# Patient Record
Sex: FEMALE | Race: WHITE | Employment: OTHER | URBAN - METROPOLITAN AREA
[De-identification: names, ages, dates, MRNs, and addresses within clinical notes are randomized per-mention and may not be internally consistent; named-entity substitution may affect disease eponyms.]

---

## 2017-02-09 ENCOUNTER — TELEPHONE (OUTPATIENT)
Dept: FAMILY MEDICINE | Facility: CLINIC | Age: 77
End: 2017-02-09

## 2017-02-09 ENCOUNTER — TRANSFERRED RECORDS (OUTPATIENT)
Dept: HEALTH INFORMATION MANAGEMENT | Facility: CLINIC | Age: 77
End: 2017-02-09

## 2017-02-09 NOTE — TELEPHONE ENCOUNTER
Reason for Call: Request for an order or referral:    Order or referral being requested: Alzheimer's     Date needed: as soon as possible    Has the patient been seen by the PCP for this problem? NO    Additional comments: Family is looking for a referral to see someone in regards to Alzheimer's. Stated that the last time Kathie's daughter was in, they discussed it, but the patient has since lost the names of the provider's that were given. Please call [facility] and advise. (We do have a consent to communicate on file)    Phone number Patient can be reached at:  Other phone number:  [phone]    Best Time:  anytime    Can we leave a detailed message on this number?  YES    Sasha Oglesby,   [facility]

## 2017-02-09 NOTE — TELEPHONE ENCOUNTER
Huddled with pcp.     Spoke with granddaughter and advised patient should be seen in clinic to determine type of referral needed.    She will inform patient and mother to call for appt.    Nicole Piña RN

## 2017-02-28 ENCOUNTER — OFFICE VISIT (OUTPATIENT)
Dept: FAMILY MEDICINE | Facility: CLINIC | Age: 77
End: 2017-02-28
Payer: MEDICARE

## 2017-02-28 VITALS
WEIGHT: 213 LBS | HEIGHT: 70 IN | DIASTOLIC BLOOD PRESSURE: 62 MMHG | BODY MASS INDEX: 30.49 KG/M2 | TEMPERATURE: 98.1 F | HEART RATE: 72 BPM | SYSTOLIC BLOOD PRESSURE: 108 MMHG | RESPIRATION RATE: 16 BRPM | OXYGEN SATURATION: 95 %

## 2017-02-28 DIAGNOSIS — F41.9 ANXIETY: ICD-10-CM

## 2017-02-28 DIAGNOSIS — I10 BENIGN ESSENTIAL HYPERTENSION: ICD-10-CM

## 2017-02-28 DIAGNOSIS — R41.3 MEMORY CHANGE: Primary | ICD-10-CM

## 2017-02-28 DIAGNOSIS — E21.0 PRIMARY HYPERPARATHYROIDISM (H): ICD-10-CM

## 2017-02-28 DIAGNOSIS — N95.0 POSTMENOPAUSAL BLEEDING: ICD-10-CM

## 2017-02-28 LAB
ALBUMIN SERPL-MCNC: 3.8 G/DL (ref 3.4–5)
ALP SERPL-CCNC: 79 U/L (ref 40–150)
ALT SERPL W P-5'-P-CCNC: 23 U/L (ref 0–50)
ANION GAP SERPL CALCULATED.3IONS-SCNC: 6 MMOL/L (ref 3–14)
AST SERPL W P-5'-P-CCNC: 15 U/L (ref 0–45)
BILIRUB SERPL-MCNC: 0.6 MG/DL (ref 0.2–1.3)
BUN SERPL-MCNC: 17 MG/DL (ref 7–30)
CALCIUM SERPL-MCNC: 9.2 MG/DL (ref 8.5–10.1)
CHLORIDE SERPL-SCNC: 105 MMOL/L (ref 94–109)
CO2 SERPL-SCNC: 29 MMOL/L (ref 20–32)
CREAT SERPL-MCNC: 0.92 MG/DL (ref 0.52–1.04)
ERYTHROCYTE [DISTWIDTH] IN BLOOD BY AUTOMATED COUNT: 13.2 % (ref 10–15)
ERYTHROCYTE [SEDIMENTATION RATE] IN BLOOD BY WESTERGREN METHOD: 14 MM/H (ref 0–30)
GFR SERPL CREATININE-BSD FRML MDRD: 59 ML/MIN/1.7M2
GLUCOSE SERPL-MCNC: 92 MG/DL (ref 70–99)
HCT VFR BLD AUTO: 42.6 % (ref 35–47)
HGB BLD-MCNC: 13.4 G/DL (ref 11.7–15.7)
MCH RBC QN AUTO: 28.5 PG (ref 26.5–33)
MCHC RBC AUTO-ENTMCNC: 31.5 G/DL (ref 31.5–36.5)
MCV RBC AUTO: 90 FL (ref 78–100)
PLATELET # BLD AUTO: 268 10E9/L (ref 150–450)
POTASSIUM SERPL-SCNC: 4.7 MMOL/L (ref 3.4–5.3)
PROT SERPL-MCNC: 7.7 G/DL (ref 6.8–8.8)
PTH-INTACT SERPL-MCNC: 31 PG/ML (ref 12–72)
RBC # BLD AUTO: 4.71 10E12/L (ref 3.8–5.2)
SODIUM SERPL-SCNC: 140 MMOL/L (ref 133–144)
TSH SERPL DL<=0.005 MIU/L-ACNC: 2.05 MU/L (ref 0.4–4)
VIT B12 SERPL-MCNC: 251 PG/ML (ref 193–986)
WBC # BLD AUTO: 7.5 10E9/L (ref 4–11)

## 2017-02-28 PROCEDURE — 99214 OFFICE O/P EST MOD 30 MIN: CPT | Performed by: INTERNAL MEDICINE

## 2017-02-28 PROCEDURE — 85652 RBC SED RATE AUTOMATED: CPT | Performed by: INTERNAL MEDICINE

## 2017-02-28 PROCEDURE — 83970 ASSAY OF PARATHORMONE: CPT | Performed by: INTERNAL MEDICINE

## 2017-02-28 PROCEDURE — 85027 COMPLETE CBC AUTOMATED: CPT | Performed by: INTERNAL MEDICINE

## 2017-02-28 PROCEDURE — 82607 VITAMIN B-12: CPT | Performed by: INTERNAL MEDICINE

## 2017-02-28 PROCEDURE — 84443 ASSAY THYROID STIM HORMONE: CPT | Performed by: INTERNAL MEDICINE

## 2017-02-28 PROCEDURE — 80053 COMPREHEN METABOLIC PANEL: CPT | Performed by: INTERNAL MEDICINE

## 2017-02-28 PROCEDURE — 36415 COLL VENOUS BLD VENIPUNCTURE: CPT | Performed by: INTERNAL MEDICINE

## 2017-02-28 RX ORDER — VIT A/VIT C/VIT E/ZINC/COPPER 4296-226
1 CAPSULE ORAL EVERY MORNING
COMMUNITY

## 2017-02-28 NOTE — NURSING NOTE
"Chief Complaint   Patient presents with     Memory Loss     short term memory loss, repeats herself a lot,   family is a little concerned about knowing where she is going.  Is writing down a lot of notes about schedules.        Initial /62 (BP Location: Right arm, Patient Position: Chair, Cuff Size: Adult Large)  Pulse 72  Temp 98.1  F (36.7  C) (Oral)  Resp 16  Ht 5' 9.5\" (1.765 m)  Wt 213 lb (96.6 kg)  SpO2 95%  BMI 31 kg/m2 Estimated body mass index is 31 kg/(m^2) as calculated from the following:    Height as of this encounter: 5' 9.5\" (1.765 m).    Weight as of this encounter: 213 lb (96.6 kg).  Medication Reconciliation: complete    "

## 2017-02-28 NOTE — LETTER
March 9, 2017      Kathie Wetzel  [address]   [facility] [facility] [place] [place] [address]        Dear Ms. Kathie Wetzel,    Overall, labs Look quite good; no change in medication or therapy recommended    Sincerely,     Cammie Daugherty MD

## 2017-02-28 NOTE — MR AVS SNAPSHOT
After Visit Summary   2/28/2017    Kathie Wetzel    MRN: [id number]           Patient Information     Date Of Birth          1940        Visit Information        Provider Department      2/28/2017 9:00 AM Cammie Daugherty MD [facility]        Today's Diagnoses     Memory change    -  1    Postmenopausal bleeding        Primary hyperparathyroidism (H)           Follow-ups after your visit        Additional Services     NEUROLOGY ADULT REFERRAL       Your provider has referred you to:     Rodri March, PhD  [facility]  [address]  [place], [place] [id number] [phone]      Reason for Referral: Consult    Please be aware that coverage of these services is subject to the terms and limitations of your health insurance plan.  Call member services at your health plan with any benefit or coverage questions.      Please bring the following with you to your appointment:    (1) Any X-Rays, CTs or MRIs which have been performed.  Contact the facility where they were done to arrange for  prior to your scheduled appointment.    (2) List of current medications  (3) This referral request   (4) Any documents/labs given to you for this referral                  Who to contact     If you have questions or need follow up information about today's clinic visit or your schedule please contact [facility] directly at [phone].  Normal or non-critical lab and imaging results will be communicated to you by MyChart, letter or phone within 4 business days after the clinic has received the results. If you do not hear from us within 7 days, please contact the clinic through MyChart or phone. If you have a critical or abnormal lab result, we will notify you by phone as soon as possible.  Submit refill requests through [employer] or call your pharmacy and they will forward the refill request to us. Please allow 3 business  "days for your refill to be completed.          Additional Information About Your Visit        [employer] Information     [employer] lets you send messages to your doctor, view your test results, renew your prescriptions, schedule appointments and more. To sign up, go to [email or web address] . Click on \"Log in\" on the left side of the screen, which will take you to the Welcome page. Then click on \"Sign up Now\" on the right side of the page.     You will be asked to enter the access code listed below, as well as some personal information. Please follow the directions to create your username and password.     Your access code is: [email or web address]  Expires: 2017  9:48 AM     Your access code will  in 90 days. If you need help or a new code, please call your [place] clinic or [phone].        Care EveryWhere ID     This is your [facility] EveryWhere ID. This could be used by other organizations to access your [facility] medical records  [id number]        Your Vitals Were     Pulse Temperature Respirations Height Pulse Oximetry BMI (Body Mass Index)    72 98.1  F (36.7  C) (Oral) 16 5' 9.5\" (1.765 m) 95% 31 kg/m2       Blood Pressure from Last 3 Encounters:   17 108/62   10/10/16 116/76   14 104/69    Weight from Last 3 Encounters:   17 213 lb (96.6 kg)   10/10/16 209 lb 9.6 oz (95.1 kg)   14 207 lb (93.9 kg)              We Performed the Following     CBC with platelets     Comprehensive metabolic panel     ESR: Erythrocyte sedimentation rate     NEUROLOGY ADULT REFERRAL     Parathyroid Hormone Intact     TSH with free T4 reflex     Vitamin B12        Primary Care Provider Office Phone # Fax #    Cammie Daugherty [phone]       [facility] [address]  [place] [address]        Thank you!     Thank you for choosing [facility]  for your care. Our goal is always to provide you with excellent care. Hearing back from our patients is one " way we can continue to improve our services. Please take a few minutes to complete the written survey that you may receive in the mail after your visit with us. Thank you!             Your Updated Medication List - Protect others around you: Learn how to safely use, store and throw away your medicines at www.disposemymeds.org.          This list is accurate as of: 2/28/17  9:48 AM.  Always use your most recent med list.                   Brand Name Dispense Instructions for use    ALEVE PO      PRN       citalopram 10 MG tablet    celeXA    90 tablet    Take 1 tablet (10 mg) by mouth daily       FISH OIL + D3 PO      Take 1 tablet by mouth daily       LIPITOR PO      Take 10 mg by mouth At Bedtime       losartan 50 MG tablet    COZAAR     Take 50 mg by mouth daily       PRESERVISION AREDS PO      Take 1 tablet by mouth daily       VITAMIN D PO      Take 1,000 Units by mouth daily

## 2017-02-28 NOTE — PROGRESS NOTES
SUBJECTIVE:                                                    Ktahie Wetzel is a 77 year old female who presents to clinic today for the following health issues:    Patient presents with:  Memory Loss: short term memory loss, repeats herself a lot, family is a little concerned about knowing where she is going. Is writing down a lot of notes about schedules.       Amount of exercise or physical activity: walks around her complex for about 5-10 minutes and shops and walks the stores. 2-3 times a week    Problems taking medications regularly: No    Medication side effects: none    Diet: regular (no restrictions)    Problem list and histories reviewed & adjusted, as indicated.  Additional history: as documented    BP Readings from Last 3 Encounters:   02/28/17 108/62   10/10/16 116/76   03/14/14 104/69    Wt Readings from Last 3 Encounters:   02/28/17 213 lb (96.6 kg)   10/10/16 209 lb 9.6 oz (95.1 kg)   03/14/14 207 lb (93.9 kg)        Labs reviewed in EPIC  Reviewed and updated as needed this visit by clinical staff  Tobacco  Allergies  Meds  Med Hx  Surg Hx  Fam Hx  Soc Hx      Reviewed and updated as needed this visit by Provider  Allergies  Meds       ROS:  CONSTITUTIONAL: NEGATIVE for fever, chills, change in weight  EYE: POSITIVE for macular degeneration - following with Ophthalmology  ENT/MOUTH: NEGATIVE for ear, mouth and throat problems  RESP: NEGATIVE for significant cough or SOB  CV: NEGATIVE for chest pain, palpitations or peripheral edema  GI: NEGATIVE for nausea, abdominal pain, heartburn, or change in bowel habits  : POSITIVE for postmenopausal spotting - scheduled US today   MUSCULOSKELETAL: NEGATIVE for significant arthralgias or myalgia  NEURO: POSITIVE for short term memory loss noted by family - repeats self, keeping notes about schedules, family concerned when patient leaves home, no reported concerns with remembering appointments, finances, or driving, NEGATIVE for weakness,  "dizziness or paresthesias  ENDOCRINE: Hx of right parathyroidectomy 3/14/14, NEGATIVE for temperature intolerance, skin/hair changes  HEME/ALLERGY/IMMUNE: NEGATIVE for bleeding problems  PSYCHIATRIC: NEGATIVE for changes in mood or affect    This document serves as a record of the services and decisions personally performed and made by Cammie Daugherty MD. It was created on her behalf by Drea Ardon, a trained medical scribe. The creation of this document is based on the provider's statements to the medical scribe.   Drea Ardon, 9:37 AM, February 28, 2017    OBJECTIVE:                                                    /62 (BP Location: Right arm, Patient Position: Chair, Cuff Size: Adult Large)  Pulse 72  Temp 98.1  F (36.7  C) (Oral)  Resp 16  Ht 5' 9.5\" (1.765 m)  Wt 213 lb (96.6 kg)  SpO2 95%  BMI 31 kg/m2  Body mass index is 31 kg/(m^2).  GENERAL APPEARANCE: healthy, alert and no distress  NECK: no adenopathy and thyroid normal to palpation, no bruits  RESP: lungs clear to auscultation - no rales, rhonchi or wheezes  CV: regular rates and rhythm and normal S1 S2, no peripheral edema  LYMPHATICS: normal ant/post cervical and supraclavicular nodes  ABDOMEN: soft, nontender, without hepatosplenomegaly or masses and bowel sounds normal  MS: extremities normal- no gross deformities noted  NEURO: mentation intact and speech normal  PSYCH: mentation appears normal and affect normal/bright    Diagnostic Test Results:  none      ASSESSMENT/PLAN:                                                    Patient seen in the clinic today with daughter, Regi.     (R41.3) Memory change (primary encounter diagnosis)  Comment: FH Alzheimer's; several family members concerned about noted change/behaviors; recommended Neurology evaluation or Neuropsych evaluation with Dr. March  Plan: NEUROLOGY ADULT REFERRAL, Vitamin B12, ESR:         Erythrocyte sedimentation rate, TSH with free         T4 reflex, Comprehensive metabolic " panel, CBC         with platelets, Parathyroid Hormone Intact          (N95.0) Postmenopausal bleeding  Comment: spotting; seeing Dr. Pau Tellez for US today and further definitive evaluation   Plan: Defer to GYN          (E21.0) Primary hyperparathyroidism (H)  Comment: large adenoma removed several years ago; Dr. Fischer,  Plan: Parathyroid Hormone Intact        CMP    (I10) Benign essential hypertension  Comment: Losartan 50 mg has been effective at controlling bp  Plan: well controlled    (F41.9) Anxiety  Comment: Citalopram 10 mg for quite sometime;   Plan: well tolerated     MEDICATIONS:   Orders Placed This Encounter   Medications     Multiple Vitamins-Minerals (PRESERVISION AREDS PO)     Sig: Take 1 tablet by mouth daily     Fish Oil-Cholecalciferol (FISH OIL + D3 PO)     Sig: Take 1 tablet by mouth daily     There are no discontinued medications.    FUTURE APPOINTMENTS:       - Make appointment with neuropsych specialist    Cammie Daugherty MD  Internal Medicine  [facility] [place]    The information in this document, created by a medical scribe for me, accurately reflects the services I personally performed and the decisions made by me. I have reviewed and approved this document for accuracy.  Dr. Cammie Daugherty, 9:49 AM, February 28, 2017

## 2017-03-15 NOTE — PROGRESS NOTES
[facility]  [address] [address] [phone]  Dept: [phone]    PRE-OP EVALUATION:  Today's date: 3/22/2017    Kathie Wetzel (: 1940) presents for pre-operative evaluation assessment as requested by Dr. Kai Tellez.  She requires evaluation and anesthesia risk assessment prior to undergoing surgery/procedure for treatment of vaginal bleeding. Proposed procedure: D and C with Biopsy    Date of Surgery/ Procedure: 3/30/17  Time of Surgery/ Procedure: 8:30am  Hospital/Surgical Facility: [facility]  Fax number for surgical facility: [phone]  Primary Physician: Cammie Daugherty  Type of Anesthesia Anticipated: to be determined    Patient has a Health Care Directive or Living Will:  YES     1. NO - Do you have a history of heart attack, stroke, stent, bypass or surgery on an artery in the head, neck, heart or legs?  2. NO - Do you ever have any pain or discomfort in your chest?  3. NO - Do you have a history of  Heart Failure?  4. YES - ARE YOUR TROUBLED BY SHORTNESS OF BREATH WHEN WALKING ON THE LEVEL, UP A SLIGHT HILL OR AT NIGHT? Gets winded when she is walking too fast.  5. NO - Do you currently have a cold, bronchitis or other respiratory infection?  6. NO - Do you have a cough, shortness of breath or wheezing?  7. NO - Do you sometimes get pains in the calves of your legs when you walk?  8. NO - Do you or anyone in your family have previous history of blood clots?  9. NO - Do you or does anyone in your family have a serious bleeding problem such as prolonged bleeding following surgeries or cuts?  10. NO - Have you ever had problems with anemia or been told to take iron pills?  11. NO - Have you had any abnormal blood loss such as black, tarry or bloody stools, or abnormal vaginal bleeding?  12. NO - Have you ever had a blood transfusion?  13. NO - Have you or any of your relatives ever had problems with anesthesia?  14. NO - Do you  have sleep apnea, excessive snoring or daytime drowsiness?  15. NO - Do you have any prosthetic heart valves?  16. NO - Do you have prosthetic joints?  17. NO - Is there any chance that you may be pregnant?      HPI:                                                      Brief HPI related to upcoming procedure:     The patient has been following with Dr. Kai Tellez and requested the patient follow-up for a dilation and curettage in addition to a biopsy to determine if there are any abnormal cells on the patient's cervix.    Sleep Disturbances:  The patient reports she has been having a tough time staying asleep at night, which she has been trying to treat with Benadryl and Tylenol-PM, but neither has proven to be consistently effective.       HYPERTENSION - Patient has longstanding history of mod-severe HTN , currently denies any symptoms referable to elevated blood pressure. Specifically denies chest pain, palpitations, dyspnea, orthopnea, PND or peripheral edema. Blood pressure readings have been in normal range. Current medication regimen is as listed below. Patient denies any side effects of medication.                                                                                                            BP Readings from Last 3 Encounters:   03/22/17 116/62   02/28/17 108/62   10/10/16 116/76        HYPERLIPIDEMIA - Patient has a long history of significant Hyperlipidemia requiring medication for treatment with recent good control. Patient reports no problems or side effects with the medication.                                                         MEDICAL HISTORY:                                                      Patient Active Problem List    Diagnosis Date Noted     Retinal tear, left 10/17/2016     Priority: Medium     Hyperlipidemia LDL goal <100 10/10/2016     Priority: Medium     Atorvastatin helpful       Benign essential hypertension 10/10/2016     Priority: Medium     Anxiety 10/10/2016      Priority: Medium     Citalopram for years       Primary hyperparathyroidism (H) 02/26/2014     Priority: Medium     Parathyroidectomy        Past Medical History:   Diagnosis Date     High cholesterol      HTN (hypertension)      Parathyroid adenoma      Thyroid disorder      Past Surgical History:   Procedure Laterality Date     APPENDECTOMY OPEN       CHOLECYSTECTOMY  2007     PARATHYROIDECTOMY  3/14/2014    Procedure: PARATHYROIDECTOMY;  Neck Exploration, Excision Right  Parathyroid Adenoma, Pre Operative Sestimibi Injection, Rapid PTH Assay ;  Surgeon: Natividad Fischer MD;  Location: [facility] OR     Current Outpatient Prescriptions   Medication Sig Dispense Refill     Multiple Vitamins-Minerals (PRESERVISION AREDS PO) Take 1 tablet by mouth daily       Fish Oil-Cholecalciferol (FISH OIL + D3 PO) Take 1 tablet by mouth daily       citalopram (CELEXA) 10 MG tablet Take 1 tablet (10 mg) by mouth daily 90 tablet 2     Atorvastatin Calcium (LIPITOR PO) Take 10 mg by mouth At Bedtime       losartan (COZAAR) 50 MG tablet Take 50 mg by mouth daily       Cholecalciferol (VITAMIN D PO) Take 1,000 Units by mouth daily        Naproxen Sodium (ALEVE PO) PRN       OTC products: None, except as noted above    Allergies   Allergen Reactions     Simvastatin      Buttock pain     Penicillins Rash      Latex Allergy: NO    Social History   Substance Use Topics     Smoking status: Former Smoker     Quit date: 3/10/1972     Smokeless tobacco: Never Used     Alcohol use Yes      Comment: 2 week      History   Drug Use No       REVIEW OF SYSTEMS:                                                    C: NEGATIVE for fever, chills, or change in weight  E: Hx of Retinal Tear, Left - surgically repaired on 2/9/2017; NEGATIVE for vision changes or irritation  E/M: NEGATIVE for ear, mouth and throat problems  R: POSITIVE for intermittent shortness of breath - with exertion and fast walking; NEGATIVE for significant cough  CV: Hx of Hypertension -  use of Losartan; NEGATIVE for chest pain, palpitations or peripheral edema  GI: NEGATIVE for nausea, abdominal pain, heartburn, or change in bowel habits  : NEGATIVE for unusual urinary or vaginal symptoms  M: NEGATIVE for significant arthralgias or myalgia  N: NEGATIVE for weakness, dizziness or paresthesias  E: Hx of Hyperparathyroidism - parathyroidectomy on 3/14/2014; Hx of Hyperlipidemia - use of Atorvastatin; NEGATIVE for temperature intolerance, or skin/hair changes  H: NEGATIVE for bleeding problems  P: Hx of Anxiety - use of Citalopram; POSITIVE for sleep disturbances - use of Benadryl; NEGATIVE for changes in mood or affect    This document serves as a record of the services and decisions personally performed and made by Cammie Daugherty MD. It was created on her behalf by Marisa Ponce, a trained medical scribe. The creation of this document is based the provider's statements to the medical scribe.  Marisa Ponce, March 22, 2017 2:19 PM     EXAM:                                                    /62 (BP Location: Right arm, Patient Position: Chair, Cuff Size: Adult Large)  Pulse 79  Temp 97.8  F (36.6  C) (Oral)  Resp 15  Wt 98.1 kg (216 lb 4.8 oz)  SpO2 97%  BMI 31.48 kg/m2  GENERAL: Patient appears healthy, alert and not distressed.  EYES: Eyes appear grossly normal to inspection, with normal conjunctivae and sclerae  HENT: Ear canals and TM's appear normal, mouth is without ulcers or lesions, oropharynx is clear and oral mucous membranes are moist  NECK: No adenopathy present, no asymmetry, masses, or scars noted, thyroid is normal to palpation  RESP: Lungs are clear to auscultation - no rales, rhonchi or wheezes present  CV: Regular rate and rhythm, normal S1 S2 heart sounds, no ectopy, no peripheral edema present, peripheral pulses normal, no carotid bruit.  ABDOMEN: Soft, nontender, no hepatosplenomegaly, no masses, normal bowel sounds  MS: No gross musculoskeletal defects noted, no  edema, gait is age appropriate without ataxia  NEURO: Patient exhibits normal strength and tone, normal speech and mentation  PSYCH: Mentation appears normal, affect is normal/bright    DIAGNOSTICS:                                                    EKG: appears normal, NSR, rate 77, normal axis, normal intervals, no acute ST/T changes c/w ischemia, no LVH by voltage criteria    Recent Labs   Lab Test  02/28/17   0949   HGB  13.4   PLT  268   NA  140   POTASSIUM  4.7   CR  0.92      IMPRESSION:                                                    Reason for surgery/procedure: postmenopausal bleeding  Diagnosis/reason for consult: postmenopausal bleeding.    The proposed surgical procedure is considered LOW risk.    REVISED CARDIAC RISK INDEX  The patient has the following serious cardiovascular risks for perioperative complications such as (MI, PE, VFib and 3  AV Block):  No serious cardiac risks  INTERPRETATION: 0 risks: Class I (very low risk - 0.4% complication rate)    The patient has the following additional risks for perioperative complications:  No identified additional risks      ICD-10-CM    1. Preop general physical exam Z01.818 EKG 12-lead complete w/read - Clinics   2. Symptomatic menopausal or female climacteric states N95.1    3. Benign essential hypertension I10        RECOMMENDATIONS:                                                        --Patient is to take all scheduled medications on the day of surgery EXCEPT for modifications listed below.     -HOLD Fish Oil and Naproxen one week prior to surgery  OK to take Citalopram and Losartan with a small sip of water on early AM of surgery.    APPROVAL GIVEN to proceed with proposed procedure, without further diagnostic evaluation     The information in this document, created by a medical scribe for me, accurately reflects the services I personally performed and the decisions made by me. I have reviewed and approved this document for accuracy.  Dr. Bonds  [place], March 22, 2017, 2:37 PM     Signed Electronically by: Cammie Daugherty MD    Copy of this evaluation report is provided to requesting physician.    [facility] Preop Guidelines

## 2017-03-15 NOTE — PATIENT INSTRUCTIONS
Before Your Surgery    Call your surgeon if there is any change in your health. This includes signs of a cold or flu (such as a sore throat, runny nose, cough, rash or fever).    Do not smoke, drink alcohol or take over the counter medicine (unless your surgeon or primary care doctor tells you to) for the 24 hours before and after surgery.    If you take prescribed drugs: Follow your doctor s orders about which medicines to take and which to stop until after surgery.    Eating and drinking prior to surgery: follow the instructions from your surgeon    Take a shower or bath the night before surgery. Use the soap your surgeon gave you to gently clean your skin. If you do not have soap from your surgeon, use your regular soap. Do not shave or scrub the surgery site.  Wear clean pajamas and have clean sheets on your bed.     Approval given to proceed with proposed procedure, without further diagnostic evaluation    --Patient advised to avoid NSAIDS (Motrin, Ibuprofen, Aleve or Naprosyn)    --If needed, Tylenol or Acetaminophen are fine to use.    --Medications reviewed:   OK to use eye drops as usual.   OK to take Citalopram and Losartan with a tiny sip of water.   WAIT to take Lipitor/Atorvastatin until you get home from surgery.   HOLD Fish Oil    --Pain medications, time off from work and FMLA following surgery deferred to surgeon.

## 2017-03-22 ENCOUNTER — OFFICE VISIT (OUTPATIENT)
Dept: FAMILY MEDICINE | Facility: CLINIC | Age: 77
End: 2017-03-22
Payer: MEDICARE

## 2017-03-22 VITALS
HEART RATE: 79 BPM | RESPIRATION RATE: 15 BRPM | OXYGEN SATURATION: 97 % | BODY MASS INDEX: 31.48 KG/M2 | TEMPERATURE: 97.8 F | SYSTOLIC BLOOD PRESSURE: 116 MMHG | DIASTOLIC BLOOD PRESSURE: 62 MMHG | WEIGHT: 216.3 LBS

## 2017-03-22 DIAGNOSIS — Z01.818 PREOP GENERAL PHYSICAL EXAM: Primary | ICD-10-CM

## 2017-03-22 DIAGNOSIS — N95.1 SYMPTOMATIC MENOPAUSAL OR FEMALE CLIMACTERIC STATES: ICD-10-CM

## 2017-03-22 DIAGNOSIS — I10 BENIGN ESSENTIAL HYPERTENSION: ICD-10-CM

## 2017-03-22 PROCEDURE — 93000 ELECTROCARDIOGRAM COMPLETE: CPT | Performed by: INTERNAL MEDICINE

## 2017-03-22 PROCEDURE — 99214 OFFICE O/P EST MOD 30 MIN: CPT | Performed by: INTERNAL MEDICINE

## 2017-03-22 NOTE — Clinical Note
Do we need to fax to [facility] surgery M[facility]? If so, please do and if not, all set.  EKG also current

## 2017-03-22 NOTE — NURSING NOTE
"Chief Complaint   Patient presents with     Pre-Op Exam       Initial /62 (BP Location: Right arm, Patient Position: Chair, Cuff Size: Adult Large)  Pulse 79  Temp 97.8  F (36.6  C) (Oral)  Resp 15  Wt 216 lb 4.8 oz (98.1 kg)  SpO2 97%  BMI 31.48 kg/m2 Estimated body mass index is 31.48 kg/(m^2) as calculated from the following:    Height as of 2/28/17: 5' 9.5\" (1.765 m).    Weight as of this encounter: 216 lb 4.8 oz (98.1 kg).  Medication Reconciliation: complete    "

## 2017-03-22 NOTE — MR AVS SNAPSHOT
After Visit Summary   3/22/2017    Kathie Wetzel    MRN: [id number]           Patient Information     Date Of Birth          1940        Visit Information        Provider Department      3/22/2017 1:30 PM Cammie Daugherty MD [facility]        Today's Diagnoses     Preop general physical exam    -  1    Symptomatic menopausal or female climacteric states        Benign essential hypertension          Care Instructions      Before Your Surgery    Call your surgeon if there is any change in your health. This includes signs of a cold or flu (such as a sore throat, runny nose, cough, rash or fever).    Do not smoke, drink alcohol or take over the counter medicine (unless your surgeon or primary care doctor tells you to) for the 24 hours before and after surgery.    If you take prescribed drugs: Follow your doctor s orders about which medicines to take and which to stop until after surgery.    Eating and drinking prior to surgery: follow the instructions from your surgeon    Take a shower or bath the night before surgery. Use the soap your surgeon gave you to gently clean your skin. If you do not have soap from your surgeon, use your regular soap. Do not shave or scrub the surgery site.  Wear clean pajamas and have clean sheets on your bed.     Approval given to proceed with proposed procedure, without further diagnostic evaluation    --Patient advised to avoid NSAIDS (Motrin, Ibuprofen, Aleve or Naprosyn)    --If needed, Tylenol or Acetaminophen are fine to use.    --Medications reviewed:   OK to use eye drops as usual.   OK to take Citalopram and Losartan with a tiny sip of water.   WAIT to take Lipitor/Atorvastatin until you get home from surgery.   HOLD Fish Oil    --Pain medications, time off from work and FMLA following surgery deferred to surgeon.        Follow-ups after your visit        Who to contact     If you have questions or need follow up information about today's  "clinic visit or your schedule please contact [facility] directly at [phone].  Normal or non-critical lab and imaging results will be communicated to you by MyChart, letter or phone within 4 business days after the clinic has received the results. If you do not hear from us within 7 days, please contact the clinic through [employer] or phone. If you have a critical or abnormal lab result, we will notify you by phone as soon as possible.  Submit refill requests through [employer] or call your pharmacy and they will forward the refill request to us. Please allow 3 business days for your refill to be completed.          Additional Information About Your Visit        MyChart Information     [employer] It lets you send messages to your doctor, view your test results, renew your prescriptions, schedule appointments and more. To sign up, go to [email or web address] . Click on \"Log in\" on the left side of the screen, which will take you to the Welcome page. Then click on \"Sign up Now\" on the right side of the page.     You will be asked to enter the access code listed below, as well as some personal information. Please follow the directions to create your username and password.     Your access code is: [email or web address]  Expires: 2017 10:48 AM     Your access code will  in 90 days. If you need help or a new code, please call your [place] clinic or [phone].        Care EveryWhere ID     This is your Care EveryWhere ID. This could be used by other organizations to access your [place] medical records  [id number]        Your Vitals Were     Pulse Temperature Respirations Pulse Oximetry BMI (Body Mass Index)       79 97.8  F (36.6  C) (Oral) 15 97% 31.48 kg/m2        Blood Pressure from Last 3 Encounters:   17 116/62   17 108/62   10/10/16 116/76    Weight from Last 3 Encounters:   17 216 lb 4.8 oz (98.1 kg)   17 213 lb (96.6 kg)   10/10/16 209 lb 9.6 oz (95.1 kg)              We " Performed the Following     EKG 12-lead complete w/read - Clinics        Primary Care Provider Office Phone # Fax #    Cammie Daugherty [phone]       [facility] [address]  [facility] [address]        Thank you!     Thank you for choosing [facility]  for your care. Our goal is always to provide you with excellent care. Hearing back from our patients is one way we can continue to improve our services. Please take a few minutes to complete the written survey that you may receive in the mail after your visit with us. Thank you!             Your Updated Medication List - Protect others around you: Learn how to safely use, store and throw away your medicines at www.disposemymeds.org.          This list is accurate as of: 3/22/17  2:33 PM.  Always use your most recent med list.                   Brand Name Dispense Instructions for use    ALEVE PO      PRN       citalopram 10 MG tablet    celeXA    90 tablet    Take 1 tablet (10 mg) by mouth daily       FISH OIL + D3 PO      Take 1 tablet by mouth daily       LIPITOR PO      Take 10 mg by mouth At Bedtime       losartan 50 MG tablet    COZAAR     Take 50 mg by mouth daily       PRESERVISION AREDS PO      Take 1 tablet by mouth daily       VITAMIN D PO      Take 1,000 Units by mouth daily

## 2017-03-30 ENCOUNTER — HOSPITAL PATHOLOGY (OUTPATIENT)
Dept: OTHER | Facility: CLINIC | Age: 77
End: 2017-03-30

## 2017-03-31 LAB — COPATH REPORT: NORMAL

## 2017-05-18 ENCOUNTER — TRANSFERRED RECORDS (OUTPATIENT)
Dept: HEALTH INFORMATION MANAGEMENT | Facility: CLINIC | Age: 77
End: 2017-05-18

## 2017-06-03 DIAGNOSIS — F41.9 ANXIETY: ICD-10-CM

## 2017-06-03 RX ORDER — CITALOPRAM HYDROBROMIDE 10 MG/1
TABLET ORAL
Qty: 90 TABLET | Refills: 0 | Status: CANCELLED | OUTPATIENT
Start: 2017-06-03

## 2017-06-05 NOTE — TELEPHONE ENCOUNTER
citalopram (CELEXA) 10 MG     Last Written Prescription Date: 10/10/16  Last Fill Quantity: 90, # refills: 2  [facility] Office Visit with [facility] care provider:  3/22/17   Next 5 appointments (look out 90 days)     Jun 06, 2017  2:00 PM CDT   [facility]    [address] [address] [address]   [phone]            Jun 20, 2017  9:00 AM CDT   Office Visit with Cammie D[facility] MD   [facility]    [address] [address] [address]   [phone]                   Last PHQ-9 score on record= No flowsheet data found.

## 2017-06-05 NOTE — TELEPHONE ENCOUNTER
Appointment tomorrow with Dr. Daugherty, called patient to ask if this can wait for tomorrow, or if she needs today.  Left message at home number to call and let us know if needed today.  Clarissa Malik, KEREN  Triage Nurse

## 2017-06-06 ENCOUNTER — OFFICE VISIT (OUTPATIENT)
Dept: FAMILY MEDICINE | Facility: CLINIC | Age: 77
End: 2017-06-06
Payer: MEDICARE

## 2017-06-06 VITALS
BODY MASS INDEX: 32.04 KG/M2 | RESPIRATION RATE: 15 BRPM | HEART RATE: 72 BPM | OXYGEN SATURATION: 96 % | DIASTOLIC BLOOD PRESSURE: 60 MMHG | SYSTOLIC BLOOD PRESSURE: 112 MMHG | TEMPERATURE: 98.3 F | WEIGHT: 220.1 LBS

## 2017-06-06 DIAGNOSIS — F41.9 ANXIETY: ICD-10-CM

## 2017-06-06 DIAGNOSIS — E78.5 HYPERLIPIDEMIA LDL GOAL <100: Primary | ICD-10-CM

## 2017-06-06 DIAGNOSIS — M54.50 RIGHT-SIDED LOW BACK PAIN WITHOUT SCIATICA, UNSPECIFIED CHRONICITY: ICD-10-CM

## 2017-06-06 DIAGNOSIS — I10 BENIGN ESSENTIAL HYPERTENSION: ICD-10-CM

## 2017-06-06 PROCEDURE — 99214 OFFICE O/P EST MOD 30 MIN: CPT | Performed by: INTERNAL MEDICINE

## 2017-06-06 RX ORDER — ATORVASTATIN CALCIUM 10 MG/1
10 TABLET, FILM COATED ORAL AT BEDTIME
Qty: 90 TABLET | Refills: 3 | Status: SHIPPED | OUTPATIENT
Start: 2017-06-06 | End: 2018-06-05

## 2017-06-06 RX ORDER — LOSARTAN POTASSIUM 50 MG/1
50 TABLET ORAL DAILY
Qty: 90 TABLET | Refills: 3 | Status: SHIPPED | OUTPATIENT
Start: 2017-06-06 | End: 2018-06-05

## 2017-06-06 RX ORDER — CITALOPRAM HYDROBROMIDE 10 MG/1
10 TABLET ORAL DAILY
Qty: 90 TABLET | Refills: 3 | Status: SHIPPED | OUTPATIENT
Start: 2017-06-06 | End: 2018-06-05

## 2017-06-06 NOTE — PROGRESS NOTES
SUBJECTIVE:                                                    Kathie Wetzel is a 77 year old female who presents to clinic today for the following health issues:    Hyperlipidemia Follow-Up      Rate your low fat/cholesterol diet?: fair    Taking statin? Yes, no muscle aches from statin    Other lipid medications/supplements?: none    Hypertension Follow-up      Outpatient blood pressures are not being checked.    Low Salt Diet: no added salt  BP Readings from Last 3 Encounters:   06/06/17 112/60   03/22/17 116/62   02/28/17 108/62        Anxiety Follow-Up      Status since last visit: No change    Other associated symptoms: None    Complicating factors:   Significant life event: No   Current substance abuse: None  Depression symptoms: No  No flowsheet data found.   GAD7       Amount of exercise or physical activity: None    Problems taking medications regularly: No    Medication side effects: none    Diet: regular (no restrictions)    Problem list and histories reviewed & adjusted, as indicated.  Additional history: as documented    Recent Labs   Lab Test  02/28/17   0949   ALT  23   CR  0.92   GFRESTIMATED  59*   GFRESTBLACK  72   POTASSIUM  4.7   TSH  2.05      BP Readings from Last 3 Encounters:   06/06/17 112/60   03/22/17 116/62   02/28/17 108/62    Wt Readings from Last 3 Encounters:   06/06/17 220 lb 1.6 oz (99.8 kg)   03/22/17 216 lb 4.8 oz (98.1 kg)   02/28/17 213 lb (96.6 kg)        Labs reviewed in EPIC    Reviewed and updated as needed this visit by clinical staff  Tobacco  Allergies  Meds  Problems  Med Hx  Surg Hx  Fam Hx  Soc Hx        Reviewed and updated as needed this visit by Provider  Allergies  Meds  Problems       ROS:  CONSTITUTIONAL: NEGATIVE for fever, chills, change in weight  RESP: NEGATIVE for significant cough or SOB  CV: HTN - on losartan (Cozaar); NEGATIVE for chest pain, palpitations or peripheral edema  GI: NEGATIVE for nausea, abdominal pain, heartburn, or change in  bowel habits  : Recent D & C with biopsy 3/30/17  MUSCULOSKELETAL: POSITIVE for intermittent right hip pain - doing PT exercises at home   ENDOCRINE: Hyperlipidemia - on atorvastatin (Lipitor); Hx of right parathyroidectomy; NEGATIVE for temperature intolerance, skin/hair changes  HEME/ALLERGY/IMMUNE: NEGATIVE for bleeding problems  PSYCHIATRIC: Anxiety - stable on citalopram (Celexa); NEGATIVE for changes in mood or affect    This document serves as a record of the services and decisions personally performed and made by Cammie Daugherty MD. It was created on her behalf by Drea Ardon, a trained medical scribe. The creation of this document is based on the provider's statements to the medical scribe.   Drea Ardon, 2:47 PM, June 6, 2017    OBJECTIVE:                                                    /60 (BP Location: Right arm, Patient Position: Chair, Cuff Size: Adult Large)  Pulse 72  Temp 98.3  F (36.8  C) (Oral)  Resp 15  Wt 220 lb 1.6 oz (99.8 kg)  SpO2 96%  BMI 32.04 kg/m2  Body mass index is 32.04 kg/(m^2).  GENERAL APPEARANCE: healthy, alert and no distress  NECK: no bruits  RESP: lungs clear to auscultation - no rales, rhonchi or wheezes  CV: regular rates and rhythm and normal S1 S2, no peripheral edema  ABDOMEN: soft, nontender, without hepatosplenomegaly or masses and bowel sounds normal  MS: extremities normal- no gross deformities noted  NEURO: Normal strength and tone, mentation intact and speech normal  PSYCH: mentation appears normal and affect normal/bright    Diagnostic Test Results:  none     ASSESSMENT/PLAN:                                                    Patient was seen in the clinic with her daughter, Regi.     (E78.5) Hyperlipidemia LDL goal <100 (primary encounter diagnosis)  Comment: med well-tolerated, future fasting lab orders placed   no LDL for comparison  Plan: atorvastatin (LIPITOR) 10 MG tablet, Lipid         panel reflex to direct LDL, Comprehensive         metabolic  panel          (I10) Benign essential hypertension  Comment: BP reviewed and well-controlled, will continue to monitor, no change in med/dosage   Plan: losartan (COZAAR) 50 MG tablet, Albumin Random         Urine Quantitative          (F41.9) Anxiety  Comment: doing well on Citalopram for past several years, stable and well-tolerated;   Plan: citalopram (CELEXA) 10 MG tablet          (M54.5) Right-sided low back pain without sciatica, unspecified chronicity  Comment: PT in the past felt to be helpful; tries to do many of those same exercises at home; she is interested in following with [facility] for re-evaluation and assess exercises;  also discussed not caring heavy purse;   Plan: [facility] PT, HAND, AND CHIROPRACTIC REFERRAL          MEDICATIONS:   Orders Placed This Encounter   Medications     citalopram (CELEXA) 10 MG tablet     Sig: Take 1 tablet (10 mg) by mouth daily     Dispense:  90 tablet     Refill:  3     losartan (COZAAR) 50 MG tablet     Sig: Take 1 tablet (50 mg) by mouth daily     Dispense:  90 tablet     Refill:  3     atorvastatin (LIPITOR) 10 MG tablet     Sig: Take 1 tablet (10 mg) by mouth At Bedtime     Dispense:  90 tablet     Refill:  3     Medications Discontinued During This Encounter   Medication Reason     citalopram (CELEXA) 10 MG tablet Reorder     losartan (COZAAR) 50 MG tablet Reorder     Atorvastatin Calcium (LIPITOR PO) Reorder     FUTURE LABS:       - Schedule a fasting blood draw in 2 weeks (lipids)  FUTURE APPOINTMENTS:       - Make follow-up visit after next lab draw      Cammie Daugherty MD  Internal Medicine  [facility]    The information in this document, created by a medical scribe for me, accurately reflects the services I personally performed and the decisions made by me. I have reviewed and approved this document for accuracy.  Dr. Cammie Daugherty, 3:08 PM, June 6, 2017

## 2017-06-06 NOTE — MR AVS SNAPSHOT
After Visit Summary   6/6/2017    Kathie Wetzel    MRN: [id number]           Patient Information     Date Of Birth          1940        Visit Information        Provider Department      6/6/2017 2:00 PM Cammie Daugherty MD [facility]        Today's Diagnoses     Hyperlipidemia LDL goal <100    -  1    Benign essential hypertension        Anxiety        Right-sided low back pain without sciatica, unspecified chronicity           Follow-ups after your visit        Additional Services     [facility] PT, HAND, AND CHIROPRACTIC REFERRAL       **This order will print in the [facility] Scheduling Office**    Physical Therapy, Hand Therapy and Chiropractic Care are available through:    *[facility]  *[facility]  *[facility]    Call one number to schedule at any of the above locations: [phone].    Your provider has referred you to: Physical Therapy at [facility] or [facility]    Indication/Reason for Referral: Low Back Pain  Onset of Illness: years  Therapy Orders: Evaluate and Treat  Special Programs: None  Special Request: None    Nubia Delacruz      Additional Comments for the Therapist or Chiropractor:       Please be aware that coverage of these services is subject to the terms and limitations of your health insurance plan.  Call member services at your health plan with any benefit or coverage questions.      Please bring the following to your appointment:    *Your personal calendar for scheduling future appointments  *Comfortable clothing                  Your next 10 appointments already scheduled     Jun 20, 2017  9:00 AM CDT   Office Visit with Cammie Daugherty MD   [facility] ([facility]    [address] [address] [phone]           Bring a current list of meds and any records pertaining to this visit.  For Physicals, please bring immunization records and any forms needing to  "be filled out.  Please arrive 10 minutes early to complete paperwork.              Future tests that were ordered for you today     Open Future Orders        Priority Expected Expires Ordered    Lipid panel reflex to direct LDL Routine 2017    Comprehensive metabolic panel Routine 2017    Albumin Random Urine Quantitative Routine 2017            Who to contact     If you have questions or need follow up information about today's clinic visit or your schedule please contact [facility] [place] directly at [phone].  Normal or non-critical lab and imaging results will be communicated to you by [employer], letter or phone within 4 business days after the clinic has received the results. If you do not hear from us within 7 days, please contact the clinic through [employer] or phone. If you have a critical or abnormal lab result, we will notify you by phone as soon as possible.  Submit refill requests through [employer] or call your pharmacy and they will forward the refill request to us. Please allow 3 business days for your refill to be completed.          Additional Information About Your Visit        [employer] Information     [employer] lets you send messages to your doctor, view your test results, renew your prescriptions, schedule appointments and more. To sign up, go to [email or web address] . Click on \"Log in\" on the left side of the screen, which will take you to the Welcome page. Then click on \"Sign up Now\" on the right side of the page.     You will be asked to enter the access code listed below, as well as some personal information. Please follow the directions to create your username and password.     Your access code is: [email or web address]  Expires: 2017  3:05 PM     Your access code will  in 90 days. If you need help or a new code, please call your [facility] or [phone].        Care EveryWhere ID     This is your Care " EveryWhere ID. This could be used by other organizations to access your [place] medical records  [id number]        Your Vitals Were     Pulse Temperature Respirations Pulse Oximetry BMI (Body Mass Index)       72 98.3  F (36.8  C) (Oral) 15 96% 32.04 kg/m2        Blood Pressure from Last 3 Encounters:   06/06/17 112/60   03/22/17 116/62   02/28/17 108/62    Weight from Last 3 Encounters:   06/06/17 220 lb 1.6 oz (99.8 kg)   03/22/17 216 lb 4.8 oz (98.1 kg)   02/28/17 213 lb (96.6 kg)              We Performed the Following     [facility] PT, HAND, AND CHIROPRACTIC REFERRAL          Where to get your medicines      These medications were sent to [facility] [address] [address] [address] [place] [address] & [facility]  [address] [address] [address]     Phone:  [phone]     atorvastatin 10 MG tablet    citalopram 10 MG tablet    losartan 50 MG tablet          Primary Care Provider Office Phone # Fax #    Cammie Daugherty [phone]       [facility] [address]  [facility] [address]        Thank you!     Thank you for choosing [facility]  for your care. Our goal is always to provide you with excellent care. Hearing back from our patients is one way we can continue to improve our services. Please take a few minutes to complete the written survey that you may receive in the mail after your visit with us. Thank you!             Your Updated Medication List - Protect others around you: Learn how to safely use, store and throw away your medicines at www.disposemymeds.org.          This list is accurate as of: 6/6/17  3:05 PM.  Always use your most recent med list.                   Brand Name Dispense Instructions for use    ALEVE PO      PRN       atorvastatin 10 MG tablet    LIPITOR    90 tablet    Take 1 tablet (10 mg) by mouth At Bedtime       citalopram 10 MG tablet    celeXA    90 tablet    Take 1 tablet (10 mg) by mouth  daily       FISH OIL + D3 PO      Take 1 tablet by mouth daily       losartan 50 MG tablet    COZAAR    90 tablet    Take 1 tablet (50 mg) by mouth daily       PRESERVISION AREDS PO      Take 1 tablet by mouth daily       VITAMIN D PO      Take 1,000 Units by mouth daily

## 2017-06-06 NOTE — NURSING NOTE
"Chief Complaint   Patient presents with     Hypertension     Anxiety     Lipids       Initial /60 (BP Location: Right arm, Patient Position: Chair, Cuff Size: Adult Large)  Pulse 72  Temp 98.3  F (36.8  C) (Oral)  Resp 15  Wt 220 lb 1.6 oz (99.8 kg)  SpO2 96%  BMI 32.04 kg/m2 Estimated body mass index is 32.04 kg/(m^2) as calculated from the following:    Height as of 2/28/17: 5' 9.5\" (1.765 m).    Weight as of this encounter: 220 lb 1.6 oz (99.8 kg).  Medication Reconciliation: complete    "

## 2017-06-20 ENCOUNTER — OFFICE VISIT (OUTPATIENT)
Dept: FAMILY MEDICINE | Facility: CLINIC | Age: 77
End: 2017-06-20
Payer: MEDICARE

## 2017-06-20 VITALS
OXYGEN SATURATION: 95 % | TEMPERATURE: 98.4 F | DIASTOLIC BLOOD PRESSURE: 62 MMHG | BODY MASS INDEX: 32.02 KG/M2 | RESPIRATION RATE: 17 BRPM | WEIGHT: 220 LBS | HEART RATE: 75 BPM | SYSTOLIC BLOOD PRESSURE: 108 MMHG

## 2017-06-20 DIAGNOSIS — F41.9 ANXIETY: ICD-10-CM

## 2017-06-20 DIAGNOSIS — R41.3 MEMORY IMPAIRMENT OF GRADUAL ONSET: ICD-10-CM

## 2017-06-20 DIAGNOSIS — R41.3 MEMORY IMPAIRMENT OF GRADUAL ONSET: Primary | ICD-10-CM

## 2017-06-20 DIAGNOSIS — I10 BENIGN ESSENTIAL HYPERTENSION: ICD-10-CM

## 2017-06-20 PROCEDURE — 99215 OFFICE O/P EST HI 40 MIN: CPT | Performed by: INTERNAL MEDICINE

## 2017-06-20 RX ORDER — DONEPEZIL HYDROCHLORIDE 5 MG/1
5 TABLET, FILM COATED ORAL AT BEDTIME
Qty: 30 TABLET | Refills: 1 | Status: SHIPPED | OUTPATIENT
Start: 2017-06-20 | End: 2017-07-19 | Stop reason: DRUGHIGH

## 2017-06-20 NOTE — PROGRESS NOTES
"  SUBJECTIVE:                                                    Kathie Wetzel is a 77 year old female who presents to clinic today for the following health issues:    Patient presents with:  Memory Loss: follow up   Patient was seen in clinic 2/28/17 with memory concerns and a family history of Alzheimer's. She reported short term memory loss with repeating herself often and writing down a lot of notes about schedules. Concerns also noted by family members. Labs were good and was recommended Neurology or Neuropsych evaluation with Dr. March. Evaluation was completed 5/18/17, patient did not follow up for results. Patient reports feeling \"stupid\" after the evaluation due to delayed recall when being timed.       Amount of exercise or physical activity: None    Problems taking medications regularly: No    Medication side effects: none    Diet: regular (no restrictions)    Problem list and histories reviewed & adjusted, as indicated.  Additional history: as documented    BP Readings from Last 3 Encounters:   06/20/17 108/62   06/06/17 112/60   03/22/17 116/62    Wt Readings from Last 3 Encounters:   06/20/17 220 lb (99.8 kg)   06/06/17 220 lb 1.6 oz (99.8 kg)   03/22/17 216 lb 4.8 oz (98.1 kg)        Reviewed and updated as needed this visit by clinical staff  Tobacco  Allergies  Meds  Med Hx  Surg Hx  Fam Hx  Soc Hx      Reviewed and updated as needed this visit by Provider       ROS:  CONSTITUTIONAL: NEGATIVE for fever, chills, change in weight  Resp: no cough  CV: bp has been well controlled; denies chest pain   ABD: no abdominal pain, no change in bowels  : no urinary symptoms   NEURO: Memory changes - seen by Dr. March for evaluation 5/18/17, report reviewed; NEGATIVE for weakness, dizziness or paresthesias  PSYCHIATRIC: Anxiety - use of citalopram (Celexa), patient reports some worrying, NEGATIVE for changes in mood or affect    This document serves as a record of the services and decisions " personally performed and made by Cammie Daugherty MD. It was created on her behalf by Drea Ardon, a trained medical scribe. The creation of this document is based on the provider's statements to the medical scribe.   Drea Ardon, 9:51 AM, June 20, 2017    OBJECTIVE:                                                    /62  Pulse 75  Temp 98.4  F (36.9  C) (Oral)  Resp 17  Wt 220 lb (99.8 kg)  SpO2 95%  BMI 32.02 kg/m2  Body mass index is 32.02 kg/(m^2).  GENERAL: healthy, alert and no distress  Lungs: clear  CV: regular rate and rhythm without murmur or ectopy  MS: extremities normal- no gross deformities noted  NEURO: mentation intact and speech normal  PSYCH: mentation appears normal and affect normal/bright    Diagnostic Test Results:  none      ASSESSMENT/PLAN:                                                    Patient was seen in the clinic today with her daughter, Regi.     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 Anderson report and recommendations 5/18/17 - labs were normal, mild to moderate impairment of memory and execution, difficulties retrieving complex information; declines MRI, Occupational therapy, driving evaluation, neuro evaluation; patient and daughter report no concern for safety, cooking, finances, or driving at this time (occasionally driving short distances); recommended regular brain exercises/activities - puzzles, reading, etc.; interested in adding Aricept - 5 mg for one month (can cut in half if having issues), follow up and can increase to 10 mg  Plan: donepezil (ARICEPT) 5 MG tablet          (F41.9) Anxiety  Comment: pt and daughter report worrying, feels current dose is effective and well tolerated.   Plan: recommend reassess in 2 months and could consider an increase in the future if desired, will continue to monitor     HTN: bp well tolerated;   meds reviewed.  Continue same med/dosage    MEDICATIONS:   Orders Placed  This Encounter   Medications     donepezil (ARICEPT) 5 MG tablet     Sig: Take 1 tablet (5 mg) by mouth At Bedtime     Dispense:  30 tablet     Refill:  1     There are no discontinued medications.    FUTURE APPOINTMENTS:       - Follow-up visit in 1 month     Cammie Daugherty MD  Internal Medicine  [facility] [place]  45 minutes is spent with patient, over 50% of that time spent providing counselling, discussing and reviewing meds and potential side effects.      The information in this document, created by a medical scribe for me, accurately reflects the services I personally performed and the decisions made by me. I have reviewed and approved this document for accuracy.  Dr. Cammie Daugherty, 10:21 AM, June 20, 2017

## 2017-06-20 NOTE — NURSING NOTE
"Chief Complaint   Patient presents with     Memory Loss     follow up        Initial /62  Pulse 75  Temp 98.4  F (36.9  C) (Oral)  Resp 17  Wt 220 lb (99.8 kg)  SpO2 95%  BMI 32.02 kg/m2 Estimated body mass index is 32.02 kg/(m^2) as calculated from the following:    Height as of 2/28/17: 5' 9.5\" (1.765 m).    Weight as of this encounter: 220 lb (99.8 kg).  Medication Reconciliation: complete    "

## 2017-06-20 NOTE — MR AVS SNAPSHOT
"              After Visit Summary   2017    Kathie Wetzel    MRN: [id number]           Patient Information     Date Of Birth          1940        Visit Information        Provider Department      2017 9:00 AM Cammie Daugherty MD [facility]        Today's Diagnoses     Memory impairment of gradual onset    -  1    Anxiety           Follow-ups after your visit        Follow-up notes from your care team     Return in about 4 weeks (around 2017), or med reassessment.      Who to contact     If you have questions or need follow up information about today's clinic visit or your schedule please contact [facility] directly at [phone].  Normal or non-critical lab and imaging results will be communicated to you by MyChart, letter or phone within 4 business days after the clinic has received the results. If you do not hear from us within 7 days, please contact the clinic through MyChart or phone. If you have a critical or abnormal lab result, we will notify you by phone as soon as possible.  Submit refill requests through [employer] or call your pharmacy and they will forward the refill request to us. Please allow 3 business days for your refill to be completed.          Additional Information About Your Visit        MyChart Information     [employer] lets you send messages to your doctor, view your test results, renew your prescriptions, schedule appointments and more. To sign up, go to [email or web address] . Click on \"Log in\" on the left side of the screen, which will take you to the Welcome page. Then click on \"Sign up Now\" on the right side of the page.     You will be asked to enter the access code listed below, as well as some personal information. Please follow the directions to create your username and password.     Your access code is: [email or web address]  Expires: 2017  3:05 PM     Your access code will  in 90 days. If you need help or a new code, please " call your [place] clinic or [phone].        Care EveryWhere ID     This is your Care EveryWhere ID. This could be used by other organizations to access your [place] medical records  [id number]        Your Vitals Were     Pulse Temperature Respirations Pulse Oximetry BMI (Body Mass Index)       75 98.4  F (36.9  C) (Oral) 17 95% 32.02 kg/m2        Blood Pressure from Last 3 Encounters:   06/20/17 108/62   06/06/17 112/60   03/22/17 116/62    Weight from Last 3 Encounters:   06/20/17 220 lb (99.8 kg)   06/06/17 220 lb 1.6 oz (99.8 kg)   03/22/17 216 lb 4.8 oz (98.1 kg)              Today, you had the following     No orders found for display         Today's Medication Changes          These changes are accurate as of: 6/20/17 10:20 AM.  If you have any questions, ask your nurse or doctor.               Start taking these medicines.        Dose/Directions    donepezil 5 MG tablet   Commonly known as:  ARICEPT   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   Started by:  Cammie Daugherty MD        Dose:  5 mg   Take 1 tablet (5 mg) by mouth At Bedtime   Quantity:  30 tablet   Refills:  1            Where to get your medicines      These medications were sent to [facility] [address] [facility] [address] [facility] AT [place] [address] & [facility]  [address] [facility] [address]     Phone:  [phone]     donepezil 5 MG tablet                Primary Care Provider Office Phone # Fax #    Cammie Daugherty [phone]       [facility] [address]  [facility] [address]        Thank you!     Thank you for choosing [facility]  for your care. Our goal is always to provide you with excellent care. Hearing back from our patients is one way we can continue to improve our services. Please take a few minutes to complete the written survey that you may receive in the mail after your visit with us. Thank you!             Your Updated  Medication List - Protect others around you: Learn how to safely use, store and throw away your medicines at www.disposemymeds.org.          This list is accurate as of: 6/20/17 10:20 AM.  Always use your most recent med list.                   Brand Name Dispense Instructions for use    ALEVE PO      PRN       atorvastatin 10 MG tablet    LIPITOR    90 tablet    Take 1 tablet (10 mg) by mouth At Bedtime       citalopram 10 MG tablet    celeXA    90 tablet    Take 1 tablet (10 mg) by mouth daily       donepezil 5 MG tablet    ARICEPT    30 tablet    Take 1 tablet (5 mg) by mouth At Bedtime       FISH OIL + D3 PO      Take 1 tablet by mouth daily       losartan 50 MG tablet    COZAAR    90 tablet    Take 1 tablet (50 mg) by mouth daily       PRESERVISION AREDS PO      Take 1 tablet by mouth daily       VITAMIN D PO      Take 1,000 Units by mouth daily

## 2017-06-21 DIAGNOSIS — I10 BENIGN ESSENTIAL HYPERTENSION: ICD-10-CM

## 2017-06-21 DIAGNOSIS — E78.5 HYPERLIPIDEMIA LDL GOAL <100: ICD-10-CM

## 2017-06-21 LAB
ALBUMIN SERPL-MCNC: 3.8 G/DL (ref 3.4–5)
ALP SERPL-CCNC: 72 U/L (ref 40–150)
ALT SERPL W P-5'-P-CCNC: 27 U/L (ref 0–50)
ANION GAP SERPL CALCULATED.3IONS-SCNC: 8 MMOL/L (ref 3–14)
AST SERPL W P-5'-P-CCNC: 15 U/L (ref 0–45)
BILIRUB SERPL-MCNC: 0.5 MG/DL (ref 0.2–1.3)
BUN SERPL-MCNC: 19 MG/DL (ref 7–30)
CALCIUM SERPL-MCNC: 9.2 MG/DL (ref 8.5–10.1)
CHLORIDE SERPL-SCNC: 107 MMOL/L (ref 94–109)
CHOLEST SERPL-MCNC: 172 MG/DL
CO2 SERPL-SCNC: 27 MMOL/L (ref 20–32)
CREAT SERPL-MCNC: 0.97 MG/DL (ref 0.52–1.04)
CREAT UR-MCNC: 125 MG/DL
GFR SERPL CREATININE-BSD FRML MDRD: 56 ML/MIN/1.7M2
GLUCOSE SERPL-MCNC: 95 MG/DL (ref 70–99)
HDLC SERPL-MCNC: 47 MG/DL
LDLC SERPL CALC-MCNC: 88 MG/DL
MICROALBUMIN UR-MCNC: 6 MG/L
MICROALBUMIN/CREAT UR: 4.66 MG/G CR (ref 0–25)
NONHDLC SERPL-MCNC: 125 MG/DL
POTASSIUM SERPL-SCNC: 4.2 MMOL/L (ref 3.4–5.3)
PROT SERPL-MCNC: 7.7 G/DL (ref 6.8–8.8)
SODIUM SERPL-SCNC: 142 MMOL/L (ref 133–144)
TRIGL SERPL-MCNC: 187 MG/DL

## 2017-06-21 PROCEDURE — 80053 COMPREHEN METABOLIC PANEL: CPT | Performed by: INTERNAL MEDICINE

## 2017-06-21 PROCEDURE — 80061 LIPID PANEL: CPT | Performed by: INTERNAL MEDICINE

## 2017-06-21 PROCEDURE — 82043 UR ALBUMIN QUANTITATIVE: CPT | Performed by: INTERNAL MEDICINE

## 2017-06-21 PROCEDURE — 36415 COLL VENOUS BLD VENIPUNCTURE: CPT | Performed by: INTERNAL MEDICINE

## 2017-06-21 NOTE — LETTER
[facility]  [address] [facility] [address]  [phone]          June 30, 2017    Kathie Wetzel                                                                                                                     [address]    [facility] [address]            Dear Kathie,    No worrisome findings on labs; encourage regular exercise and healthy eating.    Sincerely,         Cammie Daugherty MD

## 2017-06-21 NOTE — LETTER
[facility]  [address] [facility] [address]  [phone]          June 30, 2017    Kathie Wetzel                                                                                                                     [address]    [facility] [address]            Dear Kathie,    No worrisome findings on labs; encourage regular exercise and healthy eating.    Sincerely,         Cammie Daugherty MD

## 2017-06-22 RX ORDER — DONEPEZIL HYDROCHLORIDE 5 MG/1
TABLET, FILM COATED ORAL
Qty: 90 TABLET | Refills: 1 | OUTPATIENT
Start: 2017-06-22

## 2017-06-22 NOTE — TELEPHONE ENCOUNTER
Patient is due for a follow up in one month, so only 30 days for now.    Clarissa Malik, RN  Triage Nurse

## 2017-06-28 PROBLEM — R41.3 MEMORY IMPAIRMENT OF GRADUAL ONSET: Status: ACTIVE | Noted: 2017-06-28

## 2017-07-19 ENCOUNTER — OFFICE VISIT (OUTPATIENT)
Dept: FAMILY MEDICINE | Facility: CLINIC | Age: 77
End: 2017-07-19
Payer: MEDICARE

## 2017-07-19 VITALS
TEMPERATURE: 98 F | HEART RATE: 77 BPM | BODY MASS INDEX: 31.82 KG/M2 | DIASTOLIC BLOOD PRESSURE: 64 MMHG | RESPIRATION RATE: 17 BRPM | WEIGHT: 218.6 LBS | SYSTOLIC BLOOD PRESSURE: 124 MMHG | OXYGEN SATURATION: 98 %

## 2017-07-19 DIAGNOSIS — M51.369 DDD (DEGENERATIVE DISC DISEASE), LUMBAR: ICD-10-CM

## 2017-07-19 DIAGNOSIS — L70.0 OPEN COMEDONE: ICD-10-CM

## 2017-07-19 DIAGNOSIS — R41.3 MEMORY IMPAIRMENT OF GRADUAL ONSET: ICD-10-CM

## 2017-07-19 DIAGNOSIS — M54.50 LOW BACK PAIN WITHOUT SCIATICA, UNSPECIFIED BACK PAIN LATERALITY, UNSPECIFIED CHRONICITY: Primary | ICD-10-CM

## 2017-07-19 DIAGNOSIS — L72.3 SEBACEOUS CYST: ICD-10-CM

## 2017-07-19 PROCEDURE — 99214 OFFICE O/P EST MOD 30 MIN: CPT | Performed by: INTERNAL MEDICINE

## 2017-07-19 RX ORDER — DONEPEZIL HYDROCHLORIDE 5 MG/1
5 TABLET, FILM COATED ORAL AT BEDTIME
Qty: 30 TABLET | Refills: 6 | Status: CANCELLED | OUTPATIENT
Start: 2017-07-19

## 2017-07-19 RX ORDER — DONEPEZIL HYDROCHLORIDE 10 MG/1
10 TABLET, FILM COATED ORAL AT BEDTIME
Qty: 30 TABLET | Refills: 6 | Status: SHIPPED | OUTPATIENT
Start: 2017-07-19 | End: 2018-02-20

## 2017-07-19 NOTE — MR AVS SNAPSHOT
"              After Visit Summary   7/19/2017    Kathie Wetzel    MRN: [id number]           Patient Information     Date Of Birth          1940        Visit Information        Provider Department      7/19/2017 8:30 AM Cammie Daugherty MD [facility]        Today's Diagnoses     Low back pain without sciatica, unspecified back pain laterality, unspecified chronicity    -  1    Memory impairment of gradual onset           Follow-ups after your visit        Future tests that were ordered for you today     Open Future Orders        Priority Expected Expires Ordered    MR Lumbar Spine w/o Contrast Routine  7/19/2018 7/19/2017            Who to contact     If you have questions or need follow up information about today's clinic visit or your schedule please contact [facility] directly at [phone].  Normal or non-critical lab and imaging results will be communicated to you by MyChart, letter or phone within 4 business days after the clinic has received the results. If you do not hear from us within 7 days, please contact the clinic through MyChart or phone. If you have a critical or abnormal lab result, we will notify you by phone as soon as possible.  Submit refill requests through [employer] or call your pharmacy and they will forward the refill request to us. Please allow 3 business days for your refill to be completed.          Additional Information About Your Visit        MyChart Information     [employer] lets you send messages to your doctor, view your test results, renew your prescriptions, schedule appointments and more. To sign up, go to [email or web address] . Click on \"Log in\" on the left side of the screen, which will take you to the Welcome page. Then click on \"Sign up Now\" on the right side of the page.     You will be asked to enter the access code listed below, as well as some personal information. Please follow the directions to create your username and " password.     Your access code is: [email or web address]  Expires: 2017  3:05 PM     Your access code will  in 90 days. If you need help or a new code, please call your [facility] or [phone].        Care EveryWhere ID     This is your Care EveryWhere ID. This could be used by other organizations to access your [place] medical records  [id number]        Your Vitals Were     Pulse Temperature Respirations Pulse Oximetry BMI (Body Mass Index)       77 98  F (36.7  C) (Oral) 17 98% 31.82 kg/m2        Blood Pressure from Last 3 Encounters:   17 124/64   17 108/62   17 112/60    Weight from Last 3 Encounters:   17 218 lb 9.6 oz (99.2 kg)   17 220 lb (99.8 kg)   17 220 lb 1.6 oz (99.8 kg)                 Today's Medication Changes          These changes are accurate as of: 17  9:14 AM.  If you have any questions, ask your nurse or doctor.               These medicines have changed or have updated prescriptions.        Dose/Directions    donepezil 10 MG tablet   Commonly known as:  ARICEPT   This may have changed:    - medication strength  - how much to take   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   Changed by:  Cammie Daugherty MD        Dose:  10 mg   Take 1 tablet (10 mg) by mouth At Bedtime   Quantity:  30 tablet   Refills:  6            Where to get your medicines      These medications were sent to [facility] [address] [place] [address] [facility] AT [place] [address] & [facility]  [address] [facility] [address]     Phone:  [phone]     donepezil 10 MG tablet                Primary Care Provider Office Phone # Fax #    Cammie Daugherty [phone]       [facility] [address]  [facility] [address]        Equal Access to Services     RUPA DRAKE AH: Jv Peres, wakelseyda luqadaha, qaybta zeallam nelson, waxay david hahn. Yesi [facility]  [phone].    ATENCIÓN: Si rober gamino, tiene a perera disposición servicios gratuitos de asistencia lingüística. [place] [phone].    We comply with applicable federal civil rights laws and [place] laws. We do not discriminate on the basis of race, color, national origin, age, disability sex, sexual orientation or gender identity.            Thank you!     Thank you for choosing [facility] [place]  for your care. Our goal is always to provide you with excellent care. Hearing back from our patients is one way we can continue to improve our services. Please take a few minutes to complete the written survey that you may receive in the mail after your visit with us. Thank you!             Your Updated Medication List - Protect others around you: Learn how to safely use, store and throw away your medicines at www.disposemymeds.org.          This list is accurate as of: 7/19/17  9:14 AM.  Always use your most recent med list.                   Brand Name Dispense Instructions for use Diagnosis    ALEVE PO      PRN        atorvastatin 10 MG tablet    LIPITOR    90 tablet    Take 1 tablet (10 mg) by mouth At Bedtime    Hyperlipidemia LDL goal <100       citalopram 10 MG tablet    celeXA    90 tablet    Take 1 tablet (10 mg) by mouth daily    Anxiety       donepezil 10 MG tablet    ARICEPT    30 tablet    Take 1 tablet (10 mg) by mouth At Bedtime    Memory impairment of gradual onset       FISH OIL + D3 PO      Take 1 tablet by mouth daily        losartan 50 MG tablet    COZAAR    90 tablet    Take 1 tablet (50 mg) by mouth daily    Benign essential hypertension       PRESERVISION AREDS PO      Take 1 tablet by mouth daily        VITAMIN D PO      Take 1,000 Units by mouth daily

## 2017-07-19 NOTE — PROGRESS NOTES
SUBJECTIVE:                                                    Kathie Wetzel is a 77 year old female who presents to clinic today for the following health issues:    Patient presents with:  Hypertension  Memory Loss: seems to be tolerating her medications    Hypertension  Pt has a history of hypertension and is currently taking losartan.  She is happy with her medication and feels it is effective.  Her blood pressure is well maintained and within range, and she is not experiencing any side effects.  The patient is not requesting any changes at this time.    BP Readings from Last 3 Encounters:   07/19/17 124/64   06/20/17 108/62   06/06/17 112/60         Amount of exercise or physical activity: None    Problems taking medications regularly: No    Medication side effects: none    Diet: regular (no restrictions)      Memory Concerns  Pt presents to the clinic with memory concerns.  She completed a Neuropsych evaluation in May 2017, then followed up with her PCP in June, and was started on Aricept.  She feels the medication is very effective and states she tolerates it well.  She was put on 5 MG and is ready to increase her dosage.  Pt reports minimal diarrhea and is happy with her medication.     States legs are tired and so does not exercise much  Pt presents with fatigue in her legs.  She states that her whole leg feels tired and occasionally has to sit down because her legs feel so fatigued.  The patient does not report in her feet on toes, just in her legs.  Her daughter reports that this problem has gotten progressively worse.  The patient states that this problem has been occurring over the past year and agrees it has gotten worse over time.    Problem list and histories reviewed & adjusted, as indicated.  Additional history: as documented    Labs reviewed in EPIC    Reviewed and updated as needed this visit by clinical staff  Tobacco  Allergies  Meds  Med Hx  Surg Hx  Fam Hx  Soc Hx      Reviewed and  updated as needed this visit by Provider       REVIEW OF SYSTEMS:  C: NEGATIVE for fever, chills, or change in weight  E/M: NEGATIVE for ear, nose, mouth, or throat problems  R: NEGATIVE for cough or shortness of breath  CV: Hx of hypertension - use of losartan; NEGATIVE for chest pain, palpitations or peripheral edema  GI: NEGATIVE for nausea, abdominal pain, heartburn, or change in bowel habits  : NEGATIVE for unusual urinary or vaginal symptoms;  M: Positive for bilateral soreness in legs; NEGATIVE for significant arthralgias or myalgia  N: memory issues- has been evaluated per Dr. Rodri March- recently started Aricept, well tolerated; interested in increased dose.NEGATIVE for weakness, dizziness or paresthesias  E: Hyperlipidemia - use of atorvastatin; no myalgias NEGATIVE for temperature intolerance, or skin/hair changes  P: Anxiety - use of citalopram;      Pt seen with her daughter, Regi today.    This document serves as a record of the services and decisions personally performed and made by Cammie Daugherty MD. It was created on her behalf by Yannick Knowles, a trained medical scribe. The creation of this document is based the provider's statements to the medical scribe.  Yannick Knowles, July 19, 2017 8:56 AM    OBJECTIVE:   /64 (BP Location: Right arm, Patient Position: Chair, Cuff Size: Adult Large)  Pulse 77  Temp 98  F (36.7  C) (Oral)  Resp 17  Wt 218 lb 9.6 oz (99.2 kg)  SpO2 98%  BMI 31.82 kg/m2  Body mass index is 31.82 kg/(m^2).    EXAM:  GENERAL: Patient appears healthy, alert and not distressed.  NECK: No adenopathy present, no asymmetry, masses, or scars noted, thyroid is normal to palpation  RESP: Lungs are clear to auscultation - no rales, rhonchi or wheezes present  CV: Regular rate and rhythm, normal S1 S2 heart sounds, no ectopy, no peripheral edema present, peripheral pulses normal, no carotid bruit.  ABDOMEN: Soft, nontender, no hepatosplenomegaly, no masses, normal bowel  sounds  MS: No gross musculoskeletal defects noted, no edema, gait is age appropriate without ataxia, forward bending without difficulty; negative straight leg raise  SKIN: Sebaceous cysts- small and open comedones on middle of her back- easily expressed cellular debris  NEURO: Patient exhibits normal strength and tone, normal speech and mentation  PSYCH: Mentation appears normal, affect is normal/bright    Diagnostic Test Results:  No results found for this or any previous visit (from the past 24 hour(s)).     ASSESSMENT/PLAN:     (M54.5) Low back pain without sciatica, unspecified back pain laterality, unspecified chronicity  (primary encounter diagnosis)  Comment: symptoms suggesting lumbar stenosis, will have an MRI, complaining of pain in her hips as well  Plan: MR Lumbar Spine w/o Contrast        Consider FSOC or spine referral depending on results of MRI    (R41.3) Memory impairment of gradual onset  Comment: reviewed Dr. Monica courtney and recommendations.  declines MRI,. Occupational therapy, driving evaluation, neuro evaluation;  interested in increasing Aricept, feels it is effective  Plan: Increase dose of donepezil (ARICEPT) to 10 MG tablet            (L72.3) Sebaceous cyst  Comment: right mid to low back ; gently expressed, no infection.  Plan: cellular debris easily expressed from several areas    (L70.0) Open comedone  Comment: several ( 5 on back)  Plan: cellular debris easily expressed from several areas; no areas of irritation; periodic monitoring    The information in this document, created by a medical scribe for me, accurately reflects the services I personally performed and the decisions made by me. I have reviewed and approved this document for accuracy.  Dr. Cammie Daugherty, July 19, 2017, 9:24 AM    Cammie Daugherty MD  Internal Medicine  [facility]

## 2017-07-19 NOTE — NURSING NOTE
"Chief Complaint   Patient presents with     Hypertension     Memory Loss     seems to be tolerating her medications       Initial /64 (BP Location: Right arm, Patient Position: Chair, Cuff Size: Adult Large)  Pulse 77  Temp 98  F (36.7  C) (Oral)  Resp 17  Wt 218 lb 9.6 oz (99.2 kg)  SpO2 98%  BMI 31.82 kg/m2 Estimated body mass index is 31.82 kg/(m^2) as calculated from the following:    Height as of 2/28/17: 5' 9.5\" (1.765 m).    Weight as of this encounter: 218 lb 9.6 oz (99.2 kg).  Medication Reconciliation: complete    "

## 2017-07-21 RX ORDER — DONEPEZIL HYDROCHLORIDE 10 MG/1
TABLET, FILM COATED ORAL
Qty: 90 TABLET | Refills: 6 | OUTPATIENT
Start: 2017-07-21

## 2017-07-21 NOTE — TELEPHONE ENCOUNTER
Patient was here for office visit 7/19/17 and received refills.    Sent back as denied/duplicate.    Maren Weir, MSN, RN-BC  Care Coordinator  [facility] [place]

## 2017-07-26 ENCOUNTER — HOSPITAL ENCOUNTER (OUTPATIENT)
Dept: MRI IMAGING | Facility: CLINIC | Age: 77
Discharge: HOME | End: 2017-07-26
Attending: INTERNAL MEDICINE | Admitting: INTERNAL MEDICINE
Payer: MEDICARE

## 2017-07-26 DIAGNOSIS — M54.50 LOW BACK PAIN WITHOUT SCIATICA, UNSPECIFIED BACK PAIN LATERALITY, UNSPECIFIED CHRONICITY: ICD-10-CM

## 2017-07-26 PROCEDURE — 72148 MRI LUMBAR SPINE W/O DYE: CPT

## 2017-07-26 NOTE — LETTER
July 31, 2017      Kathie Wetzel  [address]   [facility] [address]        Dear Ms. 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    Sincerely,     Cammie Daugherty MD

## 2017-07-27 NOTE — PROGRESS NOTES
DDD with mild right L4 central nerve root ; referral to [facility]- [phone] to see about treatment options.

## 2017-07-31 ENCOUNTER — TELEPHONE (OUTPATIENT)
Dept: FAMILY MEDICINE | Facility: CLINIC | Age: 77
End: 2017-07-31

## 2017-07-31 NOTE — TELEPHONE ENCOUNTER
Pt. Daughter, Regi calling    Verbal permission from patient to discuss information given today.     Please review patient MR lumbar results.     FYI- will see FSOC this week. Back pain is more constant.    Wanting to know if additional pain medication can be prescribed as Naproxen is not helping. Pt. walking hunched over.     Vicki ALVAREZ RN, BSN, PHN  [place] [facility]

## 2017-07-31 NOTE — TELEPHONE ENCOUNTER
Reason for Call:  Request for results:    Name of test or procedure: MR of lumbar spine    Date of test of procedure: 7/26/17    Location of the test or procedure: [facility]    OK to leave the result message on voice mail or with a family member? YES    Phone number Patient can be reached at:  Home number on file [phone] (home)    Additional comments: Please call and discuss results and continued back pain concerns    Sasha Oglesby,   [facility]

## 2017-08-03 ENCOUNTER — TELEPHONE (OUTPATIENT)
Dept: PALLIATIVE MEDICINE | Facility: CLINIC | Age: 77
End: 2017-08-03

## 2017-08-03 ENCOUNTER — OFFICE VISIT (OUTPATIENT)
Dept: NEUROSURGERY | Facility: CLINIC | Age: 77
End: 2017-08-03
Attending: NURSE PRACTITIONER
Payer: MEDICARE

## 2017-08-03 DIAGNOSIS — M54.16 LUMBAR RADICULAR PAIN: Primary | ICD-10-CM

## 2017-08-03 PROCEDURE — 99211 OFF/OP EST MAY X REQ PHY/QHP: CPT | Performed by: NURSE PRACTITIONER

## 2017-08-03 PROCEDURE — 99203 OFFICE O/P NEW LOW 30 MIN: CPT | Performed by: NURSE PRACTITIONER

## 2017-08-03 RX ORDER — TRAMADOL HYDROCHLORIDE 50 MG/1
50 TABLET ORAL EVERY 6 HOURS PRN
Qty: 40 TABLET | Refills: 0 | Status: SHIPPED | OUTPATIENT
Start: 2017-08-03 | End: 2017-11-02

## 2017-08-03 RX ORDER — METHYLPREDNISOLONE 4 MG
TABLET, DOSE PACK ORAL
Qty: 21 TABLET | Refills: 0 | Status: SHIPPED | OUTPATIENT
Start: 2017-08-03 | End: 2017-11-02

## 2017-08-03 ASSESSMENT — PAIN SCALES - GENERAL: PAINLEVEL: MODERATE PAIN (5)

## 2017-08-03 NOTE — MR AVS SNAPSHOT
After Visit Summary   8/3/2017    Kathie Wetzel    MRN: [id number]           Patient Information     Date Of Birth          1940        Visit Information        Provider Department      8/3/2017 2:00 PM Natividad Cha APRN CNP [facility]        Today's Diagnoses     Lumbar radicular pain    -  1      Care Instructions    1. Ultram for pain.  Max 1 four times per day as needed only for severe pain. Monitor for confusion or sedation.     2.  Medrol dose pack for pain    3.  Please schedule your injection. Someone will contact you from the pain clinic within 24 hours to schedule.            Follow-ups after your visit        Additional Services     PAIN MANAGEMENT CENTER ([place]) REFERRAL       Your provider has referred you to the [facility]. Dr. Coleman WILSON  Reason for Referral: Procedure or injection only - patient will be contacted within 24 hrs to schedule: Epidural Steroid (transforaminal approach): Lumbar L3-4 right    Please complete the following questions:    What is your diagnosis for the patient's pain? Lumbar radicular pain     Do you have any specific questions for the pain specialist? No    Are there any red flags that may impact the assessment or management of the patient? None    **ANY DIAGNOSTIC TESTS THAT ARE NOT IN EPIC SHOULD BE SENT TO THE PAIN CENTER**    Please note the Pre-Op Pain Consult must be scheduled 2-3 weeks prior to the patient's surgery.  Patient's trying to schedule within 2 weeks of surgery may not be accommodated.     Pre-Op Pain Consults are only good for 30 days.    REGARDING OPIOID MEDICATIONS:  We will always address appropriateness of opioid pain medications, but we generally will not automatically take on a prescribing role. When we do take on prescribing of opioids for chronic pain, it is in collaboration with the referring physician for an intermediate period of time (months), with an expectation that the  primary physician or provider will assume the prescribing role if medications are effective at stable doses with demonstrated compliance.  Therefore, please do not assume that your prescribing responsibilities end on the day of pain clinic consultation.  Is this agreeable to you? YES    For any questions, contact the [facility] at [phone].    Please be aware that coverage of these services is subject to the terms and limitations of your health insurance plan.  Call member services at your health plan with any benefit or coverage questions.      Please bring the following with you to your appointment:    (1) Any X-Rays, CTs or MRIs which have been performed.  Contact the facility where they were done to arrange for  prior to your scheduled appointment.    (2) List of current medications   (3) This referral request   (4) Any documents/labs given to you for this referral                  Your next 10 appointments already scheduled     Aug 03, 2017  2:00 PM CDT   New Visit with WILD Snyder CNP   [facility] ([facility])    [address] [place] [address] [phone]              Who to contact     If you have questions or need follow up information about today's clinic visit or your schedule please contact [facility] directly at [phone].  Normal or non-critical lab and imaging results will be communicated to you by MyChart, letter or phone within 4 business days after the clinic has received the results. If you do not hear from us within 7 days, please contact the clinic through MyChart or phone. If you have a critical or abnormal lab result, we will notify you by phone as soon as possible.  Submit refill requests through [employer] or call your pharmacy and they will forward the refill request to us. Please allow 3 business days for your refill to be completed.           "Additional Information About Your Visit        MyChart Information     [employer] lets you send messages to your doctor, view your test results, renew your prescriptions, schedule appointments and more. To sign up, go to [email or web address] . Click on \"Log in\" on the left side of the screen, which will take you to the Welcome page. Then click on \"Sign up Now\" on the right side of the page.     You will be asked to enter the access code listed below, as well as some personal information. Please follow the directions to create your username and password.     Your access code is: [email or web address]  Expires: 2017  3:05 PM     Your access code will  in 90 days. If you need help or a new code, please call your [place] clinic or [phone].        Care EveryWhere ID     This is your Care EveryWhere ID. This could be used by other organizations to access your [facility] medical records  [id number]         Blood Pressure from Last 3 Encounters:   17 124/64   17 108/62   17 112/60    Weight from Last 3 Encounters:   17 218 lb 9.6 oz (99.2 kg)   17 220 lb (99.8 kg)   17 220 lb 1.6 oz (99.8 kg)              We Performed the Following     PAIN MANAGEMENT CENTER ([place]) REFERRAL          Today's Medication Changes          These changes are accurate as of: 8/3/17  1:25 PM.  If you have any questions, ask your nurse or doctor.               Start taking these medicines.        Dose/Directions    methylPREDNISolone 4 MG tablet   Commonly known as:  MEDROL DOSEPAK   Used for:  Lumbar radicular pain   Started by:  Natividad Cha APRN CNP        Follow package instructions   Quantity:  21 tablet   Refills:  0       traMADol 50 MG tablet   Commonly known as:  ULTRAM   Used for:  Lumbar radicular pain   Started by:  Natividad Cha APRN CNP        Dose:  50 mg   Take 1 tablet (50 mg) by mouth every 6 hours as needed for pain maximum 4 tablet(s) per day   Quantity:  40 tablet "   Refills:  0            Where to get your medicines      These medications were sent to [facility] [address] [place], [place] - [address] [address]  [address] [place] [address]     Phone:  [phone]     methylPREDNISolone 4 MG tablet         Some of these will need a paper prescription and others can be bought over the counter.  Ask your nurse if you have questions.     Bring a paper prescription for each of these medications     traMADol 50 MG tablet                Primary Care Provider Office Phone # Fax #    Cammie Daugherty [phone]       [facility] [address]  [facility] [address]        Equal Access to Services     RUPA DRAKE : Hadii aad ku hadasho Soomaali, waaxda luqadaha, qaybta kaalmada adeegyada, waxay chingin haymax alicia . So [facility] [phone].    ATENCIÓN: Si habla español, tiene a perera disposición servicios gratuitos de asistencia lingüística. [facility] [phone].    We comply with applicable federal civil rights laws and [place] laws. We do not discriminate on the basis of race, color, national origin, age, disability sex, sexual orientation or gender identity.            Thank you!     Thank you for choosing [facility]  for your care. Our goal is always to provide you with excellent care. Hearing back from our patients is one way we can continue to improve our services. Please take a few minutes to complete the written survey that you may receive in the mail after your visit with us. Thank you!             Your Updated Medication List - Protect others around you: Learn how to safely use, store and throw away your medicines at www.disposemymeds.org.          This list is accurate as of: 8/3/17  1:25 PM.  Always use your most recent med list.                   Brand Name Dispense Instructions for use Diagnosis    ALEVE PO      PRN        atorvastatin 10 MG  tablet    LIPITOR    90 tablet    Take 1 tablet (10 mg) by mouth At Bedtime    Hyperlipidemia LDL goal <100       citalopram 10 MG tablet    celeXA    90 tablet    Take 1 tablet (10 mg) by mouth daily    Anxiety       donepezil 10 MG tablet    ARICEPT    30 tablet    Take 1 tablet (10 mg) by mouth At Bedtime    Memory impairment of gradual onset       FISH OIL + D3 PO      Take 1 tablet by mouth daily        losartan 50 MG tablet    COZAAR    90 tablet    Take 1 tablet (50 mg) by mouth daily    Benign essential hypertension       methylPREDNISolone 4 MG tablet    MEDROL DOSEPAK    21 tablet    Follow package instructions    Lumbar radicular pain       PRESERVISION AREDS PO      Take 1 tablet by mouth daily        traMADol 50 MG tablet    ULTRAM    40 tablet    Take 1 tablet (50 mg) by mouth every 6 hours as needed for pain maximum 4 tablet(s) per day    Lumbar radicular pain       VITAMIN D PO      Take 1,000 Units by mouth daily

## 2017-08-03 NOTE — PROGRESS NOTES
Dr. Al Dailey  [facility]  Neurosurgery Clinic Visit          CC: low back pain     Primary care Provider: Cammie Daugherty      Reason For Visit:   I was asked by Dr. Daugherty to consult on the patient for lumbar pain.      HPI: Kathie Wetzel is a 77 year old female with lumbar pain on the right and right leg pain.  She notes she has had back pain for many years but the radicular pain started about 3 weeks ago.  She notes pain to the front of her right thigh to her knee. She has not had PT or injection therapy for her pain recently. She is not sure if the pain has gotten better.  She feels that any activity can make it worse.  She denies any falls or foot drop.      Pain at its worst 10  Pain right now:  5    Past Medical History:   Diagnosis Date     High cholesterol      HTN (hypertension)      Parathyroid adenoma      Thyroid disorder        Past Medical History reviewed with patient during visit.    Past Surgical History:   Procedure Laterality Date     APPENDECTOMY OPEN       CHOLECYSTECTOMY  2007     PARATHYROIDECTOMY  3/14/2014    Procedure: PARATHYROIDECTOMY;  Neck Exploration, Excision Right  Parathyroid Adenoma, Pre Operative Sestimibi Injection, Rapid PTH Assay ;  Surgeon: Nativdiad Fischer MD;  Location: [facility] OR     Past Surgical History reviewed with patient during visit.    Current Outpatient Prescriptions   Medication     donepezil (ARICEPT) 10 MG tablet     citalopram (CELEXA) 10 MG tablet     losartan (COZAAR) 50 MG tablet     atorvastatin (LIPITOR) 10 MG tablet     Multiple Vitamins-Minerals (PRESERVISION AREDS PO)     Fish Oil-Cholecalciferol (FISH OIL + D3 PO)     Cholecalciferol (VITAMIN D PO)     Naproxen Sodium (ALEVE PO)     No current facility-administered medications for this visit.        Allergies   Allergen Reactions     Simvastatin      Buttock pain     Penicillins Rash       Social History     Social History     Marital status: Single     Spouse name: N/A      Number of children: N/A     Years of education: N/A     Social History Main Topics     Smoking status: Former Smoker     Quit date: 3/10/1972     Smokeless tobacco: Never Used     Alcohol use Yes      Comment: 2 week      Drug use: No     Sexual activity: Not Asked     Other Topics Concern     Parent/Sibling W/ Cabg, Mi Or Angioplasty Before 65f 55m? No     Social History Narrative       Family History   Problem Relation Age of Onset     Thyroid Disease Maternal Aunt      Thyroid Disease Other      cousin          Review Of Systems  Skin: negative  Eyes: negative  Ears/Nose/Throat: negative  Respiratory: No shortness of breath, dyspnea on exertion, cough, or hemoptysis  Cardiovascular: HTN, HLD  Gastrointestinal: negative  Genitourinary: negative  Musculoskeletal: back pain  Neurologic: right leg pain   Psychiatric: negative  Hematologic/Lymphatic/Immunologic: negative  Endocrine: negative     ROS: 10 point ROS neg other than the symptoms noted above in the HPI.        Vital Signs: There were no vitals taken for this visit.    Examination:  Constitutional:  Alert, well nourished, NAD.  HEENT: Normocephalic, atraumatic.   Pulm:  Without shortness of breath   CV:  No pitting edema of BLE.    Neurological:  Awake  Alert  Oriented x 3  Speech clear  Cranial nerves II - XII intact  PERRL  EOMI  Face symmetric  Tongue midline  Motor exam   Shoulder Abduction:  Right:  5/5   Left:  5/5  Biceps:                      Right:  5/5   Left:  5/5  Triceps:                     Right:  5/5   Left:  5/5  Wrist Extensors:       Right:  5/5   Left:  5/5  Wrist Flexors:           Right:  5/5   Left:  5/5  Intrinsics:                   Right:  5/5   Left:  5/5   Hip Flexor:                Right: 5/5  Left:  5/5  Hip Adductor:             Right:  5/5  Left:  5/5  Hip Abductor:             Right:  5/5  Left:  5/5  Gastroc Soleus:        Right:  5/5  Left:  5/5  Tib/Ant:                      Right:  5/5  Left:  5/5  EHL:                           Right:  5/5  Left:  5/5   Sensation normal to bilateral upper and lower extremities    Gait: Able to stand from a seated position. Normal non-antalgic, non-myelopathic gait.  Able to heel/toe walk without loss of balance    Lumbar examination reveals tenderness of the spine and paraspinous muscles.  Painful ROM in all planes.  Hip height is symmetrical. Severe right SI joint pain, negative sciatic notch or greater trochanteric tenderness to palpation bilaterally.  Straight leg raise is negative bilaterally.      Imaging:     IMPRESSION:   1. At L3-L4 there are degenerative changes and a small right central  disc extrusion. This results in compression of the right L4 nerve root  and mild to moderate central canal and mild bilateral neural foraminal  stenosis.  2. Degenerative changes elsewhere with no other disc herniation seen.  There is mild to moderate central canal and moderate bilateral neural  foraminal stenosis at L4-L5. There is mild central canal stenosis at  both L1-L2 and L2-L3.        Assessment/Plan:   Kathie Wetzel is a 77 year old female with lumbar pain on the right and right leg pain.  She notes she has had back pain for many years but the radicular pain started about 3 weeks ago.  She notes pain to the front of her right thigh to her knee. She has not had PT or injection therapy for her pain recently. She is not sure if the pain has gotten better.  She feels that any activity can make it worse.  She denies any falls or foot drop.  The pts lumbar MRI was reviewed in detail. She does have a right L3-4 disc extrusion.  She is neurologically intact at this time.   It is recommended that she try a medrol dose pack and injection.  She is open to this. If pain persists after injection or worsens at any time she should come in to discuss surgery with Dr. Al Dailey.     Patient Instructions   1. Ultram for pain.  Max 1 four times per day as needed only for severe pain. Monitor for confusion  or sedation.     2.  Medrol dose pack for pain    3.  Please schedule your injection. Someone will contact you from the pain clinic within 24 hours to schedule.               Natividad Cha [facility]  [address] [address]    Tel [phone]  Pager [phone]

## 2017-08-03 NOTE — NURSING NOTE
"Kathie Wetzel is a 77 year old female who presents for:  Chief Complaint   Patient presents with     Neurologic Problem     Low back pain with out sciatica         Initial Vitals:  There were no vitals taken for this visit. Estimated body mass index is 31.82 kg/(m^2) as calculated from the following:    Height as of 2/28/17: 5' 9.5\" (1.765 m).    Weight as of 7/19/17: 218 lb 9.6 oz (99.2 kg).. There is no height or weight on file to calculate BSA. BP completed using cuff size: large  Data Unavailable    Do you feel safe in your environment?  Yes  Do you need any refills today? No    Nursing Comments: Low back pain with out sciatica.        5 min. nursing intake time  Lynda Piper MA       Discharge plan: 1. Ultram for pain.  Max 1 four times per day as needed only for severe pain. Monitor for confusion or sedation.     2.  Medrol dose pack for pain    3.  Please schedule your injection. Someone will contact you from the pain clinic within 24 hours to schedule.    2 min. nursing discharge time  Lynda Piper MA        "

## 2017-08-03 NOTE — PATIENT INSTRUCTIONS
1. Ultram for pain.  Max 1 four times per day as needed only for severe pain. Monitor for confusion or sedation.     2.  Medrol dose pack for pain    3.  Please schedule your injection. Someone will contact you from the pain clinic within 24 hours to schedule.

## 2017-08-04 NOTE — TELEPHONE ENCOUNTER
Pre-screening Questions for Radiology Injections:    Injection to be done at which interventional clinic site? [facility]    Procedure ordered by Semaj    Procedure ordered? Lumbar Epidural Steroid Injection    What insurance would patient like us to bill for this procedure? Medicare      Worker's comp-Any injection DO NOT SCHEDULE and route to Yeny Marino.      [employer] insurance - For SI joint injections, DO NOT SCHEDULE and route Paz Erazo.      [employer]- MBB's must be scheduled at LEAST two weeks apart      [employer] - Any injection besides hip/shoulder/knee joint DO NOT SCHEDULE and route to Paz Erazo. She will obtain PA and call pt back to schedule procedure or notify pt of denial.     HP [employer]-PA REQUIRED FOR NON-ALETA OR Joint injections    Any chance of pregnancy? NO   If YES, do NOT schedule and route to RN pool    Is an  needed? No     Patient has a drive home? (mandatory) YES:     Is patient taking any blood thinners (plavix, coumadin, jantoven, warfarin, heparin, pradaxa or dabigatran )? No   If hold needed, do NOT schedule, route to RN pool     Is patient taking any aspirin products? No     If more than 325mg/day do NOT schedule; route to RN pool     For CERVICAL procedures, hold all aspirin products for 6 days.      Does the patient have a bleeding or clotting disorder? No     If yes, okay to schedule AND route to RN nurse pool    **For any patients with platelet count <100, must be forwarded to provider**    Is patient diabetic?  No  If YES, have them bring their glucometer.    Does patient have an active infection or treated for one within the past week? No     Is patient currently taking any antibiotics?  No     For patients on chronic, preventative, or prophylactic antibiotics, procedures may be scheduled.     For patients on antibiotics for active or recent infection:    Michele Elizondo [place], [place]-antibiotic course must have been completed for  4 days    Sada Solo-antibiotic course must have been completed for 7 days    Is patient currently taking any steroid medications? (i.e. Prednisone, Medrol)  Yes - Prednisone 4mg Finish 8/10     For patients on steroid medications:    Michele Elizondo [place], [place]-steroid course must have been completed for 4 days    Sada Solo-steroid course must have been completed for 7 days    Reviewed with patient:  If you are started on any steroids or antibiotics between now and your appointment, you must contact us because it may affect our ability to perform your procedure.  Yes    Is patient actively being treated for cancer or immunocompromised, including the spleen having been removed? No    If YES, do NOT schedule and route to RN pool     **For Dr. Molina patients without spleens should have the chart sent to her**    Are you able to get on and off an exam table with minimal or no assistance? Yes  If NO, do NOT schedule and route to RN pool    Are you able to roll over and lay on your stomach with minimal or no assistance? Yes  If NO, do NOT schedule and route to RN pool     Any allergies to contrast dye, iodine, shellfish, or numbing and steroid medications? No  If YES, route to RN pool AND add allergy information to appointment notes    Allergies: Simvastatin and Penicillins        Has the patient had a flu shot or any other vaccinations within 7 days before or after the procedure.  No       Does patient have an MRI/CT?  YES: MRI  (SI joint, hip injections, lumbar sympathetic blocks, and stellate ganglion blocks do not require an MRI)    Was the MRI done w/in the last 3 years?  Yes    Was MRI done at [facility]? Yes      If not, where was it done? N/A       If MRI was not done at [facility], [facility] or [facility] do NOT schedule and route to nursing.  If pt has an imaging disc, the injection may be scheduled but pt has to bring disc to appt. If they show up w/out disc the injection cannot  be done    Reminders (please tell patient if applicable):       Instructed pt to arrive 30 minutes early for IV start if this is for a cervical procedure, ALL sympathetic (stellate ganglion, hypogastric, or lumbar sympathetic block) and all sedation procedures (RFA, spinal cord stimulation trials).  Not Applicable    -IVs are not routinely placed for Bautista and Egyhazi cervical case       If NPO for sedation, informed patient that it is okay to take medications with sips of water (except if they are to hold blood thinners).  Not Applicable   *DO take blood pressure medication if it is prescribed*      If this is for a cervical ALETA, informed patient that aspirin needs to be held for 6 days.   Not Applicable      For all patients not having spinal cord stimulator (SCS) trials or radiofrequency ablations (RFAs), informed patient:  IV sedation is not provided for this procedure.  If you feel that an oral anti-anxiety medication is needed, you can discuss this further with your referring provider or primary care provider.  The Pain Clinic provider will discuss specifics of what the procedure includes at your appointment.  Most procedures last 10-20 minutes.  We use numbing medications to help with any discomfort during the procedure.  Not Applicable      Do not schedule procedures requiring IV placement in the first appointment of the day or first appointment after lunch.       For patients 85 or older we recommend having an adult stay w/ them for the remainder of the day.       Does the patient have any questions?  NO  Yeny Marino  [facility]

## 2017-08-22 NOTE — PROGRESS NOTES
[facility] - Procedure Note    Date of Service: 8/23/2017    Procedure performed: Right L3-4 transforaminal epidural steroid injection with fluoroscopic guidance  Diagnosis: Lumbar spondylosis; Lumbar radiculitis/radiculopathy  : Claritza Cee MD & Andrew Escoto DO (pain fellow)   Anesthesia: none    Indications: Kathie Wetzel is a 77 year old female who is seen at the request of Natividad Cha CNP for lumbar transforaminal epidural steroid injection. The patient describes low back pain predominantly on the right that intermittently radiates to her lateral thigh and buttocks. The patient has been exhibiting symptoms consistent with lumbar intraspinal inflammation and radiculopathy. Symptoms have been persistent, disabling, and intermittently severe. The patient reports minimal improvement with conservative treatment, including physical therapy and medications.      Lumbar MRI was done on 7/26/2017 which showed   FINDINGS: Numbering of the levels is based on what appear to be five  lumbar type vertebral bodies. There is a mild left convexity curvature  of the mid lumbar spine. There is a decrease in the normal lumbar  lordosis. Vertebral body alignment is otherwise normal. No fracture is  seen. No pars interarticularis defect is demonstrated. No osseous  lesion is seen. There are mild Modic type II signal changes in the  endplates adjacent to the L4-L5 disc. No other abnormal marrow signal  intensity is identified. The conus medullaris terminates at the level  of the L1 vertebral body. No intrathecal abnormality is seen. The  adjacent soft tissues are unremarkable.     Findings by specific level:     T12-L1: The disc and facet joints are normal. No stenosis is seen.     L1-L2: There is disc dehydration. The disc height is well preserved.  There is a mild disc bulge with no focal herniation seen. There are  mild degenerative changes in the facet joints and prominence of the  ligamentum  flava. There is mild central canal stenosis. No neural  foraminal narrowing is seen.     L2-L3: There is disc dehydration with mild diffuse disc height loss.  There is a moderate disc bulge with no focal herniation seen. There  are mild degenerative changes in the facet joints. There is mild  central canal stenosis. No neural foraminal stenosis is present.     L3-L4: There is disc dehydration with marked disc height loss. There  is a diffuse disc bulge with a superimposed small focal right central  disc extrusion. This disc material measures 0.9 x 0.9 x 0.7 cm. This  results in posterior displacement and likely compression of the right  L4 nerve root. There is a mild to moderate degree of central canal  stenosis. Mild bilateral foraminal narrowing is seen. There are mild  degenerative changes in the facet joints and prominence of the  ligamentum flava.     L4-L5: There is disc dehydration with marked disc height loss. There  is a diffuse disc bulge with no focal herniation seen. There are mild  degenerative changes in the facet joints. There is mild to moderate  central canal stenosis with moderate bilateral neural foraminal  stenosis.     L5-S1: There is disc dehydration. The disc height is well preserved.  There is a mild disc bulge with no herniation or stenosis seen. There  are moderate degenerative changes in the facet joints.       IMPRESSION:   1. At L3-L4 there are degenerative changes and a small right central  disc extrusion. This results in compression of the right L4 nerve root  and mild to moderate central canal and mild bilateral neural foraminal  stenosis.  2. Degenerative changes elsewhere with no other disc herniation seen.  There is mild to moderate central canal and moderate bilateral neural  foraminal stenosis at L4-L5. There is mild central canal stenosis at  both L1-L2 and L2-L3.    Allergies:      Allergies   Allergen Reactions     Simvastatin      Buttock pain     Penicillins Rash         Vitals:  /75  Pulse 71  SpO2 95%    Review of Systems: The patient denies recent fever, chills, illness, use of antibiotics or anticoagulants. All other 10-point review of systems negative.     Procedure: The procedure and risks were explained, and informed written consent was obtained from the patient. Risks include but are not limited to: infection, bleeding, increased pain, and damage to soft tissue, nerve, muscle, and vasculature structures. After getting informed consent, patient was brought into the procedure suite and was placed in a prone position on the procedure table. A Pause for the Cause was performed. Patient was prepped and draped in sterile fashion.     After identifying the right L3 neuroforamen, the C-arm was rotated to a right lateral oblique angle.  A total of 4.5 mL of Lidocaine 1% was used to anesthetize the skin and the needle track at a skin entry site coaxial with the fluoroscopy beam, and overriding the superior aspect of the neuroforamen.  A 22 gauge 5 inch spinal needle was advanced under intermittent fluoroscopy until it entered the foramen superiorly.    The position was then inspected from anteroposterior and lateral views, and the needle adjusted appropriately.  After negative aspiration, a total of 2 mL of Omnipaque-300 was injected using static and continuous fluoroscopy confirming appropriate position, with spread along the nerve root sheath and into the epidural space, with no intravascular or intrathecal uptake. 1 mL of Omnipaque-300 was wasted.    2mL of 1% lidocaine with 20 mg of dexamethasone was injected.  The needle was removed. Hemostasis was achieved, the area was cleaned, and bandaids were placed when appropriate. Images were saved to PACS.    The patient tolerated the procedure well, and was taken to the recovery room, and there was no evidence of procedural complications. No new sensory or motor deficits were noted following the procedure. The patient was  stable and able to ambulate on discharge home. Post-procedure instructions were provided.     Pre-procedure pain score: 0/10 in the back, 4/10 in the leg  Post-procedure pain score: 0/10 in the back, 4/10 in the leg    Assessment/Plan: Kathie Wetzel is a 77 year old female s/p right L3 transforaminal epidural steroid injection today for lumbar spondylosis, radiculitis/radiculopathy.     1. Following today's procedure, the patient was advised to contact the [facility] for any of the following:   Fever, chills, or night sweats   New onset of pain, numbness, or weakness   Any questions/concerns regarding the procedure  If unable to contact the Pain Center, the patient was instructed to go to a local Emergency Room for any complications.   2. The patient will receive a follow-up call in 1 week.  3. The patient should follow-up with Natividad Cha CNP in 2 weeks for post-procedure evaluation.    [facility]

## 2017-08-23 ENCOUNTER — RADIANT APPOINTMENT (OUTPATIENT)
Dept: GENERAL RADIOLOGY | Facility: CLINIC | Age: 77
End: 2017-08-23
Attending: ANESTHESIOLOGY

## 2017-08-23 ENCOUNTER — RADIOLOGY INJECTION OFFICE VISIT (OUTPATIENT)
Dept: PALLIATIVE MEDICINE | Facility: CLINIC | Age: 77
End: 2017-08-23
Payer: MEDICARE

## 2017-08-23 VITALS — OXYGEN SATURATION: 96 % | SYSTOLIC BLOOD PRESSURE: 120 MMHG | HEART RATE: 70 BPM | DIASTOLIC BLOOD PRESSURE: 77 MMHG

## 2017-08-23 DIAGNOSIS — M54.16 LUMBAR RADICULOPATHY: Primary | ICD-10-CM

## 2017-08-23 DIAGNOSIS — M54.16 LUMBAR RADICULAR PAIN: ICD-10-CM

## 2017-08-23 PROCEDURE — 64483 NJX AA&/STRD TFRM EPI L/S 1: CPT | Mod: RT | Performed by: ANESTHESIOLOGY

## 2017-08-23 ASSESSMENT — PAIN SCALES - GENERAL: PAINLEVEL: MODERATE PAIN (4)

## 2017-08-23 NOTE — NURSING NOTE
Discharge Information    IV Discontiued Time:  NA    Amount of Fluid Infused:  NA    Discharge Criteria = When patient returns to baseline or as per MD order    Consciousness:  Pt is fully awake    Circulation:  BP +/- 20% of pre-procedure level    Respiration:  Patient is able to breathe deeply    O2 Sat:  Patient is able to maintain O2 Sat >92% on room air    Activity:  Moves 4 extremities on command    Ambulation:  Patient is able to stand and walk     Dressing:  Clean/dry or No Dressing    Notes:   Discharge instructions and AVS given to patient    Patient meets criteria for discharge?  YES    Admitted to PCU?  No    Responsible adult present to accompany patient home?  Yes    Signature/Title:    Keily Weir RN Care Coordinator  [facility] [place]

## 2017-08-23 NOTE — PATIENT INSTRUCTIONS
[facility] Procedure Discharge Instructions    Today you saw:   Dr. Claritza Cee    Your procedure:  Lumbar epidural steroid injection      Medications used:  Lidocaine (anesthetic)    Dexamethasone (steroid)       Omnipaque (contrast)              Be cautious when walking as numbness and/or weakness in the legs may occur up to 6-8 hours after the procedure due to effect of the local anesthetic    Do not drive for 6 hours. The effect of the local anesthetic could slow your reflexes.     Avoid strenuous activity for the first 24 hours. You may resume your regular activities after that.     You may shower, however avoid swimming, tub baths or hot tubs for 24 hours following your procedure    You may have a mild to moderate increase in pain for several days following the injection.      You may use ice packs for 10-15 minutes, 3 to 4 times a day at the injection site for comfort    Do not use heat to painful areas for 6 to 8 hours. This will give the local anesthetic time to wear off and prevent you from accidentally burning your skin.    You may use anti-inflammatory medications (such as Ibuprofen/Advil or Aleve) or Tylenol for pain control if necessary    It may take up to 14 days for the steroid medication to start working although you may feel the effect as early as a few days after the procedure.       If you experience any of the following, call the pain center line during work hours at [phone] or on-call physician after hours at [phone]:  -Fever over 100 degree F  -Swelling, bleeding, redness, drainage, warmth at the injection site  -Progressive weakness or numbness in your legs   -Loss of bowel or bladder function  -Unusual headache that is not relieved by Tylenol or your regular headache medication  -Unusual new onset of pain that is not improving    Phone #s:  Nurse triage line for general questions: [phone]

## 2017-08-23 NOTE — MR AVS SNAPSHOT
After Visit Summary   8/23/2017    Kathie Wetzel    MRN: [id number]           Patient Information     Date Of Birth          1940        Visit Information        Provider Department      8/23/2017 9:15 AM Claritza Cee MD [facility]        Care Instructions    [facility] Procedure Discharge Instructions    Today you saw:   Dr. Claritza Cee    Your procedure:  Lumbar epidural steroid injection      Medications used:  Lidocaine (anesthetic)    Dexamethasone (steroid)       Omnipaque (contrast)              Be cautious when walking as numbness and/or weakness in the legs may occur up to 6-8 hours after the procedure due to effect of the local anesthetic    Do not drive for 6 hours. The effect of the local anesthetic could slow your reflexes.     Avoid strenuous activity for the first 24 hours. You may resume your regular activities after that.     You may shower, however avoid swimming, tub baths or hot tubs for 24 hours following your procedure    You may have a mild to moderate increase in pain for several days following the injection.      You may use ice packs for 10-15 minutes, 3 to 4 times a day at the injection site for comfort    Do not use heat to painful areas for 6 to 8 hours. This will give the local anesthetic time to wear off and prevent you from accidentally burning your skin.    You may use anti-inflammatory medications (such as Ibuprofen/Advil or Aleve) or Tylenol for pain control if necessary    It may take up to 14 days for the steroid medication to start working although you may feel the effect as early as a few days after the procedure.       If you experience any of the following, call the pain center line during work hours at [phone] or on-call physician after hours at [phone]:  -Fever over 100 degree F  -Swelling, bleeding, redness, drainage, warmth at the injection site  -Progressive weakness or numbness in your legs   -Loss of bowel or  bladder function  -Unusual headache that is not relieved by Tylenol or your regular headache medication  -Unusual new onset of pain that is not improving    Phone #s:  Nurse triage line for general questions: [phone]              Follow-ups after your visit        Your next 10 appointments already scheduled     Aug 23, 2017  9:15 AM CDT   Radiology Injections with Claritza Cee MD   [facility] ([facility])    [address]  [address]  [facility] [address]   [phone]            Aug 23, 2017  9:15 AM CDT   XR LUMBAR TRANSFORAMINAL INJ SINGLE with [email or web address]   [facility] ([facility])    [address]  [address]  [facility] [address]   [phone]           For nerve root injection, please send or bring copies of any MRIs or other scans you have had.  Bring a list of your current medicines to your exam. (Include vitamins, minerals and over-the-counter medicines.) Leave your valuables at home.  Plan to have someone drive you home afterward.  Stop taking the following medicines (but talk to your doctor first):   If you take blood thinners, you may need to stop taking them a few days before treatment. Talk to your doctor before stopping these medicines.Stop taking Coumadin (warfarin) 3 days before treatment. Restart the day after treatment.   If you take Plavix, Ticlid, Pletal or Persantine, please ask your doctor if you should stop these medicines. You may need extra tests on the morning of your scan.   If you take Xarelto (Rivaroxaban), you may need to stop taking it 24 hours before treatment. Talk to your doctor before stopping this medicine. Restart the day after treatment.  You may take your other medicines as normal.  Stop all food and drink (including water) 3 hours before your test or treatment.  Please tell the doctor:   If you are allergic to X-ray dye (contrast fluid).   If you may be pregnant.   "Injections take about 30 to 45 minutes. Most people spend up to 2 hours in the clinic or hospital.  Please call the Imaging Department at your exam site with any questions.              Who to contact     If you have questions or need follow up information about today's clinic visit or your schedule please contact [facility] directly at [phone].  Normal or non-critical lab and imaging results will be communicated to you by MyChart, letter or phone within 4 business days after the clinic has received the results. If you do not hear from us within 7 days, please contact the clinic through [employer] or phone. If you have a critical or abnormal lab result, we will notify you by phone as soon as possible.  Submit refill requests through [employer] or call your pharmacy and they will forward the refill request to us. Please allow 3 business days for your refill to be completed.          Additional Information About Your Visit        [employer] Information     [employer] lets you send messages to your doctor, view your test results, renew your prescriptions, schedule appointments and more. To sign up, go to [email or web address] . Click on \"Log in\" on the left side of the screen, which will take you to the Welcome page. Then click on \"Sign up Now\" on the right side of the page.     You will be asked to enter the access code listed below, as well as some personal information. Please follow the directions to create your username and password.     Your access code is: [email or web address]  Expires: 2017  3:05 PM     Your access code will  in 90 days. If you need help or a new code, please call your [facility] clinic or [phone].        Care EveryWhere ID     This is your Care EveryWhere ID. This could be used by other organizations to access your [facility] medical records  [id number]         Blood Pressure from Last 3 Encounters:   17 124/64   17 108/62   17 112/60    Weight from Last 3 " Encounters:   07/19/17 99.2 kg (218 lb 9.6 oz)   06/20/17 99.8 kg (220 lb)   06/06/17 99.8 kg (220 lb 1.6 oz)              Today, you had the following     No orders found for display       Primary Care Provider Office Phone # Fax #    Cammie Sanjeev Daugherty [phone] [address] [address] [facility] [address]        Equal Access to Services     ADAM DRAKE AH: Hadii aad ku hadasho Soomaali, waaxda luqadaha, qaybta kaalmada adeegyada, waxay idiin hayaan adeeg kharash la'aan . So [facility] [phone].    ATENCIÓN: Si habla español, tiene a perera disposición servicios gratuitos de asistencia lingüística. [facility] [phone].    We comply with applicable federal civil rights laws and [place] laws. We do not discriminate on the basis of race, color, national origin, age, disability sex, sexual orientation or gender identity.            Thank you!     Thank you for choosing [facility]  for your care. Our goal is always to provide you with excellent care. Hearing back from our patients is one way we can continue to improve our services. Please take a few minutes to complete the written survey that you may receive in the mail after your visit with us. Thank you!             Your Updated Medication List - Protect others around you: Learn how to safely use, store and throw away your medicines at www.disposemymeds.org.          This list is accurate as of: 8/23/17  9:13 AM.  Always use your most recent med list.                   Brand Name Dispense Instructions for use Diagnosis    ALEVE PO      PRN        atorvastatin 10 MG tablet    LIPITOR    90 tablet    Take 1 tablet (10 mg) by mouth At Bedtime    Hyperlipidemia LDL goal <100       citalopram 10 MG tablet    celeXA    90 tablet    Take 1 tablet (10 mg) by mouth daily    Anxiety       donepezil 10 MG tablet    ARICEPT    30 tablet    Take 1 tablet (10 mg) by mouth At Bedtime    Memory impairment of gradual onset       FISH OIL + D3 PO       Take 1 tablet by mouth daily        losartan 50 MG tablet    COZAAR    90 tablet    Take 1 tablet (50 mg) by mouth daily    Benign essential hypertension       methylPREDNISolone 4 MG tablet    MEDROL DOSEPAK    21 tablet    Follow package instructions    Lumbar radicular pain       PRESERVISION AREDS PO      Take 1 tablet by mouth daily        traMADol 50 MG tablet    ULTRAM    40 tablet    Take 1 tablet (50 mg) by mouth every 6 hours as needed for pain maximum 4 tablet(s) per day    Lumbar radicular pain       VITAMIN D PO      Take 1,000 Units by mouth daily

## 2017-09-20 ENCOUNTER — OFFICE VISIT (OUTPATIENT)
Dept: NEUROSURGERY | Facility: CLINIC | Age: 77
End: 2017-09-20
Attending: NURSE PRACTITIONER
Payer: MEDICARE

## 2017-09-20 ENCOUNTER — TELEPHONE (OUTPATIENT)
Dept: PALLIATIVE MEDICINE | Facility: CLINIC | Age: 77
End: 2017-09-20

## 2017-09-20 VITALS
HEART RATE: 70 BPM | OXYGEN SATURATION: 98 % | HEIGHT: 70 IN | WEIGHT: 218 LBS | TEMPERATURE: 97.2 F | DIASTOLIC BLOOD PRESSURE: 74 MMHG | BODY MASS INDEX: 31.21 KG/M2 | SYSTOLIC BLOOD PRESSURE: 121 MMHG

## 2017-09-20 DIAGNOSIS — M54.16 LUMBAR RADICULAR PAIN: Primary | ICD-10-CM

## 2017-09-20 PROCEDURE — 99214 OFFICE O/P EST MOD 30 MIN: CPT | Performed by: NURSE PRACTITIONER

## 2017-09-20 PROCEDURE — 99211 OFF/OP EST MAY X REQ PHY/QHP: CPT | Performed by: NURSE PRACTITIONER

## 2017-09-20 ASSESSMENT — PAIN SCALES - GENERAL: PAINLEVEL: SEVERE PAIN (6)

## 2017-09-20 NOTE — NURSING NOTE
"Kathie Wetzel is a 77 year old female who presents for:  Chief Complaint   Patient presents with     Neurologic Problem     Failed lumbar ALETA        Initial Vitals:  /74 (BP Location: Right arm, Patient Position: Chair, Cuff Size: Adult Large)  Pulse 70  Temp 97.2  F (36.2  C)  Ht 5' 10\" (1.778 m)  Wt 218 lb (98.9 kg)  SpO2 98%  BMI 31.28 kg/m2 Estimated body mass index is 31.28 kg/(m^2) as calculated from the following:    Height as of this encounter: 5' 10\" (1.778 m).    Weight as of this encounter: 218 lb (98.9 kg).. Body surface area is 2.21 meters squared. BP completed using cuff size: large  Severe Pain (6)    Do you feel safe in your environment?  Yes  Do you need any refills today? No    Nursing Comments: Failed lumbar ALETA.  Patient rates low back pain today as 6.      5 min. nursing intake time  Joyce Kunz CMA      Discharge plan: See provider's dictation.  2 min. nursing discharge time  Joyce Kunz CMA    "

## 2017-09-20 NOTE — MR AVS SNAPSHOT
After Visit Summary   9/20/2017    Kathie Wetzel    MRN: [id number]           Patient Information     Date Of Birth          1940        Visit Information        Provider Department      9/20/2017 12:00 PM Natividad Cha APRN CNP [facility]        Today's Diagnoses     Lumbar radicular pain    -  1      Care Instructions    1.  Please schedule your injection. Someone will contact you from the pain clinic within 24 hours to schedule.      2.  Please contact the clinic if pain persists at [phone]. To schedule with Dr. Al Dailey to discuss surgery.            Follow-ups after your visit        Additional Services     PAIN MANAGEMENT CENTER ([place]) REFERRAL       Your provider has referred you to the [facility].  Dr. Cee    Reason for Referral: Procedure or injection only - patient will be contacted within 24 hrs to schedule: Epidural Steroid (transforaminal approach): Lumbar    Please complete the following questions:    What is your diagnosis for the patient's pain? Lumbar L3-4    Do you have any specific questions for the pain specialist? No    Are there any red flags that may impact the assessment or management of the patient? None    **ANY DIAGNOSTIC TESTS THAT ARE NOT IN EPIC SHOULD BE SENT TO THE PAIN CENTER**    Please note the Pre-Op Pain Consult must be scheduled 2-3 weeks prior to the patient's surgery.  Patient's trying to schedule within 2 weeks of surgery may not be accommodated.     Pre-Op Pain Consults are only good for 30 days.    REGARDING OPIOID MEDICATIONS:  We will always address appropriateness of opioid pain medications, but we generally will not automatically take on a prescribing role. When we do take on prescribing of opioids for chronic pain, it is in collaboration with the referring physician for an intermediate period of time (months), with an expectation that the primary physician or provider will assume the  "prescribing role if medications are effective at stable doses with demonstrated compliance.  Therefore, please do not assume that your prescribing responsibilities end on the day of pain clinic consultation.  Is this agreeable to you? YES    For any questions, contact the [facility] at [phone].    Please be aware that coverage of these services is subject to the terms and limitations of your health insurance plan.  Call member services at your health plan with any benefit or coverage questions.      Please bring the following with you to your appointment:    (1) Any X-Rays, CTs or MRIs which have been performed.  Contact the facility where they were done to arrange for  prior to your scheduled appointment.    (2) List of current medications   (3) This referral request   (4) Any documents/labs given to you for this referral                  Who to contact     If you have questions or need follow up information about today's clinic visit or your schedule please contact [facility] directly at [phone].  Normal or non-critical lab and imaging results will be communicated to you by MyChart, letter or phone within 4 business days after the clinic has received the results. If you do not hear from us within 7 days, please contact the clinic through [employer] or phone. If you have a critical or abnormal lab result, we will notify you by phone as soon as possible.  Submit refill requests through [employer] or call your pharmacy and they will forward the refill request to us. Please allow 3 business days for your refill to be completed.          Additional Information About Your Visit        [employer] Information     [employer] lets you send messages to your doctor, view your test results, renew your prescriptions, schedule appointments and more. To sign up, go to [email or web address] . Click on \"Log in\" on the left side of the screen, which will take you to the Welcome " "page. Then click on \"Sign up Now\" on the right side of the page.     You will be asked to enter the access code listed below, as well as some personal information. Please follow the directions to create your username and password.     Your access code is: [email or web address]  Expires: 2017 12:12 PM     Your access code will  in 90 days. If you need help or a new code, please call your [facility] clinic or [phone].        Care EveryWhere ID     This is your Care EveryWhere ID. This could be used by other organizations to access your [facility] medical records  [id number]        Your Vitals Were     Pulse Temperature Height Pulse Oximetry BMI (Body Mass Index)       70 97.2  F (36.2  C) 5' 10\" (1.778 m) 98% 31.28 kg/m2        Blood Pressure from Last 3 Encounters:   17 121/74   17 120/77   17 124/64    Weight from Last 3 Encounters:   17 218 lb (98.9 kg)   17 218 lb 9.6 oz (99.2 kg)   17 220 lb (99.8 kg)              We Performed the Following     PAIN MANAGEMENT CENTER ([place]) REFERRAL        Primary Care Provider Office Phone # Fax #    Cammie Daugherty [phone] [address] [facility] [address]        Equal Access to Services     [facility]: Hadii aad ku hadasho Soomaali, waaxda luqadaha, qaybta kaalmada adeegyada, ronnell hernandez haymax alicia . So [facility] [phone].    ATENCIÓN: Si habla español, tiene a perera disposición servicios gratuitos de asistencia lingüística. Llame al [phone].    We comply with applicable federal civil rights laws and [place] laws. We do not discriminate on the basis of race, color, national origin, age, disability sex, sexual orientation or gender identity.            Thank you!     Thank you for choosing [facility]  for your care. Our goal is always to provide you with excellent care. Hearing back from our patients is one way we can continue to improve our services. " Please take a few minutes to complete the written survey that you may receive in the mail after your visit with us. Thank you!             Your Updated Medication List - Protect others around you: Learn how to safely use, store and throw away your medicines at www.disposemymeds.org.          This list is accurate as of: 9/20/17 12:12 PM.  Always use your most recent med list.                   Brand Name Dispense Instructions for use Diagnosis    ALEVE PO      PRN        atorvastatin 10 MG tablet    LIPITOR    90 tablet    Take 1 tablet (10 mg) by mouth At Bedtime    Hyperlipidemia LDL goal <100       citalopram 10 MG tablet    celeXA    90 tablet    Take 1 tablet (10 mg) by mouth daily    Anxiety       donepezil 10 MG tablet    ARICEPT    30 tablet    Take 1 tablet (10 mg) by mouth At Bedtime    Memory impairment of gradual onset       FISH OIL + D3 PO      Take 1 tablet by mouth daily        losartan 50 MG tablet    COZAAR    90 tablet    Take 1 tablet (50 mg) by mouth daily    Benign essential hypertension       methylPREDNISolone 4 MG tablet    MEDROL DOSEPAK    21 tablet    Follow package instructions    Lumbar radicular pain       PRESERVISION AREDS PO      Take 1 tablet by mouth daily        traMADol 50 MG tablet    ULTRAM    40 tablet    Take 1 tablet (50 mg) by mouth every 6 hours as needed for pain maximum 4 tablet(s) per day    Lumbar radicular pain       VITAMIN D PO      Take 1,000 Units by mouth daily

## 2017-09-20 NOTE — PROGRESS NOTES
[facility]  Neurosurgery followup:    HPI: Ms. Wetzel is a 77 year old female that returns regarding her lumbar radicular pain on the right.  She notes continued right sided low back pain with pain that wraps into her right thigh to the knee. She has had one injection and she is not sure it was helpful. She denies any weakness and presents with her daughter today.        Exam:  Constitutional:  Alert, well nourished, NAD.  HEENT: Normocephalic, atraumatic.   Pulm:  Without shortness of breath   CV:  No pitting edema of BLE.      Neurological:  Awake  Alert  Oriented x 3  Motor exam:        IP Q DF PF EHL  R   5  5   5   5    5  L   5  5   5   5    5     Able to spontaneously move L/E bilaterally  Sensation intact throughout all L/E dermatomes       Imaging: IMPRESSION:   1. At L3-L4 there are degenerative changes and a small right central  disc extrusion. This results in compression of the right L4 nerve root  and mild to moderate central canal and mild bilateral neural foraminal  stenosis.  2. Degenerative changes elsewhere with no other disc herniation seen.  There is mild to moderate central canal and moderate bilateral neural  foraminal stenosis at L4-L5. There is mild central canal stenosis at  both L1-L2 and L2-L3.    A/P: Ms. Wetzel is a 77 year old female that returns regarding her lumbar radicular pain on the right.  She notes continued right sided low back pain with pain that wraps into her right thigh to the knee. She has had one injection and she is not sure it was helpful. She denies any weakness and presents with her daughter today.  The pts MRI was reviewed again.  She does have a right L3-4 disc extrusion. She would like to try another injection. It was explained if pain persists then she may want to come and see Dr. Al Dailey regarding surgery.  She agreed.       Patient Instructions   1.  Please schedule your injection. Someone will contact you from the pain clinic within 24 hours  to schedule.      2.  Please contact the clinic if pain persists at [phone]. To schedule with Dr. Al Dailey to discuss surgery.           Natividad Cha [facility]  [facility]  [address] [address]    Tel [phone]  Pager [phone]

## 2017-09-20 NOTE — PATIENT INSTRUCTIONS
1.  Please schedule your injection. Someone will contact you from the pain clinic within 24 hours to schedule.      2.  Please contact the clinic if pain persists at [phone]. To schedule with Dr. Al Dailey to discuss surgery.

## 2017-09-21 NOTE — TELEPHONE ENCOUNTER
Note      Pre-screening Questions for Radiology Injections:     Injection to be done at which interventional clinic site? [facility]     Procedure ordered by Semaj     Procedure ordered? Lumbar Epidural Steroid Injection     What insurance would patient like us to bill for this procedure? Medicare       Worker's comp-Any injection DO NOT SCHEDULE and route to Yeny Marino.       [employer] insurance - For SI joint injections, DO NOT SCHEDULE and route Paz Erazo.       [employer]- MBB's must be scheduled at LEAST two weeks apart       [employer] - Any injection besides hip/shoulder/knee joint DO NOT SCHEDULE and route to Paz Erazo. She will obtain PA and call pt back to schedule procedure or notify pt of denial.     [employer]-PA REQUIRED FOR NON-ALETA OR Joint injections     Any chance of pregnancy? NO   If YES, do NOT schedule and route to RN pool     Is an  needed? No     Patient has a drive home? (mandatory) YES:      Is patient taking any blood thinners (plavix, coumadin, jantoven, warfarin, heparin, pradaxa or dabigatran )? No   If hold needed, do NOT schedule, route to RN pool     Is patient taking any aspirin products? No     If more than 325mg/day do NOT schedule; route to RN pool     For CERVICAL procedures, hold all aspirin products for 6 days.      Does the patient have a bleeding or clotting disorder? No     If yes, okay to schedule AND route to RN nurse pool    **For any patients with platelet count <100, must be forwarded to provider**     Is patient diabetic?  No  If YES, have them bring their glucometer.     Does patient have an active infection or treated for one within the past week? No     Is patient currently taking any antibiotics?  No     For patients on chronic, preventative, or prophylactic antibiotics, procedures may be scheduled.     For patients on antibiotics for active or recent infection:    Michele Elizondo [place], [place]-antibiotic course must  have been completed for 4 days    Sada Solo-antibiotic course must have been completed for 7 days     Is patient currently taking any steroid medications? (i.e. Prednisone, Medrol)  Yes - Prednisone 4mg Finish 8/10     For patients on steroid medications:    iMchele Elizondo [place], [place]-steroid course must have been completed for 4 days    Sada Solo-steroid course must have been completed for 7 days     Reviewed with patient:  If you are started on any steroids or antibiotics between now and your appointment, you must contact us because it may affect our ability to perform your procedure.  Yes     Is patient actively being treated for cancer or immunocompromised, including the spleen having been removed? No    If YES, do NOT schedule and route to RN pool     **For Dr. Molina patients without spleens should have the chart sent to her**     Are you able to get on and off an exam table with minimal or no assistance? Yes  If NO, do NOT schedule and route to RN pool     Are you able to roll over and lay on your stomach with minimal or no assistance? Yes  If NO, do NOT schedule and route to RN pool     Any allergies to contrast dye, iodine, shellfish, or numbing and steroid medications? No  If YES, route to RN pool AND add allergy information to appointment notes     Allergies: Simvastatin and Penicillins         Has the patient had a flu shot or any other vaccinations within 7 days before or after the procedure.  No        Does patient have an MRI/CT?  YES: MRI  (SI joint, hip injections, lumbar sympathetic blocks, and stellate ganglion blocks do not require an MRI)    Was the MRI done w/in the last 3 years?  Yes    Was MRI done at [facility]? Yes      If not, where was it done? N/A        If MRI was not done at [facility], [facility] or [facility] do NOT schedule and route to nursing.  If pt has an imaging disc, the injection may be scheduled but pt has to bring disc to appt. If they show up  w/out disc the injection cannot be done     Reminders (please tell patient if applicable):       Instructed pt to arrive 30 minutes early for IV start if this is for a cervical procedure, ALL sympathetic (stellate ganglion, hypogastric, or lumbar sympathetic block) and all sedation procedures (RFA, spinal cord stimulation trials).  Not Applicable    -IVs are not routinely placed for Bautista and Egyhazi cervical case       If NPO for sedation, informed patient that it is okay to take medications with sips of water (except if they are to hold blood thinners).  Not Applicable                         *DO take blood pressure medication if it is prescribed*       If this is for a cervical ALETA, informed patient that aspirin needs to be held for 6 days.   Not Applicable       For all patients not having spinal cord stimulator (SCS) trials or radiofrequency ablations (RFAs), informed patient:  IV sedation is not provided for this procedure.  If you feel that an oral anti-anxiety medication is needed, you can discuss this further with your referring provider or primary care provider.  The Pain Clinic provider will discuss specifics of what the procedure includes at your appointment.  Most procedures last 10-20 minutes.  We use numbing medications to help with any discomfort during the procedure.  Not Applicable       Do not schedule procedures requiring IV placement in the first appointment of the day or first appointment after lunch.        For patients 85 or older we recommend having an adult stay w/ them for the remainder of the day.        Does the patient have any questions?  NO

## 2017-10-03 ENCOUNTER — RADIOLOGY INJECTION OFFICE VISIT (OUTPATIENT)
Dept: PALLIATIVE MEDICINE | Facility: CLINIC | Age: 77
End: 2017-10-03
Payer: MEDICARE

## 2017-10-03 ENCOUNTER — RADIANT APPOINTMENT (OUTPATIENT)
Dept: GENERAL RADIOLOGY | Facility: CLINIC | Age: 77
End: 2017-10-03
Attending: PAIN MEDICINE

## 2017-10-03 VITALS — DIASTOLIC BLOOD PRESSURE: 72 MMHG | HEART RATE: 71 BPM | OXYGEN SATURATION: 97 % | SYSTOLIC BLOOD PRESSURE: 123 MMHG

## 2017-10-03 DIAGNOSIS — M54.16 LUMBAR RADICULOPATHY: Primary | ICD-10-CM

## 2017-10-03 DIAGNOSIS — M53.3 SI (SACROILIAC) JOINT DYSFUNCTION: ICD-10-CM

## 2017-10-03 DIAGNOSIS — M54.16 LUMBAR RADICULAR PAIN: ICD-10-CM

## 2017-10-03 DIAGNOSIS — M53.3 SACROILIAC JOINT DYSFUNCTION OF RIGHT SIDE: ICD-10-CM

## 2017-10-03 PROCEDURE — 27096 INJECT SACROILIAC JOINT: CPT | Mod: RT | Performed by: PAIN MEDICINE

## 2017-10-03 NOTE — PROGRESS NOTES
Pre procedure Diagnosis: SI joint dysfunction    Post procedure Diagnosis: Same  Procedure performed: R SI joint injection  Anesthesia: none  Complications: none  Operators: Jun Nava MD     Indications:   Kathie Wetzel is a 77 year old female. The patient has a history of R LBP and tried conservative treatments prior to injection.    Options/alternatives, benefits and risks were discussed with the patient including bleeding, infection, tissue trauma, exposure to radiation, reaction to medications including seizure, nerve injury, weakness, and numbness.  Questions were answered to her satisfaction and she agrees to proceed. Voluntary informed consent was obtained and signed.     Vitals were reviewed: Yes  Allergies were reviewed:  Yes   Medications were reviewed:  Yes   Pre-procedure pain score: 4/10    Procedure:  After obtaining signed informed consent, the patient was brought into the procedure suite and was placed in a prone position on the procedure table.   A Pause for the Cause was performed.  The patient was prepped and draped in the usual sterile fashion.     After identifying the right SI joint, the C-arm was rotated obliquely to obtain the best view of the inferior angle of the joint.  Then 4 ml of Lidocaine 1%  was used to anesthetize the skin at a skin entry site coaxial with the fluoroscopy beam at this location.  A 22 gauge 3.5 inch needle was advanced under intermittent fluoroscopy until it was felt to enter the SI joint.  Aspiration was negative.    A total of 1ml of Omnipaque-300 was injected, confirming appropriate position, with spread into the intraarticular space, with no intravascular uptake noted.  9ml of Omnipaque was wasted. Location was verified in lateral view.    2 ml of 0.5% bupivacaine with 40mg of Kenalog was injected.  The needle was removed.     Hemostasis was achieved, the area was cleaned, and bandaids were placed when appropriate.  The patient tolerated the procedure  well, and was taken to the recovery room.  Images were saved to PACS.    Post-procedure pain score: 4/10  Follow-up includes:   -f/u phone call in one week  -f/u with referring Physician     Jun Nava MD  [facility]

## 2017-10-03 NOTE — NURSING NOTE
Discharge Information    IV Discontiued Time:  NA    Amount of Fluid Infused:  NA    Discharge Criteria = When patient returns to baseline or as per MD order    Consciousness:  Pt is fully awake    Circulation:  BP +/- 20% of pre-procedure level    Respiration:  Patient is able to breathe deeply    O2 Sat:  Patient is able to maintain O2 Sat >92% on room air    Activity:  Moves 4 extremities on command    Ambulation:  Patient is able to stand and walk or stand and pivot into wheelchair    Dressing:  Clean/dry or No Dressing    Notes:   Discharge instructions and AVS given to patient    Patient meets criteria for discharge?  YES    Admitted to PCU?  No    Responsible adult present to accompany patient home?  Yes    Signature/Title:    Gretchen Kinney  RN Care Coordinator  [facility]

## 2017-10-03 NOTE — MR AVS SNAPSHOT
After Visit Summary   10/3/2017    Kathie Wetzel    MRN: [id number]           Patient Information     Date Of Birth          1940        Visit Information        Provider Department      10/3/2017 10:45 AM Jun Nava MD [facility]        Today's Diagnoses     Lumbar radiculopathy    -  1    SI (sacroiliac) joint dysfunction          Care Instructions    [facility] Procedure Discharge Instructions    Today you saw:    Dr. Jun Nava    Your procedure: Sacro-iliac joint injection      Medications used:  Lidocaine (local anesthetic)  Bupivacaine (anesthetic), Kenalog (steroid)  Omnipaque (contrast)                 Be cautious when walking as numbness and/or weakness in the legs may occur up to 6-8 hours after the procedure due to effect of the local anesthetic    Do not drive for 6 hours. The effect of the local anesthetic could slow your reflexes.     Avoid strenuous activity for the first 24 hours. You may resume your regular activities after that.     You may shower, however avoid swimming, tub baths or hot tubs for 24 hours following your procedure    You may have a mild to moderate increase in pain for several days following the injection.      You may use ice packs for 10-15 minutes, 3 to 4 times a day at the injection site for comfort    Do not use heat to painful areas for 6 to 8 hours. This will give the local anesthetic time to wear off and prevent you from accidentally burning your skin.    You may use anti-inflammatory medications (such as Ibuprofen/Advil or Aleve) or Tylenol for pain control if necessary    With diabetes, check your blood sugar more frequently than usual as your blood sugar may be higher than normal for 10-14 days following a steroid injection. Contact your doctor who manages your diabetes if your blood sugar is higher than usual    It may take up to 14 days for the steroid medication to start working although you may feel  "the effect as early as a few days after the procedure.       If you experience any of the following, call the pain center line during work hours at [phone] or on-call physician after hours at [phone]:  -Fever over 100 degree F  -Swelling, bleeding, redness, drainage, warmth at the injection site  -Progressive weakness or numbness in your legs or arms  -Loss of bowel or bladder function  -Unusual headache that is not relieved by Tylenol or your regular headache medication  -Unusual new onset of pain that is not improving    Phone #s:  Nurse triage line for general questions: [phone]              Follow-ups after your visit        Who to contact     If you have questions or need follow up information about today's clinic visit or your schedule please contact [facility] directly at [phone].  Normal or non-critical lab and imaging results will be communicated to you by SkuServehart, letter or phone within 4 business days after the clinic has received the results. If you do not hear from us within 7 days, please contact the clinic through SkuServehart or phone. If you have a critical or abnormal lab result, we will notify you by phone as soon as possible.  Submit refill requests through FootballScout or call your pharmacy and they will forward the refill request to us. Please allow 3 business days for your refill to be completed.          Additional Information About Your Visit        FootballScout Information     FootballScout lets you send messages to your doctor, view your test results, renew your prescriptions, schedule appointments and more. To sign up, go to [email or web address] . Click on \"Log in\" on the left side of the screen, which will take you to the Welcome page. Then click on \"Sign up Now\" on the right side of the page.     You will be asked to enter the access code listed below, as well as some personal information. Please follow the directions to create your username and password.     Your " access code is: [email or web address]  Expires: 2017 12:12 PM     Your access code will  in 90 days. If you need help or a new code, please call your [facility] clinic or [phone].        Care EveryWhere ID     This is your Care EveryWhere ID. This could be used by other organizations to access your [facility] medical records  [id number]        Your Vitals Were     Pulse Pulse Oximetry Breastfeeding?             67 98% No          Blood Pressure from Last 3 Encounters:   10/03/17 118/79   17 121/74   17 120/77    Weight from Last 3 Encounters:   17 98.9 kg (218 lb)   17 99.2 kg (218 lb 9.6 oz)   17 99.8 kg (220 lb)              We Performed the Following     NO CHARGE LOS        Primary Care Provider Office Phone # Fax #    Cammie Sanjeev Daugherty [phone] [address] [place] [address]        Equal Access to Services     [facility]: Hadii aad ku hadasho Soomaali, waaxda luqadaha, qaybta kaalmada adeegyada, waxay chingin haymax alicia . So [facility] [phone].    ATENCIÓN: Si habla español, tiene a perera disposición servicios gratuitos de asistencia lingüística. Llame al [phone].    We comply with applicable federal civil rights laws and [place] laws. We do not discriminate on the basis of race, color, national origin, age, disability, sex, sexual orientation, or gender identity.            Thank you!     Thank you for choosing [facility]  for your care. Our goal is always to provide you with excellent care. Hearing back from our patients is one way we can continue to improve our services. Please take a few minutes to complete the written survey that you may receive in the mail after your visit with us. Thank you!             Your Updated Medication List - Protect others around you: Learn how to safely use, store and throw away your medicines at www.disposemymeds.org.          This list is accurate as of: 10/3/17 11:12  AM.  Always use your most recent med list.                   Brand Name Dispense Instructions for use Diagnosis    ALEVE PO      PRN        atorvastatin 10 MG tablet    LIPITOR    90 tablet    Take 1 tablet (10 mg) by mouth At Bedtime    Hyperlipidemia LDL goal <100       citalopram 10 MG tablet    celeXA    90 tablet    Take 1 tablet (10 mg) by mouth daily    Anxiety       donepezil 10 MG tablet    ARICEPT    30 tablet    Take 1 tablet (10 mg) by mouth At Bedtime    Memory impairment of gradual onset       FISH OIL + D3 PO      Take 1 tablet by mouth daily        losartan 50 MG tablet    COZAAR    90 tablet    Take 1 tablet (50 mg) by mouth daily    Benign essential hypertension       methylPREDNISolone 4 MG tablet    MEDROL DOSEPAK    21 tablet    Follow package instructions    Lumbar radicular pain       PRESERVISION AREDS PO      Take 1 tablet by mouth daily        traMADol 50 MG tablet    ULTRAM    40 tablet    Take 1 tablet (50 mg) by mouth every 6 hours as needed for pain maximum 4 tablet(s) per day    Lumbar radicular pain       VITAMIN D PO      Take 1,000 Units by mouth daily

## 2017-10-03 NOTE — PATIENT INSTRUCTIONS
[facility] Procedure Discharge Instructions    Today you saw:    Dr. Jun Nava    Your procedure: Sacro-iliac joint injection      Medications used:  Lidocaine (local anesthetic)  Bupivacaine (anesthetic), Kenalog (steroid)  Omnipaque (contrast)                 Be cautious when walking as numbness and/or weakness in the legs may occur up to 6-8 hours after the procedure due to effect of the local anesthetic    Do not drive for 6 hours. The effect of the local anesthetic could slow your reflexes.     Avoid strenuous activity for the first 24 hours. You may resume your regular activities after that.     You may shower, however avoid swimming, tub baths or hot tubs for 24 hours following your procedure    You may have a mild to moderate increase in pain for several days following the injection.      You may use ice packs for 10-15 minutes, 3 to 4 times a day at the injection site for comfort    Do not use heat to painful areas for 6 to 8 hours. This will give the local anesthetic time to wear off and prevent you from accidentally burning your skin.    You may use anti-inflammatory medications (such as Ibuprofen/Advil or Aleve) or Tylenol for pain control if necessary    With diabetes, check your blood sugar more frequently than usual as your blood sugar may be higher than normal for 10-14 days following a steroid injection. Contact your doctor who manages your diabetes if your blood sugar is higher than usual    It may take up to 14 days for the steroid medication to start working although you may feel the effect as early as a few days after the procedure.       If you experience any of the following, call the pain center line during work hours at [phone] or on-call physician after hours at [phone]:  -Fever over 100 degree F  -Swelling, bleeding, redness, drainage, warmth at the injection site  -Progressive weakness or numbness in your legs or arms  -Loss of bowel or bladder  function  -Unusual headache that is not relieved by Tylenol or your regular headache medication  -Unusual new onset of pain that is not improving    Phone #s:  Nurse triage line for general questions: [phone]

## 2017-10-03 NOTE — NURSING NOTE
Injection intake:    If this procedure is requiring IV sedation has the patient been NPO for 6  Hours? NA    Is patient on a prescribed blood thinner such as coumadin, Plavix, Xarelto?    No    Does patient take aspirin?  No    If this is for a cervical procedure and patient is on aspirin has it been held for 6 days?   NA    Any allergies to contrast dye, iodine, steroid and/or numbing medications?  NO    Is patient currently taking antibiotics or have an active infection?  NO    Has pt had a fever/elevated temperature within the past week? NO    Is patient currently taking oral steroids? NO    Does patient have a ? Yes       Is patient pregnant or breastfeeding?  NO    Are the vital signs normal?  Yes

## 2017-11-01 ENCOUNTER — TELEPHONE (OUTPATIENT)
Dept: NEUROSURGERY | Facility: CLINIC | Age: 77
End: 2017-11-01

## 2017-11-01 ENCOUNTER — OFFICE VISIT (OUTPATIENT)
Dept: NEUROSURGERY | Facility: CLINIC | Age: 77
End: 2017-11-01
Attending: NEUROLOGICAL SURGERY
Payer: MEDICARE

## 2017-11-01 VITALS
TEMPERATURE: 97 F | WEIGHT: 217 LBS | HEART RATE: 76 BPM | SYSTOLIC BLOOD PRESSURE: 117 MMHG | RESPIRATION RATE: 16 BRPM | BODY MASS INDEX: 31.14 KG/M2 | DIASTOLIC BLOOD PRESSURE: 76 MMHG | OXYGEN SATURATION: 96 %

## 2017-11-01 DIAGNOSIS — M54.16 LUMBAR RADICULAR PAIN: Primary | ICD-10-CM

## 2017-11-01 PROCEDURE — 99211 OFF/OP EST MAY X REQ PHY/QHP: CPT | Performed by: NEUROLOGICAL SURGERY

## 2017-11-01 PROCEDURE — 99213 OFFICE O/P EST LOW 20 MIN: CPT | Performed by: NEUROLOGICAL SURGERY

## 2017-11-01 NOTE — TELEPHONE ENCOUNTER
Left voicemail for patient to call back and schedule surgery with Dr. Dailey. Awaiting patients call back.

## 2017-11-01 NOTE — MR AVS SNAPSHOT
After Visit Summary   11/1/2017    Kathie Wetzel    MRN: [id number]           Patient Information     Date Of Birth          1940        Visit Information        Provider Department      11/1/2017 11:00 AM Al Dailey MD [facility]        Care Instructions    Patient Instructions  Pre-Operative:  -Surgery scheduled at [facility] for Right L3-4 Microdiscectomy   -Surgical risks: blood clots in the leg or lung, problems urinating, nerve damage, drainage from the incision, infection,stiffness  - Pre-operative physical with primary care physician within 30 days of surgical date.   - Likely same day procedure with discharge home day of surgery, may stay for 23 hour observation hospitalization for monitoring.     -Shower procedure: please shower with antimicrobial soap the night before surgery and morning of surgery. Please refer to showering instruction sheet in folder.  -Stop all solid foods 8 hours before surgery.  -Keep drinking clear liquids until 4 hours before surgery. Clear liquids include water, clear juice, black coffee, or clear tea without milk, Gatorade, clear soda.   - Discontinue Aspirin, NSAIDs (Advil/Ibuprofen, Naproxen,Nuprin,Relafen/Nabumetone, Diclofenac,Meloxicam, Aleve, Celebrex) x 7 days prior to surgical date.  - May try tylenol for pain 1000 mg three times per day for pain      Post-Operative:  -you may resume taking NSAIDs 7 days post op   - Post operative incisional pain x 1-2 weeks which will require pain medications and muscle relaxants. You will receive medication upon discharge.  -Do NOT drive while taking narcotic pain medication.  -Do not drink alcohol while using any pain medication  -Post operative incision care- Watch for signs of infection: redness, swelling, warmth, drainage, and fever of 101 degrees or higher. Notify clinic [phone].  -No submerging incision in water such as pools, hot tubs,baths for at least 8  "weeks or until incision is healed. Showers are fine.   - Post operative activity limitations: 6-8 weeks, no lifting > 10 pounds, no bending, twisting, or overhead reaching.  - Follow up appointments:6 week post op follow up visit with Nurse practitioner.Please call to schedule follow up appointment at [phone].  -If you are currently employed, you will need to be off work for 2-4 weeks for post op recovery and healing.               Follow-ups after your visit        Who to contact     If you have questions or need follow up information about today's clinic visit or your schedule please contact [facility] directly at [phone].  Normal or non-critical lab and imaging results will be communicated to you by BullionVaulthart, letter or phone within 4 business days after the clinic has received the results. If you do not hear from us within 7 days, please contact the clinic through BullionVaulthart or phone. If you have a critical or abnormal lab result, we will notify you by phone as soon as possible.  Submit refill requests through Oesia or call your pharmacy and they will forward the refill request to us. Please allow 3 business days for your refill to be completed.          Additional Information About Your Visit        BullionVaultharDigital Vault Information     Oesia lets you send messages to your doctor, view your test results, renew your prescriptions, schedule appointments and more. To sign up, go to [email or web address] . Click on \"Log in\" on the left side of the screen, which will take you to the Welcome page. Then click on \"Sign up Now\" on the right side of the page.     You will be asked to enter the access code listed below, as well as some personal information. Please follow the directions to create your username and password.     Your access code is: [email or web address]  Expires: 2017 12:12 PM     Your access code will  in 90 days. If you need help or a new code, please call your [place] clinic or " [phone].        Care EveryWhere ID     This is your Care EveryWhere ID. This could be used by other organizations to access your [facility] medical records  [id number]        Your Vitals Were     Pulse Temperature Respirations Pulse Oximetry BMI (Body Mass Index)       76 97  F (36.1  C) (Oral) 16 96% 31.14 kg/m2        Blood Pressure from Last 3 Encounters:   11/01/17 117/76   10/03/17 123/72   09/20/17 121/74    Weight from Last 3 Encounters:   11/01/17 217 lb (98.4 kg)   09/20/17 218 lb (98.9 kg)   07/19/17 218 lb 9.6 oz (99.2 kg)              Today, you had the following     No orders found for display       Primary Care Provider Office Phone # Fax #    Cammie Sanjeev Daugherty [phone] [address] [facility] [address]        Equal Access to Services     [facility]: Hadii aad ku hadasho Soomaali, waaxda luqadaha, qaybta kaalmada adeegyada, waxay chingin haysimonn richard alicia . So [facility] [phone].    ATENCIÓN: Si habla español, tiene a perera disposición servicios gratuitos de asistencia lingüística. Llame al [phone].    We comply with applicable federal civil rights laws and [place] laws. We do not discriminate on the basis of race, color, national origin, age, disability, sex, sexual orientation, or gender identity.            Thank you!     Thank you for choosing [facility]  for your care. Our goal is always to provide you with excellent care. Hearing back from our patients is one way we can continue to improve our services. Please take a few minutes to complete the written survey that you may receive in the mail after your visit with us. Thank you!             Your Updated Medication List - Protect others around you: Learn how to safely use, store and throw away your medicines at www.disposemymeds.org.          This list is accurate as of: 11/1/17 11:26 AM.  Always use your most recent med list.                   Brand Name Dispense  Instructions for use Diagnosis    ALEVE PO      PRN        atorvastatin 10 MG tablet    LIPITOR    90 tablet    Take 1 tablet (10 mg) by mouth At Bedtime    Hyperlipidemia LDL goal <100       citalopram 10 MG tablet    celeXA    90 tablet    Take 1 tablet (10 mg) by mouth daily    Anxiety       donepezil 10 MG tablet    ARICEPT    30 tablet    Take 1 tablet (10 mg) by mouth At Bedtime    Memory impairment of gradual onset       FISH OIL + D3 PO      Take 1 tablet by mouth daily        losartan 50 MG tablet    COZAAR    90 tablet    Take 1 tablet (50 mg) by mouth daily    Benign essential hypertension       methylPREDNISolone 4 MG tablet    MEDROL DOSEPAK    21 tablet    Follow package instructions    Lumbar radicular pain       PRESERVISION AREDS PO      Take 1 tablet by mouth daily        traMADol 50 MG tablet    ULTRAM    40 tablet    Take 1 tablet (50 mg) by mouth every 6 hours as needed for pain maximum 4 tablet(s) per day    Lumbar radicular pain       VITAMIN D PO      Take 1,000 Units by mouth daily

## 2017-11-01 NOTE — PATIENT INSTRUCTIONS
Patient Instructions  Pre-Operative:  -Surgery scheduled at [facility] for Right L3-4 Microdiscectomy   -Surgical risks: blood clots in the leg or lung, problems urinating, nerve damage, drainage from the incision, infection,stiffness  - Pre-operative physical with primary care physician within 30 days of surgical date.   - Likely same day procedure with discharge home day of surgery, may stay for 23 hour observation hospitalization for monitoring.     -Shower procedure: please shower with antimicrobial soap the night before surgery and morning of surgery. Please refer to showering instruction sheet in folder.  -Stop all solid foods 8 hours before surgery.  -Keep drinking clear liquids until 4 hours before surgery. Clear liquids include water, clear juice, black coffee, or clear tea without milk, Gatorade, clear soda.   - Discontinue Aspirin, NSAIDs (Advil/Ibuprofen, Naproxen,Nuprin,Relafen/Nabumetone, Diclofenac,Meloxicam, Aleve, Celebrex) x 7 days prior to surgical date.  - May try tylenol for pain 1000 mg three times per day for pain      Post-Operative:  -you may resume taking NSAIDs 7 days post op   - Post operative incisional pain x 1-2 weeks which will require pain medications and muscle relaxants. You will receive medication upon discharge.  -Do NOT drive while taking narcotic pain medication.  -Do not drink alcohol while using any pain medication  -Post operative incision care- Watch for signs of infection: redness, swelling, warmth, drainage, and fever of 101 degrees or higher. Notify clinic [phone].  -No submerging incision in water such as pools, hot tubs,baths for at least 8 weeks or until incision is healed. Showers are fine.   - Post operative activity limitations: 6-8 weeks, no lifting > 10 pounds, no bending, twisting, or overhead reaching.  - Follow up appointments:6 week post op follow up visit with Nurse practitioner.Please call to schedule follow up appointment at  [phone].  -If you are currently employed, you will need to be off work for 2-4 weeks for post op recovery and healing.

## 2017-11-01 NOTE — LETTER
11/1/2017         RE: Kathie Wetzel  [address]  [place] [address]        Dear Colleague,    Thank you for referring your patient, Kathie Wetzel, to the [facility]. Please see a copy of my visit note below.    Neurosurgery Follow Up Clinic Visit      Kathie Wetzel returns for follow up visit regarding her RLE pain    Currently the patient describes having persistent RLE radicular pain. She has failed PT and ALETA. Her pain is persistent and affecting her AODL    Exam is stable    MRI lumbar - right L3-4 herniated disk with compression of the right L4 root. She also has L4-5 DDD      Plan:   1. I have recommended a right L3-4 microdiscectomy. I discussed with the patient and daughter the risk of surgery to include, but, not be limited to; nerve injury, failure of improvement of symptoms, CSF leak, infection, post op hematoma, the need for recurrent surgery, paralysis, coma and death.             Again, thank you for allowing me to participate in the care of your patient.        Sincerely,        Al Dailey MD

## 2017-11-02 ENCOUNTER — OFFICE VISIT (OUTPATIENT)
Dept: FAMILY MEDICINE | Facility: CLINIC | Age: 77
End: 2017-11-02
Payer: MEDICARE

## 2017-11-02 VITALS
SYSTOLIC BLOOD PRESSURE: 116 MMHG | BODY MASS INDEX: 31.21 KG/M2 | WEIGHT: 210.7 LBS | RESPIRATION RATE: 16 BRPM | DIASTOLIC BLOOD PRESSURE: 64 MMHG | HEIGHT: 69 IN | HEART RATE: 67 BPM | TEMPERATURE: 98.1 F | OXYGEN SATURATION: 97 %

## 2017-11-02 DIAGNOSIS — I10 BENIGN ESSENTIAL HYPERTENSION: ICD-10-CM

## 2017-11-02 DIAGNOSIS — E78.5 HYPERLIPIDEMIA LDL GOAL <100: ICD-10-CM

## 2017-11-02 DIAGNOSIS — M54.16 LUMBAR RADICULAR PAIN: ICD-10-CM

## 2017-11-02 DIAGNOSIS — Z01.818 PREOP GENERAL PHYSICAL EXAM: Primary | ICD-10-CM

## 2017-11-02 DIAGNOSIS — F41.9 ANXIETY: ICD-10-CM

## 2017-11-02 DIAGNOSIS — R41.3 MEMORY IMPAIRMENT OF GRADUAL ONSET: ICD-10-CM

## 2017-11-02 PROCEDURE — 99214 OFFICE O/P EST MOD 30 MIN: CPT | Performed by: INTERNAL MEDICINE

## 2017-11-02 PROCEDURE — 93000 ELECTROCARDIOGRAM COMPLETE: CPT | Performed by: INTERNAL MEDICINE

## 2017-11-02 RX ORDER — TRAMADOL HYDROCHLORIDE 50 MG/1
50 TABLET ORAL EVERY 6 HOURS PRN
Qty: 40 TABLET | Refills: 0 | Status: SHIPPED | OUTPATIENT
Start: 2017-11-02 | End: 2017-12-18

## 2017-11-02 NOTE — PROGRESS NOTES
[facility]  [address] [facility] [address] [phone]  Dept: [phone]    PRE-OP EVALUATION:  Today's date: 2017    Kathie Wetzel (: 1940) presents for pre-operative evaluation assessment as requested by Dr. Al Dailey.  She requires evaluation and anesthesia risk assessment prior to undergoing surgery/procedure for treatment of back pain. Proposed procedure: DISCECTOMY LUMBAR POSTERIOR MICROSCOPIC TWO LEVELS  Right    Date of Surgery/ Procedure: 11/10/17  Time of Surgery/ Procedure: 12:10 pm  Hospital/Surgical Facility: [facility]  Fax number for surgical facility:   Primary Physician: Cammie Daugherty  Type of Anesthesia Anticipated: General    Patient has a Health Care Directive or Living Will: YES     1. NO - Do you have a history of heart attack, stroke, stent, bypass or surgery on an artery in the head, neck, heart or legs?  2. NO - Do you ever have any pain or discomfort in your chest?  3. NO - Do you have a history of  Heart Failure?  4. NO - Are you troubled by shortness of breath when: walking on the level, up a slight hill or at night?  5. NO - Do you currently have a cold, bronchitis or other respiratory infection?  6. NO - Do you have a cough, shortness of breath or wheezing?  7. NO - Do you sometimes get pains in the calves of your legs when you walk?  8. NO - Do you or anyone in your family have previous history of blood clots?  9. NO - Do you or does anyone in your family have a serious bleeding problem such as prolonged bleeding following surgeries or cuts?  10. NO - Have you ever had problems with anemia or been told to take iron pills?  11. NO - Have you had any abnormal blood loss such as black, tarry or bloody stools, or abnormal vaginal bleeding?  12. NO - Have you ever had a blood transfusion?  13. NO - Have you or any of your relatives ever had problems with anesthesia?  14. NO - Do you have sleep apnea,  excessive snoring or daytime drowsiness?  15. NO - Do you have any prosthetic heart valves?  16. NO - Do you have prosthetic joints?  17. NO - Is there any chance that you may be pregnant?    HPI:                                                      Brief HPI related to upcoming procedure: Patient has a history of lumbar radicular pain on the right. MRI 7/26/17 showed right L3-4 herniated disc with compression of the right L4 root. She has had two injections without relief of pain. Patient is to undergo a right L3-4 microdiscectomy.     See problem list for active medical problems. Problems all longstanding and stable, except as noted/documented. See ROS for pertinent symptoms related to these conditions.    HYPERTENSION - Patient has longstanding history of mod-severe HTN, currently denies any symptoms referable to elevated blood pressure. Specifically denies chest pain, palpitations, dyspnea, orthopnea, PND or peripheral edema. Blood pressure readings have been in normal range. Current medication regimen is as listed below. Patient denies any side effects of medication.  BP Readings from Last 3 Encounters:   11/02/17 116/64   11/01/17 117/76   10/03/17 123/72     HYPERLIPIDEMIA - Patient has a long history of significant Hyperlipidemia requiring medication for treatment with recent good control. Patient reports no problems or side effects with the medication.  Lab Results   Component Value Date    LDL 88 06/21/2017     MEDICAL HISTORY:                                                    Patient Active Problem List    Diagnosis Date Noted     Lumbar radicular pain- right side. 11/02/2017     Priority: Medium     Memory impairment of gradual onset 06/28/2017     Priority: Medium     reviewed Dr. Monica courtney and recommendations.  declines MRI,. Occupational therapy, driving evaluation, neuro evaluation;  interested in adding Aricept.       Retinal tear, left 10/17/2016     Priority: Medium     Hyperlipidemia LDL  goal <100 10/10/2016     Priority: Medium     Atorvastatin helpful       Benign essential hypertension 10/10/2016     Priority: Medium     Anxiety 10/10/2016     Priority: Medium     Citalopram for years       Primary hyperparathyroidism (H) 02/26/2014     Priority: Medium     Parathyroidectomy        Past Medical History:   Diagnosis Date     High cholesterol      HTN (hypertension)      Parathyroid adenoma      Thyroid disorder      Past Surgical History:   Procedure Laterality Date     APPENDECTOMY OPEN       CHOLECYSTECTOMY  2007     PARATHYROIDECTOMY  3/14/2014    Procedure: PARATHYROIDECTOMY;  Neck Exploration, Excision Right  Parathyroid Adenoma, Pre Operative Sestimibi Injection, Rapid PTH Assay ;  Surgeon: Natividad Fischer MD;  Location: [facility] OR     Current Outpatient Prescriptions   Medication Sig Dispense Refill     traMADol (ULTRAM) 50 MG tablet Take 1 tablet (50 mg) by mouth every 6 hours as needed for pain maximum 4 tablet(s) per day 40 tablet 0     donepezil (ARICEPT) 10 MG tablet Take 1 tablet (10 mg) by mouth At Bedtime 30 tablet 6     citalopram (CELEXA) 10 MG tablet Take 1 tablet (10 mg) by mouth daily 90 tablet 3     losartan (COZAAR) 50 MG tablet Take 1 tablet (50 mg) by mouth daily 90 tablet 3     atorvastatin (LIPITOR) 10 MG tablet Take 1 tablet (10 mg) by mouth At Bedtime 90 tablet 3     Multiple Vitamins-Minerals (PRESERVISION AREDS PO) Take 1 tablet by mouth daily       Fish Oil-Cholecalciferol (FISH OIL + D3 PO) Take 1 tablet by mouth daily       Cholecalciferol (VITAMIN D PO) Take 1,000 Units by mouth daily        Naproxen Sodium (ALEVE PO) PRN     OTC products: None, except as noted above    Allergies   Allergen Reactions     Simvastatin      Buttock pain     Penicillins Rash   Latex Allergy: NO    Social History   Substance Use Topics     Smoking status: Former Smoker     Quit date: 3/10/1972     Smokeless tobacco: Never Used     Alcohol use Yes      Comment: 2 week      History   Drug  "Use No     REVIEW OF SYSTEMS:                                                    CONSTITUTIONAL: NEGATIVE for fever, chills, change in weight  ENT/MOUTH: NEGATIVE for ear, mouth and throat problems  RESP: NEGATIVE for significant cough or SOB  CV: HTN - taking losartan (Cozaar); NEGATIVE for chest pain, palpitations or peripheral edema  GI: NEGATIVE for nausea, abdominal pain, heartburn, or change in bowel habits  MUSCULOSKELETAL: POSITIVE for lumbar radicular pain on the right - MRI 7/26/17 showed L3-4 herniated disc, two injections without relief, is to undergo surgery; NEGATIVE for significant arthralgias or myalgia  NEURO: NEGATIVE for weakness, dizziness or paresthesias  ENDOCRINE: Hyperlipidemia - taking atorvastatin (Lipitor); NEGATIVE for temperature intolerance, skin/hair changes  HEME/ALLERGY/IMMUNE: NEGATIVE for bleeding problems  PSYCHIATRIC: Anxiety - taking citalopram (Celexa); NEGATIVE for changes in mood or affect    This document serves as a record of the services and decisions personally performed and made by Cammie Daugherty MD. It was created on her behalf by Drea Ardon, a trained medical scribe. The creation of this document is based on the provider's statements to the medical scribe.   Drea Ardon, 3:54 PM, November 2, 2017    EXAM:                                                    /64  Pulse 67  Temp 98.1  F (36.7  C) (Oral)  Resp 16  Ht 5' 8.8\" (1.748 m)  Wt 210 lb 11.2 oz (95.6 kg)  SpO2 97%  BMI 31.3 kg/m2    GENERAL APPEARANCE: healthy, alert and no distress     HENT: ear canals and TM's normal, nose and mouth without ulcers or lesions, oral mucous membranes moist and oropharynx clear     NECK: no adenopathy and thyroid normal to palpation, no bruits      RESP: lungs clear to auscultation - no rales, rhonchi or wheezes     CV: regular rates and rhythm, normal S1 S2, no peripheral edema and peripheral pulses strong     ABDOMEN: soft, nontender, without hepatosplenomegaly or " masses and bowel sounds normal     MS: extremities normal- no gross deformities noted     NEURO: mentation intact and speech normal; reflexes- ankle jerk unable to illicit bilaterally     PSYCH: mentation appears normal and affect normal/bright     LYMPHATICS: normal ant/post cervical, supraclavicular nodes    DIAGNOSTICS:                                                    EKG: appears normal, NSR, rate 68 bpm, normal axis, normal intervals, no acute ST/T changes c/w ischemia, no LVH by voltage criteria    Recent Labs   Lab Test  06/21/17   0846  02/28/17   0949   HGB   --   13.4   PLT   --   268   NA  142  140   POTASSIUM  4.2  4.7   CR  0.97  0.92     IMPRESSION:                                                    Reason for surgery/procedure: Lumbar radicular pain on the right  Diagnosis/reason for consult: Pre-operative evaluation assessment for a right lumbar microscopic discectomy    The proposed surgical procedure is considered INTERMEDIATE risk.    REVISED CARDIAC RISK INDEX  The patient has the following serious cardiovascular risks for perioperative complications such as (MI, PE, VFib and 3  AV Block):  No serious cardiac risks  INTERPRETATION: 0 risks: Class I (very low risk - 0.4% complication rate)    The patient has the following additional risks for perioperative complications:  No identified additional risks      ICD-10-CM    1. Preop general physical exam Z01.818 EKG 12-lead complete w/read - Clinics   2. Lumbar radicular pain- right side. M54.16 traMADol (ULTRAM) 50 MG tablet   3. Hyperlipidemia LDL goal <100 E78.5    4. Benign essential hypertension I10    5. Anxiety F41.9    6. Memory impairment of gradual onset R41.3      RECOMMENDATIONS:                                                      Hypertension:  ACE Inhibitor or Angiotensin Receptor Blocker (ARB) Use  Ace inhibitor or Angiotensin Receptor Blocker (ARB) and will continue this medication due to the higher risk of uncontrolled perioerative  hypertension.    Mood disorder- Citalopram use reviewed; ok to continue to take medication on a daily basis; take with a small sip of water including the AM of surgeyr;     Memory- OK to continue to take Aricept every evening as scheduled     APPROVAL GIVEN to proceed with proposed procedure, without further diagnostic evaluation    --Pt advised to avoid NSAIDS (Motrin, Ibuprofen, Aleve or Naprosyn) one week prior to surgery; If needed, Tylenol or Acetaminophen are fine to use.  --Meds reviewed; Take losartan (Cozaar) and citalopram (Celexa) on the morning of surgery with a tiny sip of water. Take other medications up through the night prior to surgery.   --Pain medications, time off from work and FMLA following surgery deferred to surgeon.  --See patient instructions.     Cammie Daugherty MD  Internal Medicine  [facility] [place]    The information in this document, created by a medical scribe for me, accurately reflects the services I personally performed and the decisions made by me. I have reviewed and approved this document for accuracy.  Dr. Cammie Daugherty, 4:15 PM, November 2, 2017    Signed Electronically by: Cammie Daugherty MD    Copy of this evaluation report is provided to requesting physician.  [place] Preop Guidelines

## 2017-11-02 NOTE — PATIENT INSTRUCTIONS
--Approval given to proceed with proposed procedure, without further diagnostic evaluation.  --Avoid NSAIDS (Motrin, Ibuprofen, Aleve or Naprosyn) and fish oil one week prior to surgery; If needed, Tylenol or Acetaminophen are fine to use.  --Take losartan (Cozaar) and citalopram (Celexa) on the morning of surgery with a tiny sip of water. Take other medications up through the night prior to surgery.   --Pain medications, time off from work and FMLA following surgery deferred to surgeon.        Before Your Surgery      Call your surgeon if there is any change in your health. This includes signs of a cold or flu (such as a sore throat, runny nose, cough, rash or fever).    Do not smoke, drink alcohol or take over the counter medicine (unless your surgeon or primary care doctor tells you to) for the 24 hours before and after surgery.    If you take prescribed drugs: Follow your doctor s orders about which medicines to take and which to stop until after surgery.    Eating and drinking prior to surgery: follow the instructions from your surgeon    Take a shower or bath the night before surgery. Use the soap your surgeon gave you to gently clean your skin. If you do not have soap from your surgeon, use your regular soap. Do not shave or scrub the surgery site.  Wear clean pajamas and have clean sheets on your bed.

## 2017-11-02 NOTE — MR AVS SNAPSHOT
After Visit Summary   11/2/2017    Kathie Wetzel    MRN: [id number]           Patient Information     Date Of Birth          1940        Visit Information        Provider Department      11/2/2017 3:15 PM Cammie Daugherty MD [facility]        Today's Diagnoses     Preop general physical exam    -  1    Lumbar radicular pain- right side.        Hyperlipidemia LDL goal <100        Benign essential hypertension        Anxiety        Memory impairment of gradual onset          Care Instructions    --Approval given to proceed with proposed procedure, without further diagnostic evaluation.  --Avoid NSAIDS (Motrin, Ibuprofen, Aleve or Naprosyn) and fish oil one week prior to surgery; If needed, Tylenol or Acetaminophen are fine to use.  --Take losartan (Cozaar) and citalopram (Celexa) on the morning of surgery with a tiny sip of water. Take other medications up through the night prior to surgery.   --Pain medications, time off from work and FMLA following surgery deferred to surgeon.        Before Your Surgery      Call your surgeon if there is any change in your health. This includes signs of a cold or flu (such as a sore throat, runny nose, cough, rash or fever).    Do not smoke, drink alcohol or take over the counter medicine (unless your surgeon or primary care doctor tells you to) for the 24 hours before and after surgery.    If you take prescribed drugs: Follow your doctor s orders about which medicines to take and which to stop until after surgery.    Eating and drinking prior to surgery: follow the instructions from your surgeon    Take a shower or bath the night before surgery. Use the soap your surgeon gave you to gently clean your skin. If you do not have soap from your surgeon, use your regular soap. Do not shave or scrub the surgery site.  Wear clean pajamas and have clean sheets on your bed.           Follow-ups after your visit        Your next 10 appointments already  "scheduled     Nov 10, 2017   Procedure with Al Dailey MD   [facility] (--)    [address]  [facility] [address] [phone]              Who to contact     If you have questions or need follow up information about today's clinic visit or your schedule please contact [facility] directly at [phone].  Normal or non-critical lab and imaging results will be communicated to you by MyChart, letter or phone within 4 business days after the clinic has received the results. If you do not hear from us within 7 days, please contact the clinic through [facility] or phone. If you have a critical or abnormal lab result, we will notify you by phone as soon as possible.  Submit refill requests through [employer] or call your pharmacy and they will forward the refill request to us. Please allow 3 business days for your refill to be completed.          Additional Information About Your Visit        [facility] Information     [employer] lets you send messages to your doctor, view your test results, renew your prescriptions, schedule appointments and more. To sign up, go to [email or web address] . Click on \"Log in\" on the left side of the screen, which will take you to the Welcome page. Then click on \"Sign up Now\" on the right side of the page.     You will be asked to enter the access code listed below, as well as some personal information. Please follow the directions to create your username and password.     Your access code is: [email or web address]  Expires: 2017 12:12 PM     Your access code will  in 90 days. If you need help or a new code, please call your [place] clinic or [phone].        Care EveryWhere ID     This is your Care EveryWhere ID. This could be used by other organizations to access your [place] medical records  [id number]        Your Vitals Were     Pulse Temperature Respirations Height Pulse Oximetry BMI (Body Mass Index)    67 98.1  F (36.7 " " C) (Oral) 16 5' 8.8\" (1.748 m) 97% 31.3 kg/m2       Blood Pressure from Last 3 Encounters:   11/02/17 116/64   11/01/17 117/76   10/03/17 123/72    Weight from Last 3 Encounters:   11/02/17 210 lb 11.2 oz (95.6 kg)   11/01/17 217 lb (98.4 kg)   09/20/17 218 lb (98.9 kg)              We Performed the Following     EKG 12-lead complete w/read - Clinics          Where to get your medicines      Some of these will need a paper prescription and others can be bought over the counter.  Ask your nurse if you have questions.     Bring a paper prescription for each of these medications     traMADol 50 MG tablet          Primary Care Provider Office Phone # Fax #    Cammie Sanjeev Daugherty [phone] [address] [facility] [address]        Equal Access to Services     ADAM DRAKE : Hadii miguel angel ryano Somarissa, waaxda luqadaha, qaybta kaalmada adelucianayada, ronnell alicia . So [facility] [phone].    ATENCIÓN: Si habla español, tiene a perera disposición servicios gratuitos de asistencia lingüística. Llame al [phone].    We comply with applicable federal civil rights laws and [place] laws. We do not discriminate on the basis of race, color, national origin, age, disability, sex, sexual orientation, or gender identity.            Thank you!     Thank you for choosing [facility]  for your care. Our goal is always to provide you with excellent care. Hearing back from our patients is one way we can continue to improve our services. Please take a few minutes to complete the written survey that you may receive in the mail after your visit with us. Thank you!             Your Updated Medication List - Protect others around you: Learn how to safely use, store and throw away your medicines at www.disposemymeds.org.          This list is accurate as of: 11/2/17  4:06 PM.  Always use your most recent med list.                   Brand Name Dispense Instructions for use " Diagnosis    ALEVE PO      PRN        atorvastatin 10 MG tablet    LIPITOR    90 tablet    Take 1 tablet (10 mg) by mouth At Bedtime    Hyperlipidemia LDL goal <100       citalopram 10 MG tablet    celeXA    90 tablet    Take 1 tablet (10 mg) by mouth daily    Anxiety       donepezil 10 MG tablet    ARICEPT    30 tablet    Take 1 tablet (10 mg) by mouth At Bedtime    Memory impairment of gradual onset       FISH OIL + D3 PO      Take 1 tablet by mouth daily        losartan 50 MG tablet    COZAAR    90 tablet    Take 1 tablet (50 mg) by mouth daily    Benign essential hypertension       PRESERVISION AREDS PO      Take 1 tablet by mouth daily        traMADol 50 MG tablet    ULTRAM    40 tablet    Take 1 tablet (50 mg) by mouth every 6 hours as needed for pain maximum 4 tablet(s) per day    Lumbar radicular pain       VITAMIN D PO      Take 1,000 Units by mouth daily

## 2017-11-02 NOTE — NURSING NOTE
"Chief Complaint   Patient presents with     Pre-Op Exam       Initial /64  Pulse 67  Temp 98.1  F (36.7  C) (Oral)  Resp 16  Ht 5' 8.8\" (1.748 m)  Wt 210 lb 11.2 oz (95.6 kg)  SpO2 97%  BMI 31.3 kg/m2 Estimated body mass index is 31.3 kg/(m^2) as calculated from the following:    Height as of this encounter: 5' 8.8\" (1.748 m).    Weight as of this encounter: 210 lb 11.2 oz (95.6 kg).  Medication Reconciliation: complete    "

## 2017-11-02 NOTE — NURSING NOTE
Pre-operative Education    Education included but not limited to:  - Pre-operative physical with primary care physician within 30 days of surgical date. Pre op Juliet Melgar.   - Pre-operative clearance (cardiology, hematology, etc).   - Discontinue Aspirin, NSAIDs, Diclofenac x 7 days prior to surgical date.   -May try tylenol for pain 1000 mg three times per day for pain  -you may resume taking NSAIDs 7 days post op  -Patient is not a smoker  -Forms to be completed: none per patient   -Surgical risks: blood clots in the leg or lung, problems urinating, nerve damage, drainage from the incision, infection,stiffness  -Preparation timeline  - When to start NPO           -Special bathing procedure.Patient received Hibiclens and showering instruction sheet.   Hospital stay:  Checking in  The surgery itself   Recovery room  - Managing pain   - Likely same day procedure with discharge home day of surgery, may stay for 23 hour observation hospitalization for monitoring  - Post operative incisional pain x 1-2 weeks which will require pain medications and muscle relaxants.   -Do NOT drive while taking narcotic pain medication.  -Post operative incision care-Keep your incision clean and dry at all times. OK to remove dressing on postop day 2. OK to shower on postop day 3 and allow water to run over incision, pat dry after shower. No bathing, swimming or submerging in water. Call immediately or come to ED if any drainage occurs, or you develop new pain. Watch for signs of infection: redness, swelling, warmth, drainage, and fever of 101 degrees or higher. Notify clinic.   - Post operative activity limitations: 6-8 weeks, no lifting > 10 pounds, no bending, twisting, or overhead reaching.  - Follow up appointments in 6 weeks with Nurse Practitioner. Please call to schedule follow up appointment at [phone].   - Spine Education book was also given to the patient for further review.      Patient verbalized understanding  of above instructions. All questions were answered to the best of my ability and the patient's satisfaction. Patient advised to call with any additional questions or concerns.  Cierra Schafer RN

## 2017-11-09 NOTE — H&P (VIEW-ONLY)
[facility]  [address] [facility] [address] [phone]  Dept: [phone]    PRE-OP EVALUATION:  Today's date: 2017    Kathie Wetzel (: 1940) presents for pre-operative evaluation assessment as requested by Dr. Al Dailey.  She requires evaluation and anesthesia risk assessment prior to undergoing surgery/procedure for treatment of back pain. Proposed procedure: DISCECTOMY LUMBAR POSTERIOR MICROSCOPIC TWO LEVELS  Right    Date of Surgery/ Procedure: 11/10/17  Time of Surgery/ Procedure: 12:10 pm  Hospital/Surgical Facility: [facility]  Fax number for surgical facility:   Primary Physician: Cammie Daugherty  Type of Anesthesia Anticipated: General    Patient has a Health Care Directive or Living Will: YES     1. NO - Do you have a history of heart attack, stroke, stent, bypass or surgery on an artery in the head, neck, heart or legs?  2. NO - Do you ever have any pain or discomfort in your chest?  3. NO - Do you have a history of  Heart Failure?  4. NO - Are you troubled by shortness of breath when: walking on the level, up a slight hill or at night?  5. NO - Do you currently have a cold, bronchitis or other respiratory infection?  6. NO - Do you have a cough, shortness of breath or wheezing?  7. NO - Do you sometimes get pains in the calves of your legs when you walk?  8. NO - Do you or anyone in your family have previous history of blood clots?  9. NO - Do you or does anyone in your family have a serious bleeding problem such as prolonged bleeding following surgeries or cuts?  10. NO - Have you ever had problems with anemia or been told to take iron pills?  11. NO - Have you had any abnormal blood loss such as black, tarry or bloody stools, or abnormal vaginal bleeding?  12. NO - Have you ever had a blood transfusion?  13. NO - Have you or any of your relatives ever had problems with anesthesia?  14. NO - Do you have sleep apnea,  excessive snoring or daytime drowsiness?  15. NO - Do you have any prosthetic heart valves?  16. NO - Do you have prosthetic joints?  17. NO - Is there any chance that you may be pregnant?    HPI:                                                      Brief HPI related to upcoming procedure: Patient has a history of lumbar radicular pain on the right. MRI 7/26/17 showed right L3-4 herniated disc with compression of the right L4 root. She has had two injections without relief of pain. Patient is to undergo a right L3-4 microdiscectomy.     See problem list for active medical problems. Problems all longstanding and stable, except as noted/documented. See ROS for pertinent symptoms related to these conditions.    HYPERTENSION - Patient has longstanding history of mod-severe HTN, currently denies any symptoms referable to elevated blood pressure. Specifically denies chest pain, palpitations, dyspnea, orthopnea, PND or peripheral edema. Blood pressure readings have been in normal range. Current medication regimen is as listed below. Patient denies any side effects of medication.  BP Readings from Last 3 Encounters:   11/02/17 116/64   11/01/17 117/76   10/03/17 123/72     HYPERLIPIDEMIA - Patient has a long history of significant Hyperlipidemia requiring medication for treatment with recent good control. Patient reports no problems or side effects with the medication.  Lab Results   Component Value Date    LDL 88 06/21/2017     MEDICAL HISTORY:                                                    Patient Active Problem List    Diagnosis Date Noted     Lumbar radicular pain- right side. 11/02/2017     Priority: Medium     Memory impairment of gradual onset 06/28/2017     Priority: Medium     reviewed Dr. Monica courtney and recommendations.  declines MRI,. Occupational therapy, driving evaluation, neuro evaluation;  interested in adding Aricept.       Retinal tear, left 10/17/2016     Priority: Medium     Hyperlipidemia LDL  goal <100 10/10/2016     Priority: Medium     Atorvastatin helpful       Benign essential hypertension 10/10/2016     Priority: Medium     Anxiety 10/10/2016     Priority: Medium     Citalopram for years       Primary hyperparathyroidism (H) 02/26/2014     Priority: Medium     Parathyroidectomy        Past Medical History:   Diagnosis Date     High cholesterol      HTN (hypertension)      Parathyroid adenoma      Thyroid disorder      Past Surgical History:   Procedure Laterality Date     APPENDECTOMY OPEN       CHOLECYSTECTOMY  2007     PARATHYROIDECTOMY  3/14/2014    Procedure: PARATHYROIDECTOMY;  Neck Exploration, Excision Right  Parathyroid Adenoma, Pre Operative Sestimibi Injection, Rapid PTH Assay ;  Surgeon: Natividad Fischer MD;  Location: [facility] OR     Current Outpatient Prescriptions   Medication Sig Dispense Refill     traMADol (ULTRAM) 50 MG tablet Take 1 tablet (50 mg) by mouth every 6 hours as needed for pain maximum 4 tablet(s) per day 40 tablet 0     donepezil (ARICEPT) 10 MG tablet Take 1 tablet (10 mg) by mouth At Bedtime 30 tablet 6     citalopram (CELEXA) 10 MG tablet Take 1 tablet (10 mg) by mouth daily 90 tablet 3     losartan (COZAAR) 50 MG tablet Take 1 tablet (50 mg) by mouth daily 90 tablet 3     atorvastatin (LIPITOR) 10 MG tablet Take 1 tablet (10 mg) by mouth At Bedtime 90 tablet 3     Multiple Vitamins-Minerals (PRESERVISION AREDS PO) Take 1 tablet by mouth daily       Fish Oil-Cholecalciferol (FISH OIL + D3 PO) Take 1 tablet by mouth daily       Cholecalciferol (VITAMIN D PO) Take 1,000 Units by mouth daily        Naproxen Sodium (ALEVE PO) PRN     OTC products: None, except as noted above    Allergies   Allergen Reactions     Simvastatin      Buttock pain     Penicillins Rash   Latex Allergy: NO    Social History   Substance Use Topics     Smoking status: Former Smoker     Quit date: 3/10/1972     Smokeless tobacco: Never Used     Alcohol use Yes      Comment: 2 week      History   Drug  "Use No     REVIEW OF SYSTEMS:                                                    CONSTITUTIONAL: NEGATIVE for fever, chills, change in weight  ENT/MOUTH: NEGATIVE for ear, mouth and throat problems  RESP: NEGATIVE for significant cough or SOB  CV: HTN - taking losartan (Cozaar); NEGATIVE for chest pain, palpitations or peripheral edema  GI: NEGATIVE for nausea, abdominal pain, heartburn, or change in bowel habits  MUSCULOSKELETAL: POSITIVE for lumbar radicular pain on the right - MRI 7/26/17 showed L3-4 herniated disc, two injections without relief, is to undergo surgery; NEGATIVE for significant arthralgias or myalgia  NEURO: NEGATIVE for weakness, dizziness or paresthesias  ENDOCRINE: Hyperlipidemia - taking atorvastatin (Lipitor); NEGATIVE for temperature intolerance, skin/hair changes  HEME/ALLERGY/IMMUNE: NEGATIVE for bleeding problems  PSYCHIATRIC: Anxiety - taking citalopram (Celexa); NEGATIVE for changes in mood or affect    This document serves as a record of the services and decisions personally performed and made by Cammie Daugherty MD. It was created on her behalf by Drea Ardon, a trained medical scribe. The creation of this document is based on the provider's statements to the medical scribe.   Drea Ardon, 3:54 PM, November 2, 2017    EXAM:                                                    /64  Pulse 67  Temp 98.1  F (36.7  C) (Oral)  Resp 16  Ht 5' 8.8\" (1.748 m)  Wt 210 lb 11.2 oz (95.6 kg)  SpO2 97%  BMI 31.3 kg/m2    GENERAL APPEARANCE: healthy, alert and no distress     HENT: ear canals and TM's normal, nose and mouth without ulcers or lesions, oral mucous membranes moist and oropharynx clear     NECK: no adenopathy and thyroid normal to palpation, no bruits      RESP: lungs clear to auscultation - no rales, rhonchi or wheezes     CV: regular rates and rhythm, normal S1 S2, no peripheral edema and peripheral pulses strong     ABDOMEN: soft, nontender, without hepatosplenomegaly or " masses and bowel sounds normal     MS: extremities normal- no gross deformities noted     NEURO: mentation intact and speech normal; reflexes- ankle jerk unable to illicit bilaterally     PSYCH: mentation appears normal and affect normal/bright     LYMPHATICS: normal ant/post cervical, supraclavicular nodes    DIAGNOSTICS:                                                    EKG: appears normal, NSR, rate 68 bpm, normal axis, normal intervals, no acute ST/T changes c/w ischemia, no LVH by voltage criteria    Recent Labs   Lab Test  06/21/17   0846  02/28/17   0949   HGB   --   13.4   PLT   --   268   NA  142  140   POTASSIUM  4.2  4.7   CR  0.97  0.92     IMPRESSION:                                                    Reason for surgery/procedure: Lumbar radicular pain on the right  Diagnosis/reason for consult: Pre-operative evaluation assessment for a right lumbar microscopic discectomy    The proposed surgical procedure is considered INTERMEDIATE risk.    REVISED CARDIAC RISK INDEX  The patient has the following serious cardiovascular risks for perioperative complications such as (MI, PE, VFib and 3  AV Block):  No serious cardiac risks  INTERPRETATION: 0 risks: Class I (very low risk - 0.4% complication rate)    The patient has the following additional risks for perioperative complications:  No identified additional risks      ICD-10-CM    1. Preop general physical exam Z01.818 EKG 12-lead complete w/read - Clinics   2. Lumbar radicular pain- right side. M54.16 traMADol (ULTRAM) 50 MG tablet   3. Hyperlipidemia LDL goal <100 E78.5    4. Benign essential hypertension I10    5. Anxiety F41.9    6. Memory impairment of gradual onset R41.3      RECOMMENDATIONS:                                                      Hypertension:  ACE Inhibitor or Angiotensin Receptor Blocker (ARB) Use  Ace inhibitor or Angiotensin Receptor Blocker (ARB) and will continue this medication due to the higher risk of uncontrolled perioerative  hypertension.    Mood disorder- Citalopram use reviewed; ok to continue to take medication on a daily basis; take with a small sip of water including the AM of surgeyr;     Memory- OK to continue to take Aricept every evening as scheduled     APPROVAL GIVEN to proceed with proposed procedure, without further diagnostic evaluation    --Pt advised to avoid NSAIDS (Motrin, Ibuprofen, Aleve or Naprosyn) one week prior to surgery; If needed, Tylenol or Acetaminophen are fine to use.  --Meds reviewed; Take losartan (Cozaar) and citalopram (Celexa) on the morning of surgery with a tiny sip of water. Take other medications up through the night prior to surgery.   --Pain medications, time off from work and FMLA following surgery deferred to surgeon.  --See patient instructions.     Cammie Daugherty MD  Internal Medicine  [facility], [place]    The information in this document, created by a medical scribe for me, accurately reflects the services I personally performed and the decisions made by me. I have reviewed and approved this document for accuracy.  Dr. Cammie Daugherty, 4:15 PM, November 2, 2017    Signed Electronically by: Cammie Daugherty MD    Copy of this evaluation report is provided to requesting physician.  [place] Preop Guidelines

## 2017-11-10 ENCOUNTER — HOSPITAL ENCOUNTER (OUTPATIENT)
Facility: CLINIC | Age: 77
Discharge: HOME | End: 2017-11-11
Attending: NEUROLOGICAL SURGERY | Admitting: NEUROLOGICAL SURGERY
Payer: MEDICARE

## 2017-11-10 ENCOUNTER — ANESTHESIA EVENT (OUTPATIENT)
Dept: SURGERY | Facility: CLINIC | Age: 77
End: 2017-11-10
Payer: MEDICARE

## 2017-11-10 ENCOUNTER — ANESTHESIA (OUTPATIENT)
Dept: SURGERY | Facility: CLINIC | Age: 77
End: 2017-11-10
Payer: MEDICARE

## 2017-11-10 ENCOUNTER — APPOINTMENT (OUTPATIENT)
Dept: GENERAL RADIOLOGY | Facility: CLINIC | Age: 77
End: 2017-11-10
Attending: NEUROLOGICAL SURGERY
Payer: MEDICARE

## 2017-11-10 DIAGNOSIS — Z98.890 S/P LUMBAR MICRODISCECTOMY: Primary | ICD-10-CM

## 2017-11-10 LAB
CREAT SERPL-MCNC: 1.01 MG/DL (ref 0.52–1.04)
GFR SERPL CREATININE-BSD FRML MDRD: 53 ML/MIN/1.7M2
HGB BLD-MCNC: 12.8 G/DL (ref 11.7–15.7)
POTASSIUM SERPL-SCNC: 4 MMOL/L (ref 3.4–5.3)

## 2017-11-10 PROCEDURE — 40000934 ZZH STATISTIC OUTPATIENT (NON-OBS) DAY

## 2017-11-10 PROCEDURE — 25000128 H RX IP 250 OP 636: Performed by: NURSE ANESTHETIST, CERTIFIED REGISTERED

## 2017-11-10 PROCEDURE — 25000566 ZZH SEVOFLURANE, EA 15 MIN: Performed by: NEUROLOGICAL SURGERY

## 2017-11-10 PROCEDURE — 36000067 ZZH SURGERY LEVEL 5 1ST 30 MIN: Performed by: NEUROLOGICAL SURGERY

## 2017-11-10 PROCEDURE — 40000306 ZZH STATISTIC PRE PROC ASSESS II: Performed by: NEUROLOGICAL SURGERY

## 2017-11-10 PROCEDURE — 85018 HEMOGLOBIN: CPT | Performed by: ANESTHESIOLOGY

## 2017-11-10 PROCEDURE — 71000012 ZZH RECOVERY PHASE 1 LEVEL 1 FIRST HR: Performed by: NEUROLOGICAL SURGERY

## 2017-11-10 PROCEDURE — 25000125 ZZHC RX 250: Performed by: NURSE ANESTHETIST, CERTIFIED REGISTERED

## 2017-11-10 PROCEDURE — 25000125 ZZHC RX 250: Performed by: ANESTHESIOLOGY

## 2017-11-10 PROCEDURE — 36000069 ZZH SURGERY LEVEL 5 EA 15 ADDTL MIN: Performed by: NEUROLOGICAL SURGERY

## 2017-11-10 PROCEDURE — 25000128 H RX IP 250 OP 636: Performed by: NEUROLOGICAL SURGERY

## 2017-11-10 PROCEDURE — 25000125 ZZHC RX 250: Performed by: NEUROLOGICAL SURGERY

## 2017-11-10 PROCEDURE — 84132 ASSAY OF SERUM POTASSIUM: CPT | Performed by: ANESTHESIOLOGY

## 2017-11-10 PROCEDURE — 25000128 H RX IP 250 OP 636: Performed by: ANESTHESIOLOGY

## 2017-11-10 PROCEDURE — 37000009 ZZH ANESTHESIA TECHNICAL FEE, EACH ADDTL 15 MIN: Performed by: NEUROLOGICAL SURGERY

## 2017-11-10 PROCEDURE — 27210794 ZZH OR GENERAL SUPPLY STERILE: Performed by: NEUROLOGICAL SURGERY

## 2017-11-10 PROCEDURE — 25000300 ZZH OR RX SURGIFLO HEMOSTATIC MATRIX 10ML 199102S OPNP: Performed by: NEUROLOGICAL SURGERY

## 2017-11-10 PROCEDURE — 37000008 ZZH ANESTHESIA TECHNICAL FEE, 1ST 30 MIN: Performed by: NEUROLOGICAL SURGERY

## 2017-11-10 PROCEDURE — 63030 LAMOT DCMPRN NRV RT 1 LMBR: CPT | Performed by: NEUROLOGICAL SURGERY

## 2017-11-10 PROCEDURE — 40000936 ZZH STATISTIC OUTPATIENT (NON-OBS) NIGHT

## 2017-11-10 PROCEDURE — 82565 ASSAY OF CREATININE: CPT | Performed by: ANESTHESIOLOGY

## 2017-11-10 PROCEDURE — 36415 COLL VENOUS BLD VENIPUNCTURE: CPT | Performed by: ANESTHESIOLOGY

## 2017-11-10 PROCEDURE — 25800025 ZZH RX 258: Performed by: NEUROLOGICAL SURGERY

## 2017-11-10 PROCEDURE — 40000985 XR LUMBAR SPINE PORT 1 VW: Mod: TC

## 2017-11-10 RX ORDER — LIDOCAINE 40 MG/G
CREAM TOPICAL
Status: DISCONTINUED | OUTPATIENT
Start: 2017-11-10 | End: 2017-11-10 | Stop reason: HOSPADM

## 2017-11-10 RX ORDER — ONDANSETRON 2 MG/ML
4 INJECTION INTRAMUSCULAR; INTRAVENOUS EVERY 6 HOURS PRN
Status: DISCONTINUED | OUTPATIENT
Start: 2017-11-10 | End: 2017-11-11 | Stop reason: HOSPADM

## 2017-11-10 RX ORDER — CYCLOBENZAPRINE HCL 5 MG
5 TABLET ORAL 3 TIMES DAILY PRN
Qty: 42 TABLET | Refills: 0 | Status: SHIPPED | OUTPATIENT
Start: 2017-11-10 | End: 2017-12-18

## 2017-11-10 RX ORDER — ACETAMINOPHEN 325 MG/1
650 TABLET ORAL EVERY 6 HOURS PRN
Status: DISCONTINUED | OUTPATIENT
Start: 2017-11-10 | End: 2017-11-11 | Stop reason: HOSPADM

## 2017-11-10 RX ORDER — NALOXONE HYDROCHLORIDE 0.4 MG/ML
.1-.4 INJECTION, SOLUTION INTRAMUSCULAR; INTRAVENOUS; SUBCUTANEOUS
Status: DISCONTINUED | OUTPATIENT
Start: 2017-11-10 | End: 2017-11-11 | Stop reason: HOSPADM

## 2017-11-10 RX ORDER — NALOXONE HYDROCHLORIDE 0.4 MG/ML
.1-.4 INJECTION, SOLUTION INTRAMUSCULAR; INTRAVENOUS; SUBCUTANEOUS
Status: DISCONTINUED | OUTPATIENT
Start: 2017-11-10 | End: 2017-11-10 | Stop reason: HOSPADM

## 2017-11-10 RX ORDER — FENTANYL CITRATE 50 UG/ML
INJECTION, SOLUTION INTRAMUSCULAR; INTRAVENOUS PRN
Status: DISCONTINUED | OUTPATIENT
Start: 2017-11-10 | End: 2017-11-10

## 2017-11-10 RX ORDER — CEFAZOLIN SODIUM 2 G/100ML
2 INJECTION, SOLUTION INTRAVENOUS
Status: COMPLETED | OUTPATIENT
Start: 2017-11-10 | End: 2017-11-10

## 2017-11-10 RX ORDER — HYDROMORPHONE HYDROCHLORIDE 1 MG/ML
0.5 INJECTION, SOLUTION INTRAMUSCULAR; INTRAVENOUS; SUBCUTANEOUS ONCE
Status: COMPLETED | OUTPATIENT
Start: 2017-11-10 | End: 2017-11-10

## 2017-11-10 RX ORDER — BUPIVACAINE HYDROCHLORIDE AND EPINEPHRINE 5; 5 MG/ML; UG/ML
INJECTION, SOLUTION PERINEURAL PRN
Status: DISCONTINUED | OUTPATIENT
Start: 2017-11-10 | End: 2017-11-10 | Stop reason: HOSPADM

## 2017-11-10 RX ORDER — NEOSTIGMINE METHYLSULFATE 1 MG/ML
VIAL (ML) INJECTION PRN
Status: DISCONTINUED | OUTPATIENT
Start: 2017-11-10 | End: 2017-11-10

## 2017-11-10 RX ORDER — ONDANSETRON 2 MG/ML
INJECTION INTRAMUSCULAR; INTRAVENOUS PRN
Status: DISCONTINUED | OUTPATIENT
Start: 2017-11-10 | End: 2017-11-10

## 2017-11-10 RX ORDER — METOCLOPRAMIDE 5 MG/1
5 TABLET ORAL EVERY 6 HOURS PRN
Status: DISCONTINUED | OUTPATIENT
Start: 2017-11-10 | End: 2017-11-11 | Stop reason: HOSPADM

## 2017-11-10 RX ORDER — SODIUM CHLORIDE, SODIUM LACTATE, POTASSIUM CHLORIDE, CALCIUM CHLORIDE 600; 310; 30; 20 MG/100ML; MG/100ML; MG/100ML; MG/100ML
INJECTION, SOLUTION INTRAVENOUS CONTINUOUS
Status: DISCONTINUED | OUTPATIENT
Start: 2017-11-10 | End: 2017-11-10 | Stop reason: HOSPADM

## 2017-11-10 RX ORDER — CEFAZOLIN SODIUM 1 G/3ML
1 INJECTION, POWDER, FOR SOLUTION INTRAMUSCULAR; INTRAVENOUS SEE ADMIN INSTRUCTIONS
Status: DISCONTINUED | OUTPATIENT
Start: 2017-11-10 | End: 2017-11-10 | Stop reason: HOSPADM

## 2017-11-10 RX ORDER — GLYCOPYRROLATE 0.2 MG/ML
INJECTION, SOLUTION INTRAMUSCULAR; INTRAVENOUS PRN
Status: DISCONTINUED | OUTPATIENT
Start: 2017-11-10 | End: 2017-11-10

## 2017-11-10 RX ORDER — PROPOFOL 10 MG/ML
INJECTION, EMULSION INTRAVENOUS CONTINUOUS PRN
Status: DISCONTINUED | OUTPATIENT
Start: 2017-11-10 | End: 2017-11-10

## 2017-11-10 RX ORDER — ONDANSETRON 4 MG/1
4 TABLET, ORALLY DISINTEGRATING ORAL EVERY 6 HOURS PRN
Status: DISCONTINUED | OUTPATIENT
Start: 2017-11-10 | End: 2017-11-11 | Stop reason: HOSPADM

## 2017-11-10 RX ORDER — CYCLOBENZAPRINE HCL 5 MG
5 TABLET ORAL
Status: DISCONTINUED | OUTPATIENT
Start: 2017-11-10 | End: 2017-11-10 | Stop reason: HOSPADM

## 2017-11-10 RX ORDER — PROPOFOL 10 MG/ML
INJECTION, EMULSION INTRAVENOUS PRN
Status: DISCONTINUED | OUTPATIENT
Start: 2017-11-10 | End: 2017-11-10

## 2017-11-10 RX ORDER — PROCHLORPERAZINE MALEATE 5 MG
5 TABLET ORAL EVERY 6 HOURS PRN
Status: DISCONTINUED | OUTPATIENT
Start: 2017-11-10 | End: 2017-11-11 | Stop reason: HOSPADM

## 2017-11-10 RX ORDER — MEPERIDINE HYDROCHLORIDE 25 MG/ML
12.5 INJECTION INTRAMUSCULAR; INTRAVENOUS; SUBCUTANEOUS
Status: DISCONTINUED | OUTPATIENT
Start: 2017-11-10 | End: 2017-11-10 | Stop reason: HOSPADM

## 2017-11-10 RX ORDER — FENTANYL CITRATE 50 UG/ML
50 INJECTION, SOLUTION INTRAMUSCULAR; INTRAVENOUS EVERY 10 MIN PRN
Status: COMPLETED | OUTPATIENT
Start: 2017-11-10 | End: 2017-11-10

## 2017-11-10 RX ORDER — ONDANSETRON 2 MG/ML
4 INJECTION INTRAMUSCULAR; INTRAVENOUS EVERY 30 MIN PRN
Status: DISCONTINUED | OUTPATIENT
Start: 2017-11-10 | End: 2017-11-10 | Stop reason: HOSPADM

## 2017-11-10 RX ORDER — FENTANYL CITRATE 50 UG/ML
25-50 INJECTION, SOLUTION INTRAMUSCULAR; INTRAVENOUS
Status: DISCONTINUED | OUTPATIENT
Start: 2017-11-10 | End: 2017-11-10 | Stop reason: HOSPADM

## 2017-11-10 RX ORDER — ONDANSETRON 4 MG/1
4 TABLET, ORALLY DISINTEGRATING ORAL EVERY 30 MIN PRN
Status: DISCONTINUED | OUTPATIENT
Start: 2017-11-10 | End: 2017-11-10 | Stop reason: HOSPADM

## 2017-11-10 RX ORDER — HYDROMORPHONE HCL/0.9% NACL/PF 0.2MG/0.2
0.2 SYRINGE (ML) INTRAVENOUS
Status: DISCONTINUED | OUTPATIENT
Start: 2017-11-10 | End: 2017-11-11 | Stop reason: HOSPADM

## 2017-11-10 RX ORDER — METOCLOPRAMIDE HYDROCHLORIDE 5 MG/ML
5 INJECTION INTRAMUSCULAR; INTRAVENOUS EVERY 6 HOURS PRN
Status: DISCONTINUED | OUTPATIENT
Start: 2017-11-10 | End: 2017-11-11 | Stop reason: HOSPADM

## 2017-11-10 RX ORDER — DEXAMETHASONE SODIUM PHOSPHATE 4 MG/ML
INJECTION, SOLUTION INTRA-ARTICULAR; INTRALESIONAL; INTRAMUSCULAR; INTRAVENOUS; SOFT TISSUE PRN
Status: DISCONTINUED | OUTPATIENT
Start: 2017-11-10 | End: 2017-11-10

## 2017-11-10 RX ORDER — HYDROMORPHONE HYDROCHLORIDE 2 MG/1
2-4 TABLET ORAL EVERY 4 HOURS PRN
Qty: 50 TABLET | Refills: 0 | Status: SHIPPED | OUTPATIENT
Start: 2017-11-10 | End: 2017-12-18

## 2017-11-10 RX ORDER — LIDOCAINE HYDROCHLORIDE 10 MG/ML
INJECTION, SOLUTION INFILTRATION; PERINEURAL PRN
Status: DISCONTINUED | OUTPATIENT
Start: 2017-11-10 | End: 2017-11-10

## 2017-11-10 RX ADMIN — GLYCOPYRROLATE 0.4 MG: 0.2 INJECTION, SOLUTION INTRAMUSCULAR; INTRAVENOUS at 18:05

## 2017-11-10 RX ADMIN — HYDROMORPHONE HYDROCHLORIDE 0.5 MG: 1 INJECTION, SOLUTION INTRAMUSCULAR; INTRAVENOUS; SUBCUTANEOUS at 19:48

## 2017-11-10 RX ADMIN — SODIUM CHLORIDE, POTASSIUM CHLORIDE, SODIUM LACTATE AND CALCIUM CHLORIDE: 600; 310; 30; 20 INJECTION, SOLUTION INTRAVENOUS at 16:25

## 2017-11-10 RX ADMIN — FENTANYL CITRATE 50 MCG: 50 INJECTION INTRAMUSCULAR; INTRAVENOUS at 14:44

## 2017-11-10 RX ADMIN — PROPOFOL 75 MCG/KG/MIN: 10 INJECTION, EMULSION INTRAVENOUS at 17:07

## 2017-11-10 RX ADMIN — PROCHLORPERAZINE EDISYLATE 5 MG: 5 INJECTION INTRAMUSCULAR; INTRAVENOUS at 19:52

## 2017-11-10 RX ADMIN — ONDANSETRON 4 MG: 2 INJECTION INTRAMUSCULAR; INTRAVENOUS at 17:20

## 2017-11-10 RX ADMIN — ONDANSETRON 4 MG: 4 TABLET, ORALLY DISINTEGRATING ORAL at 19:42

## 2017-11-10 RX ADMIN — PHENYLEPHRINE HYDROCHLORIDE 100 MCG: 10 INJECTION, SOLUTION INTRAMUSCULAR; INTRAVENOUS; SUBCUTANEOUS at 17:15

## 2017-11-10 RX ADMIN — PROPOFOL 180 MG: 10 INJECTION, EMULSION INTRAVENOUS at 16:37

## 2017-11-10 RX ADMIN — Medication 0.2 MG: at 22:27

## 2017-11-10 RX ADMIN — FENTANYL CITRATE 100 MCG: 50 INJECTION, SOLUTION INTRAMUSCULAR; INTRAVENOUS at 16:37

## 2017-11-10 RX ADMIN — HYDROMORPHONE HYDROCHLORIDE 0.5 MG: 1 INJECTION, SOLUTION INTRAMUSCULAR; INTRAVENOUS; SUBCUTANEOUS at 15:41

## 2017-11-10 RX ADMIN — Medication 3 MG: at 18:05

## 2017-11-10 RX ADMIN — SODIUM CHLORIDE, POTASSIUM CHLORIDE, SODIUM LACTATE AND CALCIUM CHLORIDE: 600; 310; 30; 20 INJECTION, SOLUTION INTRAVENOUS at 16:24

## 2017-11-10 RX ADMIN — FENTANYL CITRATE 50 MCG: 50 INJECTION INTRAMUSCULAR; INTRAVENOUS at 14:09

## 2017-11-10 RX ADMIN — GLYCOPYRROLATE 0.2 MG: 0.2 INJECTION, SOLUTION INTRAMUSCULAR; INTRAVENOUS at 16:37

## 2017-11-10 RX ADMIN — HYDROMORPHONE HYDROCHLORIDE 1 MG: 1 INJECTION, SOLUTION INTRAMUSCULAR; INTRAVENOUS; SUBCUTANEOUS at 17:30

## 2017-11-10 RX ADMIN — CEFAZOLIN SODIUM 2 G: 2 INJECTION, SOLUTION INTRAVENOUS at 16:24

## 2017-11-10 RX ADMIN — LIDOCAINE HYDROCHLORIDE 50 MG: 10 INJECTION, SOLUTION INFILTRATION; PERINEURAL at 16:37

## 2017-11-10 RX ADMIN — ROCURONIUM BROMIDE 50 MG: 10 INJECTION INTRAVENOUS at 16:30

## 2017-11-10 RX ADMIN — METOCLOPRAMIDE 5 MG: 5 INJECTION, SOLUTION INTRAMUSCULAR; INTRAVENOUS at 21:33

## 2017-11-10 RX ADMIN — DEXAMETHASONE SODIUM PHOSPHATE 4 MG: 4 INJECTION, SOLUTION INTRA-ARTICULAR; INTRALESIONAL; INTRAMUSCULAR; INTRAVENOUS; SOFT TISSUE at 16:37

## 2017-11-10 ASSESSMENT — ENCOUNTER SYMPTOMS
DYSRHYTHMIAS: 0
SEIZURES: 0

## 2017-11-10 NOTE — OP NOTE
Operative Report    PREOPERATIVE DIAGNOSIS: Right L3-4 herniated disk with right lower extremity radiculopathy     POSTOPERATIVE DIAGNOSIS: Same    PROCEDURE: Right L3-4 hemilaminectomy, medial facetectomy, foraminotomy with microdiscectomy and use of X-ray and intraoperative microscope     ASSISTANT: Terry Daniels     INDICATION FOR PROCEDURE: The patient is a 77 year old female that presented with RLE radiculopathy. After reviewing the imaging studies and examination, the decision was made to proceed with the above procedure. The patient understood the risk of surgery to be infection, CSF leak, nerve root injury, failure of improvement of his symptoms. The patient voiced understanding and wanted to proceed.     DESCRIPTION OF PROCEDURE: The patient was seen in the pre op area and the procedure was discussed with the patient and family once again and all questions were answered. The consent was then signed and the she was marked with a marker. The patient was transported to the operating room on a stretcher and received general endotracheal anesthesia. The patient was placed on the operating table in the prone position on the Boaz frame with all pressure points padded. The planned operative area of the back was prepped and draped in normal sterile fashion. Portable X-ray was used to identify the appropriate level. Local anesthesia was then injected along the planned incision. A 10 blade scalpel was used to make a midline incision with dissection down through the subcutaneous tissue to the fascia. The fascia was opened on the right side with the Bovie cautery. Subperiosteal dissection exposed the lamina facet joint at L3-4. The Serrano retractor was then placed. The Midas Jericho drill and the Kerrison rongeur were used to perform the hemilaminectomy, medial facetectomy, foraminotomy, and to remove the ligamentum flavum. The thecal sac and L4 nerve root were identified. There was a bulging disk compressing the right  L4 root. The annulus was incised with the 11 blade knife and the disk was then removed with the Garland curette and also the pituitary rongeur. The nerve root was then noted to be decompressed and the Ulloa ball hook was used to verify that. Copious irrigation was performed with saline. The retractor was removed and the wound was irrigated once again. The fascia was then closed with 0-Vicryl, the subcutaneous tissue with 3-0 Vicryl, and the skin closed with dermabond. The patient was then transported back to the stretcher, extubated, and sent to recovery. At the end of the case, all counts were correct.     No complications.     ESTIMATED BLOOD LOSS: 20 ml     IV FLUID: See Anesthesia report     The patient received Ancef preoperatively.     Al Dailey MD, MS, FAANS

## 2017-11-10 NOTE — ANESTHESIA PREPROCEDURE EVALUATION
Anesthesia Evaluation     . Pt has had prior anesthetic. Type: General    History of anesthetic complications   - PONV        ROS/MED HX    ENT/Pulmonary:      (-) asthma, sleep apnea and Other pulmonary disease   Neurologic:     (+)dementia (mild),    (-) seizures, CVA, TIA and Other neuro hx   Cardiovascular:     (+) Dyslipidemia, hypertension----. : . . . :. .      (-) CAD, CHF, arrhythmias and pulmonary hypertension   METS/Exercise Tolerance:     Hematologic:        (-) anemia   Musculoskeletal:   (+) , , other musculoskeletal- Chronic back apin     (-) arthritis   GI/Hepatic:     (+) GERD      (-) hiatal hernia, inflamatory bowel disease and hepatitis   Renal/Genitourinary:      (-) renal disease   Endo:     (+) Obesity, Other Endocrine Disorder Hx parathyroid adenoma.   (-) Type I DM, Type II DM, thyroid disease and chronic steroid usage   Psychiatric:        (-) psychiatric history   Infectious Disease:  - neg infectious disease ROS       Malignancy:      - no malignancy   Other:    (+) H/O Chronic Pain,                   Physical Exam      Airway   Mallampati: II  TM distance: >3 FB  Neck ROM: full    Dental     Cardiovascular   Rhythm and rate: regular and normal  (-) no murmur    Pulmonary    breath sounds clear to auscultation    Other findings: Lab Test        02/28/17                       0949          WBC          7.5           HGB          13.4          MCV          90            PLT          268            Lab Test        06/21/17 02/28/17                       0846          0949          NA           142          140           POTASSIUM    4.2          4.7           CHLORIDE     107          105           CO2          27           29            BUN          19           17            CR           0.97         0.92          ANIONGAP     8            6             OMEGA          9.2          9.2           GLC          95           92                          Anesthesia Plan      History &  Physical Review  History and physical reviewed and following examination; no interval change.    ASA Status:  3 .    NPO Status:  > 8 hours    Plan for General and ETT with Propofol induction. Maintenance will be Balanced.    PONV prophylaxis:  Ondansetron (or other 5HT-3) and Dexamethasone or Solumedrol       Postoperative Care  Postoperative pain management:  IV analgesics and Oral pain medications.      Consents  Anesthetic plan, risks, benefits and alternatives discussed with:  Patient.  Use of blood products discussed: Yes.   Use of blood products discussed with Patient.  Consented to blood products.  .                          .

## 2017-11-10 NOTE — IP AVS SNAPSHOT
[facility] Observation Department    [address]    [facility] [address]    Phone:  [phone]                                       After Visit Summary   11/10/2017    Kathie Wetzel    MRN: [id number]           After Visit Summary Signature Page     I have received my discharge instructions, and my questions have been answered. I have discussed any challenges I see with this plan with the nurse or doctor.    ..........................................................................................................................................  Patient/Patient Representative Signature      ..........................................................................................................................................  Patient Representative Print Name and Relationship to Patient    ..................................................               ................................................  Date                                            Time    ..........................................................................................................................................  Reviewed by Signature/Title    ...................................................              ..............................................  Date                                                            Time

## 2017-11-10 NOTE — IP AVS SNAPSHOT
MRN:[id number]                      After Visit Summary   11/10/2017    Kathie Wetzel    MRN: [id number]           Thank you!     Thank you for choosing [facility] for your care. Our goal is always to provide you with excellent care. Hearing back from our patients is one way we can continue to improve our services. Please take a few minutes to complete the written survey that you may receive in the mail after you visit. If you would like to speak to someone directly about your visit please contact Patient Relations at [phone]. Thank you!          Patient Information     Date Of Birth          1940        About your hospital stay     You were admitted on:  November 10, 2017 You last received care in the:  [facility] Observation Department    You were discharged on:  November 11, 2017       Who to Call     For medical emergencies, please call 911.  For non-urgent questions about your medical care, please call your primary care provider or clinic, [phone]  For questions related to your surgery, please call your surgery clinic        Attending Provider     Provider Specialty    Al Dailey MD Neurosurgery       Primary Care Provider Office Phone # Fax #    Cammie Daugherty [phone]      After Care Instructions     Diet Instructions       Resume pre-procedure diet            Discharge Instructions       Patient to follow up with appointment in 6 weeks            No lifting        No lifting over 10 lbs and no strenuous physical activity for 6 weeks                             Further instructions from your care team       GENERAL ANESTHESIA OR SEDATION ADULT DISCHARGE INSTRUCTIONS   SPECIAL PRECAUTIONS FOR 24 HOURS AFTER SURGERY    IT IS NOT UNUSUAL TO FEEL LIGHT-HEADED OR FAINT, UP TO 24 HOURS AFTER SURGERY OR WHILE TAKING PAIN MEDICATION.  IF YOU HAVE THESE SYMPTOMS; SIT FOR A FEW MINUTES BEFORE STANDING AND HAVE SOMEONE  ASSIST YOU WHEN YOU GET UP TO WALK OR USE THE BATHROOM.    YOU SHOULD REST AND RELAX FOR THE NEXT 24 HOURS AND YOU MUST MAKE ARRANGEMENTS TO HAVE SOMEONE STAY WITH YOU FOR AT LEAST 24 HOURS AFTER YOUR DISCHARGE.  AVOID HAZARDOUS AND STRENUOUS ACTIVITIES.  DO NOT MAKE IMPORTANT DECISIONS FOR 24 HOURS.    DO NOT DRIVE ANY VEHICLE OR OPERATE MECHANICAL EQUIPMENT FOR 24 HOURS FOLLOWING THE END OF YOUR SURGERY.  EVEN THOUGH YOU MAY FEEL NORMAL, YOUR REACTIONS MAY BE AFFECTED BY THE MEDICATION YOU HAVE RECEIVED.    DO NOT DRINK ALCOHOLIC BEVERAGES FOR 24 HOURS FOLLOWING YOUR SURGERY.    DRINK CLEAR LIQUIDS (APPLE JUICE, GINGER ALE, 7-UP, BROTH, ETC.).  PROGRESS TO YOUR REGULAR DIET AS YOU FEEL ABLE.    YOU MAY HAVE A DRY MOUTH, A SORE THROAT, MUSCLES ACHES OR TROUBLE SLEEPING.  THESE SHOULD GO AWAY AFTER 24 HOURS.    CALL YOUR DOCTOR FOR ANY OF THE FOLLOWING:  SIGNS OF INFECTION (FEVER, GROWING TENDERNESS AT THE SURGERY SITE, A LARGE AMOUNT OF DRAINAGE OR BLEEDING, SEVERE PAIN, FOUL-SMELLING DRAINAGE, REDNESS OR SWELLING.    IT HAS BEEN OVER 8 TO 10 HOURS SINCE SURGERY AND YOU ARE STILL NOT ABLE TO URINATE (PASS WATER).           SPINE SURGERY DISCHARGE INSTRUCTIONS  DR. RAJWINDER BARRERA M.D.  [phone]    1.  NO LIFTING OF MORE THAT 10 POUNDS UNTIL FOLLOW-UP VISIT IN 6-8 WEEKS.    2.  OK TO REMOVE DRESSING IN 24 HOURS AND SHOWER OR BATHE, BUT DO NOT SCRUB OR SUBMERGE INCISION UNDER WATER FOR 6 WEEKS.  IF YOU HAVE DRAINAGE FROM THE INCISION, YOU MAY REPLACE THE DRESSING AS NEEDED.    3.  OK TO USE ICE PACKS TO THE BACK AREA FOR 20 MINUTES ON AND 20 MINUTES OFF FOR 24-48 HOURS AFTER SURGERY.  AVOID ICE CONTACT DIRECTLY TO THE SKIN OR INCISION.    4.  OK TO WALK AS MUCH AS TOLERATED.    5.  NO CONTACT SPORTS UNTIL FOLLOW-UP VISIT.    6.  NO HIGH IMPACT ACTIVITIES SUCH AS RUNNING/JOGGING, SNOWMOBILE OR FOUR PETERSON RIDING OR ANY OTHER RECREACTIONAL VEHICLES.    CALL MY OFFICE [phone] FOR INCREASING REDNESS,  "SWELLING OR PUS DRAINING FROM THE INCISION.  CALL IF YOU HAVE INCREASED PAIN OR ANY OTHER QUESTIONS OR CONCERNS.          Pending Results     No orders found for last 3 day(s).            Admission Information     Date & Time Provider Department Dept. Phone    11/10/2017 Al Dailey MD [facility] Observation Department [phone]      Your Vitals Were     Blood Pressure Temperature Respirations Height Weight Pulse Oximetry    121/65 (BP Location: Right arm) 97.1  F (36.2  C) (Oral) 16 1.727 m (5' 8\") 94.3 kg (208 lb) 95%    BMI (Body Mass Index)                   31.63 kg/m2           MyChart Information     [email or web address] lets you send messages to your doctor, view your test results, renew your prescriptions, schedule appointments and more. To sign up, go to [email or web address] . Click on \"Log in\" on the left side of the screen, which will take you to the Welcome page. Then click on \"Sign up Now\" on the right side of the page.     You will be asked to enter the access code listed below, as well as some personal information. Please follow the directions to create your username and password.     Your access code is: [email or web address]  Expires: 2017 11:12 AM     Your access code will  in 90 days. If you need help or a new code, please call your [facility] clinic or [phone].        Care EveryWhere ID     This is your Care EveryWhere ID. This could be used by other organizations to access your [facility] medical records  [id number]        Equal Access to Services     RUPA DRAKE : Hadii miguel angel patrick Somarissa, waaxda luqadaha, qaybta kaalmada leslie, ronnell hahn. So [facility] [phone].    ATENCIÓN: Si habla español, tiene a perera disposición servicios gratuitos de asistencia lingüística. Llame al [phone].    We comply with applicable federal civil rights laws and [place] laws. We do not discriminate on the basis of race, color, national origin, " age, disability, sex, sexual orientation, or gender identity.               Review of your medicines      START taking        Dose / Directions    cyclobenzaprine 5 MG tablet   Commonly known as:  FLEXERIL        Dose:  5 mg   Take 1 tablet (5 mg) by mouth 3 times daily as needed for muscle spasms   Quantity:  42 tablet   Refills:  0       HYDROmorphone 2 MG tablet   Commonly known as:  DILAUDID        Dose:  2-4 mg   Take 1-2 tablets (2-4 mg) by mouth every 4 hours as needed for moderate to severe pain, severe pain, breakthrough pain or pain   Quantity:  50 tablet   Refills:  0         CONTINUE these medicines which have NOT CHANGED        Dose / Directions    atorvastatin 10 MG tablet   Commonly known as:  LIPITOR   Used for:  Hyperlipidemia LDL goal <100        Dose:  10 mg   Take 1 tablet (10 mg) by mouth At Bedtime   Quantity:  90 tablet   Refills:  3       citalopram 10 MG tablet   Commonly known as:  celeXA   Used for:  Anxiety        Dose:  10 mg   Take 1 tablet (10 mg) by mouth daily   Quantity:  90 tablet   Refills:  3       donepezil 10 MG tablet   Commonly known as:  ARICEPT   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        Dose:  10 mg   Take 1 tablet (10 mg) by mouth At Bedtime   Quantity:  30 tablet   Refills:  6       losartan 50 MG tablet   Commonly known as:  COZAAR   Used for:  Benign essential hypertension        Dose:  50 mg   Take 1 tablet (50 mg) by mouth daily   Quantity:  90 tablet   Refills:  3       PRESERVISION AREDS PO        Dose:  1 tablet   Take 1 tablet by mouth daily   Refills:  0       traMADol 50 MG tablet   Commonly known as:  ULTRAM   Used for:  Lumbar radicular pain        Dose:  50 mg   Take 1 tablet (50 mg) by mouth every 6 hours as needed for pain maximum 4 tablet(s) per day   Quantity:  40 tablet   Refills:  0       VITAMIN D PO        Dose:  1000 Units   Take 1,000 Units by mouth daily   Refills:  0         STOP taking     ALEVE PO           FISH OIL + D3 PO                 Where to get your medicines      These medications were sent to [place], [place] - [address] [address] [facility]  [address] [facility] [address]     Phone:  [phone]     cyclobenzaprine 5 MG tablet         Some of these will need a paper prescription and others can be bought over the counter. Ask your nurse if you have questions.     Bring a paper prescription for each of these medications     HYDROmorphone 2 MG tablet                Protect others around you: Learn how to safely use, store and throw away your medicines at www.disposemymeds.org.             Medication List: This is a list of all your medications and when to take them. Check marks below indicate your daily home schedule. Keep this list as a reference.      Medications           Morning Afternoon Evening Bedtime As Needed    atorvastatin 10 MG tablet   Commonly known as:  LIPITOR   Take 1 tablet (10 mg) by mouth At Bedtime                                citalopram 10 MG tablet   Commonly known as:  celeXA   Take 1 tablet (10 mg) by mouth daily                                cyclobenzaprine 5 MG tablet   Commonly known as:  FLEXERIL   Take 1 tablet (5 mg) by mouth 3 times daily as needed for muscle spasms                                donepezil 10 MG tablet   Commonly known as:  ARICEPT   Take 1 tablet (10 mg) by mouth At Bedtime                                HYDROmorphone 2 MG tablet   Commonly known as:  DILAUDID   Take 1-2 tablets (2-4 mg) by mouth every 4 hours as needed for moderate to severe pain, severe pain, breakthrough pain or pain   Last time this was given:  2 mg on 11/11/2017  5:57 AM                                losartan 50 MG tablet   Commonly known as:  COZAAR   Take 1 tablet (50 mg) by mouth daily                                PRESERVISION AREDS PO   Take 1 tablet by mouth daily                                traMADol 50 MG tablet   Commonly known as:  ULTRAM   Take 1 tablet (50 mg) by  mouth every 6 hours as needed for pain maximum 4 tablet(s) per day                                VITAMIN D PO   Take 1,000 Units by mouth daily

## 2017-11-11 VITALS
OXYGEN SATURATION: 95 % | BODY MASS INDEX: 31.52 KG/M2 | RESPIRATION RATE: 16 BRPM | SYSTOLIC BLOOD PRESSURE: 121 MMHG | DIASTOLIC BLOOD PRESSURE: 65 MMHG | HEIGHT: 68 IN | WEIGHT: 208 LBS | TEMPERATURE: 97.1 F

## 2017-11-11 PROCEDURE — 25000132 ZZH RX MED GY IP 250 OP 250 PS 637: Mod: GY | Performed by: NEUROLOGICAL SURGERY

## 2017-11-11 PROCEDURE — A9270 NON-COVERED ITEM OR SERVICE: HCPCS | Mod: GY | Performed by: NEUROLOGICAL SURGERY

## 2017-11-11 PROCEDURE — 25000128 H RX IP 250 OP 636: Performed by: NEUROLOGICAL SURGERY

## 2017-11-11 RX ORDER — DEXAMETHASONE SODIUM PHOSPHATE 10 MG/ML
10 INJECTION, SOLUTION INTRAMUSCULAR; INTRAVENOUS ONCE
Status: DISCONTINUED | OUTPATIENT
Start: 2017-11-11 | End: 2017-11-11 | Stop reason: HOSPADM

## 2017-11-11 RX ADMIN — Medication 0.2 MG: at 01:01

## 2017-11-11 RX ADMIN — ACETAMINOPHEN 650 MG: 325 TABLET, FILM COATED ORAL at 05:58

## 2017-11-11 RX ADMIN — HYDROMORPHONE HYDROCHLORIDE 2 MG: 2 TABLET ORAL at 05:57

## 2017-11-11 RX ADMIN — HYDROMORPHONE HYDROCHLORIDE 2 MG: 2 TABLET ORAL at 09:03

## 2017-11-11 NOTE — PLAN OF CARE
Problem: Patient Care Overview  Goal: Plan of Care/Patient Progress Review  Outcome: Adequate for Discharge Date Met:  11/11/17  OBSERVATION patient END time: 1118

## 2017-11-11 NOTE — PROGRESS NOTES
Pt seen and she is doing very well. She feels that her pain is much better and well controlled.  Ok to DC home.      [facility]  [facility]  [address] [place] [address].  [id number]  Tel. [phone]  Fax [phone]

## 2017-11-11 NOTE — ANESTHESIA CARE TRANSFER NOTE
Patient: Kathie Wetzel    Procedure(s):  Right L3-4 Microdiscectomy - Wound Class: I-Clean    Diagnosis: Right L3-4 Herniated disc with right lower extremity radiculopathy  Diagnosis Additional Information: No value filed.    Anesthesia Type:   General, ETT     Note:  Airway :Face Mask  Patient transferred to:PACU  Comments: VVS, oxygen per face mask, report to RN.      Vitals: (Last set prior to Anesthesia Care Transfer)    CRNA VITALS  11/10/2017 1751 - 11/10/2017 1821      11/10/2017             Pulse: 116    SpO2: 100 %    Resp Rate (observed): (!)  1                Electronically Signed By: WILD Soriano CRNA  November 10, 2017  6:21 PM

## 2017-11-11 NOTE — PLAN OF CARE
Pt cleared for DC. Seen by neurosurgery prior to dc, states pain has much improved and denies need for change in medications. IV was removed unable to give iv decadron. ICe pack given for comfort measure. Pt escorted by daughter for discharge in private car

## 2017-11-11 NOTE — PHARMACY-ADMISSION MEDICATION HISTORY
Admission medication history interview status for this patient is complete. See [facility] admission navigator for allergy information, prior to admission medications and immunization status.     PTA meds completed by pre-admitting nurse Mamta Benjamin and reviewed by pharmacy       Prior to Admission medications    Medication Sig Last Dose Taking? Auth Provider   HYDROmorphone (DILAUDID) 2 MG tablet Take 1-2 tablets (2-4 mg) by mouth every 4 hours as needed for moderate to severe pain, severe pain, breakthrough pain or pain  Yes Al Dailey MD   cyclobenzaprine (FLEXERIL) 5 MG tablet Take 1 tablet (5 mg) by mouth 3 times daily as needed for muscle spasms  Yes Al Dailey MD   traMADol (ULTRAM) 50 MG tablet Take 1 tablet (50 mg) by mouth every 6 hours as needed for pain maximum 4 tablet(s) per day 11/9/2017 at Unknown time Yes Cammie Daugherty MD   donepezil (ARICEPT) 10 MG tablet Take 1 tablet (10 mg) by mouth At Bedtime 11/9/2017 at Unknown time Yes Cammie Daugherty MD   citalopram (CELEXA) 10 MG tablet Take 1 tablet (10 mg) by mouth daily 11/9/2017 at Unknown time Yes Cammie Daugherty MD   losartan (COZAAR) 50 MG tablet Take 1 tablet (50 mg) by mouth daily 11/9/2017 at Unknown time Yes Cammie Daugherty MD   atorvastatin (LIPITOR) 10 MG tablet Take 1 tablet (10 mg) by mouth At Bedtime 11/9/2017 at Unknown time Yes Cammie Daugherty MD   Multiple Vitamins-Minerals (PRESERVISION AREDS PO) Take 1 tablet by mouth daily More than a month at Unknown time  Reported, Patient   Cholecalciferol (VITAMIN D PO) Take 1,000 Units by mouth daily  More than a month at Unknown time  Reported, Patient

## 2017-11-11 NOTE — PLAN OF CARE
Problem: Patient Care Overview  Goal: Plan of Care/Patient Progress Review  Outcome: No Change  PRIMARY DIAGNOSIS: Microdiskectomy of the L3-L4 spine.  OUTPATIENT/OBSERVATION GOALS TO BE MET BEFORE DISCHARGE:  1. ADLs back to baseline: No      2. Activity and level of assistance: Up with standby assistance.      3. Pain status: Improved-controlled with oral pain medications.      4. Return to near baseline physical activity: Yes          Discharge Planner Nurse   Safe discharge environment identified: Yes  Barriers to discharge: Yes       Entered by: Irving Donis 11/10/2017 9:54 PM     Pt felt nauseous per shift. Pt received IV Reglan. Pt in bed sleeping. Pt has not requested pain meds. Ambulated in room per shift. Pt O@ removed. sats 95 on RA. VSS ex elevated BP. LS clear. Pt educated on deep breathing. A&Ox4. SBA.

## 2017-11-11 NOTE — OR NURSING
"[facility] PACU-to-NURSING HANDOFF    Procedure:  Procedure(s):  Right L3-4 Microdiscectomy - Wound Class: I-Clean   {NON-OPERATIVE PROCEDURES:369243  Last Vitals: Blood pressure (!) 150/95, temperature 97.5  F (36.4  C), temperature source Temporal, resp. rate 17, height 1.727 m (5' 8\"), weight 94.3 kg (208 lb), SpO2 99 %, not currently breastfeeding.   Reason patient is transferring to unit: unable to void  Can discharge home once goals are met: per MD.   Prescriptions: Sent to [facility].    Pain Management:: Last IV pain medication given at 1730  Pain Control: Good     Incision Site: Clean, dry, and intact  Antiemetic Management: zofran  IV Fluid total: 1000 mL  Elimination Status: Unable to void.  In bathroom now.              Will scan if no void.     Discharge Teaching: Not applicable  Patient has a Ride Home:  Yes.   Patient has someone to stay with them 24 hours after the procedure:  Yes.     PACU Nurse Name/Phone Number: Kaity Lynch,   7:36 PM    Above PACU Nurse Handoff Report was reviewed: Yes  Reviewed by: Irving Donis  "

## 2017-11-11 NOTE — PLAN OF CARE
Problem: Patient Care Overview  Goal: Plan of Care/Patient Progress Review  Outcome: Improving  PRIMARY DIAGNOSIS: ACUTE PAIN  OUTPATIENT/OBSERVATION GOALS TO BE MET BEFORE DISCHARGE:  1. Pain Status: Improved-controlled with oral pain medications.     2. Return to near baseline physical activity: No     3. Cleared for discharge by consultants (if involved): Yes     Discharge Planner Nurse   Safe discharge environment identified: Yes  Barriers to discharge: No       Entered by: Luisa Loya 11/11/2017 10:02 AM  Call out to Dr vieira re pain control      Please review provider order for any additional goals.   Nurse to notify provider when observation goals have been met and patient is ready for discharge.

## 2017-11-11 NOTE — ANESTHESIA POSTPROCEDURE EVALUATION
Patient: Kathie Wetzel    Procedure(s):  Right L3-4 Microdiscectomy - Wound Class: I-Clean    Diagnosis:Right L3-4 Herniated disc with right lower extremity radiculopathy  Diagnosis Additional Information: PREOPERATIVE DIAGNOSIS: Right L3-4 herniated disk with right lower extremity radiculopathy      POSTOPERATIVE DIAGNOSIS: Same     PROCEDURE: Right L3-4 hemilaminectomy, medial facetectomy, foraminotomy with microdiscectomy and use of X-ray and intrao, perative microscope        Anesthesia Type:  General, ETT    Note:  Anesthesia Post Evaluation    Patient location during evaluation: PACU  Patient participation: Able to fully participate in evaluation  Level of consciousness: awake and alert  Pain management: adequate  Airway patency: patent  Cardiovascular status: acceptable  Respiratory status: acceptable  Hydration status: acceptable  PONV: none     Anesthetic complications: None          Last vitals:  Vitals:    11/10/17 1845 11/10/17 1945 11/10/17 2004   BP: (!) 150/95  142/89   Resp: 17  16   Temp:   97.5  F (36.4  C)   SpO2: 99% 99% 98%         Electronically Signed By: Dennis Sharpe MD  November 10, 2017  8:21 PM

## 2017-11-11 NOTE — PLAN OF CARE
Problem: Patient Care Overview  Goal: Plan of Care/Patient Progress Review  ROOM # 206-1     Living Situation (if not independent, order SW consult): Home apt  Facility name:  : Daughter Regi     Activity level at baseline: Up ad rafael  Activity level on admit: SBA        Patient registered to observation; given Patient Bill of Rights; given the opportunity to ask questions about observation status and their plan of care.  Patient has been oriented to the observation room, bathroom and call light is in place.     Discussed discharge goals and expectations with patient/family.

## 2017-11-11 NOTE — PLAN OF CARE
Problem: Patient Care Overview  Goal: Discharge Needs Assessment  Outcome: Improving  PRIMARY DIAGNOSIS: Microdiskectomy of the L3-L4 spine.  OUTPATIENT/OBSERVATION GOALS TO BE MET BEFORE DISCHARGE:  1. ADLs back to baseline: No      2. Activity and level of assistance: Up with standby assistance.      3. Pain status: Improved-controlled with oral pain medications.      4. Return to near baseline physical activity: Yes      Discharge Planner Nurse   Safe discharge environment identified: Yes  Barriers to discharge: Yes       Entered by: Irving Donis 11/10/2017 9:54 PM     Pt denies nausea now. Pt in bed sleeping. Pt has not requested pain meds. Ambulated in room per shift. Pt O@ removed.  Sats 94 on RA. VS.   LS clear. Pt educated on deep breathing.  A&Ox4. SBA.  Plan is for discharge in am if all goals are met.

## 2017-11-11 NOTE — DISCHARGE INSTRUCTIONS
GENERAL ANESTHESIA OR SEDATION ADULT DISCHARGE INSTRUCTIONS   SPECIAL PRECAUTIONS FOR 24 HOURS AFTER SURGERY    IT IS NOT UNUSUAL TO FEEL LIGHT-HEADED OR FAINT, UP TO 24 HOURS AFTER SURGERY OR WHILE TAKING PAIN MEDICATION.  IF YOU HAVE THESE SYMPTOMS; SIT FOR A FEW MINUTES BEFORE STANDING AND HAVE SOMEONE ASSIST YOU WHEN YOU GET UP TO WALK OR USE THE BATHROOM.    YOU SHOULD REST AND RELAX FOR THE NEXT 24 HOURS AND YOU MUST MAKE ARRANGEMENTS TO HAVE SOMEONE STAY WITH YOU FOR AT LEAST 24 HOURS AFTER YOUR DISCHARGE.  AVOID HAZARDOUS AND STRENUOUS ACTIVITIES.  DO NOT MAKE IMPORTANT DECISIONS FOR 24 HOURS.    DO NOT DRIVE ANY VEHICLE OR OPERATE MECHANICAL EQUIPMENT FOR 24 HOURS FOLLOWING THE END OF YOUR SURGERY.  EVEN THOUGH YOU MAY FEEL NORMAL, YOUR REACTIONS MAY BE AFFECTED BY THE MEDICATION YOU HAVE RECEIVED.    DO NOT DRINK ALCOHOLIC BEVERAGES FOR 24 HOURS FOLLOWING YOUR SURGERY.    DRINK CLEAR LIQUIDS (APPLE JUICE, GINGER ALE, 7-UP, BROTH, ETC.).  PROGRESS TO YOUR REGULAR DIET AS YOU FEEL ABLE.    YOU MAY HAVE A DRY MOUTH, A SORE THROAT, MUSCLES ACHES OR TROUBLE SLEEPING.  THESE SHOULD GO AWAY AFTER 24 HOURS.    CALL YOUR DOCTOR FOR ANY OF THE FOLLOWING:  SIGNS OF INFECTION (FEVER, GROWING TENDERNESS AT THE SURGERY SITE, A LARGE AMOUNT OF DRAINAGE OR BLEEDING, SEVERE PAIN, FOUL-SMELLING DRAINAGE, REDNESS OR SWELLING.    IT HAS BEEN OVER 8 TO 10 HOURS SINCE SURGERY AND YOU ARE STILL NOT ABLE TO URINATE (PASS WATER).           SPINE SURGERY DISCHARGE INSTRUCTIONS  DR. RAJWINDER BARRERA M.D.  [phone]    1.  NO LIFTING OF MORE THAT 10 POUNDS UNTIL FOLLOW-UP VISIT IN 6-8 WEEKS.    2.  OK TO REMOVE DRESSING IN 24 HOURS AND SHOWER OR BATHE, BUT DO NOT SCRUB OR SUBMERGE INCISION UNDER WATER FOR 6 WEEKS.  IF YOU HAVE DRAINAGE FROM THE INCISION, YOU MAY REPLACE THE DRESSING AS NEEDED.    3.  OK TO USE ICE PACKS TO THE BACK AREA FOR 20 MINUTES ON AND 20 MINUTES OFF FOR 24-48 HOURS AFTER SURGERY.  AVOID ICE CONTACT  DIRECTLY TO THE SKIN OR INCISION.    4.  OK TO WALK AS MUCH AS TOLERATED.    5.  NO CONTACT SPORTS UNTIL FOLLOW-UP VISIT.    6.  NO HIGH IMPACT ACTIVITIES SUCH AS RUNNING/JOGGING, SNOWMOBILE OR FOUR PETERSON RIDING OR ANY OTHER RECREACTIONAL VEHICLES.    CALL MY OFFICE [phone] FOR INCREASING REDNESS, SWELLING OR PUS DRAINING FROM THE INCISION.  CALL IF YOU HAVE INCREASED PAIN OR ANY OTHER QUESTIONS OR CONCERNS.

## 2017-11-11 NOTE — PLAN OF CARE
Pt stating current pain regimen is not helping her. Pt has DC order placed and prescribed meds for dilaudid and flexeril filled. Dr Vieira paged re pt concerns going home. Awaiting call return    Spoke with dr vieira at 1020  Informed of pain Suggested x1 iv decadron. However IV was removed prior to receiving the order.  Dr vieira states Nurse practitioner is rounding this am.

## 2017-11-14 ENCOUNTER — TELEPHONE (OUTPATIENT)
Dept: NEUROSURGERY | Facility: CLINIC | Age: 77
End: 2017-11-14

## 2017-11-14 DIAGNOSIS — Z98.890 S/P LUMBAR MICRODISCECTOMY: Primary | ICD-10-CM

## 2017-11-14 RX ORDER — METHYLPREDNISOLONE 4 MG
TABLET, DOSE PACK ORAL
Qty: 21 TABLET | Refills: 0 | Status: SHIPPED | OUTPATIENT
Start: 2017-11-14 | End: 2017-12-18

## 2017-11-14 NOTE — TELEPHONE ENCOUNTER
REASON FOR CALL:  Surgery this past Friday, pain is worse than when originally discharged.   Wondering what else she can do     Detailed message can be left:  YES

## 2017-12-14 ENCOUNTER — ALLIED HEALTH/NURSE VISIT (OUTPATIENT)
Dept: NURSING | Facility: CLINIC | Age: 77
End: 2017-12-14
Payer: MEDICARE

## 2017-12-14 DIAGNOSIS — Z23 NEED FOR PROPHYLACTIC VACCINATION AND INOCULATION AGAINST INFLUENZA: Primary | ICD-10-CM

## 2017-12-14 PROCEDURE — 99207 ZZC NO CHARGE NURSE ONLY: CPT

## 2017-12-14 PROCEDURE — G0008 ADMIN INFLUENZA VIRUS VAC: HCPCS

## 2017-12-14 PROCEDURE — 90662 IIV NO PRSV INCREASED AG IM: CPT

## 2017-12-14 NOTE — MR AVS SNAPSHOT
"              After Visit Summary   12/14/2017    Kathie Wetzel    MRN: [id number]           Patient Information     Date Of Birth          1940        Visit Information        Provider Department      12/14/2017 11:00 AM  NURSE [facility] Ar[facility] Melgar        Today's Diagnoses     Need for prophylactic vaccination and inoculation against influenza    -  1       Follow-ups after your visit        Your next 10 appointments already scheduled     Dec 18, 2017 10:00 AM CST   Return Visit with WILD Snyder CNP   [facility] ([facility])    [address]  [facility] [address] [phone]              Who to contact     If you have questions or need follow up information about today's clinic visit or your schedule please contact [facility] directly at [phone].  Normal or non-critical lab and imaging results will be communicated to you by WhoGotStuffhart, letter or phone within 4 business days after the clinic has received the results. If you do not hear from us within 7 days, please contact the clinic through MyChart or phone. If you have a critical or abnormal lab result, we will notify you by phone as soon as possible.  Submit refill requests through [email or web address] or call your pharmacy and they will forward the refill request to us. Please allow 3 business days for your refill to be completed.          Additional Information About Your Visit        MyChart Information     [email or web address] lets you send messages to your doctor, view your test results, renew your prescriptions, schedule appointments and more. To sign up, go to [email or web address] . Click on \"Log in\" on the left side of the screen, which will take you to the Welcome page. Then click on \"Sign up Now\" on the right side of the page.     You will be asked to enter the access code listed below, as well as some personal information. Please follow the directions to " create your username and password.     Your access code is: [email or web address]  Expires: 2017 11:12 AM     Your access code will  in 90 days. If you need help or a new code, please call your [place] clinic or [phone].        Care EveryWhere ID     This is your Care EveryWhere ID. This could be used by other organizations to access your [facility] medical records  [id number]         Blood Pressure from Last 3 Encounters:   17 121/65   17 116/64   17 117/76    Weight from Last 3 Encounters:   11/10/17 208 lb (94.3 kg)   17 210 lb 11.2 oz (95.6 kg)   17 217 lb (98.4 kg)              We Performed the Following     ADMIN INFLUENZA (For MEDICARE Patients ONLY) []     FLU VACCINE, INCREASED ANTIGEN, PRESV FREE, AGE 65+ [47716]        Primary Care Provider Office Phone # Fax #    Cammie Sanjeev Daugherty [phone] [address] [address] [address]        Equal Access to Services     [facility]: Hadii aad ku hadasho Soomaali, waaxda luqadaha, qaybta kaalmada adeegyada, ronnell flowerin hayaan adeluciana alicia . So [facility] [phone].    ATENCIÓN: Si habla español, tiene a perera disposición servicios gratuitos de asistencia lingüística. Llame al [phone].    We comply with applicable federal civil rights laws and [place] laws. We do not discriminate on the basis of race, color, national origin, age, disability, sex, sexual orientation, or gender identity.            Thank you!     Thank you for choosing [facility]  for your care. Our goal is always to provide you with excellent care. Hearing back from our patients is one way we can continue to improve our services. Please take a few minutes to complete the written survey that you may receive in the mail after your visit with us. Thank you!             Your Updated Medication List - Protect others around you: Learn how to safely use, store and throw away your medicines at  www.disposemymeds.org.          This list is accurate as of: 12/14/17 11:51 AM.  Always use your most recent med list.                   Brand Name Dispense Instructions for use Diagnosis    atorvastatin 10 MG tablet    LIPITOR    90 tablet    Take 1 tablet (10 mg) by mouth At Bedtime    Hyperlipidemia LDL goal <100       citalopram 10 MG tablet    celeXA    90 tablet    Take 1 tablet (10 mg) by mouth daily    Anxiety       cyclobenzaprine 5 MG tablet    FLEXERIL    42 tablet    Take 1 tablet (5 mg) by mouth 3 times daily as needed for muscle spasms    S/P lumbar microdiscectomy       donepezil 10 MG tablet    ARICEPT    30 tablet    Take 1 tablet (10 mg) by mouth At Bedtime    Memory impairment of gradual onset       HYDROmorphone 2 MG tablet    DILAUDID    50 tablet    Take 1-2 tablets (2-4 mg) by mouth every 4 hours as needed for moderate to severe pain, severe pain, breakthrough pain or pain    S/P lumbar microdiscectomy       losartan 50 MG tablet    COZAAR    90 tablet    Take 1 tablet (50 mg) by mouth daily    Benign essential hypertension       methylPREDNISolone 4 MG tablet    MEDROL DOSEPAK    21 tablet    Follow package instructions    S/P lumbar microdiscectomy       PRESERVISION AREDS PO      Take 1 tablet by mouth daily        traMADol 50 MG tablet    ULTRAM    40 tablet    Take 1 tablet (50 mg) by mouth every 6 hours as needed for pain maximum 4 tablet(s) per day    Lumbar radicular pain       VITAMIN D PO      Take 1,000 Units by mouth daily

## 2017-12-18 ENCOUNTER — OFFICE VISIT (OUTPATIENT)
Dept: NEUROSURGERY | Facility: CLINIC | Age: 77
End: 2017-12-18
Attending: NURSE PRACTITIONER
Payer: MEDICARE

## 2017-12-18 VITALS
WEIGHT: 210 LBS | OXYGEN SATURATION: 95 % | SYSTOLIC BLOOD PRESSURE: 125 MMHG | DIASTOLIC BLOOD PRESSURE: 85 MMHG | HEART RATE: 91 BPM | HEIGHT: 69 IN | BODY MASS INDEX: 31.1 KG/M2

## 2017-12-18 DIAGNOSIS — Z98.890 S/P LUMBAR MICRODISCECTOMY: Primary | ICD-10-CM

## 2017-12-18 PROCEDURE — 99024 POSTOP FOLLOW-UP VISIT: CPT | Performed by: NURSE PRACTITIONER

## 2017-12-18 PROCEDURE — 99211 OFF/OP EST MAY X REQ PHY/QHP: CPT | Performed by: NURSE PRACTITIONER

## 2017-12-18 ASSESSMENT — PAIN SCALES - GENERAL: PAINLEVEL: NO PAIN (1)

## 2017-12-18 NOTE — PATIENT INSTRUCTIONS
- May increase lifting restriction to 20 pounds and slowly increase as tolerated.    - followup in as needed    - Call the clinic at [phone] for increased pain or any other questions and concerns.

## 2017-12-18 NOTE — NURSING NOTE
"Kathie Wetzel is a 77 year old female who presents for:  Chief Complaint   Patient presents with     Neurologic Problem     6 week follow up status post lumbar fusion DOS 11/10/17        Initial Vitals:  /85 (BP Location: Right arm, Patient Position: Sitting, Cuff Size: Adult Regular)  Pulse 91  Ht 5' 8.8\" (1.748 m)  Wt 210 lb (95.3 kg)  SpO2 95%  BMI 31.19 kg/m2 Estimated body mass index is 31.19 kg/(m^2) as calculated from the following:    Height as of this encounter: 5' 8.8\" (1.748 m).    Weight as of this encounter: 210 lb (95.3 kg).. Body surface area is 2.15 meters squared. BP completed using cuff size: regular  No Pain (1)    Do you feel safe in your environment?  Yes  Do you need any refills today? No    Nursing Comments: 6 week follow up status post lumbar fusion DOS 11/10/17.        5 min. nursing intake time  Lynda Piper MA       Discharge plan: - May increase lifting restriction to 20 pounds and slowly increase as tolerated.    - followup in as needed    - Call the clinic at [phone] for increased pain or any other questions and concerns.  2 min. nursing discharge time  Lynda Piper MA        "

## 2017-12-18 NOTE — MR AVS SNAPSHOT
"              After Visit Summary   12/18/2017    Kathie Wetzel    MRN: [id number]           Patient Information     Date Of Birth          1940        Visit Information        Provider Department      12/18/2017 10:00 AM Natividad Cha APRN CNP [facility]        Care Instructions    - May increase lifting restriction to 20 pounds and slowly increase as tolerated.    - followup in as needed    - Call the clinic at [phone] for increased pain or any other questions and concerns.              Follow-ups after your visit        Who to contact     If you have questions or need follow up information about today's clinic visit or your schedule please contact [place] [facility] directly at [phone].  Normal or non-critical lab and imaging results will be communicated to you by Waicaihart, letter or phone within 4 business days after the clinic has received the results. If you do not hear from us within 7 days, please contact the clinic through Waicaihart or phone. If you have a critical or abnormal lab result, we will notify you by phone as soon as possible.  Submit refill requests through Laiyaoyao or call your pharmacy and they will forward the refill request to us. Please allow 3 business days for your refill to be completed.          Additional Information About Your Visit        MyChart Information     Laiyaoyao lets you send messages to your doctor, view your test results, renew your prescriptions, schedule appointments and more. To sign up, go to [email or web address] . Click on \"Log in\" on the left side of the screen, which will take you to the Welcome page. Then click on \"Sign up Now\" on the right side of the page.     You will be asked to enter the access code listed below, as well as some personal information. Please follow the directions to create your username and password.     Your access code is: [email or web address]  Expires: 12/19/2017 11:12 AM     Your access code will " " in 90 days. If you need help or a new code, please call your [place] clinic or [phone].        Care EveryWhere ID     This is your Care EveryWhere ID. This could be used by other organizations to access your [place] medical records  [id number]        Your Vitals Were     Pulse Height Pulse Oximetry BMI (Body Mass Index)          91 5' 8.8\" (1.748 m) 95% 31.19 kg/m2         Blood Pressure from Last 3 Encounters:   17 125/85   17 121/65   17 116/64    Weight from Last 3 Encounters:   17 210 lb (95.3 kg)   11/10/17 208 lb (94.3 kg)   17 210 lb 11.2 oz (95.6 kg)              Today, you had the following     No orders found for display       Primary Care Provider Office Phone # Fax #    Cammie Sanjeev Daugherty [phone]       [address] [address] [facility] [address]        Equal Access to Services     [facility]: Hadii aad ku hadasho Soomaali, waaxda luqadaha, qaybta kaalmada adeegyada, ronnell hernandez haymax alicia . So [facility] [phone].    ATENCIÓN: Si habla español, tiene a perera disposición servicios gratuitos de asistencia lingüística. Llame al [phone].    We comply with applicable federal civil rights laws and [place] laws. We do not discriminate on the basis of race, color, national origin, age, disability, sex, sexual orientation, or gender identity.            Thank you!     Thank you for choosing [facility]  for your care. Our goal is always to provide you with excellent care. Hearing back from our patients is one way we can continue to improve our services. Please take a few minutes to complete the written survey that you may receive in the mail after your visit with us. Thank you!             Your Updated Medication List - Protect others around you: Learn how to safely use, store and throw away your medicines at www.disposemymeds.org.          This list is accurate as of: 17 10:08 AM.  Always use your " most recent med list.                   Brand Name Dispense Instructions for use Diagnosis    atorvastatin 10 MG tablet    LIPITOR    90 tablet    Take 1 tablet (10 mg) by mouth At Bedtime    Hyperlipidemia LDL goal <100       citalopram 10 MG tablet    celeXA    90 tablet    Take 1 tablet (10 mg) by mouth daily    Anxiety       donepezil 10 MG tablet    ARICEPT    30 tablet    Take 1 tablet (10 mg) by mouth At Bedtime    Memory impairment of gradual onset       losartan 50 MG tablet    COZAAR    90 tablet    Take 1 tablet (50 mg) by mouth daily    Benign essential hypertension       PRESERVISION AREDS PO      Take 1 tablet by mouth daily        VITAMIN D PO      Take 1,000 Units by mouth daily

## 2017-12-18 NOTE — PROGRESS NOTES
[facility]  Neurosurgery followup:    HPI: Ms. Wetzel is a 77 year old female that returns 6 weeks post right L3-4 microdiscectomy.  She returns today noting her pain 1/10.  She states that prior to surgery she had severe low back and right leg pain.  She notes only very intermittent right leg pain.       Exam:  Constitutional:  Alert, well nourished, NAD.  HEENT: Normocephalic, atraumatic.   Pulm:  Without shortness of breath   CV:  No pitting edema of BLE.      Neurological:  Awake  Alert  Oriented x 3  Motor exam:        IP Q DF PF EHL  R   5  5   5   5    5  L   5  5   5   5    5     Able to spontaneously move L/E bilaterally  Sensation intact throughout all L/E dermatomes     Incisions:  Lumbar incision healed       A/P: Ms. Wetzel is a 77 year old female that returns 6 weeks post right L3-4 microdiscectomy.  She returns today noting her pain 1/10.  She states that prior to surgery she had severe low back and right leg pain.  She notes only very intermittent right leg pain.  Pt was educated on restrictions and follow ups.  She is very happy with the results of the surgery. She is here with her daughter.     Patient Instructions   - May increase lifting restriction to 20 pounds and slowly increase as tolerated.    - followup in as needed    - Call the clinic at [phone] for increased pain or any other questions and concerns.               Natividad Cha [facility]  [facility]  [address]  [address] [place] [address]    Tel [phone]  Pager [phone]

## 2017-12-18 NOTE — LETTER
12/18/2017         RE: Kathie Wetzel  [address]  [place] [address]        Dear Colleague,    Thank you for referring your patient, Kathie Wetzel, to the [facility]. Please see a copy of my visit note below.    [facility]  Neurosurgery followup:    HPI: Ms. Wetzel is a 77 year old female that returns 6 weeks post right L3-4 microdiscectomy.  She returns today noting her pain 1/10.  She states that prior to surgery she had severe low back and right leg pain.  She notes only very intermittent right leg pain.       Exam:  Constitutional:  Alert, well nourished, NAD.  HEENT: Normocephalic, atraumatic.   Pulm:  Without shortness of breath   CV:  No pitting edema of BLE.      Neurological:  Awake  Alert  Oriented x 3  Motor exam:        IP Q DF PF EHL  R   5  5   5   5    5  L   5  5   5   5    5     Able to spontaneously move L/E bilaterally  Sensation intact throughout all L/E dermatomes     Incisions:  Lumbar incision healed       A/P: Ms. Wetzel is a 77 year old female that returns 6 weeks post right L3-4 microdiscectomy.  She returns today noting her pain 1/10.  She states that prior to surgery she had severe low back and right leg pain.  She notes only very intermittent right leg pain.  Pt was educated on restrictions and follow ups.  She is very happy with the results of the surgery. She is here with her daughter.     Patient Instructions   - May increase lifting restriction to 20 pounds and slowly increase as tolerated.    - followup in as needed    - Call the clinic at [phone] for increased pain or any other questions and concerns.               Natividad Cha [facility]  [facility]  [address]  [address] [place] [address]    Tel [phone]  Pager [phone]        Again, thank you for allowing me to participate in the care of your patient.        Sincerely,        WILD Snyder  CNP

## 2018-02-20 DIAGNOSIS — R41.3 MEMORY IMPAIRMENT OF GRADUAL ONSET: ICD-10-CM

## 2018-02-20 NOTE — TELEPHONE ENCOUNTER
"Requested Prescriptions   Pending Prescriptions Disp Refills     donepezil (ARICEPT) 10 MG tablet [Pharmacy Med Name: DONEPEZIL 10MG TABLETS]  Last Written Prescription Date:  7/19/17  Last Fill Quantity: 30,  # refills: 6   Last office visit: 11/2/2017 with prescribing provider:  11/2/2017     Future Office Visit:     30 tablet 0     Sig: TAKE 1 TABLET(10 MG) BY MOUTH AT BEDTIME    Miscellaneous Dementia Agents Passed    2/20/2018  9:05 AM       Passed - Recent or future visit with authorizing provider's specialty    Patient had office visit in the last year or has a visit in the next 30 days with authorizing provider.  See \"Patient Info\" tab in inbasket, or \"Choose Columns\" in Meds & Orders section of the refill encounter.            Passed - Patient is 18 years of age or older          "

## 2018-02-23 RX ORDER — DONEPEZIL HYDROCHLORIDE 10 MG/1
TABLET, FILM COATED ORAL
Qty: 30 TABLET | Refills: 2 | Status: SHIPPED | OUTPATIENT
Start: 2018-02-23 | End: 2018-05-25

## 2018-03-20 ENCOUNTER — APPOINTMENT (OUTPATIENT)
Dept: MRI IMAGING | Facility: CLINIC | Age: 78
End: 2018-03-20
Attending: EMERGENCY MEDICINE
Payer: MEDICARE

## 2018-03-20 ENCOUNTER — APPOINTMENT (OUTPATIENT)
Dept: GENERAL RADIOLOGY | Facility: CLINIC | Age: 78
End: 2018-03-20
Attending: EMERGENCY MEDICINE
Payer: MEDICARE

## 2018-03-20 ENCOUNTER — HOSPITAL ENCOUNTER (EMERGENCY)
Facility: CLINIC | Age: 78
Discharge: HOME | End: 2018-03-20
Attending: EMERGENCY MEDICINE | Admitting: EMERGENCY MEDICINE
Payer: MEDICARE

## 2018-03-20 VITALS
RESPIRATION RATE: 18 BRPM | OXYGEN SATURATION: 96 % | TEMPERATURE: 97.8 F | SYSTOLIC BLOOD PRESSURE: 120 MMHG | DIASTOLIC BLOOD PRESSURE: 79 MMHG

## 2018-03-20 DIAGNOSIS — R06.02 SOB (SHORTNESS OF BREATH): ICD-10-CM

## 2018-03-20 DIAGNOSIS — R42 DIZZINESS: ICD-10-CM

## 2018-03-20 DIAGNOSIS — R42 VERTIGO: ICD-10-CM

## 2018-03-20 LAB
ALBUMIN SERPL-MCNC: 3.6 G/DL (ref 3.4–5)
ALBUMIN UR-MCNC: NEGATIVE MG/DL
ALP SERPL-CCNC: 82 U/L (ref 40–150)
ALT SERPL W P-5'-P-CCNC: 22 U/L (ref 0–50)
ANION GAP SERPL CALCULATED.3IONS-SCNC: 6 MMOL/L (ref 3–14)
APPEARANCE UR: CLEAR
AST SERPL W P-5'-P-CCNC: 15 U/L (ref 0–45)
BASOPHILS # BLD AUTO: 0.1 10E9/L (ref 0–0.2)
BASOPHILS NFR BLD AUTO: 0.7 %
BILIRUB SERPL-MCNC: 0.3 MG/DL (ref 0.2–1.3)
BILIRUB UR QL STRIP: NEGATIVE
BUN SERPL-MCNC: 20 MG/DL (ref 7–30)
CALCIUM SERPL-MCNC: 9 MG/DL (ref 8.5–10.1)
CHLORIDE SERPL-SCNC: 104 MMOL/L (ref 94–109)
CO2 SERPL-SCNC: 29 MMOL/L (ref 20–32)
COLOR UR AUTO: ABNORMAL
CREAT SERPL-MCNC: 0.99 MG/DL (ref 0.52–1.04)
D DIMER PPP FEU-MCNC: <0.3 UG/ML FEU (ref 0–0.5)
DIFFERENTIAL METHOD BLD: NORMAL
EOSINOPHIL # BLD AUTO: 0.3 10E9/L (ref 0–0.7)
EOSINOPHIL NFR BLD AUTO: 3.5 %
ERYTHROCYTE [DISTWIDTH] IN BLOOD BY AUTOMATED COUNT: 12.6 % (ref 10–15)
GFR SERPL CREATININE-BSD FRML MDRD: 54 ML/MIN/1.7M2
GLUCOSE SERPL-MCNC: 92 MG/DL (ref 70–99)
GLUCOSE UR STRIP-MCNC: NEGATIVE MG/DL
HCT VFR BLD AUTO: 42.6 % (ref 35–47)
HGB BLD-MCNC: 13.6 G/DL (ref 11.7–15.7)
HGB UR QL STRIP: NEGATIVE
IMM GRANULOCYTES # BLD: 0 10E9/L (ref 0–0.4)
IMM GRANULOCYTES NFR BLD: 0.3 %
KETONES UR STRIP-MCNC: NEGATIVE MG/DL
LEUKOCYTE ESTERASE UR QL STRIP: ABNORMAL
LYMPHOCYTES # BLD AUTO: 2.4 10E9/L (ref 0.8–5.3)
LYMPHOCYTES NFR BLD AUTO: 26.8 %
MCH RBC QN AUTO: 28.5 PG (ref 26.5–33)
MCHC RBC AUTO-ENTMCNC: 31.9 G/DL (ref 31.5–36.5)
MCV RBC AUTO: 89 FL (ref 78–100)
MONOCYTES # BLD AUTO: 0.6 10E9/L (ref 0–1.3)
MONOCYTES NFR BLD AUTO: 6.5 %
MUCOUS THREADS #/AREA URNS LPF: PRESENT /LPF
NEUTROPHILS # BLD AUTO: 5.5 10E9/L (ref 1.6–8.3)
NEUTROPHILS NFR BLD AUTO: 62.2 %
NITRATE UR QL: NEGATIVE
NRBC # BLD AUTO: 0 10*3/UL
NRBC BLD AUTO-RTO: 0 /100
PH UR STRIP: 6 PH (ref 5–7)
PLATELET # BLD AUTO: 263 10E9/L (ref 150–450)
POTASSIUM SERPL-SCNC: 3.8 MMOL/L (ref 3.4–5.3)
PROT SERPL-MCNC: 7.6 G/DL (ref 6.8–8.8)
RBC # BLD AUTO: 4.77 10E12/L (ref 3.8–5.2)
RBC #/AREA URNS AUTO: 1 /HPF (ref 0–2)
SODIUM SERPL-SCNC: 139 MMOL/L (ref 133–144)
SOURCE: ABNORMAL
SP GR UR STRIP: 1.01 (ref 1–1.03)
SQUAMOUS #/AREA URNS AUTO: 1 /HPF (ref 0–1)
TROPONIN I SERPL-MCNC: <0.015 UG/L (ref 0–0.04)
TSH SERPL DL<=0.005 MIU/L-ACNC: 2.46 MU/L (ref 0.4–4)
UROBILINOGEN UR STRIP-MCNC: 0 MG/DL (ref 0–2)
WBC # BLD AUTO: 8.9 10E9/L (ref 4–11)
WBC #/AREA URNS AUTO: 3 /HPF (ref 0–5)

## 2018-03-20 PROCEDURE — 84484 ASSAY OF TROPONIN QUANT: CPT | Performed by: EMERGENCY MEDICINE

## 2018-03-20 PROCEDURE — 99285 EMERGENCY DEPT VISIT HI MDM: CPT | Mod: 25

## 2018-03-20 PROCEDURE — 84443 ASSAY THYROID STIM HORMONE: CPT | Performed by: EMERGENCY MEDICINE

## 2018-03-20 PROCEDURE — A9270 NON-COVERED ITEM OR SERVICE: HCPCS | Mod: GY | Performed by: EMERGENCY MEDICINE

## 2018-03-20 PROCEDURE — 71046 X-RAY EXAM CHEST 2 VIEWS: CPT

## 2018-03-20 PROCEDURE — A9585 GADOBUTROL INJECTION: HCPCS | Performed by: EMERGENCY MEDICINE

## 2018-03-20 PROCEDURE — 25000128 H RX IP 250 OP 636: Performed by: EMERGENCY MEDICINE

## 2018-03-20 PROCEDURE — 96374 THER/PROPH/DIAG INJ IV PUSH: CPT | Mod: 59

## 2018-03-20 PROCEDURE — 85025 COMPLETE CBC W/AUTO DIFF WBC: CPT | Performed by: EMERGENCY MEDICINE

## 2018-03-20 PROCEDURE — 85379 FIBRIN DEGRADATION QUANT: CPT | Performed by: EMERGENCY MEDICINE

## 2018-03-20 PROCEDURE — 70553 MRI BRAIN STEM W/O & W/DYE: CPT

## 2018-03-20 PROCEDURE — 81001 URINALYSIS AUTO W/SCOPE: CPT | Performed by: EMERGENCY MEDICINE

## 2018-03-20 PROCEDURE — 80053 COMPREHEN METABOLIC PANEL: CPT | Performed by: EMERGENCY MEDICINE

## 2018-03-20 PROCEDURE — 25000131 ZZH RX MED GY IP 250 OP 636 PS 637: Mod: GY | Performed by: EMERGENCY MEDICINE

## 2018-03-20 PROCEDURE — 93005 ELECTROCARDIOGRAM TRACING: CPT

## 2018-03-20 RX ORDER — MECLIZINE HYDROCHLORIDE 25 MG/1
25-50 TABLET ORAL 3 TIMES DAILY PRN
Qty: 30 TABLET | Refills: 1 | Status: SHIPPED | OUTPATIENT
Start: 2018-03-20 | End: 2018-06-05

## 2018-03-20 RX ORDER — MECLIZINE HYDROCHLORIDE 25 MG/1
50 TABLET ORAL ONCE
Status: COMPLETED | OUTPATIENT
Start: 2018-03-20 | End: 2018-03-20

## 2018-03-20 RX ORDER — ONDANSETRON 2 MG/ML
4 INJECTION INTRAMUSCULAR; INTRAVENOUS ONCE
Status: COMPLETED | OUTPATIENT
Start: 2018-03-20 | End: 2018-03-20

## 2018-03-20 RX ORDER — GADOBUTROL 604.72 MG/ML
9 INJECTION INTRAVENOUS ONCE
Status: COMPLETED | OUTPATIENT
Start: 2018-03-20 | End: 2018-03-20

## 2018-03-20 RX ADMIN — GADOBUTROL 9 ML: 604.72 INJECTION INTRAVENOUS at 20:20

## 2018-03-20 RX ADMIN — ONDANSETRON 4 MG: 2 INJECTION INTRAMUSCULAR; INTRAVENOUS at 18:49

## 2018-03-20 RX ADMIN — MECLIZINE HYDROCHLORIDE 50 MG: 25 TABLET ORAL at 18:47

## 2018-03-20 ASSESSMENT — ENCOUNTER SYMPTOMS
NAUSEA: 1
FEVER: 0
VOMITING: 0
ABDOMINAL PAIN: 0
SHORTNESS OF BREATH: 1
DIZZINESS: 1

## 2018-03-20 NOTE — ED AVS SNAPSHOT
[facility]    [address] [address]    Phone:  [phone]    Fax:  [phone]                                       Kathie Wetzel   MRN: [id number]    Department:  [facility]   Date of Visit:  3/20/2018           Patient Information     Date Of Birth          1940        Your diagnoses for this visit were:     Dizziness     SOB (shortness of breath)     Vertigo        You were seen by Navin Terry MD.      Follow-up Information     Schedule an appointment as soon as possible for a visit with Cammie Daugherty MD.    Specialty:  Internal Medicine    Why:  As needed    Contact information:    [address] [place] [id number] [phone]          Follow up with [facility].    Specialty:  EMERGENCY MEDICINE    Why:  If symptoms worsen    Contact information:    [address] [facility] [id number] [phone]        Discharge Instructions       Discharge Instructions  Dizziness (Lightheaded)  Today you were seen for dizziness.  Dizziness can be caused by many things and it can be very difficult to determine the cause of dizziness.  At this time, your provider has found no signs that your dizziness is due to a serious or life-threatening condition. However, sometimes there is a serious problem that does not show up right away, and it is important for you to follow up with your regular provider as instructed.  Generally, every Emergency Department visit should have a follow-up clinic visit with either a primary or a specialty clinic/provider. Please follow-up as instructed by your emergency provider today.      Return to the Emergency Department if:      You pass out (fainting or falling out), especially during exercise.      You develop chest pain, chest pressure or difficulty breathing.    Your feel an irregular heartbeat.    You have excessive  vaginal bleeding, or blood in your stool or vomit (throw up).    You have a high fever.    Your symptoms get worse or more frequent.    If when you begin to feel dizzy or lightheaded, it is important to sit down or lay down immediately to prevent injury from falling.  If you were given a prescription for medicine here today, be sure to read all of the information (including the package insert) that comes with your prescription.  This will include important information about the medicine, its side effects, and any warnings that you need to know about.  The pharmacist who fills the prescription can provide more information and answer questions you may have about the medicine.  If you have questions or concerns that the pharmacist cannot address, please call or return to the Emergency Department.   Remember that you can always come back to the Emergency Department if you are not able to see your regular provider in the amount of time listed above, if you get any new symptoms, or if there is anything that worries you.      24 Hour Appointment Hotline       To make an appointment at any [facility], call [phone] ([phone]). If you don't have a family doctor or clinic, we will help you find one. [facility] clinics are conveniently located to serve the needs of you and your family.          ED Discharge Orders     PHYSICAL THERAPY REFERRAL       *This therapy referral will be filtered to a centralized scheduling office at [facility] and the patient will receive a call to schedule an appointment at a [facility] location most convenient for them. *     [facility] provides Physical Therapy evaluation and treatment and many specialty services across the [facility] system.  If requesting a specialty program, please choose from the list below.    If you have not heard from the scheduling office within 2 business days, please call [phone] for all locations, with the  "exception of [place], please call [phone] and [place], please call [phone]  Treatment: Evaluation & Treatment  Special Instructions/Modalities: Eval and treat for dizzy, vertigo symptoms  Special Programs: Balance/Vestibular    Please be aware that coverage of these services is subject to the terms and limitations of your health insurance plan.  Call member services at your health plan with any benefit or coverage questions.      **Note to Provider:  If you are referring outside of [place] for the therapy appointment, please list the name of the location in the \"special instructions\" above, print the referral and give to the patient to schedule the appointment.                     Review of your medicines      START taking        Dose / Directions Last dose taken    meclizine 25 MG tablet   Commonly known as:  ANTIVERT   Dose:  25-50 mg   Quantity:  30 tablet        Take 1-2 tablets (25-50 mg) by mouth 3 times daily as needed for dizziness or nausea   Refills:  1          Our records show that you are taking the medicines listed below. If these are incorrect, please call your family doctor or clinic.        Dose / Directions Last dose taken    atorvastatin 10 MG tablet   Commonly known as:  LIPITOR   Dose:  10 mg   Quantity:  90 tablet        Take 1 tablet (10 mg) by mouth At Bedtime   Refills:  3        citalopram 10 MG tablet   Commonly known as:  celeXA   Dose:  10 mg   Quantity:  90 tablet        Take 1 tablet (10 mg) by mouth daily   Refills:  3        donepezil 10 MG tablet   Commonly known as:  ARICEPT   Quantity:  30 tablet        TAKE 1 TABLET(10 MG) BY MOUTH AT BEDTIME   Refills:  2        losartan 50 MG tablet   Commonly known as:  COZAAR   Dose:  50 mg   Quantity:  90 tablet        Take 1 tablet (50 mg) by mouth daily   Refills:  3        PRESERVISION AREDS PO   Dose:  1 tablet        Take 1 tablet by mouth daily   Refills:  0        VITAMIN D PO   Dose:  1000 Units        Take 1,000 Units " by mouth daily   Refills:  0                Prescriptions were sent or printed at these locations (1 Prescription)                   Other Prescriptions                Printed at Department/Unit printer (1 of 1)         meclizine (ANTIVERT) 25 MG tablet                Procedures and tests performed during your visit     CBC with platelets differential    Comprehensive metabolic panel    D dimer quantitative    EKG 12 lead    MR Brain w/o & w Contrast    TSH with free T4 reflex    Troponin I    UA with Microscopic    XR Chest 2 Views      Orders Needing Specimen Collection     None      Pending Results     Date and Time Order Name Status Description    3/20/2018 1756 EKG 12 lead Preliminary             Pending Culture Results     No orders found from 3/18/2018 to 3/21/2018.            Pending Results Instructions     If you had any lab results that were not finalized at the time of your Discharge, you can call the ED Lab Result RN at [phone]. You will be contacted by this team for any positive Lab results or changes in treatment. The nurses are available 7 days a week from 10A to 6:30P.  You can leave a message 24 hours per day and they will return your call.        Test Results From Your Hospital Stay        3/20/2018  6:32 PM      Component Results     Component Value Ref [place] & Units Status    Sodium 139 133 - 144 mmol/L Final    Potassium 3.8 3.4 - 5.3 mmol/L Final    Chloride 104 94 - 109 mmol/L Final    Carbon Dioxide 29 20 - 32 mmol/L Final    Anion Gap 6 3 - 14 mmol/L Final    Glucose 92 70 - 99 mg/dL Final    Urea Nitrogen 20 7 - 30 mg/dL Final    Creatinine 0.99 0.52 - 1.04 mg/dL Final    GFR Estimate 54 (L) >60 mL/min/1.7m2 Final    Non  GFR Calc    GFR Estimate If Black 65 >60 mL/min/1.7m2 Final    African American GFR Calc    Calcium 9.0 8.5 - 10.1 mg/dL Final    Bilirubin Total 0.3 0.2 - 1.3 mg/dL Final    Albumin 3.6 3.4 - 5.0 g/dL Final    Protein Total 7.6 6.8 - 8.8 g/dL Final     Alkaline Phosphatase 82 40 - 150 U/L Final    ALT 22 0 - 50 U/L Final    AST 15 0 - 45 U/L Final         3/20/2018  6:12 PM      Component Results     Component Value Ref [place] & Units Status    WBC 8.9 4.0 - 11.0 10e9/L Final    RBC Count 4.77 3.8 - 5.2 10e12/L Final    Hemoglobin 13.6 11.7 - 15.7 g/dL Final    Hematocrit 42.6 35.0 - 47.0 % Final    MCV 89 78 - 100 fl Final    MCH 28.5 26.5 - 33.0 pg Final    MCHC 31.9 31.5 - 36.5 g/dL Final    RDW 12.6 10.0 - 15.0 % Final    Platelet Count 263 150 - 450 10e9/L Final    Diff Method Automated Method  Final    % Neutrophils 62.2 % Final    % Lymphocytes 26.8 % Final    % Monocytes 6.5 % Final    % Eosinophils 3.5 % Final    % Basophils 0.7 % Final    % Immature Granulocytes 0.3 % Final    Nucleated RBCs 0 0 /100 Final    Absolute Neutrophil 5.5 1.6 - 8.3 10e9/L Final    Absolute Lymphocytes 2.4 0.8 - 5.3 10e9/L Final    Absolute Monocytes 0.6 0.0 - 1.3 10e9/L Final    Absolute Eosinophils 0.3 0.0 - 0.7 10e9/L Final    Absolute Basophils 0.1 0.0 - 0.2 10e9/L Final    Abs Immature Granulocytes 0.0 0 - 0.4 10e9/L Final    Absolute Nucleated RBC 0.0  Final         3/20/2018  8:42 PM      Component Results     Component Value Ref [place] & Units Status    Color Urine Straw  Final    Appearance Urine Clear  Final    Glucose Urine Negative NEG^Negative mg/dL Final    Bilirubin Urine Negative NEG^Negative Final    Ketones Urine Negative NEG^Negative mg/dL Final    Specific Gravity Urine 1.012 1.003 - 1.035 Final    Blood Urine Negative NEG^Negative Final    pH Urine 6.0 5.0 - 7.0 pH Final    Protein Albumin Urine Negative NEG^Negative mg/dL Final    Urobilinogen mg/dL 0.0 0.0 - 2.0 mg/dL Final    Nitrite Urine Negative NEG^Negative Final    Leukocyte Esterase Urine Trace (A) NEG^Negative Final    Source Midstream Urine  Final    WBC Urine 3 0 - 5 /HPF Final    RBC Urine 1 0 - 2 /HPF Final    Squamous Epithelial /HPF Urine 1 0 - 1 /HPF Final    Mucous Urine Present (A)  NEG^Negative /LPF Final         3/20/2018  8:38 PM      Narrative     MRI BRAIN WITHOUT AND WITH CONTRAST  3/20/2018 8:18 PM    HISTORY:  Dizziness, concern for posterior circulation stroke.    TECHNIQUE:  Multiplanar, multisequence MRI of the brain without and  with 9mL Gadavist    COMPARISON: None.    FINDINGS:  There is generalized atrophy of the brain. White matter T2  hyperintensities are seen in the cerebral hemispheres consistent with  sequelae of small vessel ischemic disease. There are bilateral small  subdural hygromas causing mild impression on the frontal lobes from  laterally. These are of indeterminate age. The one on the left is 1.3  cm and the one on the right is 0.8 cm thick. There is no evidence of  hemorrhage, mass, acute infarct, or anomaly.  There are no gadolinium  enhancing lesions.    There are bilateral intraocular lens implants. The arteries at the  base of the brain and the dural venous sinuses appear patent.         Impression     IMPRESSION:    1. No acute infarct or other acute abnormality.  2. There is generalized atrophy of the brain. White matter changes are  present in the cerebral hemispheres that are consistent with small  vessel ischemic disease in this age patient.  3. Subdural hygromas lateral to the frontal lobes bilaterally causing  mild flattening of the sulci. These are of indeterminate age.      SILVIA BERGER MD         3/20/2018  6:32 PM      Component Results     Component Value Ref [place] & Units Status    D Dimer <0.3 0.0 - 0.50 ug/ml FEU Final    This D-dimer assay is intended for use in conjunction with a clinical pretest   probability assessment model to exclude pulmonary embolism (PE) and deep   venous thrombosis (DVT) in outpatients suspected of PE or DVT. The cut-off   value is 0.5 ug/mL FEU.           3/20/2018  6:38 PM      Component Results     Component Value Ref [place] & Units Status    Troponin I ES <0.015 0.000 - 0.045 ug/L Final    The 99th percentile for  upper reference range is 0.045 ug/L.  Troponin values   in the range of 0.045 - 0.120 ug/L may be associated with risks of adverse   clinical events.           3/20/2018  6:42 PM      Component Results     Component Value Ref Range & Units Status    TSH 2.46 0.40 - 4.00 mU/L Final         3/20/2018  8:39 PM      Narrative     CHEST TWO VIEWS  3/20/2018 8:34 PM     HISTORY: Shortness of breath;     COMPARISON: None.        Impression     IMPRESSION: Normal.    SILVIA BERGER MD                Clinical Quality Measure: Blood Pressure Screening     Your blood pressure was checked while you were in the emergency department today. The last reading we obtained was  BP: 120/72 . Please read the guidelines below about what these numbers mean and what you should do about them.  If your systolic blood pressure (the top number) is less than 120 and your diastolic blood pressure (the bottom number) is less than 80, then your blood pressure is normal. There is nothing more that you need to do about it.  If your systolic blood pressure (the top number) is 120-139 or your diastolic blood pressure (the bottom number) is 80-89, your blood pressure may be higher than it should be. You should have your blood pressure rechecked within a year by a primary care provider.  If your systolic blood pressure (the top number) is 140 or greater or your diastolic blood pressure (the bottom number) is 90 or greater, you may have high blood pressure. High blood pressure is treatable, but if left untreated over time it can put you at risk for heart attack, stroke, or kidney failure. You should have your blood pressure rechecked by a primary care provider within the next 4 weeks.  If your provider in the emergency department today gave you specific instructions to follow-up with your doctor or provider even sooner than that, you should follow that instruction and not wait for up to 4 weeks for your follow-up visit.        Thank you for choosing  "[facility]       Thank you for choosing [facility] for your care. Our goal is always to provide you with excellent care. Hearing back from our patients is one way we can continue to improve our services. Please take a few minutes to complete the written survey that you may receive in the mail after you visit with us. Thank you!        Online [facility] Information     [employer] lets you send messages to your doctor, view your test results, renew your prescriptions, schedule appointments and more. To sign up, go to [email or web address] . Click on \"Log in\" on the left side of the screen, which will take you to the Welcome page. Then click on \"Sign up Now\" on the right side of the page.     You will be asked to enter the access code listed below, as well as some personal information. Please follow the directions to create your username and password.     Your access code is: [email or web address]  Expires: 2018  9:51 PM     Your access code will  in 90 days. If you need help or a new code, please call your [facility] clinic or [phone].        Care EveryWhere ID     This is your Care EveryWhere ID. This could be used by other organizations to access your [facility] medical records  [id number]        Equal Access to Services     RUPA DRAKE : Jv Peres, wakelseyda ariane, qaybta kaalmada adetasneemda, ronnell hahn. So [facility] [phone].    ATENCIÓN: Si habla español, tiene a perera disposición servicios gratuitos de asistencia lingüística. Llame al [phone].    We comply with applicable federal civil rights laws and [place] laws. We do not discriminate on the basis of race, color, national origin, age, disability, sex, sexual orientation, or gender identity.            After Visit Summary       This is your record. Keep this with you and show to your community pharmacist(s) and doctor(s) at your next visit.                  "

## 2018-03-20 NOTE — ED PROVIDER NOTES
"  History     Chief Complaint:  Dizziness and Shortness of Breath    The history is provided by the patient.      Kathie Wetzel is a 78 year old female who presents with dizziness and shortness of breath. Patient reports onset of mild room-spinning dizziness, lightheadedness, shortness of breath, and nausea 60 minutes prior to presentation. Patient states that she was sitting down \"doing nothing\" when symptoms onset, and that she felt well prior. Her dizziness increases when looking down or up.  On presentation, patient notes that she is mildly dizzy and nauseous. Patient does not have a history of chronic dizziness or stroke, and did not take medicine prior to arrival. Patient denies vomiting, fever, chest pain, abdominal pain, leg swelling, history of stroke, or other complaint.    Allergies:  Simvastatin  Penicillins      Medications:    Aricept  Celexa  Cozaar  Lipitor  Vitamin D    Past Medical History:    Lumbar radicular pain  Memory impairment of gradual onset  Retinal tear  Hyperlipidemia  Benign essential hypertension  Anxiety  Primary hyperparathyroidism    Past Surgical History:    Lumbar microdiscectomy  Appendectomy  Cholecystectomy  Parathyroidectomy    Family History:    Thyroid disease    Social History:  Presents with daughter   Tobacco use: Former smoker (Quit 3/10/1972)  Alcohol use: Yes (2 / week)  PCP: Cammie Daugherty    Marital Status: Single     Review of Systems   Constitutional: Negative for fever.   Respiratory: Positive for shortness of breath.    Cardiovascular: Negative for chest pain and leg swelling.   Gastrointestinal: Positive for nausea. Negative for abdominal pain and vomiting.   Neurological: Positive for dizziness.   All other systems reviewed and are negative.    Physical Exam     Patient Vitals for the past 24 hrs:   BP Temp Temp src Heart Rate Resp SpO2   03/20/18 2202 - - - - 18 -   03/20/18 2153 - - - 68 - 96 %   03/20/18 2147 - - - 68 13 98 %   03/20/18 2145 120/79 - " - 68 16 96 %   03/20/18 2130 116/81 - - 67 - 97 %   03/20/18 2115 114/77 - - 71 - 94 %   03/20/18 2036 - - - 76 25 98 %   03/20/18 2033 120/72 - - - - 97 %   03/20/18 1930 112/79 - - 71 17 97 %   03/20/18 1915 114/78 - - 73 14 97 %   03/20/18 1900 116/82 - - 75 15 94 %   03/20/18 1845 120/87 - - 75 21 -   03/20/18 1830 114/79 - - 72 22 92 %   03/20/18 1815 118/83 - - 71 21 98 %   03/20/18 1800 132/85 97.8  F (36.6  C) Oral 69 19 95 %   03/20/18 1757 (!) 132/94 - - 71 18 98 %        Physical Exam  General: Alert, appears elderly, otherwise well-developed and well nourished Cooperative.     In mild distress  HEENT:  Head:  Atraumatic  Ears:  External ears are normal  Mouth/Throat:  Oropharynx is without erythema or exudate and mucous membranes are moist.   Eyes:   Conjunctivae normal and EOM are normal. No scleral icterus.    Pupils are equal, round, and reactive to light.   Neck:   Normal range of motion. Neck supple.  CV:  Normal rate, regular rhythm, normal heart sounds and radial pulses are 2+ and symmetric.  No murmur.  Resp:  Breath sounds are clear bilaterally    Non-labored, no retractions or accessory muscle use  GI:  Abdomen is soft, no distension, no tenderness. No rebound or guarding.  No CVA tenderness bilaterally  MS:  Normal range of motion. No edema.    Normal strength in all 4 extremities.     Back atraumatic.    No midline cervical, thoracic, or lumbar tenderness  Skin:  Warm and dry.  No rash or lesions noted.  Neuro:   Alert. Normal strength.  Sensation intact in all 4 extremities. GCS: 15    Cranial nerves 2-12 intact.  Psych: Normal mood and affect. Finger to nose coordination intact bilaterally.    Emergency Department Course   ECG (18:01:37):  Rate 72 bpm. RI interval 200. QRS duration 90. QT/QTc 406/444. P-R-T axes 40 -23 40. Normal sinus rhythm. Normal ECG. Interpreted at 1800 by Kyle Cleveland MD.     Imaging:  Radiographic findings were communicated with the patient who voiced understanding  of the findings.    MR Brain without & with contrast:  IMPRESSION:    1. No acute infarct or other acute abnormality.  2. There is generalized atrophy of the brain. White matter changes are present in the cerebral hemispheres that are consistent with small vessel ischemic disease in this age patient.   3. Subdural hygromas lateral to the frontal lobes bilaterally causing mild flattening of the sulci. These are of indeterminate age.    Imaging independently reviewed and agree with radiologist interpretation.      Laboratory:  CBC: AWNL (WBC 8.9, HGB 13.6, )  CMP: GFR 54 (L) o/w WNL (Creatinine 0.99)  D-dimer: <0.3   1757: Troponin: <0.015    TSH with free T4 reflex: 2.46     Interventions:  1847: Meclizine 50 mg PO  1849: Zofran 4 mg IV  2020: Gadavist 9 mL IV    Emergency Department Course:  Past medical records, nursing notes, and vitals reviewed.  1806: I performed an exam of the patient and obtained history, as documented above.     Above interventions provided.  Blood drawn. This was sent to the lab for further testing, results above.  The patient was sent for an MRI while in the emergency department, findings above.  EKG was taken here in the ED, results as above.       2110: I rechecked the patient. She is much improved. Findings and plan explained to the Patient. Patient discharged home with instructions regarding supportive care, medications, and reasons to return. The importance of close follow-up was reviewed.      Impression & Plan      Medical Decision Making:  Kathie Wetzel is a 78 year old female who presents with dizziness and nausea, sudden onset earlier today.  Patient's no history of vertigo symptoms or stroke. Patient had a nonfocal neuro exam reassuring this is unlikely representative of a stroke, but due to the persistent dizziness symptoms I did have concern for potential posterior circulation stroke.  Patient was sent for MRI which was negative for acute infarct.  There were incidental  findings of hygromas.  Patient with resolved symptoms after meclizine administration.  I do suspect her symptoms are most consistent with vertigo.  She'll be given a prescription for meclizine and follow-up with physical therapy vestibular rehabilitation as needed.  Patient had otherwise unremarkable workup with a nonischemic EKG and negative troponin and negative d-dimer very low concern for ACS or pulmonary embolism.  Patient's chest x-ray showed no evidence of pneumothorax or infection.  Patient having no shortness of breath on my final recheck prior to discharge.  Unclear as to the etiology of her shortness of breath but I suspect potentially related to the dizziness symptoms and spinning of the room.  Patient had full resolution of symptoms prior to discharge.  Patient understood close return precautions for development stroke symptoms or any development of chest pain or other worsening symptoms.  All questions answered prior to discharge.  Discharged home.    Diagnosis:    ICD-10-CM   1. Dizziness R42   2. SOB (shortness of breath) R06.02   3. Vertigo R42       Disposition:  Discharged to home with plan as outlined.    Discharge Medications:  Discharge Medication List as of 3/20/2018  9:52 PM      START taking these medications    Details   meclizine (ANTIVERT) 25 MG tablet Take 1-2 tablets (25-50 mg) by mouth 3 times daily as needed for dizziness or nausea, Disp-30 tablet, R-1, Local Print               I, Dmitri Parmar, am serving as a scribe at 6:06 PM on 3/20/2018 to document services personally performed by Navin Terry MD based on my observations and the provider's statements to me.    3/20/2018   [facility] EMERGENCY DEPARTMENT     Navin Terry MD  03/21/18 0017

## 2018-03-21 LAB — INTERPRETATION ECG - MUSE: NORMAL

## 2018-05-25 DIAGNOSIS — R41.3 MEMORY IMPAIRMENT OF GRADUAL ONSET: ICD-10-CM

## 2018-05-25 RX ORDER — DONEPEZIL HYDROCHLORIDE 10 MG/1
TABLET, FILM COATED ORAL
Qty: 30 TABLET | Refills: 0 | Status: SHIPPED | OUTPATIENT
Start: 2018-05-25 | End: 2018-06-05

## 2018-05-25 NOTE — TELEPHONE ENCOUNTER
"See previous note.    Requested Prescriptions   Pending Prescriptions Disp Refills     donepezil (ARICEPT) 10 MG tablet [Pharmacy Med Name: DONEPEZIL 10MG TABLETS]  Last Written Prescription Date:  2/23/18  Last Fill Quantity: 30,  # refills: 2   Last office visit: 11/2/2017 with prescribing provider:  11/2/2017     Future Office Visit:   Next 5 appointments (look out 90 days)     Jun 05, 2018  7:30 AM CDT   Office Visit with Cammie Daugherty MD   [address] [address] [phone]                  30 tablet 0     Sig: TAKE 1 TABLET(10 MG) BY MOUTH AT BEDTIME    Miscellaneous Dementia Agents Passed    5/25/2018 10:39 AM       Passed - Recent (12 mo) or future (30 days) visit within the authorizing provider's specialty    Patient had office visit in the last 12 months or has a visit in the next 30 days with authorizing provider or within the authorizing provider's specialty.  See \"Patient Info\" tab in inbasket, or \"Choose Columns\" in Meds & Orders section of the refill encounter.           Passed - Patient is 18 years of age or older          "

## 2018-05-25 NOTE — TELEPHONE ENCOUNTER
Prescription approved per [facility] Refill Protocol.    Nicole Piña RN

## 2018-05-25 NOTE — TELEPHONE ENCOUNTER
The pt called to check on the status of this refill and she only has 3 days left. Please fill before the weekend if possible.   Marisa Joshi on 5/25/2018 at 10:39 AM

## 2018-06-03 DIAGNOSIS — F41.9 ANXIETY: ICD-10-CM

## 2018-06-03 RX ORDER — CITALOPRAM HYDROBROMIDE 10 MG/1
TABLET ORAL
Qty: 90 TABLET | Refills: 0 | Status: CANCELLED | OUTPATIENT
Start: 2018-06-03

## 2018-06-04 NOTE — TELEPHONE ENCOUNTER
"Requested Prescriptions   Pending Prescriptions Disp Refills     citalopram (CELEXA) 10 MG tablet [Pharmacy Med Name: CITALOPRAM 10MG TABLETS]  Last Written Prescription Date:  6/6/17  Last Fill Quantity: 90,  # refills: 3   Last office visit: 11/2/2017 with prescribing provider:  11/2/2017     Future Office Visit:   Next 5 appointments (look out 90 days)     Jun 05, 2018  7:30 AM CDT   Office Visit with Cammie Daugherty MD   [address] [address] [phone]                  90 tablet 0     Sig: TAKE 1 TABLET(10 MG) BY MOUTH DAILY    SSRIs Protocol Passed    6/3/2018 10:08 AM       Passed - Recent (12 mo) or future (30 days) visit within the authorizing provider's specialty    Patient had office visit in the last 12 months or has a visit in the next 30 days with authorizing provider or within the authorizing provider's specialty.  See \"Patient Info\" tab in inbasket, or \"Choose Columns\" in Meds & Orders section of the refill encounter.           Passed - Patient is age 18 or older       Passed - No active pregnancy on record       Passed - No positive pregnancy test in last 12 months          "

## 2018-06-05 ENCOUNTER — OFFICE VISIT (OUTPATIENT)
Dept: FAMILY MEDICINE | Facility: CLINIC | Age: 78
End: 2018-06-05
Payer: MEDICARE

## 2018-06-05 VITALS
TEMPERATURE: 98.4 F | RESPIRATION RATE: 17 BRPM | HEIGHT: 69 IN | DIASTOLIC BLOOD PRESSURE: 64 MMHG | SYSTOLIC BLOOD PRESSURE: 114 MMHG | WEIGHT: 209.2 LBS | HEART RATE: 74 BPM | OXYGEN SATURATION: 96 % | BODY MASS INDEX: 30.98 KG/M2

## 2018-06-05 DIAGNOSIS — H91.93 DECREASED HEARING OF BOTH EARS: ICD-10-CM

## 2018-06-05 DIAGNOSIS — F41.9 ANXIETY: ICD-10-CM

## 2018-06-05 DIAGNOSIS — G47.9 SLEEP DISTURBANCE: ICD-10-CM

## 2018-06-05 DIAGNOSIS — J30.0 CHRONIC VASOMOTOR RHINITIS: ICD-10-CM

## 2018-06-05 DIAGNOSIS — R41.3 MEMORY IMPAIRMENT OF GRADUAL ONSET: ICD-10-CM

## 2018-06-05 DIAGNOSIS — B00.1 COLD SORE: ICD-10-CM

## 2018-06-05 DIAGNOSIS — I10 BENIGN ESSENTIAL HYPERTENSION: ICD-10-CM

## 2018-06-05 DIAGNOSIS — H61.21 IMPACTED CERUMEN OF RIGHT EAR: ICD-10-CM

## 2018-06-05 DIAGNOSIS — E78.5 HYPERLIPIDEMIA LDL GOAL <100: Primary | ICD-10-CM

## 2018-06-05 LAB
ALBUMIN SERPL-MCNC: 3.6 G/DL (ref 3.4–5)
ALP SERPL-CCNC: 74 U/L (ref 40–150)
ALT SERPL W P-5'-P-CCNC: 28 U/L (ref 0–50)
ANION GAP SERPL CALCULATED.3IONS-SCNC: 7 MMOL/L (ref 3–14)
AST SERPL W P-5'-P-CCNC: 13 U/L (ref 0–45)
BILIRUB SERPL-MCNC: 0.5 MG/DL (ref 0.2–1.3)
BUN SERPL-MCNC: 16 MG/DL (ref 7–30)
CALCIUM SERPL-MCNC: 9.1 MG/DL (ref 8.5–10.1)
CHLORIDE SERPL-SCNC: 108 MMOL/L (ref 94–109)
CHOLEST SERPL-MCNC: 159 MG/DL
CO2 SERPL-SCNC: 27 MMOL/L (ref 20–32)
CREAT SERPL-MCNC: 0.98 MG/DL (ref 0.52–1.04)
GFR SERPL CREATININE-BSD FRML MDRD: 55 ML/MIN/1.7M2
GLUCOSE SERPL-MCNC: 88 MG/DL (ref 70–99)
HDLC SERPL-MCNC: 44 MG/DL
LDLC SERPL CALC-MCNC: 78 MG/DL
NONHDLC SERPL-MCNC: 115 MG/DL
POTASSIUM SERPL-SCNC: 4.2 MMOL/L (ref 3.4–5.3)
PROT SERPL-MCNC: 7.5 G/DL (ref 6.8–8.8)
SODIUM SERPL-SCNC: 142 MMOL/L (ref 133–144)
TRIGL SERPL-MCNC: 185 MG/DL

## 2018-06-05 PROCEDURE — 99215 OFFICE O/P EST HI 40 MIN: CPT | Performed by: INTERNAL MEDICINE

## 2018-06-05 PROCEDURE — 80061 LIPID PANEL: CPT | Performed by: INTERNAL MEDICINE

## 2018-06-05 PROCEDURE — 36415 COLL VENOUS BLD VENIPUNCTURE: CPT | Performed by: INTERNAL MEDICINE

## 2018-06-05 PROCEDURE — 80053 COMPREHEN METABOLIC PANEL: CPT | Performed by: INTERNAL MEDICINE

## 2018-06-05 RX ORDER — ATORVASTATIN CALCIUM 10 MG/1
10 TABLET, FILM COATED ORAL AT BEDTIME
Qty: 90 TABLET | Refills: 3 | Status: SHIPPED | OUTPATIENT
Start: 2018-06-05 | End: 2019-06-27

## 2018-06-05 RX ORDER — CITALOPRAM HYDROBROMIDE 10 MG/1
10 TABLET ORAL DAILY
Qty: 90 TABLET | Refills: 3 | Status: SHIPPED | OUTPATIENT
Start: 2018-06-05 | End: 2019-05-25

## 2018-06-05 RX ORDER — DONEPEZIL HYDROCHLORIDE 10 MG/1
TABLET, FILM COATED ORAL
Qty: 90 TABLET | Refills: 3 | Status: SHIPPED | OUTPATIENT
Start: 2018-06-05 | End: 2019-06-19

## 2018-06-05 RX ORDER — VALACYCLOVIR HYDROCHLORIDE 500 MG/1
TABLET, FILM COATED ORAL
Qty: 24 TABLET | Refills: 1 | Status: SHIPPED | OUTPATIENT
Start: 2018-06-05 | End: 2019-10-14

## 2018-06-05 RX ORDER — FLUTICASONE PROPIONATE 50 MCG
1-2 SPRAY, SUSPENSION (ML) NASAL DAILY
Qty: 1 BOTTLE | Refills: 11 | Status: SHIPPED | OUTPATIENT
Start: 2018-06-05 | End: 2018-06-06

## 2018-06-05 RX ORDER — LOSARTAN POTASSIUM 50 MG/1
50 TABLET ORAL DAILY
Qty: 90 TABLET | Refills: 3 | Status: SHIPPED | OUTPATIENT
Start: 2018-06-05 | End: 2019-06-27

## 2018-06-05 NOTE — MR AVS SNAPSHOT
After Visit Summary   6/5/2018    Kathie Wetzel    MRN: [id number]           Patient Information     Date Of Birth          1940        Visit Information        Provider Department      6/5/2018 7:30 AM Cammie Daugherty MD [facility]        Today's Diagnoses     Decreased hearing of both ears    -  1    Hyperlipidemia LDL goal <100        Anxiety        Memory impairment of gradual onset        Benign essential hypertension        Cold sore        Chronic vasomotor rhinitis           Follow-ups after your visit        Additional Services     OTOLARYNGOLOGY REFERRAL       Your provider has referred you to: N: [facility] [place] [phone]   [email or web address]. [email or web address]  N: [facility] [phone]   [email or web address]  N: [facility] [place] [phone]   [email or web address]  N: [facility] [place] [phone]   [email or web address]    Please be aware that coverage of these services is subject to the terms and limitations of your health insurance plan.  Call member services at your health plan with any benefit or coverage questions.      Please bring the following with you to your appointment:    (1) Any X-Rays, CTs or MRIs which have been performed.  Contact the facility where they were done to arrange for  prior to your scheduled appointment.   (2) List of current medications  (3) This referral request   (4) Any documents/labs given to you for this referral                  Who to contact     If you have questions or need follow up information about today's clinic visit or your schedule please contact [facility] directly at [phone].  Normal or non-critical lab and imaging results will be communicated to you by MyChart, letter or phone  "within 4 business days after the clinic has received the results. If you do not hear from us within 7 days, please contact the clinic through [employer] or phone. If you have a critical or abnormal lab result, we will notify you by phone as soon as possible.  Submit refill requests through [employer] or call your pharmacy and they will forward the refill request to us. Please allow 3 business days for your refill to be completed.          Additional Information About Your Visit        Care EveryWhere ID     This is your Care EveryWhere ID. This could be used by other organizations to access your [place] medical records  [id number]        Your Vitals Were     Pulse Temperature Respirations Height Pulse Oximetry BMI (Body Mass Index)    74 98.4  F (36.9  C) (Oral) 17 5' 8.8\" (1.748 m) 96% 31.07 kg/m2       Blood Pressure from Last 3 Encounters:   06/05/18 114/64   03/20/18 120/79   12/18/17 125/85    Weight from Last 3 Encounters:   06/05/18 209 lb 3.2 oz (94.9 kg)   12/18/17 210 lb (95.3 kg)   11/10/17 208 lb (94.3 kg)              We Performed the Following     Comprehensive metabolic panel     Lipid panel reflex to direct LDL Fasting     OTOLARYNGOLOGY REFERRAL          Today's Medication Changes          These changes are accurate as of 6/5/18  8:32 AM.  If you have any questions, ask your nurse or doctor.               Start taking these medicines.        Dose/Directions    fluticasone 50 MCG/ACT spray   Commonly known as:  FLONASE   Used for:  Chronic vasomotor rhinitis   Started by:  Cammie Daugherty MD        Dose:  1-2 spray   Spray 1-2 sprays into both nostrils daily   Quantity:  1 Bottle   Refills:  11       valACYclovir 500 MG tablet   Commonly known as:  VALTREX   Used for:  Cold sore   Started by:  Cammie Daugherty MD        2000 mg orally at onset of symptoms; and may repeat in 12 hours if symptoms persist   Quantity:  24 tablet   Refills:  1            Where to get your medicines      These " medications were sent to [id number] - [place], [place] - [address] [facility]  [address] [place] [address] [place] [address]     Phone:  [phone]     atorvastatin 10 MG tablet    citalopram 10 MG tablet    donepezil 10 MG tablet    losartan 50 MG tablet    valACYclovir 500 MG tablet         Some of these will need a paper prescription and others can be bought over the counter.  Ask your nurse if you have questions.     Bring a paper prescription for each of these medications     fluticasone 50 MCG/ACT spray                Primary Care Provider Office Phone # Fax #    Cammie Sanjeev Daugherty [phone] [address]  [facility] [address]        Equal Access to Services     RUPA DRAKE : Hadii miguel angel hassan hadasho Soomaali, waaxda luqadaha, qaybta kaalmada adeegyada, ronnell alicia . So [facility] [phone].    ATENCIÓN: Si habla español, tiene a perera disposición servicios gratuitos de asistencia lingüística. [facility] [phone].    We comply with applicable federal civil rights laws and [place] laws. We do not discriminate on the basis of race, color, national origin, age, disability, sex, sexual orientation, or gender identity.            Thank you!     Thank you for choosing [facility]  for your care. Our goal is always to provide you with excellent care. Hearing back from our patients is one way we can continue to improve our services. Please take a few minutes to complete the written survey that you may receive in the mail after your visit with us. Thank you!             Your Updated Medication List - Protect others around you: Learn how to safely use, store and throw away your medicines at www.disposemymeds.org.          This list is accurate as of 6/5/18  8:32 AM.  Always use your most recent med list.                   Brand Name Dispense Instructions for use Diagnosis    atorvastatin 10 MG tablet     LIPITOR    90 tablet    Take 1 tablet (10 mg) by mouth At Bedtime    Hyperlipidemia LDL goal <100       citalopram 10 MG tablet    celeXA    90 tablet    Take 1 tablet (10 mg) by mouth daily    Anxiety       donepezil 10 MG tablet    ARICEPT    90 tablet    TAKE 1 TABLET(10 MG) BY MOUTH AT BEDTIME    Memory impairment of gradual onset       fluticasone 50 MCG/ACT spray    FLONASE    1 Bottle    Spray 1-2 sprays into both nostrils daily    Chronic vasomotor rhinitis       losartan 50 MG tablet    COZAAR    90 tablet    Take 1 tablet (50 mg) by mouth daily    Benign essential hypertension       PRESERVISION AREDS PO      Take 1 tablet by mouth daily        valACYclovir 500 MG tablet    VALTREX    24 tablet    2000 mg orally at onset of symptoms; and may repeat in 12 hours if symptoms persist    Cold sore       VITAMIN D PO      Take 1,000 Units by mouth daily

## 2018-06-05 NOTE — PROGRESS NOTES
SUBJECTIVE:   Kathie Wetzel is a 78 year old female who presents to clinic today for the following health issues:    1.  Sinus congestion and dripping from her nose intermittently.   2.  is able to fall asleep but is wakeful every 2-3 hours at night.   3.  gets cold sores usually intermittently and is using Abreeva but it does not seem to help at all. States they form at the base of her nose.       Hyperlipidemia Follow-Up      Rate your low fat/cholesterol diet?: good    Taking statin?  Yes, no muscle aches from statin  Recent Labs   Lab Test  06/21/17   0846   CHOL  172   HDL  47*   LDL  88   TRIG  187*       Due for fasting labs    Other lipid medications/supplements?:  none    Hypertension Follow-up    BP Readings from Last 3 Encounters:   06/05/18 114/64   03/20/18 120/79   12/18/17 125/85        Outpatient blood pressures are not being checked.    Low Salt Diet: no added salt    Anxiety Follow-Up and memory concerns    Status since last visit: stable    Other associated symptoms:None    Complicating factors:   Significant life event: No   Current substance abuse: None  Depression symptoms: No   No flowsheet data found.    STEPHENIE-7    Amount of exercise or physical activity: None    When she goes to the store she walks around the store and walks in her halls at her apartment     Problems taking medications regularly: No    Medication side effects: none    Diet: regular (no restrictions)            Problem list and histories reviewed & adjusted, as indicated.  Additional history: as documented    Patient Active Problem List   Diagnosis     Primary hyperparathyroidism (H)     Hyperlipidemia LDL goal <100     Benign essential hypertension     Anxiety     Retinal tear, left     Memory impairment of gradual onset     Lumbar radicular pain- right side.     S/P lumbar microdiscectomy     Sleep disturbance     Past Surgical History:   Procedure Laterality Date     APPENDECTOMY OPEN       CHOLECYSTECTOMY   2007     DISCECTOMY LUMBAR POSTERIOR MICROSCOPIC ONE LEVEL Right 11/10/2017    Procedure: DISCECTOMY LUMBAR POSTERIOR MICROSCOPIC ONE LEVEL;  Right L3-4 hemilaminectomy, medial facetectomy, foraminotomy with microdiscectomy and use of X-ray and intraoperative microscope ;  Surgeon: Al Dailey MD;  Location: [facility] OR     PARATHYROIDECTOMY  3/14/2014    Procedure: PARATHYROIDECTOMY;  Neck Exploration, Excision Right  Parathyroid Adenoma, Pre Operative Sestimibi Injection, Rapid PTH Assay ;  Surgeon: Natividad Fischer MD;  Location: [facility] OR       Social History   Substance Use Topics     Smoking status: Former Smoker     Quit date: 3/10/1972     Smokeless tobacco: Never Used     Alcohol use Yes      Comment: 2 week      Family History   Problem Relation Age of Onset     Thyroid Disease Maternal Aunt      Thyroid Disease Other      cousin          Current Outpatient Prescriptions   Medication Sig Dispense Refill     atorvastatin (LIPITOR) 10 MG tablet Take 1 tablet (10 mg) by mouth At Bedtime 90 tablet 3     carbamide peroxide (DEBROX) 6.5 % otic solution Place 5-10 drops into the right ear 2 times daily 30 mL 0     Cholecalciferol (VITAMIN D PO) Take 1,000 Units by mouth daily        citalopram (CELEXA) 10 MG tablet Take 1 tablet (10 mg) by mouth daily 90 tablet 3     donepezil (ARICEPT) 10 MG tablet TAKE 1 TABLET(10 MG) BY MOUTH AT BEDTIME 90 tablet 3     losartan (COZAAR) 50 MG tablet Take 1 tablet (50 mg) by mouth daily 90 tablet 3     Multiple Vitamins-Minerals (PRESERVISION AREDS PO) Take 1 tablet by mouth daily       valACYclovir (VALTREX) 500 MG tablet 2000 mg orally at onset of symptoms; and may repeat in 12 hours if symptoms persist 24 tablet 1     fluticasone (FLONASE) 50 MCG/ACT spray SHAKE LIQUID WELL AND USE 1 TO 2 SPRAYS IN EACH NOSTRIL EVERY DAY 48 g 3     Allergies   Allergen Reactions     Simvastatin      Buttock pain     Penicillins Rash     BP Readings from Last 3 Encounters:   06/05/18  "114/64   03/20/18 120/79   12/18/17 125/85    Wt Readings from Last 3 Encounters:   06/05/18 209 lb 3.2 oz (94.9 kg)   12/18/17 210 lb (95.3 kg)   11/10/17 208 lb (94.3 kg)                  Labs reviewed in EPIC    Reviewed and updated as needed this visit by clinical staff  Tobacco  Allergies  Meds  Problems  Med Hx  Surg Hx  Fam Hx  Soc Hx        Reviewed and updated as needed this visit by Provider  Allergies  Meds  Problems         ROS:  CONSTITUTIONAL:NEGATIVE for fever, chills, change in weight  ENT/MOUTH: decreased hearing; past cerumen; would like ENT evaluation for hearing and cerumen removed.  RESP:NEGATIVE for significant cough or SOB  CV: NEGATIVE for chest pain, palpitations or peripheral edema  GI: NEGATIVE for nausea, abdominal pain, heartburn, or change in bowel habits  MUSCULOSKELETAL: NEGATIVE for significant arthralgias or myalgia; active  NEURO: cognitive decline; overall, doing well.NEGATIVE for weakness, dizziness or paresthesias  ENDOCRINE: LDL, lipids reviewed  HEME/ALLERGY/IMMUNE: NEGATIVE for bleeding problems  PSYCHIATRIC: NEGATIVE for changes in mood or affect    OBJECTIVE:     /64  Pulse 74  Temp 98.4  F (36.9  C) (Oral)  Resp 17  Ht 5' 8.8\" (1.748 m)  Wt 209 lb 3.2 oz (94.9 kg)  SpO2 96%  BMI 31.07 kg/m2  Body mass index is 31.07 kg/(m^2).  GENERAL: healthy, alert and no distress  HENT: both ears: obstruction of hard cerumen; unsuccessful removal using a curette., nose and mouth without ulcers or lesions, oropharynx clear and oral mucous membranes moist  NECK: no adenopathy, no asymmetry, masses, or scars and thyroid normal to palpation  RESP: lungs clear to auscultation - no rales, rhonchi or wheezes  CV: regular rates and rhythm, normal S1 S2,peripheral pulses strong and no peripheral edema  ABDOMEN: soft, nontender, no hepatosplenomegaly, no masses and bowel sounds normal  MS: no gross musculoskeletal defects noted, no edema  SKIN: no suspicious lesions or " "rashes  NEURO: Normal strength and tone, mentation intact and speech normal  PSYCH: mentation appears normal, affect normal/bright      ASSESSMENT/PLAN:     (E78.5) Hyperlipidemia LDL goal <100  (primary encounter diagnosis)  Comment: Fasting labs today;  Lab Results   Component Value Date          LDL 88 06/21/2017     Plan: atorvastatin (LIPITOR) 10 MG tablet, Lipid         panel reflex to direct LDL Fasting,         Comprehensive metabolic panel        Goals of therapy reviewed; Risk assessment; continue meds    (F41.9) Anxiety  Comment: Citalopram for years- well tolerated  Plan: citalopram (CELEXA) 10 MG tablet          (R41.3) Memory impairment of gradual onset  Comment: reviewed Dr. Anderson report and recommendations reviewed.  declines MRI,. Aricept has been well tolerated. Medication and dose reviewed; she desires to remain on same med and dose.   Plan: donepezil (ARICEPT) 10 MG tablet, Comprehensive        metabolic panel          (I10) Benign essential hypertension  Comment:   BP Readings from Last 3 Encounters:   06/05/18 114/64   03/20/18 120/79   12/18/17 125/85     Plan: losartan (COZAAR) 50 MG tablet, Comprehensive         metabolic panel          (H91.93) Decreased hearing of both ears  Comment: desires to see ENT, consider audiology evaluation;   Plan: OTOLARYNGOLOGY REFERRAL          (B00.1) Cold sore  Comment: \"its the herpes\"; have used topicals with limited success; recommend episodic treatment with Valtrex; discussed dosing at onset of symptoms  Plan: OTOLARYNGOLOGY REFERRAL, valACYclovir (VALTREX)        500 MG tablet        Episodic therapy desires and reasonable.     (J30.0) Chronic vasomotor rhinitis  Comment: Flonase recommended. reviewed use of medication. Proper administration reviewed; benefits outweigh risk  Plan:  fluticasone (FLONASE) 50 MCG/ACT spray          (G47.9) Sleep disturbance  Comment: feels she is able to get to sleep but tends to wake every 2-3 hours;  no specific " triggers; recommend use of Flonase to lessen nasal congestion & improve sleep  Plan: sleep hygiene reviewed and     (H61.21) Impacted cerumen of right ear  Comment: hard cerumen; recommend softening with Debrox and may resolve with home irrigation; can also see ENT; also desires evaluation for decreased hearing. May return after home use of Debrox and cerumen softening  Plan: carbamide peroxide (DEBROX) 6.5 % otic solution            FUTURE LABS:       - Schedule fasting labs in 6 months  FUTURE APPOINTMENTS:       - Make follow-up visit after next lab draw  40 minutes is spent with patient, over 50% of that time spent providing counselling, discussing and reviewing meds and potential side effects.      Cammie Daugherty MD  Internal Medicine   [facility]    Labs reviewed. Continue current meds and healthy lifestyle choices; diet and exercise.      Follow up in 6 months, sooner if other symptoms develop. Follow up with as discussedl

## 2018-06-06 DIAGNOSIS — J30.0 CHRONIC VASOMOTOR RHINITIS: ICD-10-CM

## 2018-06-12 RX ORDER — FLUTICASONE PROPIONATE 50 MCG
SPRAY, SUSPENSION (ML) NASAL
Qty: 48 G | Refills: 3 | Status: SHIPPED | OUTPATIENT
Start: 2018-06-12 | End: 2020-04-13

## 2018-07-19 ENCOUNTER — TRANSFERRED RECORDS (OUTPATIENT)
Dept: HEALTH INFORMATION MANAGEMENT | Facility: CLINIC | Age: 78
End: 2018-07-19

## 2018-09-11 ENCOUNTER — OFFICE VISIT (OUTPATIENT)
Dept: FAMILY MEDICINE | Facility: CLINIC | Age: 78
End: 2018-09-11
Payer: MEDICARE

## 2018-09-11 VITALS
DIASTOLIC BLOOD PRESSURE: 70 MMHG | RESPIRATION RATE: 17 BRPM | OXYGEN SATURATION: 97 % | BODY MASS INDEX: 30.82 KG/M2 | HEART RATE: 68 BPM | TEMPERATURE: 98.7 F | SYSTOLIC BLOOD PRESSURE: 112 MMHG | WEIGHT: 207.5 LBS

## 2018-09-11 DIAGNOSIS — L72.3 SEBACEOUS CYST: ICD-10-CM

## 2018-09-11 DIAGNOSIS — Z23 NEED FOR PROPHYLACTIC VACCINATION AND INOCULATION AGAINST INFLUENZA: ICD-10-CM

## 2018-09-11 DIAGNOSIS — E21.0 PRIMARY HYPERPARATHYROIDISM (H): ICD-10-CM
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
## 2018-09-11 PROCEDURE — 90662 IIV NO PRSV INCREASED AG IM: CPT | Performed by: INTERNAL MEDICINE

## 2018-09-11 PROCEDURE — 99214 OFFICE O/P EST MOD 30 MIN: CPT | Mod: 25 | Performed by: INTERNAL MEDICINE

## 2018-09-11 PROCEDURE — G0008 ADMIN INFLUENZA VIRUS VAC: HCPCS | Performed by: INTERNAL MEDICINE

## 2018-09-11 NOTE — PATIENT INSTRUCTIONS
Bug Bites  anterior chest  and extremities; no infection; ? chigger like lesions; discussed use of clear nail polish to cover and clear calomine lotion to prevent scratching.     Sebaceous cyst- gently expressed.

## 2018-09-11 NOTE — PROGRESS NOTES
SUBJECTIVE:   Kathie Wetzel is a 78 year old female who presents to clinic today for the following health issues:      Raised bumps and  1 on the ankle has a blister      Duration: 3-4 days    Description (location/character/radiation): on lower legs and upper trunk    Intensity:  moderate    Accompanying signs and symptoms: intense itching     History (similar episodes/previous evaluation): None    Precipitating or alleviating factors: None    Therapies tried and outcome: neosporin but did not help much     Growth in right groin area  Is Pigmented and noted about 4 weeks ago.        Problem list and histories reviewed & adjusted, as indicated.  Additional history: as documented    Patient Active Problem List   Diagnosis     Primary hyperparathyroidism (H)     Hyperlipidemia LDL goal <100     Benign essential hypertension     Anxiety     Retinal tear, left     Memory impairment of gradual onset     Lumbar radicular pain- right side.     S/P lumbar microdiscectomy     Sleep disturbance     Past Surgical History:   Procedure Laterality Date     APPENDECTOMY OPEN       CHOLECYSTECTOMY  2007     DISCECTOMY LUMBAR POSTERIOR MICROSCOPIC ONE LEVEL Right 11/10/2017    Procedure: DISCECTOMY LUMBAR POSTERIOR MICROSCOPIC ONE LEVEL;  Right L3-4 hemilaminectomy, medial facetectomy, foraminotomy with microdiscectomy and use of X-ray and intraoperative microscope ;  Surgeon: Al Dailey MD;  Location:  OR     PARATHYROIDECTOMY  3/14/2014    Procedure: PARATHYROIDECTOMY;  Neck Exploration, Excision Right  Parathyroid Adenoma, Pre Operative Sestimibi Injection, Rapid PTH Assay ;  Surgeon: Natividad Fischer MD;  Location:  OR       Social History   Substance Use Topics     Smoking status: Former Smoker     Quit date: 3/10/1972     Smokeless tobacco: Never Used     Alcohol use Yes      Comment: 2 week      Family History   Problem Relation Age of Onset     Thyroid Disease Maternal Aunt      Thyroid Disease Other       cousin          Current Outpatient Prescriptions   Medication Sig Dispense Refill     atorvastatin (LIPITOR) 10 MG tablet Take 1 tablet (10 mg) by mouth At Bedtime 90 tablet 3     carbamide peroxide (DEBROX) 6.5 % otic solution Place 5-10 drops into the right ear 2 times daily 30 mL 0     Cholecalciferol (VITAMIN D PO) Take 1,000 Units by mouth daily        citalopram (CELEXA) 10 MG tablet Take 1 tablet (10 mg) by mouth daily 90 tablet 3     donepezil (ARICEPT) 10 MG tablet TAKE 1 TABLET(10 MG) BY MOUTH AT BEDTIME 90 tablet 3     fluticasone (FLONASE) 50 MCG/ACT spray SHAKE LIQUID WELL AND USE 1 TO 2 SPRAYS IN EACH NOSTRIL EVERY DAY 48 g 3     losartan (COZAAR) 50 MG tablet Take 1 tablet (50 mg) by mouth daily 90 tablet 3     Multiple Vitamins-Minerals (PRESERVISION AREDS PO) Take 1 tablet by mouth daily       valACYclovir (VALTREX) 500 MG tablet 2000 mg orally at onset of symptoms; and may repeat in 12 hours if symptoms persist 24 tablet 1     Allergies   Allergen Reactions     Simvastatin      Buttock pain     Penicillins Rash     BP Readings from Last 3 Encounters:   09/11/18 112/70   06/05/18 114/64   03/20/18 120/79    Wt Readings from Last 3 Encounters:   09/11/18 207 lb 8 oz (94.1 kg)   06/05/18 209 lb 3.2 oz (94.9 kg)   12/18/17 210 lb (95.3 kg)                  Labs reviewed in EPIC    Reviewed and updated as needed this visit by clinical staff       Reviewed and updated as needed this visit by Provider         ROS:  CONSTITUTIONAL:NEGATIVE for fever, chills, change in weight  INTEGUMENTARY/SKIN: as noted above 2 different skin lesions  RESP:NEGATIVE for significant cough or SOB  CV: NEGATIVE for chest pain, palpitations or peripheral edema  GI: NEGATIVE for nausea, abdominal pain, heartburn, or change in bowel habits  MUSCULOSKELETAL: NEGATIVE for significant arthralgias or myalgia  NEURO: NEGATIVE for weakness, dizziness or paresthesias  HEME/ALLERGY/IMMUNE: NEGATIVE for bleeding  problems  PSYCHIATRIC: NEGATIVE for changes in mood or affect    OBJECTIVE:     /70  Pulse 68  Temp 98.7  F (37.1  C) (Oral)  Resp 17  Wt 207 lb 8 oz (94.1 kg)  SpO2 97%  BMI 30.82 kg/m2  Body mass index is 30.82 kg/(m^2).  GENERAL: healthy, alert and no distress  NECK: no adenopathy, no asymmetry, masses, or scars and thyroid normal to palpation  RESP: lungs clear to auscultation - no rales, rhonchi or wheezes  CV: regular rate and rhythm, normal S1 S2, no S3 or S4, no murmur, click or rub, no peripheral edema and peripheral pulses strong  ABDOMEN: soft, nontender, no hepatosplenomegaly, no masses and bowel sounds normal  MS: extremities normal- no gross deformities noted  SKIN: anterior chest, 2 excoriated papules without vesicles; lower extremities with several near ankle macules with small blister formation; no pustules;  other macules without vesicle or blister formation;  right inguinal area with a 2mmx 2mm open sebaceous cyst without local infection. Cellular debris gently expressed without difficulty;   NEURO: Normal strength and tone, mentation intact and speech normal      ASSESSMENT/PLAN:     (W57.XXXA) Bug bite, initial encounter  (primary encounter diagnosis)  Comment: anterior chest  and exposed areas on extremities; no infection; ? chigger like lesions; discussed use of clear nail polish to cover/sufffocate and clear calomine lotion to lessen itching,  Plan: as noted above; monitor for any changes ( ?infection may develop if increased itching.    (L72.3) Sebaceous cyst  Comment: right groin; gently expressed cellular debri;  no infection; clean; wound care   Plan: reviewed benign nature of these lesions; no infection.    (E21.0) Primary hyperparathyroidism (H)  Comment:   Lab Results   Component Value Date    CR 0.98 06/05/2018     Lab Results   Component Value Date    OMEGA 9.1 06/05/2018       Plan: calcium currently normal range    (Z23) Need for prophylactic vaccination and inoculation  against influenza  Comment: Pt request. CDC guidelines met   Plan: FLU VACCINE, INCREASED ANTIGEN, PRESV FREE, AGE        65+ [11153], ADMIN INFLUENZA (For MEDICARE         Patients ONLY) []            Cammie Daugherty MD  Internal Medicine   [facility]    Injectable Influenza Immunization Documentation    1.  Is the person to be vaccinated sick today?   No    2. Does the person to be vaccinated have an allergy to a component   of the vaccine?   No  Egg Allergy Algorithm Link    3. Has the person to be vaccinated ever had a serious reaction   to influenza vaccine in the past?   No    4. Has the person to be vaccinated ever had Guillain-Barré syndrome?   No    Form completed by patient

## 2018-09-11 NOTE — MR AVS SNAPSHOT
After Visit Summary   9/11/2018    Kathie Wetzel    MRN: [id number]           Patient Information     Date Of Birth          1940        Visit Information        Provider Department      9/11/2018 3:00 PM Cammie Daugherty MD [facility]        Today's Diagnoses     Need for prophylactic vaccination and inoculation against influenza    -  1    Sebaceous cyst        Bug bite, initial encounter          Care Instructions    Bug Bites  anterior chest  and extremities; no infection; ? chigger like lesions; discussed use of clear nail polish to cover and clear calomine lotion to prevent scratching.     Sebaceous cyst- gently expressed.          Follow-ups after your visit        Who to contact     If you have questions or need follow up information about today's clinic visit or your schedule please contact [facility] directly at [phone].  Normal or non-critical lab and imaging results will be communicated to you by MyChart, letter or phone within 4 business days after the clinic has received the results. If you do not hear from us within 7 days, please contact the clinic through MyChart or phone. If you have a critical or abnormal lab result, we will notify you by phone as soon as possible.  Submit refill requests through [employer] or call your pharmacy and they will forward the refill request to us. Please allow 3 business days for your refill to be completed.          Additional Information About Your Visit        Care EveryWhere ID     This is your Care EveryWhere ID. This could be used by other organizations to access your [place] medical records  [id number]        Your Vitals Were     Pulse Temperature Respirations Pulse Oximetry BMI (Body Mass Index)       68 98.7  F (37.1  C) (Oral) 17 97% 30.82 kg/m2        Blood Pressure from Last 3 Encounters:   09/11/18 112/70   06/05/18 114/64   03/20/18 120/79    Weight from Last 3 Encounters:   09/11/18 207 lb 8 oz  (94.1 kg)   06/05/18 209 lb 3.2 oz (94.9 kg)   12/18/17 210 lb (95.3 kg)              We Performed the Following     ADMIN INFLUENZA (For MEDICARE Patients ONLY) []     FLU VACCINE, INCREASED ANTIGEN, PRESV FREE, AGE 65+ [77944]        Primary Care Provider Office Phone # Fax #    Cammie Sanjeev Daugherty [phone] [address] [facility] [address]        Equal Access to Services     ADAM DRAKE : Hadii aad ku hadasho Soomaali, waaxda luqadaha, qaybta kaalmada adeegyada, waxay idiin hayaan adeeg kharash maral . So [facility] [phone].    ATENCIÓN: Si habla español, tiene a perera disposición servicios gratuitos de asistencia lingüística. [facility] [phone].    We comply with applicable federal civil rights laws and [place] laws. We do not discriminate on the basis of race, color, national origin, age, disability, sex, sexual orientation, or gender identity.            Thank you!     Thank you for choosing [facility]  for your care. Our goal is always to provide you with excellent care. Hearing back from our patients is one way we can continue to improve our services. Please take a few minutes to complete the written survey that you may receive in the mail after your visit with us. Thank you!             Your Updated Medication List - Protect others around you: Learn how to safely use, store and throw away your medicines at www.disposemymeds.org.          This list is accurate as of 9/11/18  4:02 PM.  Always use your most recent med list.                   Brand Name Dispense Instructions for use Diagnosis    atorvastatin 10 MG tablet    LIPITOR    90 tablet    Take 1 tablet (10 mg) by mouth At Bedtime    Hyperlipidemia LDL goal <100       carbamide peroxide 6.5 % otic solution    DEBROX    30 mL    Place 5-10 drops into the right ear 2 times daily    Impacted cerumen of right ear       citalopram 10 MG tablet    celeXA    90 tablet    Take 1 tablet (10 mg) by mouth  daily    Anxiety       donepezil 10 MG tablet    ARICEPT    90 tablet    TAKE 1 TABLET(10 MG) BY MOUTH AT BEDTIME    Memory impairment of gradual onset       fluticasone 50 MCG/ACT spray    FLONASE    48 g    SHAKE LIQUID WELL AND USE 1 TO 2 SPRAYS IN EACH NOSTRIL EVERY DAY    Chronic vasomotor rhinitis       losartan 50 MG tablet    COZAAR    90 tablet    Take 1 tablet (50 mg) by mouth daily    Benign essential hypertension       PRESERVISION AREDS PO      Take 1 tablet by mouth daily        valACYclovir 500 MG tablet    VALTREX    24 tablet    2000 mg orally at onset of symptoms; and may repeat in 12 hours if symptoms persist    Cold sore       VITAMIN D PO      Take 1,000 Units by mouth daily

## 2019-02-04 ENCOUNTER — OFFICE VISIT (OUTPATIENT)
Dept: FAMILY MEDICINE | Facility: CLINIC | Age: 79
End: 2019-02-04
Payer: MEDICARE

## 2019-02-04 VITALS
HEIGHT: 69 IN | HEART RATE: 76 BPM | OXYGEN SATURATION: 96 % | DIASTOLIC BLOOD PRESSURE: 70 MMHG | BODY MASS INDEX: 31.1 KG/M2 | TEMPERATURE: 98.3 F | WEIGHT: 210 LBS | RESPIRATION RATE: 17 BRPM | SYSTOLIC BLOOD PRESSURE: 114 MMHG

## 2019-02-04 DIAGNOSIS — G47.9 SLEEP DISTURBANCE: Primary | ICD-10-CM

## 2019-02-04 DIAGNOSIS — M54.2 CERVICALGIA: ICD-10-CM

## 2019-02-04 PROCEDURE — 99214 OFFICE O/P EST MOD 30 MIN: CPT | Performed by: INTERNAL MEDICINE

## 2019-02-04 RX ORDER — TRAZODONE HYDROCHLORIDE 50 MG/1
25-50 TABLET, FILM COATED ORAL AT BEDTIME
Qty: 90 TABLET | Refills: 1 | Status: SHIPPED | OUTPATIENT
Start: 2019-02-04 | End: 2019-09-26

## 2019-02-04 RX ORDER — AMITRIPTYLINE HYDROCHLORIDE 10 MG/1
10 TABLET ORAL AT BEDTIME
Qty: 90 TABLET | Refills: 3 | Status: CANCELLED | OUTPATIENT
Start: 2019-02-04 | End: 2020-02-04

## 2019-02-04 ASSESSMENT — MIFFLIN-ST. JEOR: SCORE: 1488.99

## 2019-02-04 NOTE — PROGRESS NOTES
Chief Complaint   Patient presents with     Neck Pain     intermittent  lasts for several hours at a time   takes tylenol and rests.      Tingling     tingling in left arm that depends on position of shoulder.       Sleep Problem     sleep is very interrupted and restless        SUBJECTIVE:   Kathie Wetzel is a 78 year old female who presents to clinic today for the following health issues:      Neck Pain      Duration: intermittent for 3-4 months    Description:  Location: neck  Radiation: none   But states when laying down and moving her left arm in certain ways it causes numbness; positional with sleep    Intensity:  moderate    Accompanying signs and symptoms: as noted    History (similar episodes/previous evaluation): None    Precipitating or alleviating factors: None    Therapies tried and outcome: Tylenol only helps a little,     Sleep concerns      Duration: ongoing    Description (location/character/radiation): states has disrupted sleep most nights    Intensity:  moderate    Accompanying signs and symptoms: wakeful, difficulty falling asleep or going back to sleep    History (similar episodes/previous evaluation): None    Precipitating or alleviating factors: None    Therapies tried and outcome: has tried Melatonin and Benadryl but they do not seem to help   Hard to sleep, up usually 2-3 hours later and then       Problem list and histories reviewed & adjusted, as indicated.  Additional history: as documented    Patient Active Problem List   Diagnosis     Primary hyperparathyroidism (H)     Hyperlipidemia LDL goal <100     Benign essential hypertension     Anxiety     Retinal tear, left     Memory impairment of gradual onset     Lumbar radicular pain- right side.     S/P lumbar microdiscectomy     Sleep disturbance     Past Surgical History:   Procedure Laterality Date     APPENDECTOMY OPEN       CHOLECYSTECTOMY  2007     DISCECTOMY LUMBAR POSTERIOR MICROSCOPIC ONE LEVEL Right 11/10/2017    Procedure:  DISCECTOMY LUMBAR POSTERIOR MICROSCOPIC ONE LEVEL;  Right L3-4 hemilaminectomy, medial facetectomy, foraminotomy with microdiscectomy and use of X-ray and intraoperative microscope ;  Surgeon: Al Dailey MD;  Location: [facility] OR     PARATHYROIDECTOMY  3/14/2014    Procedure: PARATHYROIDECTOMY;  Neck Exploration, Excision Right  Parathyroid Adenoma, Pre Operative Sestimibi Injection, Rapid PTH Assay ;  Surgeon: Natividad Fischer MD;  Location: [facility] OR       Social History     Tobacco Use     Smoking status: Former Smoker     Last attempt to quit: 3/10/1972     Years since quittin.9     Smokeless tobacco: Never Used   Substance Use Topics     Alcohol use: Yes     Comment: 2 week      Family History   Problem Relation Age of Onset     Thyroid Disease Maternal Aunt      Thyroid Disease Other         cousin          Current Outpatient Medications   Medication Sig Dispense Refill     atorvastatin (LIPITOR) 10 MG tablet Take 1 tablet (10 mg) by mouth At Bedtime 90 tablet 3     Cholecalciferol (VITAMIN D PO) Take 1,000 Units by mouth daily        citalopram (CELEXA) 10 MG tablet Take 1 tablet (10 mg) by mouth daily 90 tablet 3     donepezil (ARICEPT) 10 MG tablet TAKE 1 TABLET(10 MG) BY MOUTH AT BEDTIME 90 tablet 3     fluticasone (FLONASE) 50 MCG/ACT spray SHAKE LIQUID WELL AND USE 1 TO 2 SPRAYS IN EACH NOSTRIL EVERY DAY 48 g 3     losartan (COZAAR) 50 MG tablet Take 1 tablet (50 mg) by mouth daily 90 tablet 3     Multiple Vitamins-Minerals (PRESERVISION AREDS PO) Take 1 tablet by mouth daily       traZODone (DESYREL) 50 MG tablet Take 0.5-1 tablets (25-50 mg) by mouth At Bedtime 90 tablet 1     valACYclovir (VALTREX) 500 MG tablet 2000 mg orally at onset of symptoms; and may repeat in 12 hours if symptoms persist 24 tablet 1     Allergies   Allergen Reactions     Simvastatin      Buttock pain     Penicillins Rash     BP Readings from Last 3 Encounters:   19 114/70   18 112/70   18 114/64  "   Wt Readings from Last 3 Encounters:   02/04/19 95.3 kg (210 lb)   09/11/18 94.1 kg (207 lb 8 oz)   06/05/18 94.9 kg (209 lb 3.2 oz)                  Labs reviewed in EPIC    Reviewed and updated as needed this visit by clinical staff  Tobacco  Allergies  Meds  Med Hx  Surg Hx  Fam Hx  Soc Hx      Reviewed and updated as needed this visit by Provider  Tobacco  Allergies  Meds  Problems  Med Hx  Surg Hx  Fam Hx          ROS:  CONSTITUTIONAL: NEGATIVE for fever, chills, change in weight  INTEGUMENTARY/SKIN: NEGATIVE for worrisome rashes, moles or lesions  RESP: NEGATIVE for significant cough or SOB  CV: NEGATIVE for chest pain, palpitations or peripheral edema  GI: no bowel changes  MUSCULOSKELETAL: past hx of lumbar surgery; positional left arm numbness; intermittent; cannot reproduce the symptoms; as noted above  NEURO: no weakness;   PSYCHIATRIC: mood- Aricept helpful;  Sleep issues as noted above    OBJECTIVE:     /70   Pulse 76   Temp 98.3  F (36.8  C) (Oral)   Resp 17   Ht 1.74 m (5' 8.5\")   Wt 95.3 kg (210 lb)   SpO2 96%   BMI 31.47 kg/m    Body mass index is 31.47 kg/m .  GENERAL: healthy, alert and no distress  NECK: no adenopathy and no asymmetry, masses, or scars  RESP: lungs clear to auscultation - no rales, rhonchi or wheezes  CV: regular rates and rhythm, normal S1 S2, no S3 or S4, peripheral pulses strong and no peripheral edema  ABDOMEN: soft, nontender and bowel sounds normal  MS: upper back is tight, L>R; rotation of neck to about 75 degrees; no pain with percussion. Flexion and extension PK  NEURO: reflexes are 1+ and symmetric involving biceps and triceps bilaterally  PSYCH: mentation appears normal and affect normal/bright      ASSESSMENT/PLAN:     (G47.9) Sleep disturbance  (primary encounter diagnosis)  Comment: slow to get to sleep and then sleep for couple hours at a time; past trial of Diphenhydramine and Melatonin without benefit.  Discussed trial of medication ; " Trazodone; reviewed pros and cons of this medication and she is willing to proceed.  Agree with plans to experience restorative sleep.   Plan: traZODone (DESYREL) 50 MG tablet          (M54.2) Cervicalgia  Comment: rotation in both directions OK bilaterally; positional numbness by report. Unable to reproduce this since she only notices periodically when sleeping. ; unable to reproduce on exam.  strength preserved.   Plan: [facility] PT, HAND, AND CHIROPRACTIC REFERRAL            Cammie Daugherty MD  Internal Medicine  [facility]

## 2019-02-08 ENCOUNTER — THERAPY VISIT (OUTPATIENT)
Dept: PHYSICAL THERAPY | Facility: CLINIC | Age: 79
End: 2019-02-08
Attending: INTERNAL MEDICINE
Payer: MEDICARE

## 2019-02-08 DIAGNOSIS — M54.2 CERVICALGIA: ICD-10-CM

## 2019-02-08 PROCEDURE — 97110 THERAPEUTIC EXERCISES: CPT | Mod: GP | Performed by: PHYSICAL THERAPIST

## 2019-02-08 PROCEDURE — 97161 PT EVAL LOW COMPLEX 20 MIN: CPT | Mod: GP | Performed by: PHYSICAL THERAPIST

## 2019-02-08 NOTE — LETTER
DEPARTMENT OF HEALTH AND HUMAN SERVICES  CENTERS FOR MEDICARE & MEDICAID SERVICES    PLAN/UPDATED PLAN OF PROGRESS FOR OUTPATIENT REHABILITATION    PATIENTS NAME:  Kathie Wetzel   : 1940  PROVIDER NUMBER:    [id number]  HICN:    [id number]  PROVIDER NAME: [facility]  MEDICAL RECORD NUMBER: [id number]   START OF CARE DATE:  SOC Date: 19   TYPE:  PT  PRIMARY/TREATMENT DIAGNOSIS:Cervicalgia  VISITS FROM START OF CARE:  Rxs Used: 1     [place] [facility] Initial Evaluation  Subjective:  The history is provided by the patient. No  was used.   Kathie Wetzel is a 79 year old female with a cervical spine condition.  Condition occurred with:  Insidious onset.    This is a new condition  Patient reports to PT for left sided neck pain and states she does have symptoms down her left arm.  States she has numbness down her left arm at night when sleeping on her left side.  States the pain is intermittent in the neck and there does not seem to be any specific thing that causes the pain to start. Saw MD on 2019    Patient reports pain:  Central cervical spine and cervical left side.  Radiates to:  Elbow left, hand left, lower arm left, shoulder left and upper arm left.   and is intermittent and reported as 4/10 (9/10 pain at worst).  Associated symptoms:  Loss of strength.   Symptoms are exacerbated by lying down and looking up or down and relieved by NSAID's.          General health as reported by patient is good.  Pertinent medical history includes:  Numbness/tingling.  Medical allergies: no.  Other surgeries include:  Orthopedic surgery (Lumbar fusion about 2-3 years ago).  Current medications:  Anti-depressants and high blood pressure medication.  Current occupation is Retired.      Barriers include:  None as reported by the patient.  Red flags:  None as reported by the patient.        Objective:  Standing Alignment:    Cervical/Thoracic:   Forward head  Shoulder/UE:  Rounded shoulders  Cervical/Thoracic Evaluation  AROM:  AROM Cervical:  Flexion:            75% pain   Extension:       25% pain   Rotation:         Left: 50%     Right: 50%  Side Bend:      Left: 25%     Right:  25% pain on left side  Headaches: none  Cervical Myotomes:  normal  DTR's:  normal  Cervical Dermatomes:  normal  Cervical Palpation:    Tenderness present at Left:    Erector Spinae and Suboccipitals  Tenderness not present at Right:      Erector Spinae or Suboccipitals    PATIENTS NAME:  Kathie Wetzel   : 1940    Spinal Segmental Conclusions:  Hypomobility throughout entire cervical spine with increased tenderness at C5-6    Assessment/Plan:    Patient is a 79 year old female with cervical complaints.    Patient has the following significant findings with corresponding treatment plan.                Diagnosis 1:  Cervicalgia  Pain -  hot/cold therapy, manual therapy, self management, education, directional preference exercise and home program  Decreased ROM/flexibility - manual therapy, therapeutic exercise and home program  Decreased joint mobility - manual therapy, therapeutic exercise and home program  Decreased strength - therapeutic exercise, therapeutic activities and home program  Impaired posture - neuro re-education and home program    Previous and current functional limitations:  (See Goal Flow Sheet for this information)    Short term and Long term goals: (See Goal Flow Sheet for this information)     Communication ability:  Patient appears to be able to clearly communicate and understand verbal and written communication and follow directions correctly.  Treatment Explanation - The following has been discussed with the patient:   RX ordered/plan of care  Anticipated outcomes  Possible risks and side effects  This patient would benefit from PT intervention to resume normal activities.   Rehab potential is good.  Frequency:  1 X week, once daily  Duration:  for  "8 weeks  Discharge Plan:  Achieve all LTG.  Independent in home treatment program.  Reach maximal therapeutic benefit.      Caregiver Signature/Credentials _____________________________ Date ________       Treating Provider: Rufus Mcneill DPT     I have reviewed and certified the need for these services and plan of treatment while under my care.        PHYSICIAN'S SIGNATURE:   _____________________________________  Date___________                Cammie Daugherty MD    Certification period:  Beginning of Cert date period: 02/08/19 to  End of Cert period date: 04/28/19     Functional Level Progress Report: Please see attached \"Goal Flow sheet for Functional level.\"    ____X____ Continue Services or       ________ DC Services                Service dates: From  SOC Date: 02/08/19 date to present                         "

## 2019-02-08 NOTE — PROGRESS NOTES
[facility] Initial Evaluation  Subjective:  The history is provided by the patient. No  was used.   Kathie Wetzel is a 79 year old female with a cervical spine condition.  Condition occurred with:  Insidious onset.    This is a new condition  Patient reports to PT for left sided neck pain and states she does have symptoms down her left arm.  States she has numbness down her left arm at night when sleeping on her left side.  States the pain is intermittent in the neck and there does not seem to be any specific thing that causes the pain to start. She saw MD for the pain on 2/4/19..    Patient reports pain:  Central cervical spine and cervical left side.  Radiates to:  Elbow left, hand left, lower arm left, shoulder left and upper arm left.   and is intermittent and reported as 4/10 (9/10 pain at worst).  Associated symptoms:  Loss of strength.   Symptoms are exacerbated by lying down and looking up or down and relieved by NSAID's.          General health as reported by patient is good.  Pertinent medical history includes:  Numbness/tingling.  Medical allergies: no.  Other surgeries include:  Orthopedic surgery (Lumbar fusion about 2-3 years ago).  Current medications:  Anti-depressants and high blood pressure medication.  Current occupation is Retired.        Barriers include:  None as reported by the patient.    Red flags:  None as reported by the patient.                        Objective:  Standing Alignment:    Cervical/Thoracic:  Forward head  Shoulder/UE:  Rounded shoulders                                  Cervical/Thoracic Evaluation    AROM:  AROM Cervical:    Flexion:            75% pain   Extension:       25% pain   Rotation:         Left: 50%     Right: 50%  Side Bend:      Left: 25%     Right:  25% pain on left side      Headaches: none  Cervical Myotomes:  normal                  DTR's:  normal          Cervical Dermatomes:  normal                    Cervical  Palpation:    Tenderness present at Left:    Erector Spinae and Suboccipitals    Tenderness not present at Right:      Erector Spinae or Suboccipitals      Spinal Segmental Conclusions:  Hypomobility throughout entire cervical spine with increased tenderness at C5-6                                                  General     ROS    Assessment/Plan:    Patient is a 79 year old female with cervical complaints.    Patient has the following significant findings with corresponding treatment plan.                Diagnosis 1:  Cervicalgia  Pain -  hot/cold therapy, manual therapy, self management, education, directional preference exercise and home program  Decreased ROM/flexibility - manual therapy, therapeutic exercise and home program  Decreased joint mobility - manual therapy, therapeutic exercise and home program  Decreased strength - therapeutic exercise, therapeutic activities and home program  Impaired posture - neuro re-education and home program    Previous and current functional limitations:  (See Goal Flow Sheet for this information)    Short term and Long term goals: (See Goal Flow Sheet for this information)     Communication ability:  Patient appears to be able to clearly communicate and understand verbal and written communication and follow directions correctly.  Treatment Explanation - The following has been discussed with the patient:   RX ordered/plan of care  Anticipated outcomes  Possible risks and side effects  This patient would benefit from PT intervention to resume normal activities.   Rehab potential is good.    Frequency:  1 X week, once daily  Duration:  for 8 weeks  Discharge Plan:  Achieve all LTG.  Independent in home treatment program.  Reach maximal therapeutic benefit.    Please refer to the daily flowsheet for treatment today, total treatment time and time spent performing 1:1 timed codes.

## 2019-02-14 ENCOUNTER — TRANSFERRED RECORDS (OUTPATIENT)
Dept: HEALTH INFORMATION MANAGEMENT | Facility: CLINIC | Age: 79
End: 2019-02-14

## 2019-02-15 ENCOUNTER — THERAPY VISIT (OUTPATIENT)
Dept: PHYSICAL THERAPY | Facility: CLINIC | Age: 79
End: 2019-02-15
Payer: MEDICARE

## 2019-02-15 DIAGNOSIS — M54.2 CERVICALGIA: ICD-10-CM

## 2019-02-15 PROCEDURE — 97110 THERAPEUTIC EXERCISES: CPT | Mod: GP | Performed by: PHYSICAL THERAPIST

## 2019-02-15 PROCEDURE — 97140 MANUAL THERAPY 1/> REGIONS: CPT | Mod: GP | Performed by: PHYSICAL THERAPIST

## 2019-02-22 ENCOUNTER — THERAPY VISIT (OUTPATIENT)
Dept: PHYSICAL THERAPY | Facility: CLINIC | Age: 79
End: 2019-02-22
Payer: MEDICARE

## 2019-02-22 DIAGNOSIS — M54.2 CERVICALGIA: ICD-10-CM

## 2019-02-22 PROCEDURE — 97140 MANUAL THERAPY 1/> REGIONS: CPT | Mod: GP | Performed by: PHYSICAL THERAPIST

## 2019-02-22 PROCEDURE — 97110 THERAPEUTIC EXERCISES: CPT | Mod: GP | Performed by: PHYSICAL THERAPIST

## 2019-03-06 ENCOUNTER — TELEPHONE (OUTPATIENT)
Dept: FAMILY MEDICINE | Facility: CLINIC | Age: 79
End: 2019-03-06

## 2019-03-06 NOTE — TELEPHONE ENCOUNTER
Called patient back with the recommendations that she not take Claritin D. Should could take regular Claritin or Zyrtec.     She has no symptoms of allergies at this time. She had bought Claritin D recently and she wanted to check if she should even take them because of what the directions on the box stated.     Cristina Fernández RN Flex

## 2019-03-06 NOTE — TELEPHONE ENCOUNTER
Reason for call:  Other   Patient called regarding (reason for call): call back  Additional comments: Patient is calling to see if she can take Claritin D with her medical history, specifically HTN.  Please call to discuss with patient.     Phone number to reach patient:  Home number on file [phone] (home)    Best Time:  any    Can we leave a detailed message on this number?  YES

## 2019-04-01 ENCOUNTER — OFFICE VISIT (OUTPATIENT)
Dept: FAMILY MEDICINE | Facility: CLINIC | Age: 79
End: 2019-04-01
Payer: MEDICARE

## 2019-04-01 VITALS
OXYGEN SATURATION: 97 % | RESPIRATION RATE: 17 BRPM | TEMPERATURE: 98.8 F | SYSTOLIC BLOOD PRESSURE: 110 MMHG | BODY MASS INDEX: 31.12 KG/M2 | WEIGHT: 207.7 LBS | DIASTOLIC BLOOD PRESSURE: 64 MMHG | HEART RATE: 67 BPM

## 2019-04-01 DIAGNOSIS — M54.2 CERVICALGIA: ICD-10-CM

## 2019-04-01 DIAGNOSIS — R41.3 MEMORY IMPAIRMENT OF GRADUAL ONSET: ICD-10-CM

## 2019-04-01 DIAGNOSIS — F41.9 ANXIETY: ICD-10-CM

## 2019-04-01 DIAGNOSIS — S29.012A UPPER BACK STRAIN, INITIAL ENCOUNTER: Primary | ICD-10-CM

## 2019-04-01 DIAGNOSIS — I10 BENIGN ESSENTIAL HYPERTENSION: ICD-10-CM

## 2019-04-01 PROCEDURE — 99214 OFFICE O/P EST MOD 30 MIN: CPT | Performed by: INTERNAL MEDICINE

## 2019-04-01 RX ORDER — LORATADINE 10 MG/1
10 TABLET ORAL EVERY MORNING
COMMUNITY
End: 2020-12-16

## 2019-04-01 NOTE — PATIENT INSTRUCTIONS
Bolster Stretch   Small towel rolled and placed just below Shoulder blades   Arms up over head.   Stretch back; repeat with bolster just above and at shoulder blade level     Prayer stretch   Sitting at table, outstretched straight ahead.   Off to each Side and then to the other side   (may modify to a seated position)     Cat Stretch   standing in front of bathroom sink height; arch your back like a cat; hold   Slide elbows out   Drop Back, Stretch.     Upper Back while Seated.   Standing at the counter,  Upper arm flat against table. Push down - sliding shoulder blades outward,     Do each of these 2 sets of 5 each day ( AM and PM)

## 2019-04-01 NOTE — PROGRESS NOTES
Chief Complaint   Patient presents with     Headache     states has a persistent pain on the top of her that has been throbbing for the past 3 weeks.        SUBJECTIVE:   Kathie Wetzel is a 79 year old female who presents to clinic today for the following health issues:    Headache ( pain on the top of her head)      Duration: has been present since 3/6/19    Description  Location: on the top of her head   Character: throbbing pain, sharp pain  Frequency:  States has only had short periods of time that the headache is gone.   Duration:  For the past 3 weeks    Intensity:  moderate    Accompanying signs and symptoms:    Precipitating or Alleviating factors:  Nausea/vomiting: no  Dizziness: no  Weakness or numbness: no  Visual changes: none  Fever: no   Sinus or URI symptoms YES- did have congestion 3 weeks ago but has been on Claritin and Flonase and her sinuses have improved     History  Head trauma: no  Family history of migraines: no  Previous tests for headaches: no  Neurologist evaluations: no  Able to do daily activities when headache present: YES  Wake with headaches: YES- at times yes  Daily pain medication use: no  Any changes in: No    Precipitating or Alleviating factors (light/sound/sleep/caffeine): does wear sunglasses    Therapies tried and outcome: Tylenol    Outcome - usually effective  Frequent/daily pain medication use: no        Problem list and histories reviewed & adjusted, as indicated.  Additional history: as documented    Patient Active Problem List   Diagnosis     Primary hyperparathyroidism (H)     Hyperlipidemia LDL goal <100     Benign essential hypertension     Anxiety     Retinal tear, left     Memory impairment of gradual onset     Lumbar radicular pain- right side.     S/P lumbar microdiscectomy     Sleep disturbance     Cervicalgia     Past Surgical History:   Procedure Laterality Date     APPENDECTOMY OPEN       CHOLECYSTECTOMY  2007     DISCECTOMY LUMBAR POSTERIOR MICROSCOPIC ONE  LEVEL Right 11/10/2017    Procedure: DISCECTOMY LUMBAR POSTERIOR MICROSCOPIC ONE LEVEL;  Right L3-4 hemilaminectomy, medial facetectomy, foraminotomy with microdiscectomy and use of X-ray and intraoperative microscope ;  Surgeon: Al Dailey MD;  Location: [facility] OR     PARATHYROIDECTOMY  3/14/2014    Procedure: PARATHYROIDECTOMY;  Neck Exploration, Excision Right  Parathyroid Adenoma, Pre Operative Sestimibi Injection, Rapid PTH Assay ;  Surgeon: Natividad Fischer MD;  Location: [facility] OR       Social History     Tobacco Use     Smoking status: Former Smoker     Last attempt to quit: 3/10/1972     Years since quittin.1     Smokeless tobacco: Never Used   Substance Use Topics     Alcohol use: Yes     Comment: 2 week      Family History   Problem Relation Age of Onset     Thyroid Disease Maternal Aunt      Thyroid Disease Other         cousin          Current Outpatient Medications   Medication Sig Dispense Refill     atorvastatin (LIPITOR) 10 MG tablet Take 1 tablet (10 mg) by mouth At Bedtime 90 tablet 3     Cholecalciferol (VITAMIN D PO) Take 1,000 Units by mouth daily        citalopram (CELEXA) 10 MG tablet Take 1 tablet (10 mg) by mouth daily 90 tablet 3     donepezil (ARICEPT) 10 MG tablet TAKE 1 TABLET(10 MG) BY MOUTH AT BEDTIME 90 tablet 3     fluticasone (FLONASE) 50 MCG/ACT spray SHAKE LIQUID WELL AND USE 1 TO 2 SPRAYS IN EACH NOSTRIL EVERY DAY 48 g 3     loratadine (CLARITIN) 10 MG tablet Take 10 mg by mouth daily       losartan (COZAAR) 50 MG tablet Take 1 tablet (50 mg) by mouth daily 90 tablet 3     Multiple Vitamins-Minerals (PRESERVISION AREDS PO) Take 1 tablet by mouth daily       traZODone (DESYREL) 50 MG tablet Take 0.5-1 tablets (25-50 mg) by mouth At Bedtime 90 tablet 1     valACYclovir (VALTREX) 500 MG tablet 2000 mg orally at onset of symptoms; and may repeat in 12 hours if symptoms persist 24 tablet 1     Allergies   Allergen Reactions     Simvastatin      Buttock pain      Penicillins Rash     Recent Labs   Lab Test 06/05/18  0836 03/20/18  1757  06/21/17  0846 02/28/17  0949   LDL 78  --   --  88  --    HDL 44*  --   --  47*  --    TRIG 185*  --   --  187*  --    ALT 28 22  --  27 23   CR 0.98 0.99   < > 0.97 0.92   GFRESTIMATED 55* 54*   < > 56* 59*   GFRESTBLACK 66 65   < > 67 72   POTASSIUM 4.2 3.8   < > 4.2 4.7   TSH  --  2.46  --   --  2.05    < > = values in this interval not displayed.      BP Readings from Last 3 Encounters:   04/01/19 110/64   02/04/19 114/70   09/11/18 112/70    Wt Readings from Last 3 Encounters:   04/01/19 94.2 kg (207 lb 11.2 oz)   02/04/19 95.3 kg (210 lb)   09/11/18 94.1 kg (207 lb 8 oz)                    Reviewed and updated as needed this visit by clinical staff  Tobacco  Allergies  Meds  Problems  Med Hx  Surg Hx  Fam Hx  Soc Hx        Reviewed and updated as needed this visit by Provider  Tobacco  Allergies  Meds  Problems  Med Hx  Surg Hx  Fam Hx         ROS:  CONSTITUTIONAL: NEGATIVE for fever, chills, change in weight  INTEGUMENTARY/SKIN: NEGATIVE for worrisome rashes, moles or lesions  ENT/MOUTH: increased pressureRESP: NEGATIVE for significant cough or SOB  CV: NEGATIVE for chest pain, palpitations or peripheral edema  MUSCULOSKELETAL: upper back and neck tightness; no injury; ongoing issue  NEURO: no numbness, tingling or radicular symptoms  HEME/ALLERGY/IMMUNE: NEGATIVE for bleeding problems  PSYCHIATRIC: she has seen neurology and started on Aricept; early on; medication has been well tolerated.    OBJECTIVE:     /64   Pulse 67   Temp 98.8  F (37.1  C) (Oral)   Resp 17   Wt 94.2 kg (207 lb 11.2 oz)   SpO2 97%   BMI 31.12 kg/m    Body mass index is 31.12 kg/m .  GENERAL: healthy, alert and no distress  EYES: Eyes grossly normal to inspection  HENT: ear canals and TM's normal, nose and mouth without ulcers or lesions; no sinus congest, , no sinus tenderness.  NECK: no adenopathy, no asymmetry, masses, or scars and  thyroid normal to palpation  RESP: lungs clear to auscultation - no rales, rhonchi or wheezes  CV: regular rates and rhythm, normal S1 S2,  peripheral pulses strong and no peripheral edema  ABDOMEN: soft, nontender and bowel sounds normal  MS: neck full range of motion; tight muscles involving upper back, neck , capitus muscle;  SKIN: no suspicious lesions or rashes  NEURO: Normal strength and tone, sensory exam grossly normal, mentation intact, DTR's normal and symmetric and Romberg normal; gait normal  PSYCH: mentation appears normal and affect normal/bright        ASSESSMENT/PLAN:     (S29.012A) Upper back strain, initial encounter  (primary encounter diagnosis)  Comment: tight muscles, upper back, neck and occiput area  Plan: reviewed and modeled exercises; may add heat; see pt instructions. Consider additonal physical therapy if needed.    (M54.2) Cervicalgia  Comment: tight muscles, as noted above;  Plan: exercises as discussed.    (I10) Benign essential hypertension  Comment: BLOOD PRESSURE well controlled  Plan:     (F41.9) Anxiety  Comment: reviewed possible worry and or need to adjust medications  Plan: medications reviewed;    (R41.3) Memory impairment of gradual onset  Comment: pt has seen neurology ; medications feel meds effective;  Plan: a reviewed; strengthen  no      Cammie Sanjeev Daugherty MD    [facility]

## 2019-05-09 PROBLEM — M54.2 CERVICALGIA: Status: RESOLVED | Noted: 2019-02-08 | Resolved: 2019-05-09

## 2019-05-09 NOTE — PROGRESS NOTES
Subjective:  HPI                    Objective:  System    Physical Exam    General     ROS    Assessment/Plan:    DISCHARGE REPORT    Progress reporting period is from 2/8/19 to 2/22/19.       SUBJECTIVE  Subjective changes noted by patient: Patient states she is doing better.  States there was 1 day she did notice pain with sleeping but then did her exercises and took Tylenol and the pain resolved.    Current Pain level: 0/10.     Initial Pain level: 9/10.     OBJECTIVE  Changes noted in objective findings:  Patient has failed to return to therapy so current objective findings are unknown.        ASSESSMENT/PLAN  Updated problem list and treatment plan: Diagnosis 1:  Cervicalgia    Assessment of Progress: The patient has not returned to therapy. Current status is unknown.  At her last visit she did report she was feeling better.  Kathie continues to require the following intervention to meet STG and LTG's:  PT intervention is no longer required to meet STG/LTG.    Recommendations:  Patient will be discharged from PT as she has not returned for further appointments.     Please refer to the daily flowsheet for treatment today, total treatment time and time spent performing 1:1 timed codes.

## 2019-05-25 DIAGNOSIS — F41.9 ANXIETY: ICD-10-CM

## 2019-05-25 NOTE — TELEPHONE ENCOUNTER
"Requested Prescriptions   Pending Prescriptions Disp Refills     citalopram (CELEXA) 10 MG tablet [Pharmacy Med Name: CITALOPRAM 10MG TABLETS] 90 tablet 0     Sig: TAKE 1 TABLET(10 MG) BY MOUTH DAILY     .Last Written Prescription Date:  06/05/2018  Last Fill Quantity: 90 tablet,  # refills: 3   Last office visit: 4/1/2019 with prescribing provider:  04/01/2019   Future Office Visit:            SSRIs Protocol Passed - 5/25/2019 10:07 AM        Passed - Recent (12 mo) or future (30 days) visit within the authorizing provider's specialty     Patient had office visit in the last 12 months or has a visit in the next 30 days with authorizing provider or within the authorizing provider's specialty.  See \"Patient Info\" tab in inbasket, or \"Choose Columns\" in Meds & Orders section of the refill encounter.              Passed - Medication is active on med list        Passed - Patient is age 18 or older        Passed - No active pregnancy on record        Passed - No positive pregnancy test in last 12 months          Kevin MCCONNELLT  "

## 2019-05-28 NOTE — TELEPHONE ENCOUNTER
No flowsheet data found.  No flowsheet data found.      No record of STEPHENIE-7 on file. Please contact pt for her to complete.

## 2019-05-28 NOTE — TELEPHONE ENCOUNTER
Last Written Prescription Date:  6/5/18  Last Fill Quantity: 90,  # refills: 3   Last office visit: 4/1/2019 with prescribing provider:  Cammie Daugherty   Future Office Visit:       Routing refill request to provider for review/approval because:  Drug interaction warning: Citalopram and Trazadone      Mary Anne Daly RN on 5/28/2019 at 9:06 AM

## 2019-05-29 RX ORDER — CITALOPRAM HYDROBROMIDE 10 MG/1
TABLET ORAL
Qty: 90 TABLET | Refills: 1 | Status: SHIPPED | OUTPATIENT
Start: 2019-05-29 | End: 2019-09-26

## 2019-05-29 ASSESSMENT — ANXIETY QUESTIONNAIRES
3. WORRYING TOO MUCH ABOUT DIFFERENT THINGS: NOT AT ALL
GAD7 TOTAL SCORE: 0
7. FEELING AFRAID AS IF SOMETHING AWFUL MIGHT HAPPEN: NOT AT ALL
5. BEING SO RESTLESS THAT IT IS HARD TO SIT STILL: NOT AT ALL
1. FEELING NERVOUS, ANXIOUS, OR ON EDGE: NOT AT ALL
2. NOT BEING ABLE TO STOP OR CONTROL WORRYING: NOT AT ALL
IF YOU CHECKED OFF ANY PROBLEMS ON THIS QUESTIONNAIRE, HOW DIFFICULT HAVE THESE PROBLEMS MADE IT FOR YOU TO DO YOUR WORK, TAKE CARE OF THINGS AT HOME, OR GET ALONG WITH OTHER PEOPLE: NOT DIFFICULT AT ALL
6. BECOMING EASILY ANNOYED OR IRRITABLE: NOT AT ALL

## 2019-05-29 ASSESSMENT — PATIENT HEALTH QUESTIONNAIRE - PHQ9
5. POOR APPETITE OR OVEREATING: NOT AT ALL
SUM OF ALL RESPONSES TO PHQ QUESTIONS 1-9: 0

## 2019-05-29 NOTE — TELEPHONE ENCOUNTER
PHQ and Stephenie completed   PHQ-9 SCORE 5/29/2019   PHQ-9 Total Score 0     STEPHENIE-7 SCORE 5/29/2019   Total Score 0

## 2019-05-29 NOTE — TELEPHONE ENCOUNTER
No flowsheet data found.      Chart routed back to PCP without requested information.  Will reroute to RN pool.   Still looking for STEPHENIE-7

## 2019-05-30 ASSESSMENT — ANXIETY QUESTIONNAIRES: GAD7 TOTAL SCORE: 0

## 2019-06-19 DIAGNOSIS — R41.3 MEMORY IMPAIRMENT OF GRADUAL ONSET: ICD-10-CM

## 2019-06-19 RX ORDER — DONEPEZIL HYDROCHLORIDE 10 MG/1
TABLET, FILM COATED ORAL
Qty: 90 TABLET | Refills: 0 | Status: SHIPPED | OUTPATIENT
Start: 2019-06-19 | End: 2019-09-17

## 2019-06-19 NOTE — TELEPHONE ENCOUNTER
"Donepezil 10 mg  Last Written Prescription Date:  6/5/18  Last Fill Quantity: 90,  # refills: 3   Last office visit: 4/1/2019 with prescribing provider:  AB   Future Office Visit:   Next 5 appointments (look out 90 days)    Jun 27, 2019  9:30 AM CDT  Office Visit with Cammie Daugherty MD  [facility] ([address] [address] [phone]         Requested Prescriptions   Pending Prescriptions Disp Refills     donepezil (ARICEPT) 10 MG tablet [Pharmacy Med Name: DONEPEZIL 10MG TABLETS] 90 tablet 0     Sig: TAKE 1 TABLET(10 MG) BY MOUTH AT BEDTIME       Miscellaneous Dementia Agents Passed - 6/19/2019  2:18 PM        Passed - Recent (12 mo) or future (30 days) visit within the authorizing provider's specialty     Patient had office visit in the last 12 months or has a visit in the next 30 days with authorizing provider or within the authorizing provider's specialty.  See \"Patient Info\" tab in inbasket, or \"Choose Columns\" in Meds & Orders section of the refill encounter.              Passed - Medication is active on med list        Passed - Patient is 18 years of age or older        Prescription approved per [facility] Refill Protocol.  Caridad FERRIS RN     "

## 2019-06-27 ENCOUNTER — OFFICE VISIT (OUTPATIENT)
Dept: FAMILY MEDICINE | Facility: CLINIC | Age: 79
End: 2019-06-27
Payer: MEDICARE

## 2019-06-27 VITALS
DIASTOLIC BLOOD PRESSURE: 72 MMHG | RESPIRATION RATE: 18 BRPM | HEART RATE: 77 BPM | WEIGHT: 199.2 LBS | OXYGEN SATURATION: 95 % | TEMPERATURE: 99.2 F | BODY MASS INDEX: 29.85 KG/M2 | SYSTOLIC BLOOD PRESSURE: 100 MMHG

## 2019-06-27 DIAGNOSIS — H61.23 BILATERAL IMPACTED CERUMEN: ICD-10-CM

## 2019-06-27 DIAGNOSIS — I10 BENIGN ESSENTIAL HYPERTENSION: ICD-10-CM

## 2019-06-27 DIAGNOSIS — H91.93 DECREASED HEARING OF BOTH EARS: ICD-10-CM

## 2019-06-27 DIAGNOSIS — G57.01 PIRIFORMIS SYNDROME, RIGHT: ICD-10-CM

## 2019-06-27 DIAGNOSIS — E78.5 HYPERLIPIDEMIA LDL GOAL <100: ICD-10-CM

## 2019-06-27 DIAGNOSIS — L57.0 ACTINIC KERATOSIS: Primary | ICD-10-CM

## 2019-06-27 DIAGNOSIS — N18.30 CKD (CHRONIC KIDNEY DISEASE) STAGE 3, GFR 30-59 ML/MIN (H): ICD-10-CM

## 2019-06-27 PROCEDURE — 99214 OFFICE O/P EST MOD 30 MIN: CPT | Mod: 25 | Performed by: INTERNAL MEDICINE

## 2019-06-27 PROCEDURE — 17000 DESTRUCT PREMALG LESION: CPT | Performed by: INTERNAL MEDICINE

## 2019-06-27 RX ORDER — ATORVASTATIN CALCIUM 10 MG/1
10 TABLET, FILM COATED ORAL AT BEDTIME
Qty: 90 TABLET | Refills: 3 | Status: SHIPPED | OUTPATIENT
Start: 2019-06-27 | End: 2020-04-13

## 2019-06-27 RX ORDER — LOSARTAN POTASSIUM 50 MG/1
50 TABLET ORAL DAILY
Qty: 90 TABLET | Refills: 3 | Status: SHIPPED | OUTPATIENT
Start: 2019-06-27 | End: 2020-04-13

## 2019-06-27 NOTE — PROGRESS NOTES
Subjective     Kathie Wetzel is a 79 year old female who presents to clinic today for the following health issues:    HPI   Concern - bump on arm  Onset: 3-4 weeks    Description:    spot on right upper arm,     Intensity: mild    Progression of Symptoms:  same    Accompanying Signs & Symptoms:  No pain, no drainage    Previous history of similar problem:   None  Therapies Tried and outcome: Soap and lotion    Back Pain       Duration: intermittent for 5 weeks        Specific cause: turning/bending while opening a window    Description:   Location of pain: gluteus right  Character of pain: dull ache  Pain radiation:radiates into the right leg    Intensity: mild    History:   History of back problems: no prior back problems  Any previous MRI or X-rays: None  Therapies tried without relief: none    Alleviating factors:   Improved by: acetaminophen (Tylenol)      Precipitating factors:  Worsened by: Nothing      Patient Active Problem List   Diagnosis     Primary hyperparathyroidism (H)     Hyperlipidemia LDL goal <100     Benign essential hypertension     Anxiety     Retinal tear, left     Memory impairment of gradual onset     Lumbar radicular pain- right side.     S/P lumbar microdiscectomy     Sleep disturbance     Past Surgical History:   Procedure Laterality Date     APPENDECTOMY OPEN       CHOLECYSTECTOMY  2007     DISCECTOMY LUMBAR POSTERIOR MICROSCOPIC ONE LEVEL Right 11/10/2017    Procedure: DISCECTOMY LUMBAR POSTERIOR MICROSCOPIC ONE LEVEL;  Right L3-4 hemilaminectomy, medial facetectomy, foraminotomy with microdiscectomy and use of X-ray and intraoperative microscope ;  Surgeon: Al Dailey MD;  Location:  OR     PARATHYROIDECTOMY  3/14/2014    Procedure: PARATHYROIDECTOMY;  Neck Exploration, Excision Right  Parathyroid Adenoma, Pre Operative Sestimibi Injection, Rapid PTH Assay ;  Surgeon: Natividad Fischer MD;  Location:  OR       Social History     Tobacco Use     Smoking status:  Former Smoker     Last attempt to quit: 3/10/1972     Years since quittin.5     Smokeless tobacco: Never Used   Substance Use Topics     Alcohol use: Yes     Comment: 2 week      Family History   Problem Relation Age of Onset     Thyroid Disease Maternal Aunt      Thyroid Disease Other         cousin          Current Outpatient Medications   Medication Sig Dispense Refill     atorvastatin (LIPITOR) 10 MG tablet Take 1 tablet (10 mg) by mouth At Bedtime 90 tablet 3     Cholecalciferol (VITAMIN D PO) Take 1,000 Units by mouth daily        citalopram (CELEXA) 10 MG tablet TAKE 1 TABLET(10 MG) BY MOUTH DAILY 90 tablet 1     donepezil (ARICEPT) 10 MG tablet TAKE 1 TABLET(10 MG) BY MOUTH AT BEDTIME 90 tablet 0     fluticasone (FLONASE) 50 MCG/ACT spray SHAKE LIQUID WELL AND USE 1 TO 2 SPRAYS IN EACH NOSTRIL EVERY DAY 48 g 3     loratadine (CLARITIN) 10 MG tablet Take 10 mg by mouth daily       losartan (COZAAR) 50 MG tablet Take 1 tablet (50 mg) by mouth daily 90 tablet 3     Multiple Vitamins-Minerals (PRESERVISION AREDS PO) Take 1 tablet by mouth daily       traZODone (DESYREL) 50 MG tablet Take 0.5-1 tablets (25-50 mg) by mouth At Bedtime 90 tablet 1     valACYclovir (VALTREX) 500 MG tablet 2000 mg orally at onset of symptoms; and may repeat in 12 hours if symptoms persist 24 tablet 1     atorvastatin (LIPITOR) 10 MG tablet TAKE 1 TABLET(10 MG) BY MOUTH AT BEDTIME 90 tablet 0     Allergies   Allergen Reactions     Simvastatin      Buttock pain     Penicillins Rash     Recent Labs   Lab Test 18  0836 18  1757  17  0846 17  0949   LDL 78  --   --  88  --    HDL 44*  --   --  47*  --    TRIG 185*  --   --  187*  --    ALT 28 22  --  27 23   CR 0.98 0.99   < > 0.97 0.92   GFRESTIMATED 55* 54*   < > 56* 59*   GFRESTBLACK 66 65   < > 67 72   POTASSIUM 4.2 3.8   < > 4.2 4.7   TSH  --  2.46  --   --  2.05    < > = values in this interval not displayed.      BP Readings from Last 3 Encounters:    06/27/19 100/72   04/01/19 110/64   02/04/19 114/70    Wt Readings from Last 3 Encounters:   06/27/19 90.4 kg (199 lb 3.2 oz)   04/01/19 94.2 kg (207 lb 11.2 oz)   02/04/19 95.3 kg (210 lb)              Reviewed and updated as needed this visit by Provider  Tobacco  Allergies  Meds  Problems  Med Hx  Surg Hx  Fam Hx         Review of Systems   ROS COMP: CONSTITUTIONAL: NEGATIVE for fever, chills, change in weight  INTEGUMENTARY/SKIN:   right upper posterior arm; scaly   ENT/MOUTH: NEGATIVE for ear, mouth and throat problems  RESP: NEGATIVE for significant cough or SOB  CV: NEGATIVE for chest pain, palpitations or peripheral edema  GI: NEGATIVE for nausea, abdominal pain, heartburn, or change in bowel habits  MUSCULOSKELETAL: low back and hip tenderness; walking without assistance   NEURO: NEGATIVE for weakness, dizziness or paresthesias  PSYCHIATRIC: NEGATIVE for changes in mood or affect      Objective    /72 (BP Location: Right arm, Patient Position: Chair, Cuff Size: Adult Regular)   Pulse 77   Temp 99.2  F (37.3  C) (Oral)   Resp 18   Wt 90.4 kg (199 lb 3.2 oz)   LMP  (LMP Unknown)   SpO2 95%   BMI 29.85 kg/m    Body mass index is 29.85 kg/m .  Physical Exam   GENERAL: healthy, alert and no distress  HENT: small amount removed with curette; bilateral canal not fully obstructed;    NECK: no adenopathy, no asymmetry, masses, or scars and thyroid normal to palpation  RESP: lungs clear to auscultation - no rales, rhonchi or wheezes  CV: regular rate and rhythm, normal S1 S2, no S3 or S4, no murmur, click or rub, no peripheral edema and peripheral pulses strong  ABDOMEN: soft, nontender, no hepatosplenomegaly, no masses and bowel sounds normal  MS: lower back- forward bending OK, right piriformis point tenderness;   NEURO: Normal strength and tone, sensory exam grossly normal and mentation intact  Skin: right posterior arm, 0.5cm scaly keratosis  PSYCH: mentation appears normal, affect  normal/bright    Diagnostic Test Results:  Labs reviewed in Epic        Assessment & Plan     (L57.0) Actinic keratosis  (primary encounter diagnosis)  Comment: right posterior arm, scaly area  Plan: DESTRUCT PREMALIGNANT LESION, FIRST        Following informed consent, Liquid nitrogen was applied to lesions in a freeze-thaw-freeze pattern.  Two applications to the lesion; Procedure well tolerated. Wound care reviewed.     (H61.23) Bilateral impacted cerumen  Comment: small amount removed with curette; canal not fully obstructed;  she desires ENT evaluation including audiology   Plan: OTOLARYNGOLOGY REFERRAL, AUDIOLOGY BALANCE         REFERRAL, OTOLARYNGOLOGY REFERRAL          (G57.01) Piriformis syndrome, right  Comment: tenderness on exam; reviewed stretching exercises  Plan: see exercises in pt instructions; if not improved, then recommend contact clinic and referral for FSOC will be provided.     (H91.93) Decreased hearing of both ears  Comment: ear canals are clear; she is interested in audiology evaluation to assess treatment options.   Plan: OTOLARYNGOLOGY REFERRAL, AUDIOLOGY BALANCE         REFERRAL, OTOLARYNGOLOGY REFERRAL          (E78.5) Hyperlipidemia LDL goal <100  Comment:   Lab Results   Component Value Date    LDL 78 06/05/2018     Due for labs; lab orders placed  Plan: atorvastatin (LIPITOR) 10 MG tablet          (I10) Benign essential hypertension  Comment: BLOOD PRESSURE reviewed; labs due to assess renal function and electrolytes.  Plan: losartan (COZAAR) 50 MG tablet              Return in about 2 weeks (around 7/11/2019) for if not better, consider physical therapy .    Cammie Daugherty MD  Internal Medicine   [facility]

## 2019-06-27 NOTE — PATIENT INSTRUCTIONS
Seated in  Chair  Cross one knee over the other   Shoulders straight ahead  While gently pulling knee across body    After you feel a good stretch , then can add next step of  turn toward that hip for added stretch   Several increments, holding for 5-20 seconds   Do the other side as well.

## 2019-09-07 PROBLEM — N18.30 CKD (CHRONIC KIDNEY DISEASE) STAGE 3, GFR 30-59 ML/MIN (H): Status: ACTIVE | Noted: 2019-09-07

## 2019-09-17 DIAGNOSIS — R41.3 MEMORY IMPAIRMENT OF GRADUAL ONSET: ICD-10-CM

## 2019-09-17 RX ORDER — DONEPEZIL HYDROCHLORIDE 10 MG/1
TABLET, FILM COATED ORAL
Qty: 90 TABLET | Refills: 0 | Status: SHIPPED | OUTPATIENT
Start: 2019-09-17 | End: 2019-09-26

## 2019-09-17 NOTE — TELEPHONE ENCOUNTER
"Requested Prescriptions   Pending Prescriptions Disp Refills     donepezil (ARICEPT) 10 MG tablet [Pharmacy Med Name: DONEPEZIL 10MG TABLETS] 90 tablet 0     Sig: TAKE 1 TABLET(10 MG) BY MOUTH AT BEDTIME   Last Written Prescription Date:  6/19/19  Last Fill Quantity: 90,  # refills: 0   Last office visit: 6/27/2019 with prescribing provider:  Cammie Daugherty MD   Future Office Visit:        Miscellaneous Dementia Agents Passed - 9/17/2019 10:10 AM        Passed - Recent (12 mo) or future (30 days) visit within the authorizing provider's specialty     Patient had office visit in the last 12 months or has a visit in the next 30 days with authorizing provider or within the authorizing provider's specialty.  See \"Patient Info\" tab in inbasket, or \"Choose Columns\" in Meds & Orders section of the refill encounter.              Passed - Medication is active on med list        Passed - Patient is 18 years of age or older          "

## 2019-09-20 ENCOUNTER — TRANSFERRED RECORDS (OUTPATIENT)
Dept: HEALTH INFORMATION MANAGEMENT | Facility: CLINIC | Age: 79
End: 2019-09-20

## 2019-09-25 ENCOUNTER — TELEPHONE (OUTPATIENT)
Dept: FAMILY MEDICINE | Facility: CLINIC | Age: 79
End: 2019-09-25

## 2019-09-25 NOTE — TELEPHONE ENCOUNTER
Reason for call:  Patient reporting a symptom    Symptom or request: rash all over the right arm    Duration (how long have symptoms been present): Thursday    Have you been treated for this before? No    Additional comments: has been using neosporin. Also has tried every thing. Itches.    Phone Number patient can be reached at:  Home number on file [phone] (home)    Best Time:  any    Can we leave a detailed message on this number:  YES    Call taken on 9/25/2019 at 1:09 PM by Jamila Davies

## 2019-09-25 NOTE — TELEPHONE ENCOUNTER
Right hand/wrist/arm has 3 red spots that are now connected w/ redness. She c/o it itches A LOT, pimple like on the top of the spots. She does not have a fever, no other symptoms noted. Has tried calamine lotion and neosporin without relief.     Scheduled her tomorrow w/ DN.     Caridad FERRIS RN

## 2019-09-26 ENCOUNTER — OFFICE VISIT (OUTPATIENT)
Dept: FAMILY MEDICINE | Facility: CLINIC | Age: 79
End: 2019-09-26
Payer: MEDICARE

## 2019-09-26 VITALS
DIASTOLIC BLOOD PRESSURE: 62 MMHG | OXYGEN SATURATION: 96 % | WEIGHT: 192.9 LBS | SYSTOLIC BLOOD PRESSURE: 100 MMHG | HEIGHT: 69 IN | HEART RATE: 77 BPM | TEMPERATURE: 98 F | RESPIRATION RATE: 16 BRPM | BODY MASS INDEX: 28.57 KG/M2

## 2019-09-26 DIAGNOSIS — F41.9 ANXIETY: ICD-10-CM

## 2019-09-26 DIAGNOSIS — L30.9 DERMATITIS: ICD-10-CM

## 2019-09-26 DIAGNOSIS — L03.113 CELLULITIS OF RIGHT UPPER EXTREMITY: ICD-10-CM

## 2019-09-26 DIAGNOSIS — G47.9 SLEEP DISTURBANCE: ICD-10-CM

## 2019-09-26 DIAGNOSIS — Z23 NEED FOR PROPHYLACTIC VACCINATION AND INOCULATION AGAINST INFLUENZA: Primary | ICD-10-CM

## 2019-09-26 DIAGNOSIS — R41.3 MEMORY IMPAIRMENT OF GRADUAL ONSET: ICD-10-CM

## 2019-09-26 PROCEDURE — G0008 ADMIN INFLUENZA VIRUS VAC: HCPCS | Performed by: INTERNAL MEDICINE

## 2019-09-26 PROCEDURE — 90662 IIV NO PRSV INCREASED AG IM: CPT | Performed by: INTERNAL MEDICINE

## 2019-09-26 PROCEDURE — 99214 OFFICE O/P EST MOD 30 MIN: CPT | Mod: 25 | Performed by: INTERNAL MEDICINE

## 2019-09-26 RX ORDER — DOXYCYCLINE 100 MG/1
100 CAPSULE ORAL 2 TIMES DAILY
Qty: 14 CAPSULE | Refills: 0 | Status: SHIPPED | OUTPATIENT
Start: 2019-09-26 | End: 2020-04-13

## 2019-09-26 RX ORDER — CITALOPRAM HYDROBROMIDE 10 MG/1
10 TABLET ORAL DAILY
Qty: 90 TABLET | Refills: 1 | Status: SHIPPED | OUTPATIENT
Start: 2019-09-26 | End: 2020-04-13

## 2019-09-26 RX ORDER — METHYLPREDNISOLONE 4 MG
TABLET, DOSE PACK ORAL
Qty: 21 TABLET | Refills: 0 | Status: SHIPPED | OUTPATIENT
Start: 2019-09-26 | End: 2020-04-13

## 2019-09-26 RX ORDER — TRAZODONE HYDROCHLORIDE 50 MG/1
50-75 TABLET, FILM COATED ORAL AT BEDTIME
Qty: 135 TABLET | Refills: 1 | Status: SHIPPED | OUTPATIENT
Start: 2019-09-26 | End: 2020-04-13

## 2019-09-26 RX ORDER — DONEPEZIL HYDROCHLORIDE 10 MG/1
10 TABLET, FILM COATED ORAL AT BEDTIME
Qty: 90 TABLET | Refills: 3 | Status: SHIPPED | OUTPATIENT
Start: 2019-09-26 | End: 2020-09-03

## 2019-09-26 ASSESSMENT — MIFFLIN-ST. JEOR: SCORE: 1406.43

## 2019-09-26 NOTE — PROGRESS NOTES
Subjective     Kathie Wetzel is a 79 year old female who presents to clinic today for the following health issues:    HPI   Rash      Duration: 1 week    Description  Location: Both arms   Itching: severe    Intensity: No pain     Accompanying signs and symptoms: Redness, mild swelling of area     History (similar episodes/previous evaluation): Has had something similar in the past     Precipitating or alleviating factors:  New exposures:  None  Recent travel: no      Therapies tried and outcome: Calamine lotion, neosporin, neither were effective       Patient Active Problem List   Diagnosis     Primary hyperparathyroidism (H)     Hyperlipidemia LDL goal <100     Benign essential hypertension     Anxiety     Retinal tear, left     Memory impairment of gradual onset     Lumbar radicular pain- right side.     S/P lumbar microdiscectomy     Sleep disturbance     CKD (chronic kidney disease) stage 3, GFR 30-59 ml/min (H)     Cellulitis of right upper extremity     Past Surgical History:   Procedure Laterality Date     APPENDECTOMY OPEN       CHOLECYSTECTOMY       DISCECTOMY LUMBAR POSTERIOR MICROSCOPIC ONE LEVEL Right 11/10/2017    Procedure: DISCECTOMY LUMBAR POSTERIOR MICROSCOPIC ONE LEVEL;  Right L3-4 hemilaminectomy, medial facetectomy, foraminotomy with microdiscectomy and use of X-ray and intraoperative microscope ;  Surgeon: Al Dailey MD;  Location:  OR     PARATHYROIDECTOMY  3/14/2014    Procedure: PARATHYROIDECTOMY;  Neck Exploration, Excision Right  Parathyroid Adenoma, Pre Operative Sestimibi Injection, Rapid PTH Assay ;  Surgeon: Natividad Fischer MD;  Location:  OR       Social History     Tobacco Use     Smoking status: Former Smoker     Packs/day: 0.00     Last attempt to quit: 3/10/1972     Years since quittin.6     Smokeless tobacco: Never Used   Substance Use Topics     Alcohol use: Yes     Comment: 2 week      Family History   Problem Relation Age of Onset     Thyroid  Disease Maternal Aunt      Thyroid Disease Other         cousin          Current Outpatient Medications   Medication Sig Dispense Refill     atorvastatin (LIPITOR) 10 MG tablet TAKE 1 TABLET(10 MG) BY MOUTH AT BEDTIME 90 tablet 0     atorvastatin (LIPITOR) 10 MG tablet Take 1 tablet (10 mg) by mouth At Bedtime 90 tablet 3     Cholecalciferol (VITAMIN D PO) Take 1,000 Units by mouth daily        citalopram (CELEXA) 10 MG tablet Take 1 tablet (10 mg) by mouth daily 90 tablet 1     donepezil (ARICEPT) 10 MG tablet Take 1 tablet (10 mg) by mouth At Bedtime 90 tablet 3     doxycycline hyclate (VIBRAMYCIN) 100 MG capsule Take 1 capsule (100 mg) by mouth 2 times daily 14 capsule 0     fluticasone (FLONASE) 50 MCG/ACT spray SHAKE LIQUID WELL AND USE 1 TO 2 SPRAYS IN EACH NOSTRIL EVERY DAY 48 g 3     loratadine (CLARITIN) 10 MG tablet Take 10 mg by mouth daily       losartan (COZAAR) 50 MG tablet Take 1 tablet (50 mg) by mouth daily 90 tablet 3     methylPREDNISolone (MEDROL DOSEPAK) 4 MG tablet therapy pack Follow Package Directions 21 tablet 0     Multiple Vitamins-Minerals (PRESERVISION AREDS PO) Take 1 tablet by mouth daily       traZODone (DESYREL) 50 MG tablet Take 1-1.5 tablets (50-75 mg) by mouth At Bedtime 135 tablet 1     valACYclovir (VALTREX) 500 MG tablet TAKE 4 TABLETS BY MOUTH AT ONSET OF SYMPTOMS. MAY REPEAT IN 12 HOURS IF SYMPTOMS PERSIST 24 tablet 0     Allergies   Allergen Reactions     Simvastatin      Buttock pain     Penicillins Rash     BP Readings from Last 3 Encounters:   09/26/19 100/62   06/27/19 100/72   04/01/19 110/64    Wt Readings from Last 3 Encounters:   09/26/19 87.5 kg (192 lb 14.4 oz)   06/27/19 90.4 kg (199 lb 3.2 oz)   04/01/19 94.2 kg (207 lb 11.2 oz)            Reviewed and updated as needed this visit by Provider  Tobacco  Allergies  Meds  Problems  Med Hx  Surg Hx  Fam Hx         Review of Systems   ROS COMP: CONSTITUTIONAL: NEGATIVE for fever, chills, change in  "weight  INTEGUMENTARY/SKIN: rash as noted above  ENT/MOUTH: NEGATIVE for ear, mouth and throat problems  RESP: NEGATIVE for significant cough or SOB  CV: NEGATIVE for chest pain, palpitations or peripheral edema  GI: NEGATIVE for nausea, abdominal pain, heartburn, or change in bowel habits  MUSCULOSKELETAL: ambulatory without assitance  NEURO: NEGATIVE for weakness, dizziness or paresthesias  ENDOCRINE: NEGATIVE for temperature intolerance, skin/hair changes; Atorvastatin well tolerated.   PSYCHIATRIC: sleep issues; Trazodone use reviewed; it has been quite helpful getting to and staying asleep. Mood has been helped by Citalopram;  She request review of medication and would like these medications renewed  STEPHENIE-7 SCORE 5/29/2019   Total Score 0   she would like to remain on current medication        Objective    /62   Pulse 77   Temp 98  F (36.7  C) (Tympanic)   Resp 16   Ht 1.74 m (5' 8.5\")   Wt 87.5 kg (192 lb 14.4 oz)   LMP  (LMP Unknown)   SpO2 96%   BMI 28.90 kg/m    Body mass index is 28.9 kg/m .  Physical Exam   GENERAL: healthy, alert and no distress  RESP: lungs clear to auscultation - no rales, rhonchi or wheezes  CV: regular rates and rhythm, normal S1 S2, no S3 or S4, peripheral pulses strong and no peripheral edema  ABDOMEN: soft, nontender and bowel sounds normal  MS: no gross musculoskeletal defects noted, no edema  SKIN: erythematous rash; with induration and linear papule approx 0.2-  0.4 cm in size at the right forearm; no ulcer or bulla; area at risk and surrounding erythema   right forearm concerning for early infection; areas invoilved include involves, R and Left forearm, right wrist and upper back ;  NEURO: Normal strength and tone, mentation intact and speech normal  PSYCH: mentation appears normal and affect normal/bright    Diagnostic Test Results:  Labs reviewed in Epic        Assessment & Plan     (L30.9) Dermatitis  Comment: uncertain trigger; right forearm concerning for " "early infection; involves, R and Left forearm, right wrist and upper back ; ?insects? Contact dermatitis.   Since it is fairly widespread, it would be difficult to control with topical agents; we discussed adding low dose  Plan: methylPREDNISolone (MEDROL DOSEPAK) 4 MG tablet        therapy pack, doxycycline hyclate (VIBRAMYCIN)         100 MG capsule          (L03.113) Cellulitis of right upper extremity  Comment: no clear etiology ; suspects mall insect bites when she was outside; they became itchy and then  Progressed to early cellulitis.   Plan: doxycycline hyclate (VIBRAMYCIN) 100 MG capsule          (F41.9) Anxiety  Comment: Citalopram for years  Plan: citalopram (CELEXA) 10 MG tablet          (R41.3) Memory impairment of gradual onset  Comment:  Dr. Anderson report and recommendations. Aricept has been well tolerated and effective; family has previously agreed with said benefit.  Plan: donepezil (ARICEPT) 10 MG tablet          (G47.9) Sleep disturbance  Comment: reviewed medications; has been helpful; importance of sleep  Plan: traZODone (DESYREL) 50 MG tablet          (Z23) Need for prophylactic vaccination and inoculation against influenza  (primary encounter diagnosis)  Comment:   Plan: INFLUENZA (HIGH DOSE) 3 VALENT VACCINE [54892],        Vaccine Administration, Initial [78295]             BMI:   Estimated body mass index is 28.9 kg/m  as calculated from the following:    Height as of this encounter: 1.74 m (5' 8.5\").    Weight as of this encounter: 87.5 kg (192 lb 14.4 oz).       No follow-ups on file.    Cammie Daugherty MD  Internal Medicine   [facility]      "

## 2019-10-14 DIAGNOSIS — B00.1 COLD SORE: ICD-10-CM

## 2019-10-14 NOTE — TELEPHONE ENCOUNTER
"Requested Prescriptions   Pending Prescriptions Disp Refills     valACYclovir (VALTREX) 500 MG tablet [Pharmacy Med Name: VALACYCLOVIR 500MG TABLETS]  Last Written Prescription Date:  06/05/2018  Last Fill Quantity: 24 tablet,  # refills: 1   Last Office Visit: No previous visit found   Future Office Visit:      24 tablet 0     Sig: TAKE 4 TABLETS BY MOUTH AT ONSET OF SYMPTOMS. MAY REPEAT IN 12 HOURS IF SYMPTOMS PERSIST       Antivirals for Herpes Protocol Failed - 10/14/2019  9:35 AM        Failed - Normal serum creatinine on file in past 12 months     Recent Labs   Lab Test 06/05/18  0836   CR 0.98           Passed - Patient is age 12 or older        Passed - Recent (12 mo) or future (30 days) visit within the authorizing provider's specialty     Patient has had an office visit with the authorizing provider or a provider within the authorizing providers department within the previous 12 mos or has a future within next 30 days. See \"Patient Info\" tab in inbasket, or \"Choose Columns\" in Meds & Orders section of the refill encounter.            Passed - Medication is active on med list          "

## 2019-10-15 NOTE — TELEPHONE ENCOUNTER
Routing refill request to provider for review/approval because:  Labs not current:  MILAGRO Fernández RN Flex

## 2019-10-16 RX ORDER — VALACYCLOVIR HYDROCHLORIDE 500 MG/1
TABLET, FILM COATED ORAL
Qty: 24 TABLET | Refills: 0 | Status: ON HOLD | OUTPATIENT
Start: 2019-10-16 | End: 2020-05-04

## 2019-12-20 ENCOUNTER — TRANSFERRED RECORDS (OUTPATIENT)
Dept: HEALTH INFORMATION MANAGEMENT | Facility: CLINIC | Age: 79
End: 2019-12-20

## 2020-03-18 ENCOUNTER — TELEPHONE (OUTPATIENT)
Dept: FAMILY MEDICINE | Facility: CLINIC | Age: 80
End: 2020-03-18

## 2020-03-18 NOTE — TELEPHONE ENCOUNTER
Spoke to Dr. Daugherty.  She advised for the pt to try some nutrition drinks at home.  Drink 1 a day.  She can split it up if she wants to have 1/2 at a time.    Dr. Daugherty advised to monitor cough, see if she gets a fever, SOB and any other symptoms - call if concerns.  Keep social distance of 6 feet, wash hands, cover cough, don't touch face.    We are trying to keep pts out of the clinic due to everything that is going on right now, so let's schedule her at the beginning of April.

## 2020-03-18 NOTE — TELEPHONE ENCOUNTER
Called the pt to discuss.      She lost 20 pounds over 6-8 weeks.  She has a poor appetite.  She is forcing herself to eat.  Sometimes she feels nauseas after she eats.  No ab pain.  No diarrhea or constipation.  About 6-8 times a week she will have a loose stool - this started about 6-8 weeks ago when she started loosing the weight.  She is drinking lots of water.  She is peeing ok, several times a night.      She has had a cough for 2 weeks.  No fever, no SOB.  No sore throat and no fatigue.      She recently got back from [place].

## 2020-03-18 NOTE — TELEPHONE ENCOUNTER
Reason for Call:  Other appointment    Detailed comments: Pt has multiple sxs; asking for appt with Dr Daugherty.  Deep cough (no fever), non-productive, no appetite, wt loss of > 20 lbs over last 8 weeks.  Recent travel to [place] 3/4-3/8/20.    Phone Number Patient can be reached at: Home number on file [phone] (home)    Best Time: any    Can we leave a detailed message on this number? NO   Please call pt at [phone].    Thank you.  [facility]    Call taken on 3/18/2020 at 10:25 AM by Frida Concepcion

## 2020-04-13 ENCOUNTER — OFFICE VISIT (OUTPATIENT)
Dept: FAMILY MEDICINE | Facility: CLINIC | Age: 80
End: 2020-04-13
Payer: MEDICARE

## 2020-04-13 VITALS
DIASTOLIC BLOOD PRESSURE: 54 MMHG | BODY MASS INDEX: 25.71 KG/M2 | OXYGEN SATURATION: 97 % | TEMPERATURE: 98 F | HEART RATE: 88 BPM | WEIGHT: 171.6 LBS | SYSTOLIC BLOOD PRESSURE: 92 MMHG | RESPIRATION RATE: 16 BRPM

## 2020-04-13 DIAGNOSIS — I10 BENIGN ESSENTIAL HYPERTENSION: ICD-10-CM

## 2020-04-13 DIAGNOSIS — R41.3 MEMORY IMPAIRMENT OF GRADUAL ONSET: ICD-10-CM

## 2020-04-13 DIAGNOSIS — E78.5 HYPERLIPIDEMIA LDL GOAL <100: ICD-10-CM

## 2020-04-13 DIAGNOSIS — R63.4 WEIGHT LOSS: Primary | ICD-10-CM

## 2020-04-13 DIAGNOSIS — N18.30 CKD (CHRONIC KIDNEY DISEASE) STAGE 3, GFR 30-59 ML/MIN (H): ICD-10-CM

## 2020-04-13 DIAGNOSIS — G47.9 SLEEP DISTURBANCE: ICD-10-CM

## 2020-04-13 DIAGNOSIS — F41.9 ANXIETY: ICD-10-CM

## 2020-04-13 DIAGNOSIS — R73.09 ABNORMAL GLUCOSE: ICD-10-CM

## 2020-04-13 LAB
ALBUMIN SERPL-MCNC: 3.2 G/DL (ref 3.4–5)
ALP SERPL-CCNC: 85 U/L (ref 40–150)
ALT SERPL W P-5'-P-CCNC: 17 U/L (ref 0–50)
ANION GAP SERPL CALCULATED.3IONS-SCNC: 6 MMOL/L (ref 3–14)
AST SERPL W P-5'-P-CCNC: 12 U/L (ref 0–45)
BASOPHILS # BLD AUTO: 0 10E9/L (ref 0–0.2)
BASOPHILS NFR BLD AUTO: 0.5 %
BILIRUB SERPL-MCNC: 0.5 MG/DL (ref 0.2–1.3)
BUN SERPL-MCNC: 16 MG/DL (ref 7–30)
CALCIUM SERPL-MCNC: 8.8 MG/DL (ref 8.5–10.1)
CHLORIDE SERPL-SCNC: 106 MMOL/L (ref 94–109)
CHOLEST SERPL-MCNC: 126 MG/DL
CO2 SERPL-SCNC: 28 MMOL/L (ref 20–32)
CREAT SERPL-MCNC: 0.94 MG/DL (ref 0.52–1.04)
CREAT UR-MCNC: 379 MG/DL
DIFFERENTIAL METHOD BLD: ABNORMAL
EOSINOPHIL # BLD AUTO: 0.3 10E9/L (ref 0–0.7)
EOSINOPHIL NFR BLD AUTO: 3.9 %
ERYTHROCYTE [DISTWIDTH] IN BLOOD BY AUTOMATED COUNT: 12.6 % (ref 10–15)
GFR SERPL CREATININE-BSD FRML MDRD: 57 ML/MIN/{1.73_M2}
GLUCOSE SERPL-MCNC: 95 MG/DL (ref 70–99)
HBA1C MFR BLD: 5.6 % (ref 0–5.6)
HCT VFR BLD AUTO: 39.4 % (ref 35–47)
HDLC SERPL-MCNC: 43 MG/DL
HGB BLD-MCNC: 12.3 G/DL (ref 11.7–15.7)
LDLC SERPL CALC-MCNC: 65 MG/DL
LYMPHOCYTES # BLD AUTO: 1.5 10E9/L (ref 0.8–5.3)
LYMPHOCYTES NFR BLD AUTO: 20.3 %
MCH RBC QN AUTO: 28.5 PG (ref 26.5–33)
MCHC RBC AUTO-ENTMCNC: 31.2 G/DL (ref 31.5–36.5)
MCV RBC AUTO: 91 FL (ref 78–100)
MICROALBUMIN UR-MCNC: 40 MG/L
MICROALBUMIN/CREAT UR: 10.61 MG/G CR (ref 0–25)
MONOCYTES # BLD AUTO: 0.6 10E9/L (ref 0–1.3)
MONOCYTES NFR BLD AUTO: 7.5 %
NEUTROPHILS # BLD AUTO: 5 10E9/L (ref 1.6–8.3)
NEUTROPHILS NFR BLD AUTO: 67.8 %
NONHDLC SERPL-MCNC: 83 MG/DL
PLATELET # BLD AUTO: 241 10E9/L (ref 150–450)
POTASSIUM SERPL-SCNC: 3.9 MMOL/L (ref 3.4–5.3)
PREALB SERPL IA-MCNC: 18 MG/DL (ref 15–45)
PROT SERPL-MCNC: 7.5 G/DL (ref 6.8–8.8)
RBC # BLD AUTO: 4.32 10E12/L (ref 3.8–5.2)
SODIUM SERPL-SCNC: 140 MMOL/L (ref 133–144)
TRIGL SERPL-MCNC: 89 MG/DL
TSH SERPL DL<=0.005 MIU/L-ACNC: 2.43 MU/L (ref 0.4–4)
VIT B12 SERPL-MCNC: 185 PG/ML (ref 193–986)
WBC # BLD AUTO: 7.4 10E9/L (ref 4–11)

## 2020-04-13 PROCEDURE — 36415 COLL VENOUS BLD VENIPUNCTURE: CPT | Performed by: INTERNAL MEDICINE

## 2020-04-13 PROCEDURE — 83036 HEMOGLOBIN GLYCOSYLATED A1C: CPT | Performed by: INTERNAL MEDICINE

## 2020-04-13 PROCEDURE — 84443 ASSAY THYROID STIM HORMONE: CPT | Performed by: INTERNAL MEDICINE

## 2020-04-13 PROCEDURE — 99215 OFFICE O/P EST HI 40 MIN: CPT | Performed by: INTERNAL MEDICINE

## 2020-04-13 PROCEDURE — 82607 VITAMIN B-12: CPT | Performed by: INTERNAL MEDICINE

## 2020-04-13 PROCEDURE — 82043 UR ALBUMIN QUANTITATIVE: CPT | Performed by: INTERNAL MEDICINE

## 2020-04-13 PROCEDURE — 85025 COMPLETE CBC W/AUTO DIFF WBC: CPT | Performed by: INTERNAL MEDICINE

## 2020-04-13 PROCEDURE — 80061 LIPID PANEL: CPT | Performed by: INTERNAL MEDICINE

## 2020-04-13 PROCEDURE — 84134 ASSAY OF PREALBUMIN: CPT | Performed by: INTERNAL MEDICINE

## 2020-04-13 PROCEDURE — 80053 COMPREHEN METABOLIC PANEL: CPT | Performed by: INTERNAL MEDICINE

## 2020-04-13 RX ORDER — LOSARTAN POTASSIUM 50 MG/1
50 TABLET ORAL DAILY
Qty: 90 TABLET | Refills: 1 | Status: ON HOLD | OUTPATIENT
Start: 2020-04-13 | End: 2020-05-13

## 2020-04-13 RX ORDER — CITALOPRAM HYDROBROMIDE 10 MG/1
10 TABLET ORAL DAILY
Qty: 90 TABLET | Refills: 1 | Status: SHIPPED | OUTPATIENT
Start: 2020-04-13 | End: 2020-09-03

## 2020-04-13 RX ORDER — ATORVASTATIN CALCIUM 10 MG/1
TABLET, FILM COATED ORAL
Qty: 90 TABLET | Refills: 1 | Status: SHIPPED | OUTPATIENT
Start: 2020-04-13 | End: 2020-09-03

## 2020-04-13 RX ORDER — TRAZODONE HYDROCHLORIDE 50 MG/1
50-75 TABLET, FILM COATED ORAL AT BEDTIME
Qty: 180 TABLET | Refills: 1 | Status: SHIPPED | OUTPATIENT
Start: 2020-04-13 | End: 2020-09-03

## 2020-04-13 ASSESSMENT — ANXIETY QUESTIONNAIRES
1. FEELING NERVOUS, ANXIOUS, OR ON EDGE: NOT AT ALL
7. FEELING AFRAID AS IF SOMETHING AWFUL MIGHT HAPPEN: NOT AT ALL
6. BECOMING EASILY ANNOYED OR IRRITABLE: NOT AT ALL
5. BEING SO RESTLESS THAT IT IS HARD TO SIT STILL: NOT AT ALL
GAD7 TOTAL SCORE: 0
IF YOU CHECKED OFF ANY PROBLEMS ON THIS QUESTIONNAIRE, HOW DIFFICULT HAVE THESE PROBLEMS MADE IT FOR YOU TO DO YOUR WORK, TAKE CARE OF THINGS AT HOME, OR GET ALONG WITH OTHER PEOPLE: NOT DIFFICULT AT ALL
3. WORRYING TOO MUCH ABOUT DIFFERENT THINGS: NOT AT ALL
2. NOT BEING ABLE TO STOP OR CONTROL WORRYING: NOT AT ALL

## 2020-04-13 ASSESSMENT — PATIENT HEALTH QUESTIONNAIRE - PHQ9
SUM OF ALL RESPONSES TO PHQ QUESTIONS 1-9: 4
5. POOR APPETITE OR OVEREATING: NOT AT ALL

## 2020-04-13 NOTE — LETTER
April 21, 2020      Kathie Wetzel  [address]   [place] [id number]        Dear ,    We are writing to inform you of your test results.    B12 is on the low side.   Low range of normal prealbumin correlates with your decreased intake.   Other labs OK ( kidney, liver, electrolytes, thyroid, etc)   Recommend taking a over the counter ( OTC)  B12 supplement 5000 mcg orally per day. We have it in our pharmacy and they can get any OTC products for  You through the drive through.   As discussed at appt, need to increase dietary intake ( Ensure, Boost, etc ).     Resulted Orders   Albumin Random Urine Quantitative with Creat Ratio   Result Value Ref Range    Creatinine Urine 379 mg/dL    Albumin Urine mg/L 40 mg/L    Albumin Urine mg/g Cr 10.61 0 - 25 mg/g Cr   Comprehensive metabolic panel   Result Value Ref Range    Sodium 140 133 - 144 mmol/L    Potassium 3.9 3.4 - 5.3 mmol/L    Chloride 106 94 - 109 mmol/L    Carbon Dioxide 28 20 - 32 mmol/L    Anion Gap 6 3 - 14 mmol/L    Glucose 95 70 - 99 mg/dL      Comment:      Non Fasting    Urea Nitrogen 16 7 - 30 mg/dL    Creatinine 0.94 0.52 - 1.04 mg/dL    GFR Estimate 57 (L) >60 mL/min/[1.73_m2]      Comment:      Non  GFR Calc  Starting 12/18/2018, serum creatinine based estimated GFR (eGFR) will be   calculated using the Chronic Kidney Disease Epidemiology Collaboration   (CKD-EPI) equation.      GFR Estimate If Black 66 >60 mL/min/[1.73_m2]      Comment:       GFR Calc  Starting 12/18/2018, serum creatinine based estimated GFR (eGFR) will be   calculated using the Chronic Kidney Disease Epidemiology Collaboration   (CKD-EPI) equation.      Calcium 8.8 8.5 - 10.1 mg/dL    Bilirubin Total 0.5 0.2 - 1.3 mg/dL    Albumin 3.2 (L) 3.4 - 5.0 g/dL    Protein Total 7.5 6.8 - 8.8 g/dL    Alkaline Phosphatase 85 40 - 150 U/L    ALT 17 0 - 50 U/L    AST 12 0 - 45 U/L   Lipid panel reflex to direct LDL Fasting   Result  Value Ref Range    Cholesterol 126 <200 mg/dL    Triglycerides 89 <150 mg/dL      Comment:      Non Fasting    HDL Cholesterol 43 (L) >49 mg/dL    LDL Cholesterol Calculated 65 <100 mg/dL      Comment:      Desirable:       <100 mg/dl    Non HDL Cholesterol 83 <130 mg/dL   Hemoglobin A1c   Result Value Ref Range    Hemoglobin A1C 5.6 0 - 5.6 %      Comment:      Normal <5.7% Prediabetes 5.7-6.4%  Diabetes 6.5% or higher - adopted from ADA   consensus guidelines.     TSH with free T4 reflex   Result Value Ref Range    TSH 2.43 0.40 - 4.00 mU/L   Prealbumin   Result Value Ref Range    Prealbumin 18 15 - 45 mg/dL   Vitamin B12   Result Value Ref Range    Vitamin B12 185 (L) 193 - 986 pg/mL   CBC with platelets and differential   Result Value Ref Range    WBC 7.4 4.0 - 11.0 10e9/L    RBC Count 4.32 3.8 - 5.2 10e12/L    Hemoglobin 12.3 11.7 - 15.7 g/dL    Hematocrit 39.4 35.0 - 47.0 %    MCV 91 78 - 100 fl    MCH 28.5 26.5 - 33.0 pg    MCHC 31.2 (L) 31.5 - 36.5 g/dL      Comment:      Results confirmed by repeat test    RDW 12.6 10.0 - 15.0 %    Platelet Count 241 150 - 450 10e9/L    % Neutrophils 67.8 %    % Lymphocytes 20.3 %    % Monocytes 7.5 %    % Eosinophils 3.9 %    % Basophils 0.5 %    Absolute Neutrophil 5.0 1.6 - 8.3 10e9/L    Absolute Lymphocytes 1.5 0.8 - 5.3 10e9/L    Absolute Monocytes 0.6 0.0 - 1.3 10e9/L    Absolute Eosinophils 0.3 0.0 - 0.7 10e9/L    Absolute Basophils 0.0 0.0 - 0.2 10e9/L    Diff Method Automated Method        If you have any questions or concerns, please call the clinic at the number listed above.       Sincerely,        Cammie Daugherty MD/sb

## 2020-04-13 NOTE — PROGRESS NOTES
Subjective   appt in clinic.  Daughter, Paige Millan is on video call via [employer] IPad.     Kathie Wetzel is a 80 year old female who presents to clinic today for the following health issues:    HPI     Primary concern has been with weight loss    Depression Followup    How are you doing with your depression since your last visit? stable    Are you having other symptoms that might be associated with depression? No    Have you had a significant life event?  No     Are you feeling anxious or having panic attacks?   No    Do you have any concerns with your use of alcohol or other drugs? No    Social History     Tobacco Use     Smoking status: Former Smoker     Packs/day: 0.00     Last attempt to quit: 3/10/1972     Years since quittin.1     Smokeless tobacco: Never Used   Substance Use Topics     Alcohol use: Yes     Comment: 2 week      Drug use: No     PHQ 2019   PHQ-9 Total Score 0 4   Q9: Thoughts of better off dead/self-harm past 2 weeks Not at all Not at all     STEPHENIE-7 SCORE 2019   Total Score 0 0     Last PHQ-9 2020   1.  Little interest or pleasure in doing things 0   2.  Feeling down, depressed, or hopeless 0   3.  Trouble falling or staying asleep, or sleeping too much 1   4.  Feeling tired or having little energy 0   5.  Poor appetite or overeating 3   6.  Feeling bad about yourself 0   7.  Trouble concentrating 0   8.  Moving slowly or restless 0   Q9: Thoughts of better off dead/self-harm past 2 weeks 0   PHQ-9 Total Score 4   Difficulty at work, home, or with people Not difficult at all       How many servings of fruits and vegetables do you eat daily?  2-3    On average, how many sweetened beverages do you drink each day (Examples: soda, juice, sweet tea, etc.  Do NOT count diet or artificially sweetened beverages)?   0    How many days per week do you exercise enough to make your heart beat faster? 7    How many minutes a day do you exercise enough to make your heart  beat faster? 10 - 19   Times 2-3 daily    How many days per week do you miss taking your medication? 0    Pt states been loosing weight and is not sure why.  States does not have an appetite and needs to make herself eat. Denies abdominal pain or change in bowels.   Some days eats 2 times a day and may be only yogurt or cheese and meat.     Wt Readings from Last 3 Encounters:   04/13/20 77.8 kg (171 lb 9.6 oz)   09/26/19 87.5 kg (192 lb 14.4 oz)   06/27/19 90.4 kg (199 lb 3.2 oz)      Note 19# weight loss in past 6 months; decreased intake reported      Hyperlipidemia Follow-Up      Are you regularly taking any medication or supplement to lower your cholesterol?   Yes- Atorvastatin    Are you having muscle aches or other side effects that you think could be caused by your cholesterol lowering medication?  No     Reviewed labs- overdue; will order today    Hypertension Follow-up      Do you check your blood pressure regularly outside of the clinic? No     Are you following a low salt diet? Yes    Are your blood pressures ever more than 140 on the top number (systolic) OR more   than 90 on the bottom number (diastolic), for example 140/90? No  She is on Losartan and also denies symptoms of hypotension; denies low bp pressure or feeling light headed or dizzy    Patient Active Problem List   Diagnosis     Primary hyperparathyroidism (H)     Hyperlipidemia LDL goal <100     Benign essential hypertension     Anxiety     Retinal tear, left     Memory impairment of gradual onset     Lumbar radicular pain- right side.     S/P lumbar microdiscectomy     Sleep disturbance     CKD (chronic kidney disease) stage 3, GFR 30-59 ml/min (H)     Cellulitis of right upper extremity     Past Surgical History:   Procedure Laterality Date     APPENDECTOMY OPEN       CHOLECYSTECTOMY  2007     DISCECTOMY LUMBAR POSTERIOR MICROSCOPIC ONE LEVEL Right 11/10/2017    Procedure: DISCECTOMY LUMBAR POSTERIOR MICROSCOPIC ONE LEVEL;  Right L3-4  hemilaminectomy, medial facetectomy, foraminotomy with microdiscectomy and use of X-ray and intraoperative microscope ;  Surgeon: Al Dailey MD;  Location: [facility] OR     PARATHYROIDECTOMY  3/14/2014    Procedure: PARATHYROIDECTOMY;  Neck Exploration, Excision Right  Parathyroid Adenoma, Pre Operative Sestimibi Injection, Rapid PTH Assay ;  Surgeon: Natividad Fischer MD;  Location: [facility] OR       Social History     Tobacco Use     Smoking status: Former Smoker     Packs/day: 0.00     Last attempt to quit: 3/10/1972     Years since quittin.1     Smokeless tobacco: Never Used   Substance Use Topics     Alcohol use: Yes     Comment: 2 week      Family History   Problem Relation Age of Onset     Thyroid Disease Maternal Aunt      Thyroid Disease Other         cousin          Current Outpatient Medications   Medication Sig Dispense Refill     atorvastatin (LIPITOR) 10 MG tablet TAKE 1 TABLET(10 MG) BY MOUTH AT BEDTIME 90 tablet 0     Cholecalciferol (VITAMIN D PO) Take 1,000 Units by mouth daily        citalopram (CELEXA) 10 MG tablet Take 1 tablet (10 mg) by mouth daily 90 tablet 1     donepezil (ARICEPT) 10 MG tablet Take 1 tablet (10 mg) by mouth At Bedtime 90 tablet 3     loratadine (CLARITIN) 10 MG tablet Take 10 mg by mouth daily       losartan (COZAAR) 50 MG tablet Take 1 tablet (50 mg) by mouth daily 90 tablet 3     Multiple Vitamins-Minerals (PRESERVISION AREDS PO) Take 1 tablet by mouth daily       traZODone (DESYREL) 50 MG tablet Take 1-1.5 tablets (50-75 mg) by mouth At Bedtime 135 tablet 1     valACYclovir (VALTREX) 500 MG tablet TAKE 4 TABLETS BY MOUTH AT ONSET OF SYMPTOMS. MAY REPEAT IN 12 HOURS IF SYMPTOMS PERSIST 24 tablet 0     Allergies   Allergen Reactions     Simvastatin      Buttock pain     Penicillins Rash     Recent Labs   Lab Test 18  0836 18  1757  17  0846 17  0949   LDL 78  --   --  88  --    HDL 44*  --   --  47*  --    TRIG 185*  --   --  187*  --     ALT 28 22  --  27 23   CR 0.98 0.99   < > 0.97 0.92   GFRESTIMATED 55* 54*   < > 56* 59*   GFRESTBLACK 66 65   < > 67 72   POTASSIUM 4.2 3.8   < > 4.2 4.7   TSH  --  2.46  --   --  2.05    < > = values in this interval not displayed.      BP Readings from Last 3 Encounters:   04/13/20 92/54   09/26/19 100/62   06/27/19 100/72    Wt Readings from Last 3 Encounters:   04/13/20 77.8 kg (171 lb 9.6 oz)   09/26/19 87.5 kg (192 lb 14.4 oz)   06/27/19 90.4 kg (199 lb 3.2 oz)           Reviewed and updated as needed this visit by Provider  Tobacco  Allergies  Meds  Problems  Med Hx  Surg Hx  Fam Hx         Review of Systems   ROS COMP: CONSTITUTIONAL: NEGATIVE for fever, chills, change in weight  INTEGUMENTARY/SKIN: NEGATIVE for worrisome rashes, moles or lesions  EYES: NEGATIVE for vision changes or irritation and she dose wear glasses.   ENT/MOUTH: NEGATIVE for ear, mouth and throat problems  RESP:NEGATIVE for significant cough or SOB  CV: Blood pressure medications reviewed. NEGATIVE for chest pain, palpitations or peripheral edema  GI: NEGATIVE for nausea, abdominal pain, heartburn, or change in bowel habits  She reports one soft stool daily  MUSCULOSKELETAL: NEGATIVE for significant arthralgias or myalgia and she is active, daily  NEURO: NEGATIVE for weakness, dizziness or paresthesias  ENDOCRINE: past parathyroid surgery; lipids reviewed; labs reviewed- due for labs.   PSYCHIATRIC: anxiety, medications reviewed - Sleeping well with Trazodone 100 mg per night, Donepezil used daily       Objective    BP 92/54   Pulse 88   Temp 98  F (36.7  C) (Oral)   Resp 16   Wt 77.8 kg (171 lb 9.6 oz)   LMP  (LMP Unknown)   SpO2 97%   BMI 25.71 kg/m    Body mass index is 25.71 kg/m .  Physical Exam   GENERAL: healthy, alert, no distress and is masked ( voluntary per pt- no symptoms)  EYES: Eyes grossly normal to inspection, conjunctivae and sclerae normal and wears glasses  NECK: no adenopathy, no asymmetry, masses,  or scars and thyroid normal to palpation  RESP: lungs clear to auscultation - no rales, rhonchi or wheezes  CV: regular rates and rhythm, normal S1 S2, no S3 or S4, peripheral pulses strong and no peripheral edema  ABDOMEN: soft, nontender, no hepatosplenomegaly, no masses and bowel sounds normal  MS: no gross musculoskeletal defects noted, no edema  NEURO: Normal strength and tone, mentation intact and speech normal  PSYCH: mentation appears normal, affect normal/bright    Diagnostic Test Results:  Labs reviewed in Epic        Assessment & Plan     (R63.4) Weight loss  (primary encounter diagnosis)  Comment: 19 # weight loss in past 6 months. Note decreased intake.   discussed regualr eating palnl MyPlate.gov reviewed ( printed copy in AVS)  reassess weight in one month.  Weight in healthy range but note change over time.   Discussed eating regularly; not necessarily waiting until she is hungry. Encouraged one nutrition drink per day; can split throughout the day.  Plan: Hemoglobin A1c, TSH with free T4 reflex,         Prealbumin, Vitamin B12, CBC with platelets and        differential          (N18.3) CKD (chronic kidney disease) stage 3, GFR 30-59 ml/min (H)  Comment: monitor renal function.   Plan: Albumin Random Urine Quantitative with Creat         Ratio, Comprehensive metabolic panel, Vitamin         B12, CBC with platelets and differential          (F41.9) Anxiety  Comment: feeling medication continues to be helpful; sleeping OK wit meds  Plan: citalopram (CELEXA) 10 MG tablet          (G47.9) Sleep disturbance  Comment: sleeping well with meds; tried the 75 mg but feels the Trazodone 100 mg is more effective and avoids cutting tablets. She prefers the 100 mg dose but using 50 mg tablets; she will periodically try to decrease the dose.   Plan: Vitamin B12, traZODone (DESYREL) 50 MG tablet          (R41.3) Memory impairment of gradual onset  Comment: overall, doing wel  Plan: TSH with free T4 reflex,  "Prealbumin, Vitamin         B12        Continue Donepezil     (E78.5) Hyperlipidemia LDL goal <100  Comment: due for labs- today; statin well tolerated.   Plan: Comprehensive metabolic panel, Lipid panel         reflex to direct LDL Fasting, atorvastatin         (LIPITOR) 10 MG tablet          (I10) Benign essential hypertension  Comment:   She is on Losartan and also denies symptoms of hypotension; denies low bp pressure or feeling light headed or dizzy; no changes in medication use at this time;  With weight loss, may need to consider dose or medication adjustment if she becomes symptomatic.   Plan: Vitamin B12, CBC with platelets and         differential, losartan (COZAAR) 50 MG tablet          (R73.09) Abnormal glucose  Comment: no hx diabetes; weight loss reviewed  Plan: Comprehensive metabolic panel, Hemoglobin A1c          (F41.9) Anxiety  Comment: Citalopram for years has been effective.  Plan: citalopram (CELEXA) 10 MG tablet           BMI:   Estimated body mass index is 25.71 kg/m  as calculated from the following:    Height as of 9/26/19: 1.74 m (5' 8.5\").    Weight as of this encounter: 77.8 kg (171 lb 9.6 oz).         Return in about 4 weeks (around 5/11/2020) for labs today; reassess weight in one month; .    Cammie Daugherty MD  Internal Medicine  [facility] [place]    40 minutes is spent with patient and daughter on the phone/ipad, over 50% of that time spent providing counselling, discussing and reviewing meds and potential side effects.        "

## 2020-04-13 NOTE — PATIENT INSTRUCTIONS
Patient Education     Understanding USDA MyPlate  The USDA (U.S. Department of Agriculture) has guidelines to help you make healthy food choices. These are called MyPlate. MyPlate shows the food groups that make up healthy meals using the image of a place setting. Before you eat, think about the healthiest choices for what to put onto your plate or into your cup or bowl. To learn more about building a healthy plate, visit www.choosemyplate.gov.    The food groups    Fruits. Any fruit or 100% fruit juice counts as part of the Fruit Group. Fruits may be fresh, canned, frozen, or dried, and may be whole, cut-up, or pureed. Make half your plate fruits and vegetables.    Vegetables. Any vegetable or 100% vegetable juice counts as a member of the Vegetable Group. Vegetables may be fresh, frozen, canned, or dried. They can be served raw or cooked and may be whole, cut-up, or mashed. Make half your plate fruits and vegetables.    Grains. All foods made from grains are part of the Grains Group. These include wheat, rice, oats, cornmeal, and barley such as bread, pasta, oatmeal, cereal, tortillas, and grits. Grains should be no more than a quarter of your plate. At least half of your grains should be whole grains.    Protein. This group includes meat, poultry, seafood, beans and peas, eggs, processed soy products (like tofu), nuts (including nut butters), and seeds. Make protein choices no more than a quarter of your plate. Meat and poultry choices should be lean or low fat.    Dairy. All fluid milk products and foods made from milk that contain calcium, like yogurt and cheese, are part of the Dairy Group. (Foods that have little calcium, such as cream, butter, and cream cheese, are not part of the group.) Most dairy choices should be low-fat or fat-free.    Oils. These are fats that are liquid at room temperature. They include canola, corn, olive, soybean, and sunflower oil. Foods that are mainly oil include mayonnaise,  certain salad dressings, and soft margarines. You should have only 5 to 7 teaspoons of oils a day. You probably already get this much from the food you eat.  Date Last Reviewed: 8/1/2017 2000-2019 [employer]. [address] [place] [address], [place] [address]. All rights reserved. This information is not intended as a substitute for professional medical care. Always follow your healthcare professional's instructions.

## 2020-04-14 ASSESSMENT — ANXIETY QUESTIONNAIRES: GAD7 TOTAL SCORE: 0

## 2020-04-19 ENCOUNTER — TRANSFERRED RECORDS (OUTPATIENT)
Dept: HEALTH INFORMATION MANAGEMENT | Facility: CLINIC | Age: 80
End: 2020-04-19

## 2020-04-19 LAB
ALT SERPL-CCNC: 19 IU/L (ref 8–45)
AST SERPL-CCNC: 26 IU/L (ref 2–40)
CREAT SERPL-MCNC: 1 MG/DL (ref 0.57–1.11)
GFR SERPL CREATININE-BSD FRML MDRD: 53 ML/MIN/1.73M2
GLUCOSE SERPL-MCNC: 145 MG/DL (ref 65–100)
POTASSIUM SERPL-SCNC: 3.9 MMOL/L (ref 3.5–5)

## 2020-04-21 ENCOUNTER — VIRTUAL VISIT (OUTPATIENT)
Dept: FAMILY MEDICINE | Facility: CLINIC | Age: 80
End: 2020-04-21
Payer: MEDICARE

## 2020-04-21 DIAGNOSIS — R63.0 POOR APPETITE: ICD-10-CM

## 2020-04-21 DIAGNOSIS — R79.89 LOW VITAMIN B12 LEVEL: ICD-10-CM

## 2020-04-21 DIAGNOSIS — B02.29 POSTHERPETIC NEURALGIA: Primary | ICD-10-CM

## 2020-04-21 PROCEDURE — 99214 OFFICE O/P EST MOD 30 MIN: CPT | Mod: 95 | Performed by: INTERNAL MEDICINE

## 2020-04-21 RX ORDER — MECOBALAMIN 5000 MCG
5000 TABLET,DISINTEGRATING ORAL DAILY
Qty: 100 TABLET | Refills: 3 | COMMUNITY
Start: 2020-04-21

## 2020-04-21 RX ORDER — GABAPENTIN 300 MG/1
300 CAPSULE ORAL 3 TIMES DAILY
Qty: 90 CAPSULE | Refills: 0 | Status: SHIPPED | OUTPATIENT
Start: 2020-04-21 | End: 2020-06-01

## 2020-04-21 RX ORDER — VALACYCLOVIR HYDROCHLORIDE 1 G/1
1000 TABLET, FILM COATED ORAL 3 TIMES DAILY
Qty: 21 TABLET | Refills: 0 | Status: ON HOLD | OUTPATIENT
Start: 2020-04-21 | End: 2020-05-04

## 2020-04-21 RX ORDER — OXYCODONE HYDROCHLORIDE 5 MG/1
5 TABLET ORAL EVERY 6 HOURS PRN
Qty: 6 TABLET | Refills: 0 | Status: SHIPPED | OUTPATIENT
Start: 2020-04-21 | End: 2020-04-30

## 2020-04-21 RX ORDER — OXYCODONE AND ACETAMINOPHEN 5; 325 MG/1; MG/1
1 TABLET ORAL EVERY 6 HOURS PRN
Status: ON HOLD | COMMUNITY
Start: 2020-04-19 | End: 2020-05-04

## 2020-04-21 NOTE — PROGRESS NOTES
"Kathie Wetzel is a 80 year old female who is being evaluated via a billable video visit.      The patient has been notified of following:     \"This video visit will be conducted via a call between you and your physician/provider. We have found that certain health care needs can be provided without the need for an in-person physical exam.  This service lets us provide the care you need with a video conversation.  If a prescription is necessary we can send it directly to your pharmacy.  If lab work is needed we can place an order for that and you can then stop by our lab to have the test done at a later time.    Video visits are billed at different rates depending on your insurance coverage.  Please reach out to your insurance provider with any questions.    If during the course of the call the physician/provider feels a video visit is not appropriate, you will not be charged for this service.\"    Patient has given verbal consent for Video visit? Yes    How would you like to obtain your AVS? Mail a copy   Home Phone [phone]   Mobile [phone]       Patient would like the video invitation sent by: Text to cell phone: [phone]   Error in chart  [phone]- corrected.     Subjective     Kathie Wetzel is a 80 year old female who presents to clinic today for the following health issues:      Kathie Wetzel is a 80 year old female who presents to clinic today for the following health issues:    ED/UC Followup:    Facility:  [facility]   Date of visit: 4/19/2020  Reason for visit: Right sided pain from her waist up into her axilla and sometimes shoulder  Current Status: States only feeling a slight bit better and pain is slowly getting better.  Very painful to take a deep breath.  States that the muscle relaxant is not helping       Decrease in appetite and decreased fluid intake noted by daughter and will attempt to increase both. Mom just does not feel hungry and so does not feel like drinking the " Ensure.       Will be staying with her daughter as long as she is on the Percocet.  Is only using at night for pain to sleep and does sleep in a chair.  Taking tylenol twice daily but is sitting still most of the day.       Has only taken Ensure once.        Video Start Time: below    Patient Active Problem List   Diagnosis     Hyperlipidemia LDL goal <100     Benign essential hypertension     Anxiety     Retinal tear, left     Memory impairment of gradual onset     Lumbar radicular pain- right side.     S/P lumbar microdiscectomy     Sleep disturbance     CKD (chronic kidney disease) stage 3, GFR 30-59 ml/min (H)     Cellulitis of right upper extremity     Past Surgical History:   Procedure Laterality Date     APPENDECTOMY OPEN       CHOLECYSTECTOMY       DISCECTOMY LUMBAR POSTERIOR MICROSCOPIC ONE LEVEL Right 11/10/2017    Procedure: DISCECTOMY LUMBAR POSTERIOR MICROSCOPIC ONE LEVEL;  Right L3-4 hemilaminectomy, medial facetectomy, foraminotomy with microdiscectomy and use of X-ray and intraoperative microscope ;  Surgeon: Al Dailey MD;  Location: [facility] OR     PARATHYROIDECTOMY  3/14/2014    Procedure: PARATHYROIDECTOMY;  Neck Exploration, Excision Right  Parathyroid Adenoma, Pre Operative Sestimibi Injection, Rapid PTH Assay ;  Surgeon: Natividad Fischer MD;  Location:  OR       Social History     Tobacco Use     Smoking status: Former Smoker     Packs/day: 0.00     Last attempt to quit: 3/10/1972     Years since quittin.1     Smokeless tobacco: Never Used   Substance Use Topics     Alcohol use: Yes     Comment: 2 week      Family History   Problem Relation Age of Onset     Thyroid Disease Maternal Aunt      Thyroid Disease Other         cousin          Current Outpatient Medications   Medication Sig Dispense Refill     atorvastatin (LIPITOR) 10 MG tablet TAKE 1 TABLET(10 MG) BY MOUTH AT BEDTIME 90 tablet 1     Cholecalciferol (VITAMIN D PO) Take 1,000 Units by mouth daily         citalopram (CELEXA) 10 MG tablet Take 1 tablet (10 mg) by mouth daily 90 tablet 1     donepezil (ARICEPT) 10 MG tablet Take 1 tablet (10 mg) by mouth At Bedtime 90 tablet 3     gabapentin (NEURONTIN) 300 MG capsule Take 1 capsule (300 mg) by mouth 3 times daily 90 capsule 0     loratadine (CLARITIN) 10 MG tablet Take 10 mg by mouth daily       losartan (COZAAR) 50 MG tablet Take 1 tablet (50 mg) by mouth daily 90 tablet 1     Multiple Vitamins-Minerals (PRESERVISION AREDS PO) Take 1 tablet by mouth daily       oxyCODONE (ROXICODONE) 5 MG tablet Take 1 tablet (5 mg) by mouth every 6 hours as needed for severe pain 6 tablet 0     oxyCODONE-acetaminophen (PERCOCET) 5-325 MG tablet Take 1 tablet by mouth every 6 hours as needed       traZODone (DESYREL) 50 MG tablet Take 1-1.5 tablets (50-75 mg) by mouth At Bedtime 180 tablet 1     valACYclovir (VALTREX) 1000 mg tablet Take 1 tablet (1,000 mg) by mouth 3 times daily for 7 days 21 tablet 0     valACYclovir (VALTREX) 500 MG tablet TAKE 4 TABLETS BY MOUTH AT ONSET OF SYMPTOMS. MAY REPEAT IN 12 HOURS IF SYMPTOMS PERSIST 24 tablet 0     Allergies   Allergen Reactions     Simvastatin      Buttock pain     Penicillins Rash     Recent Labs   Lab Test 04/13/20  1024 06/05/18  0836 03/20/18  1757  06/21/17  0846   A1C 5.6  --   --   --   --    LDL 65 78  --   --  88   HDL 43* 44*  --   --  47*   TRIG 89 185*  --   --  187*   ALT 17 28 22  --  27   CR 0.94 0.98 0.99   < > 0.97   GFRESTIMATED 57* 55* 54*   < > 56*   GFRESTBLACK 66 66 65   < > 67   POTASSIUM 3.9 4.2 3.8   < > 4.2   TSH 2.43  --  2.46  --   --     < > = values in this interval not displayed.      BP Readings from Last 3 Encounters:   04/13/20 92/54   09/26/19 100/62   06/27/19 100/72    Wt Readings from Last 3 Encounters:   04/13/20 77.8 kg (171 lb 9.6 oz)   09/26/19 87.5 kg (192 lb 14.4 oz)   06/27/19 90.4 kg (199 lb 3.2 oz)           Reviewed and updated as needed this visit by Provider  Tobacco  Allergies  Meds  " Problems  Med Hx  Surg Hx  Fam Hx         Review of Systems   ROS COMP: CONSTITUTIONAL: NEGATIVE for fever, chills,    Weigh loss as discussed last week  INTEGUMENTARY/SKIN: no skin eruptions/breakouts.  Past history of shingles; she thinks it may have been back side or chest; uncertain  ENT/MOUTH: NEGATIVE for ear, mouth and throat problems  RESP: NEGATIVE for significant cough or SOB  CV: NEGATIVE for chest pain, palpitations or peripheral edema  GI: still not feeling hungry  MUSCULOSKELETAL: NEGATIVE for significant arthralgias or myalgia  NEURO: denies dizziness, no weakness ( low energy)  ENDOCRINE: B12 recent lab was on low side. Lipitor OK  HEME/ALLERGY/IMMUNE: no bleeding, B12 was on lower end in recent labs  PSYCHIATRIC: memory- Aricept, Citalopram - mood;       Objective    LMP  (LMP Unknown)   Estimated body mass index is 25.71 kg/m  as calculated from the following:    Height as of 9/26/19: 1.74 m (5' 8.5\").    Weight as of 4/13/20: 77.8 kg (171 lb 9.6 oz).  Physical Exam   GENERAL: alert, no distress and she is able to get up from chair independently  EYES: wearing glasses, conjunctiva is normal appearing  RESP: she is able to take deep breaths without splinting or wincing, no acute distress  CV: Regular, good color  ABDOMEN: soft when palpated by patient  MS: she is able to move all 4 extremities and rise from chair without assistance  Neuro: conversant, mentation is normal; speech is normal  SKIN: she is able to lift clothing showing right anterior chest, right axilla and side as well as right side of her back ; no erythema, no clusters or vesicles.   Normal skin ; daughter, Yanci also looked and confirmed no redness or blotches.   PSYCH: mentation appears normal      Diagnostic Test Results:  Labs reviewed in Epic  4/13/2020    B12 185 on 4/13   ( [id number])  Albumin 3.2 ( 3.4-5.0)    Assessment & Plan     Right sided pain- reviewed records from [facility].   No clear etiology found. They " prescribed Oxycodone and muscle relaxants for presumed muscle pain with little relief.  Given the character of her pain, and the history of shingles, she may have post herpetic neuralgia.     (B02.29) Postherpetic neuralgia  (primary encounter diagnosis)  Comment: since she has had outbreak; thinks it was right side but not sure; no injury, no other findings on evaluation 4/13 in clinic or 4/19 at [facility].  Treat as possible post herpetic neuralgia; muscle relaxants not helpful; Oxycodone only used when trying to sleep.   Plan: gabapentin (NEURONTIN) 300 MG capsule,         valACYclovir (VALTREX) 1000 mg tablet,         oxyCODONE (ROXICODONE) 5 MG tablet    She is currently at her daughter, Yanci's home in [place].            (E53.8) Low vitamin B12 level  Comment: 4/13/2020    B12 185 on 4/13   ( [id number])  Plan: recommend Cyanocobalamin 5000 mcg daily    (R63.0) Poor appetite  Comment: continue to keep well hydrated, eating as able; continue to try and find nutritional support which she will tolerate  Plan: hydration and eating ; do not wait until hungry or thirsty.         No follow-ups on file.    Cammie Daugherty MD  Internal Medicine   [facility]      Video-Visit Details    Type of service:  Video Visit  Video Start: 10:25 AM  Video End Time (time video stopped): 10:55 AM  30 minutes      Originating Location (pt. Location): Home    Distant Location (provider location):  [facility]     Mode of Communication:  Video Conference via [employer] ( initially connection issues)    Return in about 1 week (around 4/28/2020) for make sure she is feeling better. see if any outbreak; nerve pain better?? eating??.       Cammie Daugherty MD  Internal Medicine  electronically signed

## 2020-04-23 ENCOUNTER — VIRTUAL VISIT (OUTPATIENT)
Dept: FAMILY MEDICINE | Facility: CLINIC | Age: 80
End: 2020-04-23
Payer: MEDICARE

## 2020-04-23 DIAGNOSIS — M25.511 RIGHT SHOULDER PAIN, UNSPECIFIED CHRONICITY: Primary | ICD-10-CM

## 2020-04-23 DIAGNOSIS — M54.6 ACUTE RIGHT-SIDED THORACIC BACK PAIN: ICD-10-CM

## 2020-04-23 DIAGNOSIS — B02.29 POSTHERPETIC NEURALGIA: ICD-10-CM

## 2020-04-23 DIAGNOSIS — J90 PLEURAL EFFUSION, BACTERIAL: ICD-10-CM

## 2020-04-23 DIAGNOSIS — R63.4 WEIGHT LOSS: ICD-10-CM

## 2020-04-23 DIAGNOSIS — J90 PLEURAL EFFUSION: ICD-10-CM

## 2020-04-23 PROCEDURE — 99215 OFFICE O/P EST HI 40 MIN: CPT | Mod: 95 | Performed by: INTERNAL MEDICINE

## 2020-04-23 NOTE — PROGRESS NOTES
"Kathie Wetzel is a 80 year old female who is being evaluated via a billable video visit.      The patient has been notified of following:     \"This video visit will be conducted via a call between you and your physician/provider. We have found that certain health care needs can be provided without the need for an in-person physical exam.  This service lets us provide the care you need with a video conversation.  If a prescription is necessary we can send it directly to your pharmacy.  If lab work is needed we can place an order for that and you can then stop by our lab to have the test done at a later time.    Video visits are billed at different rates depending on your insurance coverage.  Please reach out to your insurance provider with any questions.    If during the course of the call the physician/provider feels a video visit is not appropriate, you will not be charged for this service.\"    Patient has given verbal consent for Video visit? Yes    How would you like to obtain your AVS? Mail a copy    Patient would like the video invitation sent by: Text to cell phone: [phone]    Will anyone else be joining your video visit?     Subjective     Kathie Wetzel is a 80 year old female who presents to clinic today for the following health issues:video visit conducted with pt and daughter, , Regi Millan. Genevieve is currently staying at her daughter's home in [place] due to pain, poor appetite and needing more assistance.     HPI  Medication Followup of Gabapentin and Valacyclovir     Taking Medication as prescribed: yes    Side Effects:  None    Medication Helping Symptoms:  Yes   (some)      started meds on Tuesday afternoon   Feels like they are helping but still unable to lay down to sleep.  now noting some swelling in her lower legs.     Still taking Percocet to help her sleep.    Tried to lay down last night in her bed but noted increased pain in her shoulder and side and then radiated to underneath her " right breast. discomfort lasted for while    ( keeps stating that something is wrong with her to her family )    Daughter felt she might be a little off balance but Genevieve says it is just from the pain)       20 ER evaluation at [facility] reviewed.  They felt it was muscle pull; no clear reason per pt.  Labs OK  CXR report; raised question of pulmonary process and started on Augmentin.     Patient Active Problem List   Diagnosis     Hyperlipidemia LDL goal <100     Benign essential hypertension     Anxiety     Retinal tear, left     Memory impairment of gradual onset     Lumbar radicular pain- right side.     S/P lumbar microdiscectomy     Sleep disturbance     CKD (chronic kidney disease) stage 3, GFR 30-59 ml/min (H)     Cellulitis of right upper extremity     Past Surgical History:   Procedure Laterality Date     APPENDECTOMY OPEN       CHOLECYSTECTOMY       DISCECTOMY LUMBAR POSTERIOR MICROSCOPIC ONE LEVEL Right 11/10/2017    Procedure: DISCECTOMY LUMBAR POSTERIOR MICROSCOPIC ONE LEVEL;  Right L3-4 hemilaminectomy, medial facetectomy, foraminotomy with microdiscectomy and use of X-ray and intraoperative microscope ;  Surgeon: Al Dailey MD;  Location: [facility] OR     PARATHYROIDECTOMY  3/14/2014    Procedure: PARATHYROIDECTOMY;  Neck Exploration, Excision Right  Parathyroid Adenoma, Pre Operative Sestimibi Injection, Rapid PTH Assay ;  Surgeon: Natividad Fischer MD;  Location: [facility] OR       Social History     Tobacco Use     Smoking status: Former Smoker     Packs/day: 0.00     Last attempt to quit: 3/10/1972     Years since quittin.1     Smokeless tobacco: Never Used   Substance Use Topics     Alcohol use: Yes     Comment: 2 week      Family History   Problem Relation Age of Onset     Thyroid Disease Maternal Aunt      Thyroid Disease Other         cousin          Current Outpatient Medications   Medication Sig Dispense Refill     atorvastatin (LIPITOR) 10 MG tablet TAKE 1  TABLET(10 MG) BY MOUTH AT BEDTIME 90 tablet 1     Cholecalciferol (VITAMIN D PO) Take 1,000 Units by mouth daily        citalopram (CELEXA) 10 MG tablet Take 1 tablet (10 mg) by mouth daily 90 tablet 1     donepezil (ARICEPT) 10 MG tablet Take 1 tablet (10 mg) by mouth At Bedtime 90 tablet 3     gabapentin (NEURONTIN) 300 MG capsule Take 1 capsule (300 mg) by mouth 3 times daily 90 capsule 0     levofloxacin (LEVAQUIN) 750 MG tablet Take 1 tablet (750 mg) by mouth daily 14 tablet 0     loratadine (CLARITIN) 10 MG tablet Take 10 mg by mouth daily       losartan (COZAAR) 50 MG tablet Take 1 tablet (50 mg) by mouth daily 90 tablet 1     Methylcobalamin (B-12) 5000 MCG TBDP Take 5,000 mcg by mouth daily 100 tablet 3     Multiple Vitamins-Minerals (PRESERVISION AREDS PO) Take 1 tablet by mouth daily       oxyCODONE (ROXICODONE) 5 MG tablet Take 1 tablet (5 mg) by mouth every 6 hours as needed for severe pain 12 tablet 0     oxyCODONE-acetaminophen (PERCOCET) 5-325 MG tablet Take 1 tablet by mouth every 6 hours as needed       traZODone (DESYREL) 50 MG tablet Take 1-1.5 tablets (50-75 mg) by mouth At Bedtime 180 tablet 1     valACYclovir (VALTREX) 1000 mg tablet Take 1 tablet (1,000 mg) by mouth 3 times daily for 7 days 21 tablet 0     valACYclovir (VALTREX) 500 MG tablet TAKE 4 TABLETS BY MOUTH AT ONSET OF SYMPTOMS. MAY REPEAT IN 12 HOURS IF SYMPTOMS PERSIST 24 tablet 0     Allergies   Allergen Reactions     Simvastatin      Buttock pain     Penicillins Rash     BP Readings from Last 3 Encounters:   04/30/20 106/62   04/13/20 92/54   09/26/19 100/62    Wt Readings from Last 3 Encounters:   04/13/20 77.8 kg (171 lb 9.6 oz)   09/26/19 87.5 kg (192 lb 14.4 oz)   06/27/19 90.4 kg (199 lb 3.2 oz)           Reviewed and updated as needed this visit by Provider  Tobacco  Allergies  Meds  Problems  Med Hx  Surg Hx  Fam Hx         Review of Systems   ROS COMP: CONSTITUTIONAL:no fever or chills, 20# weight loss in past 6  "months.  decreased oral intake overall as discussed.   INTEGUMENTARY/SKIN: NO right sided outbreak of shingles.  She was started on Valtrex in the event she was having outbreak;   ENT/MOUTH: NEGATIVE for ear, mouth and throat problems  RESP: feeling more short of breath at times;   CV: NEGATIVE for chest pain, palpitations or peripheral edema  GI: NEGATIVE for nausea, abdominal pain, heartburn, or change in bowel habits  MUSCULOSKELETAL: right sided pain, wrapping around to back; right shoulder pain without injury  NEURO: ?right sided neuropathic pain; referred pain of uncertain etiology   ENDOCRINE: NEGATIVE for temperature intolerance, skin/hair changes  HEME/ALLERGY/IMMUNE: fatigue, has recently moved to daughter's home to get more assistance; she is otherwise independent   PSYCHIATRIC: frustrated about not feeling well; cognitive impairment; she has been on Aricept and has done well.  Lives independent; currently needing more support from family.       Objective    LMP  (LMP Unknown)   Estimated body mass index is 25.71 kg/m  as calculated from the following:    Height as of 9/26/19: 1.74 m (5' 8.5\").    Weight as of 4/13/20: 77.8 kg (171 lb 9.6 oz).  Physical Exam     GENERAL: alert and no respiratory distress  EYES: Eyes grossly normal to inspection, conjunctivae and sclerae normal  RESP: no audible wheeze, cough, or visible cyanosis.  No visible retractions or increased work of breathing.  Able to speak fully in complete sentences.  MS: she has painful right shoulder; she is able to raise both arms over head and touch her upper back  SKIN: pt was able to stand and examined skin involving her right side and chest wall; no vesicles or dermatomal erythema is seen  NEURO: Cranial nerves grossly intact, mentation intact and speech normal  PSYCH: mentation appears normal, affect normal/bright, judgement and insight intact, normal speech and appearance well-groomed      Diagnostic Test Results:  Labs reviewed in " Epic      thoracic XRay: independent review;  mild thoracic scoliosis; no compression fracture identified.    Assessment & Plan     (M25.511) Right shoulder pain, unspecified chronicity  (primary encounter diagnosis)  Comment: no bony abnormality   Reviewed with pt and her daughter via video   Plan: XR Shoulder Right G/E 3 Views        With pleural effusion, suspect referred pain from fluid irritation of the diaphragm; needing narcotics at night to sleep; during the day,      (M54.6) Acute right-sided thoracic back pain  Comment: Back pain radiating to right side; concerning for compression fracture or facet abnormality. Video visit assessment  targeting T10-L2 region;  XRay finding suggest mild scoliosis; no compression fracture on independent review of XRay.   Plan: XR Thoracic Lumbar Spine 2 Views, oxyCODONE         (ROXICODONE) 5 MG tablet          (J90) Pleural effusion  Comment: reviewed CXR; report from [facility] ER is again reviewed; unable to see CXR; but no effusion was reported.   Plan: HC THORACENTESIS NEEDLE/CATH PLEURA W/IMAGING,         CT Chest w Contrast, Fluid Culture Aerobic         Bacterial, Cytology non gyn, IR REFERRAL, Gram         stain            (R63.4) Weight loss  Comment: loss 21 # in past 6 months, 28# in past 9-10 months;  Poor appetite as of late as well.  Wt Readings from Last 3 Encounters:   04/13/20 77.8 kg (171 lb 9.6 oz)   09/26/19 87.5 kg (192 lb 14.4 oz)   06/27/19 90.4 kg (199 lb 3.2 oz)      Plan: CT Chest w Contrast          (B02.29) Postherpetic neuralgia  Comment: based on the distribution of pain and hint of redness ( no vesicles) and negative [facility] CXR from Friday, 4/17, felt it would help to try the Valtrex and then Gabapentin added for added pain control;  Pt does have some cognitive impairment and felt she may have an early outbreak of possible shingles.    Plan: as noted above    addendum 4/30/20 following Thoracentesis  Additional video visit with pt and daughter to  review results and plan of care.   (J90) Pleural effusion, bacterial  Comment: 4/30/2020 loculated pleural effusion ; thoracentesis 550 ml removed preelim report GNR, GPcocci; PCN allergy   She denies cough, significant fatigue and 20 # weight loss.  She continues to need Oxycodone at night to help her get comfortable and allow her to rest.    Plan: levofloxacin (LEVAQUIN) 750 MG tablet,         oxyCODONE (ROXICODONE) 5 MG tablet         Pt and daughter continue to desire outpatient treatment.  Oral antibiotics, pain management, close follow up    Return in about 1 week (around 4/30/2020) for [facility] interventional radiology will call to set up thoracentesis and CT scan .    Cammie Daugherty MD  Internal Medicine   [facility]      Video-Visit Details    Type of service:  Video Visit  Video Start  3:40 PM    Video End Time:4:20 PM    Additional video calls 4/25/2020 2:00- 2:15 PM  4/30/2020  5:42- 6:04 PM  and telephone follow up with both on 5/3/2020 5:30-5:55 PM  with pt and daughter to coordinate additional outpatient evaluation work up with CT scan and thoracentesis     Originating Location (pt. Location): Home    Distant Location (provider location):  [facility]     Mode of Communication:  Video Conference via D[employer]    Return in about 1 week (around 4/30/2020) for [facility] interventional radiology will call to set up thoracentesis and CT scan .     Cammie Daugherty MD  Internal Medicine  electronically signed     Initially, 40 minutes is spent with patient and her daughter, over 50% of that time spent providing counselling, discussing and reviewing meds and potential side effects.  Subsequent video follow up contact as noted above.     Pt and daughter continue to desire outpatient treatment.  Oral antibiotics, pain management, close follow up

## 2020-04-24 ENCOUNTER — ANCILLARY PROCEDURE (OUTPATIENT)
Dept: GENERAL RADIOLOGY | Facility: CLINIC | Age: 80
End: 2020-04-24
Attending: INTERNAL MEDICINE
Payer: MEDICARE

## 2020-04-24 DIAGNOSIS — M54.6 ACUTE RIGHT-SIDED THORACIC BACK PAIN: ICD-10-CM

## 2020-04-24 DIAGNOSIS — M25.511 RIGHT SHOULDER PAIN, UNSPECIFIED CHRONICITY: ICD-10-CM

## 2020-04-24 PROCEDURE — 73030 X-RAY EXAM OF SHOULDER: CPT | Mod: RT

## 2020-04-24 PROCEDURE — 72080 X-RAY EXAM THORACOLMB 2/> VW: CPT | Mod: FY

## 2020-04-30 ENCOUNTER — HOSPITAL ENCOUNTER (OUTPATIENT)
Dept: GENERAL RADIOLOGY | Facility: CLINIC | Age: 80
End: 2020-04-30
Attending: RADIOLOGY
Payer: MEDICARE

## 2020-04-30 ENCOUNTER — HOSPITAL ENCOUNTER (OUTPATIENT)
Dept: ULTRASOUND IMAGING | Facility: CLINIC | Age: 80
End: 2020-04-30
Attending: INTERNAL MEDICINE
Payer: MEDICARE

## 2020-04-30 ENCOUNTER — TELEPHONE (OUTPATIENT)
Dept: FAMILY MEDICINE | Facility: CLINIC | Age: 80
End: 2020-04-30

## 2020-04-30 ENCOUNTER — HOSPITAL ENCOUNTER (OUTPATIENT)
Dept: CT IMAGING | Facility: CLINIC | Age: 80
End: 2020-04-30
Attending: INTERNAL MEDICINE
Payer: MEDICARE

## 2020-04-30 VITALS
SYSTOLIC BLOOD PRESSURE: 106 MMHG | DIASTOLIC BLOOD PRESSURE: 62 MMHG | RESPIRATION RATE: 16 BRPM | HEART RATE: 123 BPM | OXYGEN SATURATION: 91 %

## 2020-04-30 DIAGNOSIS — R63.4 WEIGHT LOSS: ICD-10-CM

## 2020-04-30 DIAGNOSIS — J90 PLEURAL EFFUSION: ICD-10-CM

## 2020-04-30 DIAGNOSIS — B02.29 POSTHERPETIC NEURALGIA: ICD-10-CM

## 2020-04-30 LAB
GRAM STN SPEC: ABNORMAL
SPECIMEN SOURCE: ABNORMAL

## 2020-04-30 PROCEDURE — 88305 TISSUE EXAM BY PATHOLOGIST: CPT | Mod: 26 | Performed by: INTERNAL MEDICINE

## 2020-04-30 PROCEDURE — 88305 TISSUE EXAM BY PATHOLOGIST: CPT | Performed by: INTERNAL MEDICINE

## 2020-04-30 PROCEDURE — 87076 CULTURE ANAEROBE IDENT EACH: CPT | Performed by: INTERNAL MEDICINE

## 2020-04-30 PROCEDURE — 87181 SC STD AGAR DILUTION PER AGT: CPT | Performed by: INTERNAL MEDICINE

## 2020-04-30 PROCEDURE — 25000125 ZZHC RX 250: Performed by: RADIOLOGY

## 2020-04-30 PROCEDURE — 71260 CT THORAX DX C+: CPT

## 2020-04-30 PROCEDURE — 32555 ASPIRATE PLEURA W/ IMAGING: CPT

## 2020-04-30 PROCEDURE — 00000155 ZZHCL STATISTIC H-CELL BLOCK W/STAIN: Performed by: INTERNAL MEDICINE

## 2020-04-30 PROCEDURE — 00000102 ZZHCL STATISTIC CYTO WRIGHT STAIN TC: Performed by: INTERNAL MEDICINE

## 2020-04-30 PROCEDURE — 25000128 H RX IP 250 OP 636: Performed by: RADIOLOGY

## 2020-04-30 PROCEDURE — 40000986 XR CHEST 1 VW

## 2020-04-30 PROCEDURE — 88112 CYTOPATH CELL ENHANCE TECH: CPT | Performed by: INTERNAL MEDICINE

## 2020-04-30 PROCEDURE — 88112 CYTOPATH CELL ENHANCE TECH: CPT | Mod: 26 | Performed by: INTERNAL MEDICINE

## 2020-04-30 PROCEDURE — 87070 CULTURE OTHR SPECIMN AEROBIC: CPT | Performed by: INTERNAL MEDICINE

## 2020-04-30 PROCEDURE — 87205 SMEAR GRAM STAIN: CPT | Performed by: INTERNAL MEDICINE

## 2020-04-30 RX ORDER — LEVOFLOXACIN 750 MG/1
750 TABLET, FILM COATED ORAL DAILY
Qty: 14 TABLET | Refills: 0 | Status: ON HOLD | OUTPATIENT
Start: 2020-04-30 | End: 2020-05-13

## 2020-04-30 RX ORDER — OXYCODONE HYDROCHLORIDE 5 MG/1
5 TABLET ORAL EVERY 6 HOURS PRN
Qty: 6 TABLET | Refills: 0 | Status: CANCELLED | OUTPATIENT
Start: 2020-04-30

## 2020-04-30 RX ORDER — NICOTINE POLACRILEX 4 MG
15-30 LOZENGE BUCCAL
Status: DISCONTINUED | OUTPATIENT
Start: 2020-04-30 | End: 2020-05-01 | Stop reason: HOSPADM

## 2020-04-30 RX ORDER — DEXTROSE MONOHYDRATE 25 G/50ML
25-50 INJECTION, SOLUTION INTRAVENOUS
Status: DISCONTINUED | OUTPATIENT
Start: 2020-04-30 | End: 2020-05-01 | Stop reason: HOSPADM

## 2020-04-30 RX ORDER — OXYCODONE HYDROCHLORIDE 5 MG/1
5 TABLET ORAL EVERY 6 HOURS PRN
Qty: 12 TABLET | Refills: 0 | Status: SHIPPED | OUTPATIENT
Start: 2020-04-30 | End: 2020-09-03

## 2020-04-30 RX ORDER — IOPAMIDOL 755 MG/ML
500 INJECTION, SOLUTION INTRAVASCULAR ONCE
Status: COMPLETED | OUTPATIENT
Start: 2020-04-30 | End: 2020-04-30

## 2020-04-30 RX ADMIN — IOPAMIDOL 80 ML: 755 INJECTION, SOLUTION INTRAVENOUS at 09:29

## 2020-04-30 RX ADMIN — LIDOCAINE HYDROCHLORIDE 10 ML: 10 INJECTION, SOLUTION EPIDURAL; INFILTRATION; INTRACAUDAL; PERINEURAL at 08:43

## 2020-04-30 RX ADMIN — SODIUM CHLORIDE 60 ML: 9 INJECTION, SOLUTION INTRAVENOUS at 09:30

## 2020-04-30 NOTE — PROGRESS NOTES
Patient tolerated right thoracentesis well.  550 mL of blood tinged fluid drained done by Dr Villagomez  Dressing clean dry and intact with no drainage.  NO albumin given. Patient had post chest x ray showing no pneumothorax.  Samples collected sent to lab, patient to next exam, states understanding of discharge instructions.  Cj Alexis RN, BSN

## 2020-04-30 NOTE — TELEPHONE ENCOUNTER
Pending Prescriptions:                       Disp   Refills    oxyCODONE (ROXICODONE) 5 MG tablet        6 tabl*0            Sig: Take 1 tablet (5 mg) by mouth every 6 hours as           needed for severe pain    Pt is fully out.

## 2020-04-30 NOTE — TELEPHONE ENCOUNTER
Controlled Substance Refill Request for oxycodone, 5 mg tab:  Problem List Complete:  No     PROVIDER TO CONSIDER COMPLETION OF PROBLEM LIST AND OVERVIEW/CONTROLLED SUBSTANCE AGREEMENT    THE MOST RECENT OFFICE VISIT MUST BE WITHIN THE PAST 3 MONTHS. AT LEAST ONE FACE TO FACE VISIT MUST OCCUR EVERY 6 MONTHS. ADDITIONAL VISITS CAN BE VIRTUAL.  (THIS STATEMENT SHOULD BE DELETED.)    Future Office visit:     Controlled substance agreement:   Encounter-Level CSA:    There are no encounter-level csa.     Patient-Level CSA:    There are no patient-level csa.       Last Urine Drug Screen: No results found for: CDAUT, No results found for: COMDAT, No results found for: THC13, PCP13, COC13, MAMP13, OPI13, AMP13, BZO13, TCA13, MTD13, BAR13, OXY13, PPX13, BUP13     Processing:  Rx to be electronically transmitted to pharmacy by provider      [email or web address]       checked in past 3 months?  Yes Last prescribed for 6 tabs on 4/21/20. Prior to that, oxy-acetaminophen, 5-325 mg  prescribed on 4/19 for 6 tabs        Last Written Prescription Date:  4/21/20  Last Fill Quantity: 6,   # refills: 0  Last Office Visit: 4/23/20  Future Office visit:       Routing refill request to provider for review/approval because:  Drug not on the G, P or [facility] refill protocol or controlled substance

## 2020-04-30 NOTE — PROCEDURES
[facility]    Procedure: Right thora    Date/Time: 4/30/2020 10:29 AM  Performed by: Alexander Villagomez MD  Authorized by: Alexander Villagomez MD     UNIVERSAL PROTOCOL   Site Marked: NA  Prior Images Obtained and Reviewed:  Yes  Required items: Required blood products, implants, devices and special equipment available    Patient identity confirmed:  Verbally with patient, arm band, provided demographic data and hospital-assigned identification number  Patient was reevaluated immediately before administering moderate or deep sedation or anesthesia  Confirmation Checklist:  Patient's identity using two indicators, relevant allergies, procedure was appropriate and matched the consent or emergent situation and correct equipment/implants were available  Time out: Immediately prior to the procedure a time out was called    Universal Protocol: the Joint Commission Universal Protocol was followed    Preparation: Patient was prepped and draped in usual sterile fashion    ESBL (mL):  2         ANESTHESIA    Anesthesia: Local infiltration  Local Anesthetic:  Lidocaine 1% without epinephrine      SEDATION    Patient Sedated: No    See dictated procedure note for full details.  Findings: Right US guided thora with removal of amairani fluid.  Pleural fluid is complex and multiseptated.  Will not be able to drain all fluid percutaneously--may need CT surgery consult.  Pain before and after procedure.    Specimens: fluid and/or tissue for laboratory analysis    Complications: None    Condition: Stable    PROCEDURE   Patient Tolerance:  Patient tolerated the procedure well with no immediate complications    Length of time physician/provider present for 1:1 monitoring during sedation: 0

## 2020-04-30 NOTE — TELEPHONE ENCOUNTER
Pt's daughter, Regi (consent on file) called inquiring the status of the Oxy refill and requested for Dr. Daugherty to give her a call, [phone] to discuss pt.    Joyce Mcelroy on 4/30/2020 at 12:43 PM

## 2020-04-30 NOTE — TELEPHONE ENCOUNTER
Pt and daughter contacted. Thoracentesis today 550 ml removed.   Still having pain  Renewed Oxycodone; pt remains at her daughter's home.

## 2020-05-02 ENCOUNTER — TELEPHONE (OUTPATIENT)
Dept: INTERNAL MEDICINE | Facility: CLINIC | Age: 80
End: 2020-05-02

## 2020-05-02 NOTE — TELEPHONE ENCOUNTER
MD on call    Received pager from labs about prelim report of GS pleural fluid    Patient already on Levaquin. I don't think she needs to change Abx at this time. I didn't call the patient     Primary clinic to f/u on cultures     Gifty Gold MD  Internal Medicine

## 2020-05-04 ENCOUNTER — VIRTUAL VISIT (OUTPATIENT)
Dept: FAMILY MEDICINE | Facility: CLINIC | Age: 80
End: 2020-05-04
Payer: MEDICARE

## 2020-05-04 ENCOUNTER — HOSPITAL ENCOUNTER (INPATIENT)
Facility: CLINIC | Age: 80
LOS: 9 days | Discharge: HOME HEALTH | DRG: 163 | End: 2020-05-13
Attending: HOSPITALIST | Admitting: HOSPITALIST
Payer: MEDICARE

## 2020-05-04 DIAGNOSIS — R41.3 MEMORY IMPAIRMENT OF GRADUAL ONSET: ICD-10-CM

## 2020-05-04 DIAGNOSIS — J18.9 PARAPNEUMONIC EFFUSION: ICD-10-CM

## 2020-05-04 DIAGNOSIS — R79.89 LOW VITAMIN B12 LEVEL: ICD-10-CM

## 2020-05-04 DIAGNOSIS — Z86.018: ICD-10-CM

## 2020-05-04 DIAGNOSIS — J91.8 PARAPNEUMONIC EFFUSION: ICD-10-CM

## 2020-05-04 DIAGNOSIS — D63.8 ANEMIA DUE TO CHRONIC ILLNESS: ICD-10-CM

## 2020-05-04 DIAGNOSIS — J91.8 PARAPNEUMONIC EFFUSION: Primary | ICD-10-CM

## 2020-05-04 DIAGNOSIS — M41.35 THORACOGENIC SCOLIOSIS OF THORACOLUMBAR REGION: ICD-10-CM

## 2020-05-04 DIAGNOSIS — J90 PLEURAL EFFUSION ON RIGHT: Primary | ICD-10-CM

## 2020-05-04 DIAGNOSIS — N18.30 CKD (CHRONIC KIDNEY DISEASE) STAGE 3, GFR 30-59 ML/MIN (H): ICD-10-CM

## 2020-05-04 DIAGNOSIS — R06.02 SOB (SHORTNESS OF BREATH): ICD-10-CM

## 2020-05-04 DIAGNOSIS — M54.6 ACUTE RIGHT-SIDED THORACIC BACK PAIN: ICD-10-CM

## 2020-05-04 DIAGNOSIS — R63.4 WEIGHT LOSS: ICD-10-CM

## 2020-05-04 DIAGNOSIS — R63.0 POOR APPETITE: ICD-10-CM

## 2020-05-04 DIAGNOSIS — D72.828 OTHER ELEVATED WHITE BLOOD CELL (WBC) COUNT: ICD-10-CM

## 2020-05-04 DIAGNOSIS — J18.9 PARAPNEUMONIC EFFUSION: Primary | ICD-10-CM

## 2020-05-04 DIAGNOSIS — I10 BENIGN ESSENTIAL HYPERTENSION: ICD-10-CM

## 2020-05-04 LAB
ALBUMIN SERPL-MCNC: 1.7 G/DL (ref 3.4–5)
ALP SERPL-CCNC: 340 U/L (ref 40–150)
ALT SERPL W P-5'-P-CCNC: 31 U/L (ref 0–50)
ANION GAP SERPL CALCULATED.3IONS-SCNC: 8 MMOL/L (ref 3–14)
APTT PPP: 53 SEC (ref 22–37)
AST SERPL W P-5'-P-CCNC: 46 U/L (ref 0–45)
BILIRUB SERPL-MCNC: 0.7 MG/DL (ref 0.2–1.3)
BUN SERPL-MCNC: 28 MG/DL (ref 7–30)
CALCIUM SERPL-MCNC: 8.6 MG/DL (ref 8.5–10.1)
CHLORIDE SERPL-SCNC: 102 MMOL/L (ref 94–109)
CO2 SERPL-SCNC: 26 MMOL/L (ref 20–32)
COPATH REPORT: NORMAL
CREAT SERPL-MCNC: 1.19 MG/DL (ref 0.52–1.04)
CRP SERPL-MCNC: 280 MG/L (ref 0–8)
DIFFERENTIAL METHOD BLD: ABNORMAL
ERYTHROCYTE [DISTWIDTH] IN BLOOD BY AUTOMATED COUNT: 13.7 % (ref 10–15)
ERYTHROCYTE [SEDIMENTATION RATE] IN BLOOD BY WESTERGREN METHOD: 102 MM/H (ref 0–30)
GFR SERPL CREATININE-BSD FRML MDRD: 43 ML/MIN/{1.73_M2}
GLUCOSE SERPL-MCNC: 113 MG/DL (ref 70–99)
HCT VFR BLD AUTO: 30.1 % (ref 35–47)
HGB BLD-MCNC: 9.5 G/DL (ref 11.7–15.7)
INR PPP: 1.3 (ref 0.86–1.14)
LACTATE BLD-SCNC: 0.9 MMOL/L (ref 0.7–2)
LYMPHOCYTES # BLD AUTO: 1.7 10E9/L (ref 0.8–5.3)
LYMPHOCYTES NFR BLD AUTO: 7 %
MCH RBC QN AUTO: 27.9 PG (ref 26.5–33)
MCHC RBC AUTO-ENTMCNC: 31.6 G/DL (ref 31.5–36.5)
MCV RBC AUTO: 89 FL (ref 78–100)
MONOCYTES # BLD AUTO: 1 10E9/L (ref 0–1.3)
MONOCYTES NFR BLD AUTO: 4 %
NEUTROPHILS # BLD AUTO: 21.5 10E9/L (ref 1.6–8.3)
NEUTROPHILS NFR BLD AUTO: 89 %
PLATELET # BLD AUTO: 436 10E9/L (ref 150–450)
PLATELET # BLD EST: ABNORMAL 10*3/UL
POTASSIUM SERPL-SCNC: 4.2 MMOL/L (ref 3.4–5.3)
PROT SERPL-MCNC: 6.4 G/DL (ref 6.8–8.8)
PTH-INTACT SERPL-MCNC: 36 PG/ML (ref 18–80)
RBC # BLD AUTO: 3.4 10E12/L (ref 3.8–5.2)
RBC MORPH BLD: ABNORMAL
SODIUM SERPL-SCNC: 136 MMOL/L (ref 133–144)
VARIANT LYMPHS BLD QL SMEAR: PRESENT
VIT B12 SERPL-MCNC: 2225 PG/ML (ref 193–986)
WBC # BLD AUTO: 24.2 10E9/L (ref 4–11)

## 2020-05-04 PROCEDURE — 87635 SARS-COV-2 COVID-19 AMP PRB: CPT | Performed by: HOSPITALIST

## 2020-05-04 PROCEDURE — 99223 1ST HOSP IP/OBS HIGH 75: CPT | Mod: AI | Performed by: HOSPITALIST

## 2020-05-04 PROCEDURE — 12000000 ZZH R&B MED SURG/OB

## 2020-05-04 PROCEDURE — 85652 RBC SED RATE AUTOMATED: CPT | Performed by: INTERNAL MEDICINE

## 2020-05-04 PROCEDURE — 85610 PROTHROMBIN TIME: CPT | Performed by: HOSPITALIST

## 2020-05-04 PROCEDURE — 25000125 ZZHC RX 250: Performed by: HOSPITALIST

## 2020-05-04 PROCEDURE — 83970 ASSAY OF PARATHORMONE: CPT | Performed by: INTERNAL MEDICINE

## 2020-05-04 PROCEDURE — 80053 COMPREHEN METABOLIC PANEL: CPT | Performed by: HOSPITALIST

## 2020-05-04 PROCEDURE — 80053 COMPREHEN METABOLIC PANEL: CPT | Performed by: INTERNAL MEDICINE

## 2020-05-04 PROCEDURE — 82607 VITAMIN B-12: CPT | Performed by: INTERNAL MEDICINE

## 2020-05-04 PROCEDURE — 71046 X-RAY EXAM CHEST 2 VIEWS: CPT | Mod: FY

## 2020-05-04 PROCEDURE — 82306 VITAMIN D 25 HYDROXY: CPT | Performed by: INTERNAL MEDICINE

## 2020-05-04 PROCEDURE — 83605 ASSAY OF LACTIC ACID: CPT | Performed by: HOSPITALIST

## 2020-05-04 PROCEDURE — 85730 THROMBOPLASTIN TIME PARTIAL: CPT | Performed by: HOSPITALIST

## 2020-05-04 PROCEDURE — 86140 C-REACTIVE PROTEIN: CPT | Performed by: INTERNAL MEDICINE

## 2020-05-04 PROCEDURE — 99207 ZZC OFFICE-HOSPITAL ADMIT: CPT | Mod: 95 | Performed by: INTERNAL MEDICINE

## 2020-05-04 PROCEDURE — 36415 COLL VENOUS BLD VENIPUNCTURE: CPT | Performed by: INTERNAL MEDICINE

## 2020-05-04 PROCEDURE — 36415 COLL VENOUS BLD VENIPUNCTURE: CPT | Performed by: HOSPITALIST

## 2020-05-04 PROCEDURE — 85025 COMPLETE CBC W/AUTO DIFF WBC: CPT | Performed by: INTERNAL MEDICINE

## 2020-05-04 PROCEDURE — 25000132 ZZH RX MED GY IP 250 OP 250 PS 637: Mod: GY | Performed by: HOSPITALIST

## 2020-05-04 PROCEDURE — 25000128 H RX IP 250 OP 636: Performed by: HOSPITALIST

## 2020-05-04 RX ORDER — POTASSIUM CHLORIDE 7.45 MG/ML
10 INJECTION INTRAVENOUS
Status: DISCONTINUED | OUTPATIENT
Start: 2020-05-04 | End: 2020-05-13 | Stop reason: HOSPADM

## 2020-05-04 RX ORDER — POTASSIUM CHLORIDE 1500 MG/1
20-40 TABLET, EXTENDED RELEASE ORAL
Status: DISCONTINUED | OUTPATIENT
Start: 2020-05-04 | End: 2020-05-13 | Stop reason: HOSPADM

## 2020-05-04 RX ORDER — LOSARTAN POTASSIUM 50 MG/1
50 TABLET ORAL DAILY
Status: DISCONTINUED | OUTPATIENT
Start: 2020-05-05 | End: 2020-05-04

## 2020-05-04 RX ORDER — POTASSIUM CL/LIDO/0.9 % NACL 10MEQ/0.1L
10 INTRAVENOUS SOLUTION, PIGGYBACK (ML) INTRAVENOUS
Status: DISCONTINUED | OUTPATIENT
Start: 2020-05-04 | End: 2020-05-13 | Stop reason: HOSPADM

## 2020-05-04 RX ORDER — AMOXICILLIN 250 MG
2 CAPSULE ORAL 2 TIMES DAILY PRN
Status: DISCONTINUED | OUTPATIENT
Start: 2020-05-04 | End: 2020-05-05

## 2020-05-04 RX ORDER — OXYCODONE AND ACETAMINOPHEN 5; 325 MG/1; MG/1
1-2 TABLET ORAL EVERY 4 HOURS PRN
Status: DISCONTINUED | OUTPATIENT
Start: 2020-05-04 | End: 2020-05-05

## 2020-05-04 RX ORDER — POTASSIUM CHLORIDE 1.5 G/1.58G
20-40 POWDER, FOR SOLUTION ORAL
Status: DISCONTINUED | OUTPATIENT
Start: 2020-05-04 | End: 2020-05-13 | Stop reason: HOSPADM

## 2020-05-04 RX ORDER — LIDOCAINE 40 MG/G
CREAM TOPICAL
Status: DISCONTINUED | OUTPATIENT
Start: 2020-05-04 | End: 2020-05-13 | Stop reason: HOSPADM

## 2020-05-04 RX ORDER — POTASSIUM CHLORIDE 29.8 MG/ML
20 INJECTION INTRAVENOUS
Status: DISCONTINUED | OUTPATIENT
Start: 2020-05-04 | End: 2020-05-13 | Stop reason: HOSPADM

## 2020-05-04 RX ORDER — POLYETHYLENE GLYCOL 3350 17 G/17G
17 POWDER, FOR SOLUTION ORAL DAILY PRN
Status: DISCONTINUED | OUTPATIENT
Start: 2020-05-04 | End: 2020-05-05

## 2020-05-04 RX ORDER — DOXYCYCLINE 100 MG/10ML
100 INJECTION, POWDER, LYOPHILIZED, FOR SOLUTION INTRAVENOUS EVERY 12 HOURS
Status: DISCONTINUED | OUTPATIENT
Start: 2020-05-04 | End: 2020-05-06

## 2020-05-04 RX ORDER — PROCHLORPERAZINE MALEATE 5 MG
5 TABLET ORAL EVERY 6 HOURS PRN
Status: DISCONTINUED | OUTPATIENT
Start: 2020-05-04 | End: 2020-05-05

## 2020-05-04 RX ORDER — NALOXONE HYDROCHLORIDE 0.4 MG/ML
.1-.4 INJECTION, SOLUTION INTRAMUSCULAR; INTRAVENOUS; SUBCUTANEOUS
Status: DISCONTINUED | OUTPATIENT
Start: 2020-05-04 | End: 2020-05-13 | Stop reason: HOSPADM

## 2020-05-04 RX ORDER — ONDANSETRON 4 MG/1
4 TABLET, ORALLY DISINTEGRATING ORAL EVERY 6 HOURS PRN
Status: DISCONTINUED | OUTPATIENT
Start: 2020-05-04 | End: 2020-05-05

## 2020-05-04 RX ORDER — DONEPEZIL HYDROCHLORIDE 10 MG/1
10 TABLET, FILM COATED ORAL AT BEDTIME
Status: DISCONTINUED | OUTPATIENT
Start: 2020-05-04 | End: 2020-05-13 | Stop reason: HOSPADM

## 2020-05-04 RX ORDER — ONDANSETRON 2 MG/ML
4 INJECTION INTRAMUSCULAR; INTRAVENOUS EVERY 6 HOURS PRN
Status: DISCONTINUED | OUTPATIENT
Start: 2020-05-04 | End: 2020-05-05

## 2020-05-04 RX ORDER — AMOXICILLIN 250 MG
1 CAPSULE ORAL 2 TIMES DAILY PRN
Status: DISCONTINUED | OUTPATIENT
Start: 2020-05-04 | End: 2020-05-05

## 2020-05-04 RX ORDER — PROCHLORPERAZINE 25 MG
12.5 SUPPOSITORY, RECTAL RECTAL EVERY 12 HOURS PRN
Status: DISCONTINUED | OUTPATIENT
Start: 2020-05-04 | End: 2020-05-05

## 2020-05-04 RX ORDER — ATORVASTATIN CALCIUM 10 MG/1
10 TABLET, FILM COATED ORAL AT BEDTIME
Status: DISCONTINUED | OUTPATIENT
Start: 2020-05-04 | End: 2020-05-13 | Stop reason: HOSPADM

## 2020-05-04 RX ORDER — SODIUM CHLORIDE, SODIUM LACTATE, POTASSIUM CHLORIDE, CALCIUM CHLORIDE 600; 310; 30; 20 MG/100ML; MG/100ML; MG/100ML; MG/100ML
INJECTION, SOLUTION INTRAVENOUS CONTINUOUS
Status: DISCONTINUED | OUTPATIENT
Start: 2020-05-05 | End: 2020-05-06

## 2020-05-04 RX ORDER — ACETAMINOPHEN 325 MG/1
650 TABLET ORAL EVERY 4 HOURS PRN
Status: DISCONTINUED | OUTPATIENT
Start: 2020-05-04 | End: 2020-05-05

## 2020-05-04 RX ORDER — ACETAMINOPHEN 650 MG/1
650 SUPPOSITORY RECTAL EVERY 4 HOURS PRN
Status: DISCONTINUED | OUTPATIENT
Start: 2020-05-04 | End: 2020-05-05

## 2020-05-04 RX ORDER — BISACODYL 10 MG
10 SUPPOSITORY, RECTAL RECTAL DAILY PRN
Status: DISCONTINUED | OUTPATIENT
Start: 2020-05-04 | End: 2020-05-05

## 2020-05-04 RX ORDER — ERTAPENEM 1 G/1
1 INJECTION, POWDER, LYOPHILIZED, FOR SOLUTION INTRAMUSCULAR; INTRAVENOUS EVERY 24 HOURS
Status: DISCONTINUED | OUTPATIENT
Start: 2020-05-04 | End: 2020-05-08

## 2020-05-04 RX ORDER — GABAPENTIN 300 MG/1
300 CAPSULE ORAL 3 TIMES DAILY
Status: DISCONTINUED | OUTPATIENT
Start: 2020-05-04 | End: 2020-05-13 | Stop reason: HOSPADM

## 2020-05-04 RX ADMIN — PHYTONADIONE 5 MG: 10 INJECTION, EMULSION INTRAMUSCULAR; INTRAVENOUS; SUBCUTANEOUS at 22:46

## 2020-05-04 RX ADMIN — ATORVASTATIN CALCIUM 10 MG: 10 TABLET, FILM COATED ORAL at 22:31

## 2020-05-04 RX ADMIN — DOXYCYCLINE 100 MG: 100 INJECTION, POWDER, LYOPHILIZED, FOR SOLUTION INTRAVENOUS at 23:08

## 2020-05-04 RX ADMIN — OXYCODONE HYDROCHLORIDE AND ACETAMINOPHEN 1 TABLET: 5; 325 TABLET ORAL at 22:46

## 2020-05-04 RX ADMIN — GABAPENTIN 300 MG: 300 CAPSULE ORAL at 22:31

## 2020-05-04 RX ADMIN — ERTAPENEM SODIUM 1 G: 1 INJECTION, POWDER, LYOPHILIZED, FOR SOLUTION INTRAMUSCULAR; INTRAVENOUS at 22:31

## 2020-05-04 RX ADMIN — DONEPEZIL HYDROCHLORIDE 10 MG: 10 TABLET ORAL at 22:46

## 2020-05-04 ASSESSMENT — ACTIVITIES OF DAILY LIVING (ADL)
FALL_HISTORY_WITHIN_LAST_SIX_MONTHS: NO
COGNITION: 0 - NO COGNITION ISSUES REPORTED
TOILETING: 0-->INDEPENDENT
DRESS: 0-->INDEPENDENT
BATHING: 0-->INDEPENDENT
RETIRED_COMMUNICATION: 0-->UNDERSTANDS/COMMUNICATES WITHOUT DIFFICULTY
RETIRED_EATING: 0-->INDEPENDENT
SWALLOWING: 0-->SWALLOWS FOODS/LIQUIDS WITHOUT DIFFICULTY
TRANSFERRING: 0-->INDEPENDENT
AMBULATION: 0-->INDEPENDENT

## 2020-05-04 NOTE — PROGRESS NOTES
"Kathie Wetzel is a 80 year old female who is being evaluated via a billable telephone visit.      The patient has been notified of following:     \"This telephone visit will be conducted via a call between you and your physician/provider. We have found that certain health care needs can be provided without the need for a physical exam.  This service lets us provide the care you need with a short phone conversation.  If a prescription is necessary we can send it directly to your pharmacy.  If lab work is needed we can place an order for that and you can then stop by our lab to have the test done at a later time.    Telephone visits are billed at different rates depending on your insurance coverage. During this emergency period, for some insurers they may be billed the same as an in-person visit.  Please reach out to your insurance provider with any questions.    If during the course of the call the physician/provider feels a telephone visit is not appropriate, you will not be charged for this service.\"    Patient has given verbal consent for Telephone visit?  Yes    What phone number would you like to be contacted at? [phone]    How would you like to obtain your AVS? Mail a copy    Subjective     Kathie Wetzel is a 80 year old female who presents to clinic today for the following health issues:  Ongoing fatigue, poor oral intake, parapneumonic effusion, 20# weight loss in the past 6 months.  Spoke with pt and her daughter and set up CXR and blood work to be done at [facility] prior to completion of this video visit.     Daughter states her mom is very weak and trouble walking around.  She requested the use of a wheelchair when she is in the clinic at [place] today.     HPI      Kathie Wetzel is an 80 year old female who continues to be fatigued, shortness of breath with exertion, and not eating well.  She has not felt well for over 2 weeks, noting 20# unintentional weight loss, decreased oral " "intake and has become weaker; she lives independently but the past two weeks has been staying with her daughter.  She has been seen on multiple occasions by PCP utilizing video visit technology, [facility] ER Visit on 4/19/2020 where she had CXR and blood work and was Dx with \"muscle strain\" and discharged on Oxycodone and muscle relaxants. She did not improve and the following week additional evaluation was conducted as an out pt.  She continues to feel weak.   As part of her evaluation for right sided pain and possible \"pulled muscle\", a thoracic  Xray was done in clinic to assess for possible compression fracture as a cause for probable referred pain.  No compression fracture was found,  Mild scoliosis was noted and a right sided.     A right sided pleural effusion was noted.   She was then scheduled for a thoracentesis and CT scan done on 4/30/2020; 550 ml of fluid was removed. It was noted to be a loculated pleural effusion with incomplete removal of fluid.  Cytology was negative for malignancy, Gram stain indicated several cell types but culture noting \"broth only\". She was started on Levaquin 750 mg PO daily.  Despite that, her white count has increased and HGB has decreased.   Lab Results   Component Value Date    WBC 24.2 05/04/2020    WBC 7.4 04/13/2020        Lab Results   Component Value Date    HGB 9.5 05/04/2020    HGB 12.3 04/13/2020     Sed Rate   Date Value Ref Range Status   05/04/2020 102 (H) 0 - 30 mm/h Final     Comment:     Results confirmed by repeat test  106          follow up CXR today indicates recurrent pleural effusion.  Labs as noted above  Today she continues to be weak, shortness of breath with exertion ( no covid exposure or symptoms)    Her activity is limited       Patient Active Problem List   Diagnosis     Hyperlipidemia LDL goal <100     Benign essential hypertension     Anxiety     Retinal tear, left     Memory impairment of gradual onset     Lumbar radicular pain- right " side.     S/P lumbar microdiscectomy     Sleep disturbance     CKD (chronic kidney disease) stage 3, GFR 30-59 ml/min (H)     Cellulitis of right upper extremity     Past Surgical History:   Procedure Laterality Date     APPENDECTOMY OPEN       CHOLECYSTECTOMY       DISCECTOMY LUMBAR POSTERIOR MICROSCOPIC ONE LEVEL Right 11/10/2017    Procedure: DISCECTOMY LUMBAR POSTERIOR MICROSCOPIC ONE LEVEL;  Right L3-4 hemilaminectomy, medial facetectomy, foraminotomy with microdiscectomy and use of X-ray and intraoperative microscope ;  Surgeon: Al Dailey MD;  Location: [facility] OR     PARATHYROIDECTOMY  3/14/2014    Procedure: PARATHYROIDECTOMY;  Neck Exploration, Excision Right  Parathyroid Adenoma, Pre Operative Sestimibi Injection, Rapid PTH Assay ;  Surgeon: Natividad Fischer MD;  Location: [facility] OR       Social History     Tobacco Use     Smoking status: Former Smoker     Packs/day: 0.00     Last attempt to quit: 3/10/1972     Years since quittin.1     Smokeless tobacco: Never Used   Substance Use Topics     Alcohol use: Yes     Comment: 2 week      Family History   Problem Relation Age of Onset     Thyroid Disease Maternal Aunt      Thyroid Disease Other         cousin          Current Outpatient Medications   Medication Sig Dispense Refill     atorvastatin (LIPITOR) 10 MG tablet TAKE 1 TABLET(10 MG) BY MOUTH AT BEDTIME 90 tablet 1     Cholecalciferol (VITAMIN D PO) Take 1,000 Units by mouth daily        citalopram (CELEXA) 10 MG tablet Take 1 tablet (10 mg) by mouth daily 90 tablet 1     donepezil (ARICEPT) 10 MG tablet Take 1 tablet (10 mg) by mouth At Bedtime 90 tablet 3     gabapentin (NEURONTIN) 300 MG capsule Take 1 capsule (300 mg) by mouth 3 times daily 90 capsule 0     levofloxacin (LEVAQUIN) 750 MG tablet Take 1 tablet (750 mg) by mouth daily 14 tablet 0     loratadine (CLARITIN) 10 MG tablet Take 10 mg by mouth daily       losartan (COZAAR) 50 MG tablet Take 1 tablet (50 mg) by mouth daily  90 tablet 1     Methylcobalamin (B-12) 5000 MCG TBDP Take 5,000 mcg by mouth daily 100 tablet 3     Multiple Vitamins-Minerals (PRESERVISION AREDS PO) Take 1 tablet by mouth daily       oxyCODONE (ROXICODONE) 5 MG tablet Take 1 tablet (5 mg) by mouth every 6 hours as needed for severe pain 12 tablet 0     oxyCODONE-acetaminophen (PERCOCET) 5-325 MG tablet Take 1 tablet by mouth every 6 hours as needed       traZODone (DESYREL) 50 MG tablet Take 1-1.5 tablets (50-75 mg) by mouth At Bedtime 180 tablet 1     valACYclovir (VALTREX) 1000 mg tablet Take 1 tablet (1,000 mg) by mouth 3 times daily for 7 days 21 tablet 0     valACYclovir (VALTREX) 500 MG tablet TAKE 4 TABLETS BY MOUTH AT ONSET OF SYMPTOMS. MAY REPEAT IN 12 HOURS IF SYMPTOMS PERSIST 24 tablet 0     Allergies   Allergen Reactions     Simvastatin      Buttock pain     Penicillins Rash     Recent Labs   Lab Test 04/13/20  1024 06/05/18  0836 03/20/18  1757  06/21/17  0846   A1C 5.6  --   --   --   --    LDL 65 78  --   --  88   HDL 43* 44*  --   --  47*   TRIG 89 185*  --   --  187*   ALT 17 28 22  --  27   CR 0.94 0.98 0.99   < > 0.97   GFRESTIMATED 57* 55* 54*   < > 56*   GFRESTBLACK 66 66 65   < > 67   POTASSIUM 3.9 4.2 3.8   < > 4.2   TSH 2.43  --  2.46  --   --     < > = values in this interval not displayed.      BP Readings from Last 3 Encounters:   04/30/20 106/62   04/13/20 92/54   09/26/19 100/62    Wt Readings from Last 3 Encounters:   04/13/20 77.8 kg (171 lb 9.6 oz)   09/26/19 87.5 kg (192 lb 14.4 oz)   06/27/19 90.4 kg (199 lb 3.2 oz)            Reviewed and updated as needed this visit by Provider  Tobacco  Allergies  Meds  Problems  Med Hx  Surg Hx  Fam Hx         Review of Systems   ROS COMP: CONSTITUTIONAL: NEGATIVE for fever, chills; she has had 20# unintentional weight loss over past 6 months;  INTEGUMENTARY/SKIN: no zoster eruption  ( considered as part of the right sided pain along rib margin);she was started on Valtrex and added  Gabapentin for possible post herpetic neuralgia pain early in the course. Valtrex stopped but continue Gabapentin for pain management.   EYES: she wears glasses;   ENT/MOUTH: NEGATIVE for ear, mouth and throat problems  RESP: recent dx of parapneumonic effusion with loculated effusion; incomplete evacuation of 550 ML of pleural fluid on 4/30/20. Due to gram stain  Results, she was started on Levaquin.  CV: NEGATIVE for chest pain, palpitations or peripheral edema  GI: poor appetite but no change in bowel function; no constipation or diarrhea  MUSCULOSKELETAL: weak; prior to illness she was independent; lived alone, ambulated without assistance; supportive family  NEURO: cognitive impairment; she has done well on Aricept   ENDOCRINE: hx of hyperparathyroidism; successful resection of large parathyroid adenoma.  HEME/ALLERGY/IMMUNE: fatigue  PSYCHIATRIC: NEGATIVE for changes in mood or affect       Objective   Reported vitals:  LMP  (LMP Unknown)    GENERAL: elderly, fatigued and in no distressed; pt was examined via video and did not see pt ambulate today; she is quite pleasant. Sitting more   EYES: Eyes grossly normal to inspection, conjunctivae and sclerae normal  RESP: no audible wheeze, cough, or visible cyanosis.  No visible retractions or increased work of breathing.  Able to speak fully in complete sentences.  MS: extremities normal- no gross deformities noted; with her daughters assistance, she has not pitting edema; more trace  SKIN: no suspicious lesions or rashes  NEURO: Cranial nerves grossly intact, mentation intact and speech normal  PSYCH: mentation appears normal and affect normal/bright    Diagnostic Test Results:  Labs reviewed in Epic      Independent review of CXR today  Reviewed via video with pt and her daughter, comparison with previous XRays; one done in Spring 2018, 4/30/2020 and today.  Lab Results   Component Value Date    WBC 24.2 05/04/2020     Lab Results   Component Value Date    RBC  3.40 05/04/2020     Lab Results   Component Value Date    HGB 9.5 05/04/2020     Lab Results   Component Value Date    HCT 30.1 05/04/2020     No components found for: MCT  Lab Results   Component Value Date    MCV 89 05/04/2020     Lab Results   Component Value Date    MCH 27.9 05/04/2020     Lab Results   Component Value Date    MCHC 31.6 05/04/2020     Lab Results   Component Value Date    RDW 13.7 05/04/2020     Lab Results   Component Value Date     05/04/2020   Diff pending        Assessment/Plan:  (J18.9,  J91.8) Parapneumonic effusion  (primary encounter diagnosis)  Comment: recommend further inpatient evaluation; as of today,  Anemia noted and significant increase in WBC despite antibiotics ( Levaquin) for presumed infection.  Significant inflammatory response noted with    Best to proceed with CT surgery evaluation for elective VAT  Plan: ESR: Erythrocyte sedimentation rate, CBC with         platelets and differential, CRP, inflammation,         XR Chest 2 Views          (R63.4) Weight loss  Comment: unintentional 20# in pat 6 weeks  Plan: ESR: Erythrocyte sedimentation rate, CRP,         inflammation, Comprehensive metabolic panel         (BMP + Alb, Alk Phos, ALT, AST, Total. Bili,         TP)          (M54.6) Acute right-sided thoracic back pain  Comment: now felt to be referred pain due to irritation of the diaphragm by the persistent right sided pleural effusion  Plan: pleural effusion definitive care.     (R63.0) Poor appetite  Comment: longstanding issue  Plan: Comprehensive metabolic panel (BMP + Alb, Alk         Phos, ALT, AST, Total. Bili, TP)          (E53.8) Low vitamin B12 level  Comment: mildly low; likely recent related to decreased nutritional source; no longstanding macrocytic anemia.   Plan: Vitamin D Deficiency, Vitamin B12          (R41.3) Memory impairment of gradual onset  Comment: continue Aricept;  Daughter, Regi Millan  Assists pt when needed.   Plan: Vitamin D  Deficiency          (M41.35) Thoracogenic scoliosis of thoracolumbar region  Comment: noted but not felt to be the primary cause of her right sided pain; see referred pain related to persistent right sided loculated effusion  Plan:     (Z86.39) Hx of benign neoplasm of parathyroid gland  Comment: calcium has been OK;   Plan: Parathyroid Hormone Intact, Vitamin D         Deficiency, Comprehensive metabolic panel (BMP         + Alb, Alk Phos, ALT, AST, Total. Bili, TP)          (R06.02) SOB (shortness of breath)  Comment: related to persistent loculated effusion  Plan: XR Chest 2 Views        Recommend admission    (N18.3) CKD (chronic kidney disease) stage 3, GFR 30-59 ml/min (H)  Comment: mild; keep hydrated.   Plan: Vitamin D Deficiency, Comprehensive metabolic         panel (BMP + Alb, Alk Phos, ALT, AST, Total.         Bili, TP)          (D72.828) Other elevated white blood cell (WBC) count  Comment:   Lab Results   Component Value Date    WBC 24.2 05/04/2020    WBC 7.4 04/13/2020        Plan: definitive treatment of right pleural effusion    (D63.8) Anemia due to chronic illness  Comment:   Lab Results   Component Value Date    HGB 9.5 05/04/2020    HGB 12.3 04/13/2020      due to current illness.   Plan: anticipate resolution when acute process resolves.   ]    Return today (on 5/4/2020) for recommend hospitalization for definitive tx of parapneumonic effusion - Dr.Louis Shah CT surgery .   Recommend direct admission to [facility].   Physician to physician telephone consultation to review case and next steps;   Further outpt evaluation is limited, recommend inpt  Evaluation to include consultation with cardiothoracic surgery with Dr Gary Lira    Start of Video Visit: 2:20 PM    End of Video Visit: 3:25 PM    Pt was at home    Provider: [facility]    Video visit conducted through [employer]     Pt was contacted after discussion with hospitalist to assist with coordination of care  and assist with direct admission to avoid unnecessary wait and further evaluation in Emergency Department.    Anticipate pt will be seen tomorrow by CT surgery.    Cammie Daugherty MD  Internal Medicine  electronically signed   Pager: [phone]    Extended visit.

## 2020-05-05 ENCOUNTER — APPOINTMENT (OUTPATIENT)
Dept: GENERAL RADIOLOGY | Facility: CLINIC | Age: 80
DRG: 163 | End: 2020-05-05
Attending: PHYSICIAN ASSISTANT
Payer: MEDICARE

## 2020-05-05 ENCOUNTER — ANESTHESIA (OUTPATIENT)
Dept: SURGERY | Facility: CLINIC | Age: 80
DRG: 163 | End: 2020-05-05
Payer: MEDICARE

## 2020-05-05 ENCOUNTER — ANESTHESIA EVENT (OUTPATIENT)
Dept: SURGERY | Facility: CLINIC | Age: 80
DRG: 163 | End: 2020-05-05
Payer: MEDICARE

## 2020-05-05 ENCOUNTER — APPOINTMENT (OUTPATIENT)
Dept: CT IMAGING | Facility: CLINIC | Age: 80
DRG: 163 | End: 2020-05-05
Attending: HOSPITALIST
Payer: MEDICARE

## 2020-05-05 LAB
ALBUMIN SERPL-MCNC: 1.7 G/DL (ref 3.4–5)
ALP SERPL-CCNC: 350 U/L (ref 40–150)
ALT SERPL W P-5'-P-CCNC: 32 U/L (ref 0–50)
ANION GAP SERPL CALCULATED.3IONS-SCNC: 5 MMOL/L (ref 3–14)
AST SERPL W P-5'-P-CCNC: 40 U/L (ref 0–45)
BILIRUB SERPL-MCNC: 0.9 MG/DL (ref 0.2–1.3)
BUN SERPL-MCNC: 30 MG/DL (ref 7–30)
CALCIUM SERPL-MCNC: 8.4 MG/DL (ref 8.5–10.1)
CHLORIDE SERPL-SCNC: 102 MMOL/L (ref 94–109)
CO2 SERPL-SCNC: 27 MMOL/L (ref 20–32)
CREAT SERPL-MCNC: 1.22 MG/DL (ref 0.52–1.04)
DEPRECATED CALCIDIOL+CALCIFEROL SERPL-MC: 31 UG/L (ref 20–75)
GFR SERPL CREATININE-BSD FRML MDRD: 42 ML/MIN/{1.73_M2}
GLUCOSE SERPL-MCNC: 100 MG/DL (ref 70–99)
GRAM STN SPEC: ABNORMAL
HGB BLD-MCNC: NORMAL G/DL (ref 11.7–15.7)
POTASSIUM SERPL-SCNC: 4.1 MMOL/L (ref 3.4–5.3)
PROT SERPL-MCNC: 7.2 G/DL (ref 6.8–8.8)
SARS-COV-2 PCR COMMENT: NORMAL
SARS-COV-2 RNA SPEC QL NAA+PROBE: NEGATIVE
SARS-COV-2 RNA SPEC QL NAA+PROBE: NORMAL
SODIUM SERPL-SCNC: 136 MMOL/L (ref 133–144)
SPECIMEN SOURCE: ABNORMAL
SPECIMEN SOURCE: NORMAL
SPECIMEN SOURCE: NORMAL

## 2020-05-05 PROCEDURE — 87181 SC STD AGAR DILUTION PER AGT: CPT | Performed by: THORACIC SURGERY (CARDIOTHORACIC VASCULAR SURGERY)

## 2020-05-05 PROCEDURE — 86850 RBC ANTIBODY SCREEN: CPT | Performed by: ANESTHESIOLOGY

## 2020-05-05 PROCEDURE — 0BJQ4ZZ INSPECTION OF PLEURA, PERCUTANEOUS ENDOSCOPIC APPROACH: ICD-10-PCS | Performed by: THORACIC SURGERY (CARDIOTHORACIC VASCULAR SURGERY)

## 2020-05-05 PROCEDURE — 93010 ELECTROCARDIOGRAM REPORT: CPT | Performed by: INTERNAL MEDICINE

## 2020-05-05 PROCEDURE — 36000093 ZZH SURGERY LEVEL 4 1ST 30 MIN: Performed by: THORACIC SURGERY (CARDIOTHORACIC VASCULAR SURGERY)

## 2020-05-05 PROCEDURE — 87076 CULTURE ANAEROBE IDENT EACH: CPT | Performed by: THORACIC SURGERY (CARDIOTHORACIC VASCULAR SURGERY)

## 2020-05-05 PROCEDURE — 25000125 ZZHC RX 250: Performed by: PHYSICIAN ASSISTANT

## 2020-05-05 PROCEDURE — 36415 COLL VENOUS BLD VENIPUNCTURE: CPT | Performed by: HOSPITALIST

## 2020-05-05 PROCEDURE — 25000128 H RX IP 250 OP 636: Performed by: REGISTERED NURSE

## 2020-05-05 PROCEDURE — P9041 ALBUMIN (HUMAN),5%, 50ML: HCPCS | Performed by: REGISTERED NURSE

## 2020-05-05 PROCEDURE — 86901 BLOOD TYPING SEROLOGIC RH(D): CPT | Performed by: ANESTHESIOLOGY

## 2020-05-05 PROCEDURE — 25000125 ZZHC RX 250: Performed by: THORACIC SURGERY (CARDIOTHORACIC VASCULAR SURGERY)

## 2020-05-05 PROCEDURE — 87176 TISSUE HOMOGENIZATION CULTR: CPT | Performed by: THORACIC SURGERY (CARDIOTHORACIC VASCULAR SURGERY)

## 2020-05-05 PROCEDURE — 40000170 ZZH STATISTIC PRE-PROCEDURE ASSESSMENT II: Performed by: THORACIC SURGERY (CARDIOTHORACIC VASCULAR SURGERY)

## 2020-05-05 PROCEDURE — 88307 TISSUE EXAM BY PATHOLOGIST: CPT | Performed by: THORACIC SURGERY (CARDIOTHORACIC VASCULAR SURGERY)

## 2020-05-05 PROCEDURE — 37000008 ZZH ANESTHESIA TECHNICAL FEE, 1ST 30 MIN: Performed by: THORACIC SURGERY (CARDIOTHORACIC VASCULAR SURGERY)

## 2020-05-05 PROCEDURE — 71250 CT THORAX DX C-: CPT

## 2020-05-05 PROCEDURE — 25000128 H RX IP 250 OP 636: Performed by: PHYSICIAN ASSISTANT

## 2020-05-05 PROCEDURE — 27210794 ZZH OR GENERAL SUPPLY STERILE: Performed by: THORACIC SURGERY (CARDIOTHORACIC VASCULAR SURGERY)

## 2020-05-05 PROCEDURE — 25000566 ZZH SEVOFLURANE, EA 15 MIN: Performed by: THORACIC SURGERY (CARDIOTHORACIC VASCULAR SURGERY)

## 2020-05-05 PROCEDURE — 25800030 ZZH RX IP 258 OP 636: Performed by: ANESTHESIOLOGY

## 2020-05-05 PROCEDURE — 87205 SMEAR GRAM STAIN: CPT | Performed by: THORACIC SURGERY (CARDIOTHORACIC VASCULAR SURGERY)

## 2020-05-05 PROCEDURE — 93005 ELECTROCARDIOGRAM TRACING: CPT

## 2020-05-05 PROCEDURE — 40000985 XR CHEST PORT 1 VW

## 2020-05-05 PROCEDURE — 86923 COMPATIBILITY TEST ELECTRIC: CPT | Performed by: ANESTHESIOLOGY

## 2020-05-05 PROCEDURE — 25800030 ZZH RX IP 258 OP 636: Performed by: HOSPITALIST

## 2020-05-05 PROCEDURE — 0BBN0ZZ EXCISION OF RIGHT PLEURA, OPEN APPROACH: ICD-10-PCS | Performed by: THORACIC SURGERY (CARDIOTHORACIC VASCULAR SURGERY)

## 2020-05-05 PROCEDURE — 25000132 ZZH RX MED GY IP 250 OP 250 PS 637: Mod: GY | Performed by: PHYSICIAN ASSISTANT

## 2020-05-05 PROCEDURE — 37000009 ZZH ANESTHESIA TECHNICAL FEE, EACH ADDTL 15 MIN: Performed by: THORACIC SURGERY (CARDIOTHORACIC VASCULAR SURGERY)

## 2020-05-05 PROCEDURE — 71000013 ZZH RECOVERY PHASE 1 LEVEL 1 EA ADDTL HR: Performed by: THORACIC SURGERY (CARDIOTHORACIC VASCULAR SURGERY)

## 2020-05-05 PROCEDURE — 36000063 ZZH SURGERY LEVEL 4 EA 15 ADDTL MIN: Performed by: THORACIC SURGERY (CARDIOTHORACIC VASCULAR SURGERY)

## 2020-05-05 PROCEDURE — 86900 BLOOD TYPING SEROLOGIC ABO: CPT | Performed by: ANESTHESIOLOGY

## 2020-05-05 PROCEDURE — 36415 COLL VENOUS BLD VENIPUNCTURE: CPT | Performed by: ANESTHESIOLOGY

## 2020-05-05 PROCEDURE — 25800030 ZZH RX IP 258 OP 636: Performed by: REGISTERED NURSE

## 2020-05-05 PROCEDURE — 87070 CULTURE OTHR SPECIMN AEROBIC: CPT | Performed by: THORACIC SURGERY (CARDIOTHORACIC VASCULAR SURGERY)

## 2020-05-05 PROCEDURE — 99232 SBSQ HOSP IP/OBS MODERATE 35: CPT | Performed by: INTERNAL MEDICINE

## 2020-05-05 PROCEDURE — 88307 TISSUE EXAM BY PATHOLOGIST: CPT | Mod: 26 | Performed by: THORACIC SURGERY (CARDIOTHORACIC VASCULAR SURGERY)

## 2020-05-05 PROCEDURE — 25000132 ZZH RX MED GY IP 250 OP 250 PS 637: Mod: GY | Performed by: HOSPITALIST

## 2020-05-05 PROCEDURE — 0BNK0ZZ RELEASE RIGHT LUNG, OPEN APPROACH: ICD-10-PCS | Performed by: THORACIC SURGERY (CARDIOTHORACIC VASCULAR SURGERY)

## 2020-05-05 PROCEDURE — 87075 CULTR BACTERIA EXCEPT BLOOD: CPT | Performed by: THORACIC SURGERY (CARDIOTHORACIC VASCULAR SURGERY)

## 2020-05-05 PROCEDURE — 12000000 ZZH R&B MED SURG/OB

## 2020-05-05 PROCEDURE — 71000012 ZZH RECOVERY PHASE 1 LEVEL 1 FIRST HR: Performed by: THORACIC SURGERY (CARDIOTHORACIC VASCULAR SURGERY)

## 2020-05-05 PROCEDURE — 25000125 ZZHC RX 250: Performed by: REGISTERED NURSE

## 2020-05-05 PROCEDURE — 25000125 ZZHC RX 250: Performed by: HOSPITALIST

## 2020-05-05 RX ORDER — DIPHENHYDRAMINE HCL 12.5MG/5ML
12.5 LIQUID (ML) ORAL EVERY 6 HOURS PRN
Status: DISCONTINUED | OUTPATIENT
Start: 2020-05-05 | End: 2020-05-13 | Stop reason: HOSPADM

## 2020-05-05 RX ORDER — DEXAMETHASONE SODIUM PHOSPHATE 4 MG/ML
INJECTION, SOLUTION INTRA-ARTICULAR; INTRALESIONAL; INTRAMUSCULAR; INTRAVENOUS; SOFT TISSUE PRN
Status: DISCONTINUED | OUTPATIENT
Start: 2020-05-05 | End: 2020-05-05

## 2020-05-05 RX ORDER — LIDOCAINE 40 MG/G
CREAM TOPICAL
Status: DISCONTINUED | OUTPATIENT
Start: 2020-05-05 | End: 2020-05-05

## 2020-05-05 RX ORDER — PROPOFOL 10 MG/ML
INJECTION, EMULSION INTRAVENOUS CONTINUOUS PRN
Status: DISCONTINUED | OUTPATIENT
Start: 2020-05-05 | End: 2020-05-05

## 2020-05-05 RX ORDER — BUPIVACAINE HYDROCHLORIDE 5 MG/ML
INJECTION, SOLUTION PERINEURAL PRN
Status: DISCONTINUED | OUTPATIENT
Start: 2020-05-05 | End: 2020-05-05 | Stop reason: HOSPADM

## 2020-05-05 RX ORDER — CEFAZOLIN SODIUM 2 G/100ML
2 INJECTION, SOLUTION INTRAVENOUS
Status: COMPLETED | OUTPATIENT
Start: 2020-05-05 | End: 2020-05-05

## 2020-05-05 RX ORDER — ONDANSETRON 4 MG/1
4 TABLET, ORALLY DISINTEGRATING ORAL EVERY 6 HOURS PRN
Status: DISCONTINUED | OUTPATIENT
Start: 2020-05-05 | End: 2020-05-13 | Stop reason: HOSPADM

## 2020-05-05 RX ORDER — AMOXICILLIN 250 MG
2 CAPSULE ORAL 2 TIMES DAILY
Status: DISCONTINUED | OUTPATIENT
Start: 2020-05-05 | End: 2020-05-13 | Stop reason: HOSPADM

## 2020-05-05 RX ORDER — GINSENG 100 MG
CAPSULE ORAL DAILY
Status: DISCONTINUED | OUTPATIENT
Start: 2020-05-05 | End: 2020-05-13 | Stop reason: HOSPADM

## 2020-05-05 RX ORDER — BISACODYL 10 MG
10 SUPPOSITORY, RECTAL RECTAL DAILY PRN
Status: DISCONTINUED | OUTPATIENT
Start: 2020-05-05 | End: 2020-05-13 | Stop reason: HOSPADM

## 2020-05-05 RX ORDER — SODIUM CHLORIDE, SODIUM LACTATE, POTASSIUM CHLORIDE, CALCIUM CHLORIDE 600; 310; 30; 20 MG/100ML; MG/100ML; MG/100ML; MG/100ML
INJECTION, SOLUTION INTRAVENOUS CONTINUOUS
Status: DISCONTINUED | OUTPATIENT
Start: 2020-05-05 | End: 2020-05-05 | Stop reason: HOSPADM

## 2020-05-05 RX ORDER — HYDRALAZINE HYDROCHLORIDE 20 MG/ML
2.5-5 INJECTION INTRAMUSCULAR; INTRAVENOUS EVERY 10 MIN PRN
Status: DISCONTINUED | OUTPATIENT
Start: 2020-05-05 | End: 2020-05-05 | Stop reason: HOSPADM

## 2020-05-05 RX ORDER — AMOXICILLIN 250 MG
1 CAPSULE ORAL 2 TIMES DAILY
Status: DISCONTINUED | OUTPATIENT
Start: 2020-05-05 | End: 2020-05-13 | Stop reason: HOSPADM

## 2020-05-05 RX ORDER — ONDANSETRON 2 MG/ML
4 INJECTION INTRAMUSCULAR; INTRAVENOUS EVERY 30 MIN PRN
Status: DISCONTINUED | OUTPATIENT
Start: 2020-05-05 | End: 2020-05-05 | Stop reason: HOSPADM

## 2020-05-05 RX ORDER — NALOXONE HYDROCHLORIDE 0.4 MG/ML
.1-.4 INJECTION, SOLUTION INTRAMUSCULAR; INTRAVENOUS; SUBCUTANEOUS
Status: DISCONTINUED | OUTPATIENT
Start: 2020-05-05 | End: 2020-05-05

## 2020-05-05 RX ORDER — CEFAZOLIN SODIUM 1 G/3ML
1 INJECTION, POWDER, FOR SOLUTION INTRAMUSCULAR; INTRAVENOUS SEE ADMIN INSTRUCTIONS
Status: CANCELLED | OUTPATIENT
Start: 2020-05-05

## 2020-05-05 RX ORDER — LABETALOL HYDROCHLORIDE 5 MG/ML
10 INJECTION, SOLUTION INTRAVENOUS
Status: DISCONTINUED | OUTPATIENT
Start: 2020-05-05 | End: 2020-05-05 | Stop reason: HOSPADM

## 2020-05-05 RX ORDER — HYDROMORPHONE HYDROCHLORIDE 1 MG/ML
.2-.3 INJECTION, SOLUTION INTRAMUSCULAR; INTRAVENOUS; SUBCUTANEOUS
Status: DISCONTINUED | OUTPATIENT
Start: 2020-05-05 | End: 2020-05-13 | Stop reason: HOSPADM

## 2020-05-05 RX ORDER — HYDROMORPHONE HYDROCHLORIDE 1 MG/ML
.3-.5 INJECTION, SOLUTION INTRAMUSCULAR; INTRAVENOUS; SUBCUTANEOUS EVERY 5 MIN PRN
Status: DISCONTINUED | OUTPATIENT
Start: 2020-05-05 | End: 2020-05-05 | Stop reason: HOSPADM

## 2020-05-05 RX ORDER — FAMOTIDINE 20 MG/1
20 TABLET, FILM COATED ORAL EVERY EVENING
Status: DISCONTINUED | OUTPATIENT
Start: 2020-05-05 | End: 2020-05-13 | Stop reason: HOSPADM

## 2020-05-05 RX ORDER — ALBUMIN, HUMAN INJ 5% 5 %
SOLUTION INTRAVENOUS CONTINUOUS PRN
Status: DISCONTINUED | OUTPATIENT
Start: 2020-05-05 | End: 2020-05-05

## 2020-05-05 RX ORDER — NEOSTIGMINE METHYLSULFATE 1 MG/ML
VIAL (ML) INJECTION PRN
Status: DISCONTINUED | OUTPATIENT
Start: 2020-05-05 | End: 2020-05-05

## 2020-05-05 RX ORDER — EPHEDRINE SULFATE 50 MG/ML
INJECTION, SOLUTION INTRAMUSCULAR; INTRAVENOUS; SUBCUTANEOUS PRN
Status: DISCONTINUED | OUTPATIENT
Start: 2020-05-05 | End: 2020-05-05

## 2020-05-05 RX ORDER — ONDANSETRON 4 MG/1
4 TABLET, ORALLY DISINTEGRATING ORAL EVERY 30 MIN PRN
Status: DISCONTINUED | OUTPATIENT
Start: 2020-05-05 | End: 2020-05-05 | Stop reason: HOSPADM

## 2020-05-05 RX ORDER — PROCHLORPERAZINE MALEATE 5 MG
5 TABLET ORAL EVERY 6 HOURS PRN
Status: DISCONTINUED | OUTPATIENT
Start: 2020-05-05 | End: 2020-05-13 | Stop reason: HOSPADM

## 2020-05-05 RX ORDER — ACETAMINOPHEN 325 MG/1
650 TABLET ORAL EVERY 4 HOURS PRN
Status: DISCONTINUED | OUTPATIENT
Start: 2020-05-08 | End: 2020-05-13 | Stop reason: HOSPADM

## 2020-05-05 RX ORDER — FENTANYL CITRATE 50 UG/ML
INJECTION, SOLUTION INTRAMUSCULAR; INTRAVENOUS PRN
Status: DISCONTINUED | OUTPATIENT
Start: 2020-05-05 | End: 2020-05-05

## 2020-05-05 RX ORDER — AMOXICILLIN 250 MG
1 CAPSULE ORAL 2 TIMES DAILY PRN
Status: DISCONTINUED | OUTPATIENT
Start: 2020-05-05 | End: 2020-05-13 | Stop reason: HOSPADM

## 2020-05-05 RX ORDER — LIDOCAINE HYDROCHLORIDE 20 MG/ML
INJECTION, SOLUTION INFILTRATION; PERINEURAL PRN
Status: DISCONTINUED | OUTPATIENT
Start: 2020-05-05 | End: 2020-05-05

## 2020-05-05 RX ORDER — CEFAZOLIN SODIUM 1 G/3ML
1 INJECTION, POWDER, FOR SOLUTION INTRAMUSCULAR; INTRAVENOUS SEE ADMIN INSTRUCTIONS
Status: DISCONTINUED | OUTPATIENT
Start: 2020-05-05 | End: 2020-05-05 | Stop reason: HOSPADM

## 2020-05-05 RX ORDER — GLYCOPYRROLATE 0.2 MG/ML
INJECTION, SOLUTION INTRAMUSCULAR; INTRAVENOUS PRN
Status: DISCONTINUED | OUTPATIENT
Start: 2020-05-05 | End: 2020-05-05

## 2020-05-05 RX ORDER — AMOXICILLIN 250 MG
2 CAPSULE ORAL 2 TIMES DAILY PRN
Status: DISCONTINUED | OUTPATIENT
Start: 2020-05-05 | End: 2020-05-13 | Stop reason: HOSPADM

## 2020-05-05 RX ORDER — MAGNESIUM HYDROXIDE 1200 MG/15ML
LIQUID ORAL PRN
Status: DISCONTINUED | OUTPATIENT
Start: 2020-05-05 | End: 2020-05-05 | Stop reason: HOSPADM

## 2020-05-05 RX ORDER — NITROGLYCERIN 0.4 MG/1
0.4 TABLET SUBLINGUAL EVERY 5 MIN PRN
Status: DISCONTINUED | OUTPATIENT
Start: 2020-05-05 | End: 2020-05-12

## 2020-05-05 RX ORDER — DIPHENHYDRAMINE HYDROCHLORIDE 50 MG/ML
12.5 INJECTION INTRAMUSCULAR; INTRAVENOUS EVERY 6 HOURS PRN
Status: DISCONTINUED | OUTPATIENT
Start: 2020-05-05 | End: 2020-05-13 | Stop reason: HOSPADM

## 2020-05-05 RX ORDER — ONDANSETRON 2 MG/ML
INJECTION INTRAMUSCULAR; INTRAVENOUS PRN
Status: DISCONTINUED | OUTPATIENT
Start: 2020-05-05 | End: 2020-05-05

## 2020-05-05 RX ORDER — FENTANYL CITRATE 50 UG/ML
25-50 INJECTION, SOLUTION INTRAMUSCULAR; INTRAVENOUS
Status: DISCONTINUED | OUTPATIENT
Start: 2020-05-05 | End: 2020-05-05 | Stop reason: HOSPADM

## 2020-05-05 RX ORDER — ONDANSETRON 2 MG/ML
4 INJECTION INTRAMUSCULAR; INTRAVENOUS EVERY 6 HOURS PRN
Status: DISCONTINUED | OUTPATIENT
Start: 2020-05-05 | End: 2020-05-13 | Stop reason: HOSPADM

## 2020-05-05 RX ORDER — CEFAZOLIN SODIUM 2 G/100ML
2 INJECTION, SOLUTION INTRAVENOUS
Status: CANCELLED | OUTPATIENT
Start: 2020-05-05

## 2020-05-05 RX ORDER — PROPOFOL 10 MG/ML
INJECTION, EMULSION INTRAVENOUS PRN
Status: DISCONTINUED | OUTPATIENT
Start: 2020-05-05 | End: 2020-05-05

## 2020-05-05 RX ORDER — POLYETHYLENE GLYCOL 3350 17 G/17G
17 POWDER, FOR SOLUTION ORAL DAILY PRN
Status: DISCONTINUED | OUTPATIENT
Start: 2020-05-05 | End: 2020-05-13 | Stop reason: HOSPADM

## 2020-05-05 RX ORDER — CALCIUM CARBONATE 500 MG/1
1000 TABLET, CHEWABLE ORAL 4 TIMES DAILY PRN
Status: DISCONTINUED | OUTPATIENT
Start: 2020-05-05 | End: 2020-05-13 | Stop reason: HOSPADM

## 2020-05-05 RX ORDER — ACETAMINOPHEN 325 MG/1
975 TABLET ORAL EVERY 8 HOURS
Status: DISPENSED | OUTPATIENT
Start: 2020-05-05 | End: 2020-05-08

## 2020-05-05 RX ADMIN — LIDOCAINE HYDROCHLORIDE 80 MG: 20 INJECTION, SOLUTION INFILTRATION; PERINEURAL at 15:31

## 2020-05-05 RX ADMIN — ACETAMINOPHEN 975 MG: 325 TABLET, FILM COATED ORAL at 21:00

## 2020-05-05 RX ADMIN — PHENYLEPHRINE HYDROCHLORIDE 200 MCG: 10 INJECTION INTRAVENOUS at 16:45

## 2020-05-05 RX ADMIN — PHENYLEPHRINE HYDROCHLORIDE 100 MCG: 10 INJECTION INTRAVENOUS at 16:24

## 2020-05-05 RX ADMIN — PHENYLEPHRINE HYDROCHLORIDE 100 MCG: 10 INJECTION INTRAVENOUS at 16:53

## 2020-05-05 RX ADMIN — PHENYLEPHRINE HYDROCHLORIDE 100 MCG: 10 INJECTION INTRAVENOUS at 16:23

## 2020-05-05 RX ADMIN — PHENYLEPHRINE HYDROCHLORIDE 100 MCG: 10 INJECTION INTRAVENOUS at 16:33

## 2020-05-05 RX ADMIN — ALBUMIN HUMAN: 0.05 INJECTION, SOLUTION INTRAVENOUS at 16:18

## 2020-05-05 RX ADMIN — PHENYLEPHRINE HYDROCHLORIDE 100 MCG: 10 INJECTION INTRAVENOUS at 16:28

## 2020-05-05 RX ADMIN — PHENYLEPHRINE HYDROCHLORIDE 100 MCG: 10 INJECTION INTRAVENOUS at 16:14

## 2020-05-05 RX ADMIN — ROCURONIUM BROMIDE 50 MG: 10 INJECTION INTRAVENOUS at 15:32

## 2020-05-05 RX ADMIN — ROCURONIUM BROMIDE 5 MG: 10 INJECTION INTRAVENOUS at 16:23

## 2020-05-05 RX ADMIN — PHENYLEPHRINE HYDROCHLORIDE 100 MCG: 10 INJECTION INTRAVENOUS at 16:38

## 2020-05-05 RX ADMIN — PHENYLEPHRINE HYDROCHLORIDE 100 MCG: 10 INJECTION INTRAVENOUS at 16:07

## 2020-05-05 RX ADMIN — PHENYLEPHRINE HYDROCHLORIDE 100 MCG: 10 INJECTION INTRAVENOUS at 16:36

## 2020-05-05 RX ADMIN — PHENYLEPHRINE HYDROCHLORIDE 100 MCG: 10 INJECTION INTRAVENOUS at 16:15

## 2020-05-05 RX ADMIN — PHENYLEPHRINE HYDROCHLORIDE 100 MCG: 10 INJECTION INTRAVENOUS at 16:13

## 2020-05-05 RX ADMIN — PHENYLEPHRINE HYDROCHLORIDE 100 MCG: 10 INJECTION INTRAVENOUS at 15:46

## 2020-05-05 RX ADMIN — ALBUMIN HUMAN: 0.05 INJECTION, SOLUTION INTRAVENOUS at 16:45

## 2020-05-05 RX ADMIN — PHENYLEPHRINE HYDROCHLORIDE 100 MCG: 10 INJECTION INTRAVENOUS at 16:16

## 2020-05-05 RX ADMIN — HYDROMORPHONE HYDROCHLORIDE 1 MG: 2 TABLET ORAL at 21:38

## 2020-05-05 RX ADMIN — CEFAZOLIN SODIUM 2 G: 2 INJECTION, SOLUTION INTRAVENOUS at 15:37

## 2020-05-05 RX ADMIN — SODIUM CHLORIDE, POTASSIUM CHLORIDE, SODIUM LACTATE AND CALCIUM CHLORIDE: 600; 310; 30; 20 INJECTION, SOLUTION INTRAVENOUS at 16:52

## 2020-05-05 RX ADMIN — PHENYLEPHRINE HYDROCHLORIDE 100 MCG: 10 INJECTION INTRAVENOUS at 16:39

## 2020-05-05 RX ADMIN — PROPOFOL 60 MG: 10 INJECTION, EMULSION INTRAVENOUS at 15:34

## 2020-05-05 RX ADMIN — ROCURONIUM BROMIDE 20 MG: 10 INJECTION INTRAVENOUS at 15:47

## 2020-05-05 RX ADMIN — SODIUM CHLORIDE, POTASSIUM CHLORIDE, SODIUM LACTATE AND CALCIUM CHLORIDE: 600; 310; 30; 20 INJECTION, SOLUTION INTRAVENOUS at 13:03

## 2020-05-05 RX ADMIN — SENNOSIDES AND DOCUSATE SODIUM 1 TABLET: 8.6; 5 TABLET ORAL at 21:00

## 2020-05-05 RX ADMIN — NEOSTIGMINE METHYLSULFATE 4 MG: 1 INJECTION, SOLUTION INTRAVENOUS at 17:17

## 2020-05-05 RX ADMIN — PHENYLEPHRINE HYDROCHLORIDE 200 MCG: 10 INJECTION INTRAVENOUS at 16:41

## 2020-05-05 RX ADMIN — PHENYLEPHRINE HYDROCHLORIDE 100 MCG: 10 INJECTION INTRAVENOUS at 16:48

## 2020-05-05 RX ADMIN — PHENYLEPHRINE HYDROCHLORIDE 100 MCG: 10 INJECTION INTRAVENOUS at 16:44

## 2020-05-05 RX ADMIN — PHENYLEPHRINE HYDROCHLORIDE 100 MCG: 10 INJECTION INTRAVENOUS at 16:27

## 2020-05-05 RX ADMIN — DOXYCYCLINE 100 MG: 100 INJECTION, POWDER, LYOPHILIZED, FOR SOLUTION INTRAVENOUS at 22:19

## 2020-05-05 RX ADMIN — HYDROMORPHONE HYDROCHLORIDE 0.25 MG: 1 INJECTION, SOLUTION INTRAMUSCULAR; INTRAVENOUS; SUBCUTANEOUS at 17:28

## 2020-05-05 RX ADMIN — DEXAMETHASONE SODIUM PHOSPHATE 4 MG: 4 INJECTION, SOLUTION INTRA-ARTICULAR; INTRALESIONAL; INTRAMUSCULAR; INTRAVENOUS; SOFT TISSUE at 15:37

## 2020-05-05 RX ADMIN — CITALOPRAM HYDROBROMIDE 10 MG: 10 TABLET ORAL at 08:48

## 2020-05-05 RX ADMIN — SODIUM CHLORIDE, POTASSIUM CHLORIDE, SODIUM LACTATE AND CALCIUM CHLORIDE: 600; 310; 30; 20 INJECTION, SOLUTION INTRAVENOUS at 00:40

## 2020-05-05 RX ADMIN — FAMOTIDINE 20 MG: 20 TABLET ORAL at 21:00

## 2020-05-05 RX ADMIN — PHENYLEPHRINE HYDROCHLORIDE 0.5 MCG/KG/MIN: 10 INJECTION INTRAVENOUS at 16:48

## 2020-05-05 RX ADMIN — PHENYLEPHRINE HYDROCHLORIDE 100 MCG: 10 INJECTION INTRAVENOUS at 16:42

## 2020-05-05 RX ADMIN — HYDROMORPHONE HYDROCHLORIDE 0.3 MG: 1 INJECTION, SOLUTION INTRAMUSCULAR; INTRAVENOUS; SUBCUTANEOUS at 20:26

## 2020-05-05 RX ADMIN — ROCURONIUM BROMIDE 5 MG: 10 INJECTION INTRAVENOUS at 16:38

## 2020-05-05 RX ADMIN — HYDROMORPHONE HYDROCHLORIDE 0.25 MG: 1 INJECTION, SOLUTION INTRAMUSCULAR; INTRAVENOUS; SUBCUTANEOUS at 16:02

## 2020-05-05 RX ADMIN — FENTANYL CITRATE 50 MCG: 50 INJECTION, SOLUTION INTRAMUSCULAR; INTRAVENOUS at 15:37

## 2020-05-05 RX ADMIN — ONDANSETRON 4 MG: 2 INJECTION INTRAMUSCULAR; INTRAVENOUS at 16:59

## 2020-05-05 RX ADMIN — GLYCOPYRROLATE 0.6 MG: 0.2 INJECTION, SOLUTION INTRAMUSCULAR; INTRAVENOUS at 17:17

## 2020-05-05 RX ADMIN — FENTANYL CITRATE 50 MCG: 50 INJECTION, SOLUTION INTRAMUSCULAR; INTRAVENOUS at 15:31

## 2020-05-05 RX ADMIN — ATORVASTATIN CALCIUM 10 MG: 10 TABLET, FILM COATED ORAL at 21:00

## 2020-05-05 RX ADMIN — PROPOFOL 50 MCG/KG/MIN: 10 INJECTION, EMULSION INTRAVENOUS at 15:36

## 2020-05-05 RX ADMIN — Medication 550 ML: at 18:08

## 2020-05-05 RX ADMIN — PHENYLEPHRINE HYDROCHLORIDE 100 MCG: 10 INJECTION INTRAVENOUS at 16:30

## 2020-05-05 RX ADMIN — PHENYLEPHRINE HYDROCHLORIDE 100 MCG: 10 INJECTION INTRAVENOUS at 16:22

## 2020-05-05 RX ADMIN — PROPOFOL 140 MG: 10 INJECTION, EMULSION INTRAVENOUS at 15:32

## 2020-05-05 RX ADMIN — GABAPENTIN 300 MG: 300 CAPSULE ORAL at 08:48

## 2020-05-05 RX ADMIN — GABAPENTIN 300 MG: 300 CAPSULE ORAL at 21:00

## 2020-05-05 RX ADMIN — DONEPEZIL HYDROCHLORIDE 10 MG: 10 TABLET ORAL at 21:00

## 2020-05-05 RX ADMIN — ERTAPENEM SODIUM 1 G: 1 INJECTION, POWDER, LYOPHILIZED, FOR SOLUTION INTRAMUSCULAR; INTRAVENOUS at 21:36

## 2020-05-05 RX ADMIN — PHENYLEPHRINE HYDROCHLORIDE 100 MCG: 10 INJECTION INTRAVENOUS at 16:35

## 2020-05-05 RX ADMIN — Medication 5 MG: at 16:44

## 2020-05-05 RX ADMIN — DOXYCYCLINE 100 MG: 100 INJECTION, POWDER, LYOPHILIZED, FOR SOLUTION INTRAVENOUS at 08:54

## 2020-05-05 RX ADMIN — PHENYLEPHRINE HYDROCHLORIDE 100 MCG: 10 INJECTION INTRAVENOUS at 16:19

## 2020-05-05 ASSESSMENT — ENCOUNTER SYMPTOMS
SEIZURES: 0
DYSRHYTHMIAS: 0

## 2020-05-05 ASSESSMENT — ACTIVITIES OF DAILY LIVING (ADL)
ADLS_ACUITY_SCORE: 13
ADLS_ACUITY_SCORE: 14
ADLS_ACUITY_SCORE: 13

## 2020-05-05 ASSESSMENT — LIFESTYLE VARIABLES: TOBACCO_USE: 1

## 2020-05-05 NOTE — PLAN OF CARE
VSS, 2L O2 pre-op. A/Ox 4. Pt went to surgery early this afternoon for VATS/thoracotomy. Prior to surgery skin integrity intact. Denied pain medications. LS were diminished throughout.  Awaiting arrival back from PACU. CTM

## 2020-05-05 NOTE — PROGRESS NOTES
THORACIC SURGERY    Reviewed in details  As per Danna Hammond PA-C/s note  Right empyema, multiloculated    Options of treatment discussed    Recommend r VATS, right thoracotomy decortication right lung    Operation, goals and risks discussed      JUSTIN LOPEZ MD [facility] ONCOLOGY THORACIC SURGERY  CELL:  [phone]  OFFICE: [phone]

## 2020-05-05 NOTE — ANESTHESIA CARE TRANSFER NOTE
Patient: Kathie Wetzel    Procedure(s):  RIGHT VIDEO ASSISTED THORACOSCOPY, RIGHT THORACOTOMY, RIGHT DECORTICATION, RIGHT PARIETAL PLEURECTOMY  THORACOTOMY     Diagnosis: Empyema (H) [J86.9]  Diagnosis Additional Information: No value filed.    Anesthesia Type:   General     Note:  Airway :Face Mask  Patient transferred to:PACU  Comments: Transferred to PACU, spontaneous respirations, 10L oxygen via facemask.  All monitors and alarms on and functioning, VSS.  Patient awake, comfortable.  Report to PACU RN.Handoff Report: Identifed the Patient, Identified the Reponsible Provider, Reviewed the pertinent medical history, Discussed the surgical course, Reviewed Intra-OP anesthesia mangement and issues during anesthesia, Set expectations for post-procedure period and Allowed opportunity for questions and acknowledgement of understanding      Vitals: (Last set prior to Anesthesia Care Transfer)    CRNA VITALS  5/5/2020 1703 - 5/5/2020 1739      5/5/2020             Pulse:  101    SpO2:  100 %    Resp Rate (set):  10                Electronically Signed By: WILD Barber CRNA  May 5, 2020  5:39 PM

## 2020-05-05 NOTE — H&P
[facility]    History and Physical - Hospitalist Service       Date of Admission:  5/4/2020    Assessment & Plan   Kathie Wetzel is a 80 year old female admitted on 5/4/2020.  Past history of HTN, hyperlipidemia, mild cognitive impairment, CKD, depression who presents as a direct admission from clinic with probable parapneumonic right pleural effusion with plan for Thoracic Surgery consultation.    Probable parapneumonic right pleural effusion  Progressive fatigue and dyspnea over past 2 weeks with prior work-up revealing right pleural effusion.  Thoracentesis 4/30 removed 550 ml fluid with gram stain and culture showing GNR and GPC in broth, negative cytology.  Initiated on course of levofloxacin outpatient with CBC 5/4 showing new leukocytosis of 24 and CXR showing enlargement of effusion.  - start ertapenem and doxycycline  - follow fever curve, CBC in AM  - CT chest  - sputum stain and culture if able  - Thoracic Surgery consult  - NPO midnight in case of surgical intervention  - recent EKG reviewed from March, denies any exertional cardiorespiratory symptoms and is able to achieve 4 METS, she is low risk per revised cardiac risk index and is cleared to proceed with any surgical intervention  - INR and PPT slightly elevated, possibly nutritional - will give 5 mg Vit K and repeat in AM    Severe malnutrition  Reporting 20 pound unintentional weight loss over past 6 months.  - nutrition consult    CKD stage III  Baseline Cr 0.9-1.0, admission Cr 1.19.  - BMP in AM    HTN  - hold PTA losartan    Hyperlipidemia  - continue PTA atorvastatin    Depression  - continue PTA Celexa    Mild cognitive impairment  - continue PTA Aricept    Neuropathy  - continue PTA gabapentin       Diet: NPO per Anesthesia Guidelines for Procedure/Surgery Except for: Meds, Ice Chips  Advance Diet as Tolerated: Clear Liquid Diet    DVT Prophylaxis: Pneumatic Compression Devices  Ramirez Catheter: not present  Code Status:  DNR/DNI      Disposition Plan   Expected discharge: 2 - 3 days, recommended to transitional care unit once work-up and any intervention complete.  Entered: Hima Paris MD 05/04/2020, 9:27 PM     The patient's care was discussed with the Patient.    Hima Paris MD  [facility]    ______________________________________________________________________    Chief Complaint   Dyspnea, fatigue    History is obtained from the patient, chart review.    History of Present Illness   Kathie Wetzel is a 80 year old female who presents with dyspnea and fatigue.  Reports progressive symptoms over the past 2 weeks to the point where is now short of breath at rest.  She reports symptoms started with right sided pleuritic chest wall pain, which has also progressed to become quite severe, exacerbated with deep breathing but notes no real alleviating factors.  She initially started with mild dyspnea on exertion but this has progressed to dyspnea at rest as noted.  She has also experienced quite significant fatigue and poor appetite over this time, however, it appears that her recent weight loss has been over the past 6 months.  She was seen in the ED on 4/19 for chest pain and sent home with pain medications.  Has had several virtual visits with her primary provider to due to progressive symptoms with repeat imaging showing new right pleural effusion.  She underwent thoracentesis 4/30 with results as noted above.  She had another virtual visit with her primary today due to ongoing symptoms and a repeat CXR showed enlargement of effusion so she was directly admitted to [facility] for Thoracic Surgery evaluation.     Review of Systems    The 10 point Review of Systems is negative other than noted in the HPI or here.     Past Medical History    Vitamin B12 deficiency  Hypertension  Hyperlipidemia  Mild cognitive impairment  Chronic kidney disease stage III  Depression  History of parathyroid adenoma    Past Surgical  History   I have reviewed this patient's surgical history and updated it with pertinent information if needed.  Past Surgical History:   Procedure Laterality Date     APPENDECTOMY OPEN       CHOLECYSTECTOMY  2007     DISCECTOMY LUMBAR POSTERIOR MICROSCOPIC ONE LEVEL Right 11/10/2017    Procedure: DISCECTOMY LUMBAR POSTERIOR MICROSCOPIC ONE LEVEL;  Right L3-4 hemilaminectomy, medial facetectomy, foraminotomy with microdiscectomy and use of X-ray and intraoperative microscope ;  Surgeon: Al Dailey MD;  Location: [facility] OR     PARATHYROIDECTOMY  3/14/2014    Procedure: PARATHYROIDECTOMY;  Neck Exploration, Excision Right  Parathyroid Adenoma, Pre Operative Sestimibi Injection, Rapid PTH Assay ;  Surgeon: Natividad Fischer MD;  Location:  OR       Social History   Denies history of tobacco or alcohol use.  Has recently been residing with her daughter's family.    Family History   Reviewed and noncontributory.     Prior to Admission Medications   Prior to Admission Medications   Prescriptions Last Dose Informant Patient Reported? Taking?   Cholecalciferol (VITAMIN D PO) 5/4/2020 at am  Yes Yes   Sig: Take 1,000 Units by mouth every morning    Methylcobalamin (B-12) 5000 MCG TBDP 5/4/2020 at am  No Yes   Sig: Take 5,000 mcg by mouth daily   Patient taking differently: Take 5,000 mcg by mouth every morning    Multiple Vitamins-Minerals (PRESERVISION AREDS PO) 5/4/2020 at am  Yes Yes   Sig: Take 1 tablet by mouth every morning    atorvastatin (LIPITOR) 10 MG tablet 5/3/2020 at hs  No Yes   Sig: TAKE 1 TABLET(10 MG) BY MOUTH AT BEDTIME   Patient taking differently: Take 10 mg by mouth At Bedtime TAKE 1 TABLET(10 MG) BY MOUTH AT BEDTIME   citalopram (CELEXA) 10 MG tablet 5/4/2020 at am  No Yes   Sig: Take 1 tablet (10 mg) by mouth daily   Patient taking differently: Take 10 mg by mouth every morning    donepezil (ARICEPT) 10 MG tablet 5/3/2020 at hs  No Yes   Sig: Take 1 tablet (10 mg) by mouth At Bedtime    gabapentin (NEURONTIN) 300 MG capsule 5/4/2020 at afternoon  No Yes   Sig: Take 1 capsule (300 mg) by mouth 3 times daily   levofloxacin (LEVAQUIN) 750 MG tablet 5/4/2020 at am  No Yes   Sig: Take 1 tablet (750 mg) by mouth daily   Patient taking differently: Take 750 mg by mouth every morning Has taken 5 out of 14 tablets.  Should have 9 doses remaining.   loratadine (CLARITIN) 10 MG tablet 5/4/2020 at am  Yes Yes   Sig: Take 10 mg by mouth every morning    losartan (COZAAR) 50 MG tablet 5/4/2020 at am  No Yes   Sig: Take 1 tablet (50 mg) by mouth daily   Patient taking differently: Take 50 mg by mouth every morning    oxyCODONE (ROXICODONE) 5 MG tablet 5/4/2020 at 1800  No Yes   Sig: Take 1 tablet (5 mg) by mouth every 6 hours as needed for severe pain   traZODone (DESYREL) 50 MG tablet 5/3/2020 at hs  No Yes   Sig: Take 1-1.5 tablets (50-75 mg) by mouth At Bedtime   Patient taking differently: Take 100 mg by mouth At Bedtime RX is for 1 to 1.5 tablets (50mg to 75 mg) but she was told she can take 2 tablets (100 mg) at bedtime and this is what she has been taking..      Facility-Administered Medications: None     Allergies   Allergies   Allergen Reactions     Simvastatin      Buttock pain     Penicillins Rash       Physical Exam   Vital Signs: Temp: 98.4  F (36.9  C) Temp src: Oral BP: 100/60 Pulse: 95   Resp: 20 SpO2: 96 % O2 Device: Nasal cannula Oxygen Delivery: 2 LPM  Weight: 0 lbs 0 oz    General Appearance: Well-nourished female in no acute distress, appearing younger than stated age  Eyes: Pupils equal, round and reactive to light, sclera anicteric  HEENT: Mucous membranes moist, no neck lymphadenopathy  Respiratory: Diminished right middle and lower lobe lung sounds, no wheezes or crackles, no tachypnea  Cardiovascular: Regular rhythm, normal S1/S2, no murmurs, rubs or gallops  GI: Abdomen soft, nontender, nondistended, normal bowel sounds  Lymph/Hematologic: No peripheral edema  Musculoskeletal:  Extremities warm well perfused  Neurologic: Alert and appropriate, cranial is grossly intact  Psychiatric: Normal affect    Data   Data reviewed today: I reviewed all medications, new labs and imaging results over the last 24 hours. I personally reviewed the chest x-ray image(s) showing Large left pleural effusion.    Recent Labs   Lab 05/04/20 2034 05/04/20  1101   WBC  --  24.2*   HGB  --  9.5*   MCV  --  89   PLT  --  436   INR 1.30*  --      --    POTASSIUM 4.2  --    CHLORIDE 102  --    CO2 26  --    BUN 28  --    CR 1.19*  --    ANIONGAP 8  --    OMEGA 8.6  --    *  --    ALBUMIN 1.7*  --    PROTTOTAL 6.4*  --    BILITOTAL 0.7  --    ALKPHOS 340*  --    ALT 31  --    AST 46*  --      Recent Results (from the past 24 hour(s))   XR Chest 2 Views    Narrative    CHEST TWO VIEWS May 4, 2020 11:40 AM     HISTORY: Parapneumonic effusion. SOB (shortness of breath).    COMPARISON: 4/30/2020.      Impression    IMPRESSION: Loculated right mid to low thoracic effusion appears  larger in volume. Dense consolidation at the right mid to low lung may  be atelectasis but underlying airspace disease not excluded. Left lung  appears clear. Stable cardiac silhouette.    NINOSKA LARES MD

## 2020-05-05 NOTE — CONSULTS
Consult Date:  05/05/2020      INFECTIOUS DISEASE CONSULTATION      ROOM:  333      REFERRING PHYSICIAN:  Dr. Hima Paris      IMPRESSION:   1.  An 80-year-old female with 3 weeks of right-sided chest pain without major systemic infection symptoms, found to have a loculated pleural effusion and tap growing what appeared to be multiple anaerobes, presumed empyema.   2.  Malnutrition, significant weight loss over several months, question related to extended empyema versus other.   3.  Mild chronic renal insufficiency.   4.  PENICILLIN ALLERGY.      RECOMMENDATIONS:  Agree with ertapenem for now, await full information and adjust accordingly.  Amount of antibiotics will depend on end findings.  Await Dr. Shah' surgical findings, getting surgery today.      HISTORY:  This 80-year-old female is seen in consultation due to right-sided empyema.  The patient has no particular history of major recurrent infections and no recent pneumonia.  She has had some degree of weight loss and not felt great for as much as a couple of months.  She has had right-sided chest pain for at least 3 weeks, eventually found to have probable infiltrate and effusion, was given a course of Levaquin without improvement.  She has now been continuing to have ongoing symptoms and problems and now fluid found to be infected looking on tap.  She is now admitted for presumed surgical intervention, started on antibiotics.      PAST MEDICAL HISTORY:  No major infection problems historically, no major pneumonia episodes.      SOCIAL AND FAMILY HISTORY:  Prior exposures of no significance.  No COVID-19 exposures, test is negative.  No active alcohol or tobacco use of significance.      MEDICATIONS:  As listed.      REVIEW OF SYSTEMS:  Ongoing right-sided chest discomfort.  No other particular focal symptoms.  No major fevers, chills or sweats.  Has had weight loss, but has been going on longer than the chest pain problem.      ALLERGIES:  PENICILLIN,  CHILDHOOD RASH, NOT RECHALLENGED IN ADULTHOOD.      PHYSICAL EXAMINATION:   GENERAL:  The patient appears her stated age.  She does not look toxic or ill.   VITAL SIGNS:  Currently normal, including being afebrile.   HEENT:  No thrush or intraoral lesions.  Pupils reactive.   NECK:  Supple and nontender.   HEART:  Regular rhythm, no significant murmur.   LUNGS:  Crackles and decreased breath sounds and a difficult time taking a deep breath on the right.   ABDOMEN:  Soft, nontender.   EXTREMITIES:  No rash or skin lesions.      LABORATORY DATA:  Imaging with major loculated fluid.  White count 24,200.  Pleural fluid culture growing both gram-negative and gram-positive organisms, possible anaerobes.      Thank you very much for the consultation.  I will follow the patient with you.         MARCO WADE MD             D: 2020   T: 2020   MT: MICHELL      Name:     JAY MENDOSA   MRN:      [id number]        Account:       [id number]   :      1940           Consult Date:  2020      Document: [id number]       cc: Cammie Daugherty MD

## 2020-05-05 NOTE — ANESTHESIA POSTPROCEDURE EVALUATION
Patient: Kathie Wetzel    Procedure(s):  RIGHT VIDEO ASSISTED THORACOSCOPY, RIGHT THORACOTOMY, RIGHT DECORTICATION, RIGHT PARIETAL PLEURECTOMY  THORACOTOMY     Diagnosis:Empyema (H) [J86.9]  Diagnosis Additional Information: No value filed.    Anesthesia Type:  General    Note:  Anesthesia Post Evaluation    Patient location during evaluation: PACU  Patient participation: Able to fully participate in evaluation  Level of consciousness: awake  Pain management: adequate  Airway patency: patent  Cardiovascular status: acceptable  Respiratory status: acceptable  Hydration status: acceptable  PONV: none             Last vitals:  Vitals:    05/05/20 1123 05/05/20 1239 05/05/20 1734   BP: 103/65 117/64 97/53   Pulse: 76 68 105   Resp: 18  9   Temp: 36.6  C (97.8  F) 36.3  C (97.4  F) 36.7  C (98.1  F)   SpO2: 100% 100% 95%         Electronically Signed By: Abraham Bianchi MD  May 5, 2020  6:22 PM

## 2020-05-05 NOTE — CONSULTS
ID consult dictated IMP 1 81 yo female R pleura empyema, + cx ? Anaerobes    REC op planned, await full cx ertapenem for now

## 2020-05-05 NOTE — CONSULTS
Received provider order consult to see patient for malnutrition    Patient currently in the OR  RD to assess patient on 5/6/20    Areli Arias RD, LD, CNSC   Clinical Dietitian - [facility]

## 2020-05-05 NOTE — PHARMACY-ADMISSION MEDICATION HISTORY
".Pharmacy Medication History  Admission medication history interview status for the 5/4/2020  admission is complete. See EPIC admission navigator for prior to admission medications     Medication history sources: Patient and Surescripts  Medication history source reliability: Good  Adherence assessment: Good    Significant changes made to the medication list:  D/C'd Percocet and Valtrex.      Additional medication history information:   none    Medication reconciliation completed by provider prior to medication history? No    Time spent in this activity: 20\"      Prior to Admission medications    Medication Sig Last Dose Taking? Auth Provider   atorvastatin (LIPITOR) 10 MG tablet TAKE 1 TABLET(10 MG) BY MOUTH AT BEDTIME  Patient taking differently: Take 10 mg by mouth At Bedtime TAKE 1 TABLET(10 MG) BY MOUTH AT BEDTIME 5/3/2020 at hs Yes Cammie Daugherty MD   Cholecalciferol (VITAMIN D PO) Take 1,000 Units by mouth every morning  5/4/2020 at am Yes Reported, Patient   citalopram (CELEXA) 10 MG tablet Take 1 tablet (10 mg) by mouth daily  Patient taking differently: Take 10 mg by mouth every morning  5/4/2020 at am Yes Cammie Daugherty MD   donepezil (ARICEPT) 10 MG tablet Take 1 tablet (10 mg) by mouth At Bedtime 5/3/2020 at hs Yes Cammie Daugherty MD   gabapentin (NEURONTIN) 300 MG capsule Take 1 capsule (300 mg) by mouth 3 times daily 5/4/2020 at afternoon Yes Cammie Daugherty MD   levofloxacin (LEVAQUIN) 750 MG tablet Take 1 tablet (750 mg) by mouth daily  Patient taking differently: Take 750 mg by mouth every morning Has taken 5 out of 14 tablets.  Should have 9 doses remaining. 5/4/2020 at am Yes Cammie Daugherty MD   loratadine (CLARITIN) 10 MG tablet Take 10 mg by mouth every morning  5/4/2020 at am Yes Reported, Patient   losartan (COZAAR) 50 MG tablet Take 1 tablet (50 mg) by mouth daily  Patient taking differently: Take 50 mg by mouth every morning  5/4/2020 at am Yes Cammie Daugherty" MD Sanjeev   Methylcobalamin (B-12) 5000 MCG TBDP Take 5,000 mcg by mouth daily  Patient taking differently: Take 5,000 mcg by mouth every morning  5/4/2020 at am Yes Cammie Daugherty MD   Multiple Vitamins-Minerals (PRESERVISION AREDS PO) Take 1 tablet by mouth every morning  5/4/2020 at am Yes Reported, Patient   oxyCODONE (ROXICODONE) 5 MG tablet Take 1 tablet (5 mg) by mouth every 6 hours as needed for severe pain 5/4/2020 at 1800 Yes Cammie Daugherty MD   traZODone (DESYREL) 50 MG tablet Take 1-1.5 tablets (50-75 mg) by mouth At Bedtime  Patient taking differently: Take 100 mg by mouth At Bedtime RX is for 1 to 1.5 tablets (50mg to 75 mg) but she was told she can take 2 tablets (100 mg) at bedtime and this is what she has been taking.. 5/3/2020 at hs Yes Cammie Daugherty MD

## 2020-05-05 NOTE — PLAN OF CARE
A/Ox4. VSS on 2L. Pt states she wears oxygen at home, but cannot recall how many liters. BS active,+flatus, -bm.  LS diminished. Skin: intact. CMS intact. Pain controlled with PRN percocet. Up with 1+GB. NPO ex meds. Voiding adequately.Plan: Thoracic surgery to see today.

## 2020-05-05 NOTE — CONSULTS
Thoracic Surgery Consult Note:    Kathei Wetzel  1940   [id number]    Date of Admission: 5/4/2020  7:54 PM  Date of Consult: 5/5/2020     CC: pleural effeusion   Pleural effusion on right    Referring Provider: Dr. Hima Paris    Consulting Thoracic Surgeon: Dr. Gary Shah    ASSESSMENT/PLAN:   Right parapneumonic, multiloculated pleural effusion: recommend right video assisted thoracoscopy, possible right thoracotomy, total decortication right lung. Patient is on surgery scheduled for this afternoon with Dr. Gary Shah. Alternatives, risks, benefits, expected post-op course reviewed with patient and she wishes to proceed. Discussed with daughter Regi, on Facetime/video call in patient's room. Also discussed code status-- patient's DNR/DNI will be on hold during the perioperative period, including the immediate 24 hours post-op.     History of the Present Illness: Pt is an 80 year old female with history of htn, hyperlipidemia, mild cognitive impairment, CKD and depression who was admitted through the ED last night for the treatment of parapneumonic pleural effusion on right.  Thoracic Surgery Consult was requested for consideration of surgical intervention.    Genevieve has been feeling fatigued, with poor oral intake and weight loss and increasing dyspnea for the past few weeks. Symptoms have gotten so significant that her daughter reported the patient was very weak and had trouble walking independently. She's been staying with her daughter, Regi, for the past few weeks. Genevieve has had several PCP visits regarding these symptoms and on 4/30 at [facility] underwent an US-guided right thoracentesis with 550 ml removed with multiple septations  and incomplete drainage noted; cultures growing out Gram neg rods and Gram pos cocci and cytology negative. CT chest performed after thoracentesis on 4/30 showed a persistent moderate-sized right pleural effusion with compressive atelectasis in the right middle and lower  lobes. Patient was prescribed Levaquin 750 po daily and managed outpatient. She presented to her PCP office yesterday and admission was recommended for consideration of surgical intervention for definitive treatment of right parapneumonic effusion. In the ED, WBC was 24.2, Sed rate 102, INR 1.3, COVID-19 neg, and CXR (from earlier same day) with large loculated right pleural effusion and dense consolidation fo right mid to lower lung. Left lung clear.       ROS: A 12-point review of systems was performed and negative except as noted in the HPI above.     Past Medical History:  Past Medical History:   Diagnosis Date     High cholesterol      HTN (hypertension)      Parathyroid adenoma      PONV (postoperative nausea and vomiting)      Thyroid disorder         Past Surgical History:  Past Surgical History:   Procedure Laterality Date     APPENDECTOMY OPEN       CHOLECYSTECTOMY  2007     DISCECTOMY LUMBAR POSTERIOR MICROSCOPIC ONE LEVEL Right 11/10/2017    Procedure: DISCECTOMY LUMBAR POSTERIOR MICROSCOPIC ONE LEVEL;  Right L3-4 hemilaminectomy, medial facetectomy, foraminotomy with microdiscectomy and use of X-ray and intraoperative microscope ;  Surgeon: Al Dailey MD;  Location: [facility] OR     PARATHYROIDECTOMY  3/14/2014    Procedure: PARATHYROIDECTOMY;  Neck Exploration, Excision Right  Parathyroid Adenoma, Pre Operative Sestimibi Injection, Rapid PTH Assay ;  Surgeon: Natividad Fischer MD;  Location: [facility] OR       Family History:  Family History   Problem Relation Age of Onset     Thyroid Disease Maternal Aunt      Thyroid Disease Other         cousin      Social History:  Smoked a few cigarettes as a teenager  Lives independently except for the past two weeks with her daughter due to weakness and right chest pain    Medications:  No current outpatient medications on file.       S: NAD, sitting upright in bed    O: /65   Pulse 74   Temp 97.7  F (36.5  C) (Oral)   Resp 18   Wt 81.3 kg (179 lb  "3.7 oz)   LMP  (LMP Unknown)   SpO2 96%   BMI 26.86 kg/m   on 2 L O2 nasal cannula.    Exam:  General:  Kathie is awake, alert, and cooperative.  NAD.    Imaging:  Recent Results (from the past 48 hour(s))   XR Chest 2 Views    Narrative    CHEST TWO VIEWS May 4, 2020 11:40 AM     HISTORY: Parapneumonic effusion. SOB (shortness of breath).    COMPARISON: 4/30/2020.      Impression    IMPRESSION: Loculated right mid to low thoracic effusion appears  larger in volume. Dense consolidation at the right mid to low lung may  be atelectasis but underlying airspace disease not excluded. Left lung  appears clear. Stable cardiac silhouette.    NINOSKA LARES MD     CT chest 5/5/20: personally reviewed by myself. Pertinent findings:   \"IMPRESSION:    1. Slight interval increase in the size of the loculated right pleural  effusion predominantly the posterior component which appears to be  more loculated as compared to 4/30/2020 exam. Also now containing foci  of gas, which could be related to recent thoracentesis versus  developing infection/empyema.  2. Few scattered pulmonary nodules measuring up to 4 mm in the right  upper lobe, not significantly changed as compared to 4/30/2020 exam.  3. 3 cm high attenuation partially exophytic left renal cyst, likely  represents hemorrhagic/proteinaceous cyst, can be further evaluated  with renal ultrasound.\"    Discussed the above plan of care with patient, daughter Regi, and RN today. Orders left.  We will continue to follow with you.  Thank you for allowing us to participate in the care of this patient and please call if there are further questions or concerns.    Time dedicated to Consultation: 35 minutes were spent at bedside, floor and unit with greater than 50% of the time spent on patient counseling and education, radiology review and coordination of care.    Danna Hammond PA-C with Dr. Gary Shah  [facility]  Cell [phone]      "

## 2020-05-05 NOTE — PROGRESS NOTES
[facility]    HOSPITALIST PROGRESS NOTE :   --------------------------------------------------    Date of Admission:  5/4/2020    Cumulative Summary: Kathie Wetzel is a 80 year old female with past medical history significant for essential hypertension, hyperlipidemia, mild cognitive impairment, CKD, depression who was admitted as a direct admission from clinic with possible parapneumonic right pleural effusion on May 4.  Patient was a started on IV antibiotics and thoracic surgery was consulted.    Assessment & Plan     Parapneumonic right pleural effusion:   COVID ruled out  Acute hypoxia without respiratory distress: She has been requiring 3 L of oxygen after hospitalization, most likely secondary to above  She has been complaining of progressive fatigue and dyspnea over the past couple of weeks with prior work-up revealing right pleural effusion.  Patient underwent thoracentesis on April 30 and removed 550 mL fluid with Gram stain and culture showing GNR and GPC in broth and negative cytology.  She was initiated on a course of levofloxacin as an outpatient with worsening leukocytosis which was noticed on her follow-up with chest x-ray showing enlargement of effusion.  Patient was requested to be admitted.        --Continue to monitor patient closely.  --Patient has been a started on IV ertapenem and doxycycline, I will also go ahead and consult infectious disease to evaluate proper duration of antibiotics considering parapneumonic effusion.  --NPO possible surgical intervention.  --Repeat CBC and CRP tomorrow morning.  --CT scan of the chest without contrast was done, results are reviewed showing slight interval increase in size of loculated right pleural effusion predominantly in the posterior component which appears to be more loculated as compared to April 30.  Also now containing foci of gas which could be related to recent thoracentesis versus developing infection/empyema.  --Thoracic  surgery has been consulted, will follow up on their recommendations.  --Recent EKG reviewed from March, denies any exertional cardiorespiratory symptoms and is able to achieve 4 METS, she is low risk per revised cardiac risk index and is cleared to proceed with any surgical intervention  -- INR and PPT slightly elevated, possibly nutritional , received 5 mg Vit K yesterday.    Severe malnutrition:   Reporting 20 pound unintentional weight loss over past 6 months.  Ready to patient she has early satiety, usually only eating 1 meal a day, she has been eating better in the past few weeks as she has been with her daughter.  -- nutrition consult  -- Patient will also benefit from age appropriate cancer screening.     CKD stage III:  Baseline Cr 0.9-1.0, admission Cr 1.19.  -- BMP in AM     HTN  -- Continue to hold PTA Losartan.     Hyperlipidemia  -- continue PTA Atorvastatin     Depression  -- continue PTA Celexa     Mild cognitive impairment  -- continue PTA Aricept     Neuropathy  -- continue PTA Gabapentin    Diet: NPO per Anesthesia Guidelines for Procedure/Surgery Except for: Meds, Ice Chips    Ramirez Catheter: not present  DVT Prophylaxis: Pneumatic Compression Devices  Code Status: DNR/DNI    The patient's care was discussed with the Patient.    Disposition Plan   Expected discharge: Expecting patient to stay for another couple of nights, will continue to follow closely.    Karely Mckinney MD, FACP  Text Page (7am - 6pm)    ----------------------------------------------------------------------------------------------------------------------    Interval History   Patient care was assumed this morning, patient was seen and examined, sitting in bed, good historian but does have some mild cognitive deficit, denying any fever or chills at this point, aware about CT surgery evaluation and possible need for VATS if recommended.  Patient has been losing some weight in the past few months, has decreased appetite     -Data  reviewed today: I reviewed all new labs and imaging results over the last 24 hours.    I personally reviewed the chest CT image(s) showing Right parapneumonic effusion as above.    Physical Exam   Temp: 97.7  F (36.5  C) Temp src: Oral BP: 102/65 Pulse: 74   Resp: 18 SpO2: 96 % O2 Device: Nasal cannula Oxygen Delivery: 3 LPM  Vitals:    05/05/20 0500   Weight: 81.3 kg (179 lb 3.7 oz)     Vital Signs with Ranges  Temp:  [97.7  F (36.5  C)-99.3  F (37.4  C)] 97.7  F (36.5  C)  Pulse:  [] 74  Resp:  [18-22] 18  BP: ()/(59-65) 102/65  SpO2:  [95 %-96 %] 96 %  No intake/output data recorded.    GENERAL: Alert , awake and oriented. NAD. Conversational, appropriate.  Wearing nasal cannula  HEENT: Normocephalic. EOMI. No icterus or injection. Nares normal.   LUNGS: Clear to auscultation with decreased dullness to percussion and decreased breath sounds in right base. No dyspnea at rest.   HEART: Regular rate. Extremities perfused.   ABDOMEN: Soft, nontender, and nondistended. Positive bowel sounds.   EXTREMITIES: No LE edema noted.   NEUROLOGIC: Moves extremities x4 on command. No acute focal neurologic abnormalities noted.     Medications     lactated ringers 75 mL/hr at 05/05/20 0040       atorvastatin  10 mg Oral At Bedtime     citalopram  10 mg Oral QAM     donepezil  10 mg Oral At Bedtime     doxycycline (VIBRAMYCIN) IV  100 mg Intravenous Q12H     ertapenem (INVanz) IV  1 g Intravenous Q24H     gabapentin  300 mg Oral TID     sodium chloride (PF)  3 mL Intracatheter Q8H       Data   Recent Labs   Lab 05/04/20 2034 05/04/20  1101   WBC  --  24.2*   HGB  --  9.5*   MCV  --  89   PLT  --  436   INR 1.30*  --     136   POTASSIUM 4.2 4.1   CHLORIDE 102 102   CO2 26 27   BUN 28 30   CR 1.19* 1.22*   ANIONGAP 8 5   OMEGA 8.6 8.4*   * 100*   ALBUMIN 1.7* 1.7*   PROTTOTAL 6.4* 7.2   BILITOTAL 0.7 0.9   ALKPHOS 340* 350*   ALT 31 32   AST 46* 40       Imaging:   Recent Results (from the past 24 hour(s))    XR Chest 2 Views    Narrative    CHEST TWO VIEWS May 4, 2020 11:40 AM     HISTORY: Parapneumonic effusion. SOB (shortness of breath).    COMPARISON: 4/30/2020.      Impression    IMPRESSION: Loculated right mid to low thoracic effusion appears  larger in volume. Dense consolidation at the right mid to low lung may  be atelectasis but underlying airspace disease not excluded. Left lung  appears clear. Stable cardiac silhouette.    NINOSKA LARES MD   CT Chest w/o Contrast    Narrative    CT CHEST WITHOUT CONTRAST  5/5/2020 9:29 AM    HISTORY:  Loculated left pleural effusion.    TECHNIQUE:  Scans obtained from the apices through the diaphragm  without IV contrast. Radiation dose for this scan was reduced using  automated exposure control, adjustment of the mA and/or kV according  to patient size, or iterative reconstruction technique.    COMPARISON:  Chest CT on 4/30/2020.    FINDINGS:   Chest/mediastinum: No cardiomegaly. Trace pericardial effusion.  Scattered atherosclerotic vascular calcification of the coronary  arteries and thoracic aorta. Few mildly enlarged mediastinal lymph  nodes, for example 1.1 cm short axis precarinal lymph node (series 2  image 27), likely reactive.    Lung/pleura: Slight interval increase in the size of the loculated  right pleural effusion, predominantly of the posterior component which  appears more loculated as compared to 4/30/2020 exam, containing foci  of gas. Finding could be related to recent aspiration versus infected  collection/empyema. Trace left pleural effusion. Small amount of fluid  within the right major fissure. Few calcified pulmonary granulomas,  for example 2 mm granuloma at the right upper lobe (series 4 image  121). Few scattered pulmonary nodules including a 4 mm right upper  lobe nodule (series 2 image 77) and 2 mm left upper lobe nodule  (series 4 image 139), not significantly changed as compared to  4/30/2020 exam.    Upper abdomen: Limited evaluation of the  upper abdomen due to lack of  coverage and contrast. 3 cm high attenuation partially exophytic  cystic lesion arising from the posterior aspect of the interpolar  region of the left kidney (series 2 image 70), not significantly  changed as compared to 4/30/2020 exam, likely represents  hemorrhagic/proteinaceous cyst.    Bones and soft tissue: Degenerative changes of the spine. No  suspicious osseous lesion.      Impression    IMPRESSION:    1. Slight interval increase in the size of the loculated right pleural  effusion predominantly the posterior component which appears to be  more loculated as compared to 4/30/2020 exam. Also now containing foci  of gas, which could be related to recent thoracentesis versus  developing infection/empyema.  2. Few scattered pulmonary nodules measuring up to 4 mm in the right  upper lobe, not significantly changed as compared to 4/30/2020 exam.  3. 3 cm high attenuation partially exophytic left renal cyst, likely  represents hemorrhagic/proteinaceous cyst, can be further evaluated  with renal ultrasound.    ASHIA ERICKSON MD

## 2020-05-05 NOTE — ANESTHESIA PREPROCEDURE EVALUATION
Anesthesia Pre-Procedure Evaluation    Patient: Kathie Wtezel   MRN: [id number] : 1940          Preoperative Diagnosis: Empyema (H) [J86.9]    Procedure(s):  RIGHT VIDEO ASSISTED THORACOSCOPY, RIGHT THORACOTOMY DECORTICATION.    Past Medical History:   Diagnosis Date     High cholesterol      HTN (hypertension)      Parathyroid adenoma      PONV (postoperative nausea and vomiting)      Thyroid disorder      Past Surgical History:   Procedure Laterality Date     APPENDECTOMY OPEN       CHOLECYSTECTOMY  2007     DISCECTOMY LUMBAR POSTERIOR MICROSCOPIC ONE LEVEL Right 11/10/2017    Procedure: DISCECTOMY LUMBAR POSTERIOR MICROSCOPIC ONE LEVEL;  Right L3-4 hemilaminectomy, medial facetectomy, foraminotomy with microdiscectomy and use of X-ray and intraoperative microscope ;  Surgeon: Al Dailey MD;  Location: [facility] OR     PARATHYROIDECTOMY  3/14/2014    Procedure: PARATHYROIDECTOMY;  Neck Exploration, Excision Right  Parathyroid Adenoma, Pre Operative Sestimibi Injection, Rapid PTH Assay ;  Surgeon: Natividad Fischer MD;  Location: [facility] OR       Anesthesia Evaluation     . Pt has had prior anesthetic. Type: General    History of anesthetic complications   - PONV        ROS/MED HX    ENT/Pulmonary:     (+)tobacco use, Past use , . Other pulmonary disease Empyema.   (-) asthma and sleep apnea   Neurologic:     (+)dementia (mild), neuropathy - Lumbar radiuclopathy on the right,    (-) seizures, CVA, TIA and Other neuro hx   Cardiovascular:     (+) Dyslipidemia, hypertension----. : . . . :. .      (-) CAD, CHF, arrhythmias and pulmonary hypertension   METS/Exercise Tolerance:  >4 METS   Hematologic:        (-) anemia   Musculoskeletal:   (+) arthritis,  other musculoskeletal- Chronic back apin      GI/Hepatic:     (+) GERD      (-) hiatal hernia, inflamatory bowel disease and hepatitis   Renal/Genitourinary:     (+) chronic renal disease, type: CRI,       Endo:     (+) Obesity, Other Endocrine Disorder Hx  "parathyroid adenoma.   (-) Type I DM, Type II DM, thyroid disease and chronic steroid usage   Psychiatric:     (+) psychiatric history anxiety      Infectious Disease:  - neg infectious disease ROS       Malignancy:      - no malignancy   Other:    (+) H/O Chronic Pain,                        Physical Exam      Airway   Mallampati: II  TM distance: >3 FB  Neck ROM: full    Dental     Cardiovascular   Rhythm and rate: regular and normal  (-) no murmur    Pulmonary    breath sounds clear to auscultation            Lab Results   Component Value Date    WBC 24.2 (H) 05/04/2020    HGB 9.5 (L) 05/04/2020    HCT 30.1 (L) 05/04/2020     05/04/2020    .0 (H) 05/04/2020     (H) 05/04/2020     05/04/2020    POTASSIUM 4.2 05/04/2020    CHLORIDE 102 05/04/2020    CO2 26 05/04/2020    BUN 28 05/04/2020    CR 1.19 (H) 05/04/2020     (H) 05/04/2020    OMEGA 8.6 05/04/2020    ALBUMIN 1.7 (L) 05/04/2020    PROTTOTAL 6.4 (L) 05/04/2020    ALT 31 05/04/2020    AST 46 (H) 05/04/2020    ALKPHOS 340 (H) 05/04/2020    BILITOTAL 0.7 05/04/2020    PTT 53 (H) 05/04/2020    INR 1.30 (H) 05/04/2020    TSH 2.43 04/13/2020       Preop Vitals  BP Readings from Last 3 Encounters:   05/05/20 102/65   04/30/20 106/62   04/13/20 92/54    Pulse Readings from Last 3 Encounters:   05/05/20 74   04/30/20 123   04/13/20 88      Resp Readings from Last 3 Encounters:   05/05/20 18   04/30/20 16   04/13/20 16    SpO2 Readings from Last 3 Encounters:   05/05/20 96%   04/30/20 91%   04/13/20 97%      Temp Readings from Last 1 Encounters:   05/05/20 36.5  C (97.7  F) (Oral)    Ht Readings from Last 1 Encounters:   09/26/19 1.74 m (5' 8.5\")      Wt Readings from Last 1 Encounters:   05/05/20 81.3 kg (179 lb 3.7 oz)    Estimated body mass index is 26.86 kg/m  as calculated from the following:    Height as of 9/26/19: 1.74 m (5' 8.5\").    Weight as of this encounter: 81.3 kg (179 lb 3.7 oz).       Anesthesia Plan      History & " Physical Review  History and physical reviewed and following examination; no interval change.    ASA Status:  3 .    NPO Status:  > 8 hours    Plan for General with Intravenous and Propofol induction. Maintenance will be Balanced.    PONV prophylaxis:  Ondansetron (or other 5HT-3), Dexamethasone or Solumedrol and Other (See comment) (Propofol gtt)  Additional equipment: Videolaryngoscope and Fiberoptic bronchoscope        Postoperative Care  Postoperative pain management:  IV analgesics and Multi-modal analgesia.      Consents  Anesthetic plan, risks, benefits and alternatives discussed with:  Patient..                 Abraham Bianchi MD

## 2020-05-05 NOTE — BRIEF OP NOTE
[facility]    Brief Operative Note    Pre-operative diagnosis: Empyema (H) [J86.9]  Post-operative diagnosis righty empyema    Procedure: Procedure(s):  RIGHT VIDEO ASSISTED THORACOSCOPY, RIGHT THORACOTOMY, TOTAL DECORTICATION RIGHT LUNG, RIGHT PARIETAL PLEURECTOMY  Surgeon: Surgeon(s) and Role:     * Gary Shah MD - Primary     * Danna Hammond PA-C - Assisting  Anesthesia: General   Estimated blood loss: 500 cc  Drains: Two 28 straight Australian chest tubes to anterior and posterior apex, one 28 Australian angled chest tube to right base  Specimens:   ID Type Source Tests Collected by Time Destination   1 : RIGHT PLEURAL DEBRIDE Tissue Pleural/Thoracic Cavity TISSUE CULTURE AEROBIC BACTERIAL Gary Shah MD 5/5/2020  4:03 PM    2 : RIGHT PLEURAL DEBRIDE Tissue Pleural/Thoracic Cavity ANAEROBIC BACTERIAL CULTURE Gary Shah MD 5/5/2020  4:03 PM    A : RIGHT PARIETAL PLEURA  Tissue Pleural/Thoracic Cavity SURGICAL PATHOLOGY EXAM Gary Shah MD 5/5/2020  4:07 PM      Findings:   Large purulent loculation at right base, partially trapped lung, good re-expansion of right lung at conclusion of procedure, some consolidation of RML anteriorly. Gram stain of right pleural debris with Gram pos cocci in pairs and chains and Gram pos rods. Await cultures.  Complications: None.  Implants: * No implants in log *

## 2020-05-05 NOTE — PROGRESS NOTES
[facility] lab called back w/covid results. Patient is negative. JT Clay w/thoracic surgery and bedside RN, Wanda Chaney, notified.

## 2020-05-06 ENCOUNTER — APPOINTMENT (OUTPATIENT)
Dept: GENERAL RADIOLOGY | Facility: CLINIC | Age: 80
DRG: 163 | End: 2020-05-06
Attending: PHYSICIAN ASSISTANT
Payer: MEDICARE

## 2020-05-06 LAB
ABO + RH BLD: NORMAL
ABO + RH BLD: NORMAL
ANION GAP SERPL CALCULATED.3IONS-SCNC: 7 MMOL/L (ref 3–14)
APTT PPP: 50 SEC (ref 22–37)
BASOPHILS # BLD AUTO: 0.1 10E9/L (ref 0–0.2)
BASOPHILS NFR BLD AUTO: 0.2 %
BLD GP AB SCN SERPL QL: NORMAL
BLD PROD TYP BPU: NORMAL
BLD PROD TYP BPU: NORMAL
BLD UNIT ID BPU: 0
BLOOD BANK CMNT PATIENT-IMP: NORMAL
BLOOD PRODUCT CODE: NORMAL
BPU ID: NORMAL
BUN SERPL-MCNC: 25 MG/DL (ref 7–30)
CALCIUM SERPL-MCNC: 8.4 MG/DL (ref 8.5–10.1)
CHLORIDE SERPL-SCNC: 105 MMOL/L (ref 94–109)
CO2 SERPL-SCNC: 25 MMOL/L (ref 20–32)
CREAT SERPL-MCNC: 0.98 MG/DL (ref 0.52–1.04)
CRP SERPL-MCNC: 182 MG/L (ref 0–8)
DIFFERENTIAL METHOD BLD: ABNORMAL
EOSINOPHIL # BLD AUTO: 0 10E9/L (ref 0–0.7)
EOSINOPHIL NFR BLD AUTO: 0 %
ERYTHROCYTE [DISTWIDTH] IN BLOOD BY AUTOMATED COUNT: 14.3 % (ref 10–15)
GFR SERPL CREATININE-BSD FRML MDRD: 54 ML/MIN/{1.73_M2}
GLUCOSE BLDC GLUCOMTR-MCNC: 111 MG/DL (ref 70–99)
GLUCOSE SERPL-MCNC: 118 MG/DL (ref 70–99)
HCT VFR BLD AUTO: 24.1 % (ref 35–47)
HGB BLD-MCNC: 7.5 G/DL (ref 11.7–15.7)
HGB BLD-MCNC: 8.3 G/DL (ref 11.7–15.7)
IMM GRANULOCYTES # BLD: 1.1 10E9/L (ref 0–0.4)
IMM GRANULOCYTES NFR BLD: 3.1 %
INTERPRETATION ECG - MUSE: NORMAL
LACTATE BLD-SCNC: 1.2 MMOL/L (ref 0.7–2)
LYMPHOCYTES # BLD AUTO: 1.3 10E9/L (ref 0.8–5.3)
LYMPHOCYTES NFR BLD AUTO: 3.7 %
MCH RBC QN AUTO: 27.8 PG (ref 26.5–33)
MCHC RBC AUTO-ENTMCNC: 31.1 G/DL (ref 31.5–36.5)
MCV RBC AUTO: 89 FL (ref 78–100)
MONOCYTES # BLD AUTO: 1.5 10E9/L (ref 0–1.3)
MONOCYTES NFR BLD AUTO: 4.3 %
NEUTROPHILS # BLD AUTO: 31.7 10E9/L (ref 1.6–8.3)
NEUTROPHILS NFR BLD AUTO: 88.7 %
NRBC # BLD AUTO: 0 10*3/UL
NRBC BLD AUTO-RTO: 0 /100
NUM BPU REQUESTED: 1
PLATELET # BLD AUTO: 437 10E9/L (ref 150–450)
POTASSIUM SERPL-SCNC: 5 MMOL/L (ref 3.4–5.3)
RBC # BLD AUTO: 2.7 10E12/L (ref 3.8–5.2)
SODIUM SERPL-SCNC: 137 MMOL/L (ref 133–144)
SPECIMEN EXP DATE BLD: NORMAL
TRANSFUSION STATUS PATIENT QL: NORMAL
TRANSFUSION STATUS PATIENT QL: NORMAL
WBC # BLD AUTO: 35.7 10E9/L (ref 4–11)

## 2020-05-06 PROCEDURE — 25000128 H RX IP 250 OP 636: Performed by: PHYSICIAN ASSISTANT

## 2020-05-06 PROCEDURE — 99207 ZZC CDG-MDM COMPONENT: MEETS MODERATE - UP CODED: CPT | Performed by: INTERNAL MEDICINE

## 2020-05-06 PROCEDURE — 25000125 ZZHC RX 250: Performed by: PHYSICIAN ASSISTANT

## 2020-05-06 PROCEDURE — 85730 THROMBOPLASTIN TIME PARTIAL: CPT | Performed by: PHYSICIAN ASSISTANT

## 2020-05-06 PROCEDURE — 85025 COMPLETE CBC W/AUTO DIFF WBC: CPT | Performed by: PHYSICIAN ASSISTANT

## 2020-05-06 PROCEDURE — 00000146 ZZHCL STATISTIC GLUCOSE BY METER IP

## 2020-05-06 PROCEDURE — 83605 ASSAY OF LACTIC ACID: CPT | Performed by: THORACIC SURGERY (CARDIOTHORACIC VASCULAR SURGERY)

## 2020-05-06 PROCEDURE — P9016 RBC LEUKOCYTES REDUCED: HCPCS | Performed by: ANESTHESIOLOGY

## 2020-05-06 PROCEDURE — 99233 SBSQ HOSP IP/OBS HIGH 50: CPT | Performed by: INTERNAL MEDICINE

## 2020-05-06 PROCEDURE — 25800029 ZZH RX IP 258 OP 250: Performed by: INTERNAL MEDICINE

## 2020-05-06 PROCEDURE — 36415 COLL VENOUS BLD VENIPUNCTURE: CPT | Performed by: INTERNAL MEDICINE

## 2020-05-06 PROCEDURE — 36415 COLL VENOUS BLD VENIPUNCTURE: CPT | Performed by: THORACIC SURGERY (CARDIOTHORACIC VASCULAR SURGERY)

## 2020-05-06 PROCEDURE — 71045 X-RAY EXAM CHEST 1 VIEW: CPT

## 2020-05-06 PROCEDURE — 80048 BASIC METABOLIC PNL TOTAL CA: CPT | Performed by: PHYSICIAN ASSISTANT

## 2020-05-06 PROCEDURE — 36415 COLL VENOUS BLD VENIPUNCTURE: CPT | Performed by: PHYSICIAN ASSISTANT

## 2020-05-06 PROCEDURE — 25000132 ZZH RX MED GY IP 250 OP 250 PS 637: Mod: GY | Performed by: PHYSICIAN ASSISTANT

## 2020-05-06 PROCEDURE — 86140 C-REACTIVE PROTEIN: CPT | Performed by: PHYSICIAN ASSISTANT

## 2020-05-06 PROCEDURE — 12000000 ZZH R&B MED SURG/OB

## 2020-05-06 PROCEDURE — 25800030 ZZH RX IP 258 OP 636: Performed by: INTERNAL MEDICINE

## 2020-05-06 PROCEDURE — 85018 HEMOGLOBIN: CPT | Performed by: INTERNAL MEDICINE

## 2020-05-06 RX ORDER — SODIUM CHLORIDE 450 MG/100ML
INJECTION, SOLUTION INTRAVENOUS CONTINUOUS
Status: ACTIVE | OUTPATIENT
Start: 2020-05-06 | End: 2020-05-07

## 2020-05-06 RX ADMIN — SODIUM CHLORIDE: 4.5 INJECTION, SOLUTION INTRAVENOUS at 21:35

## 2020-05-06 RX ADMIN — HYDROMORPHONE HYDROCHLORIDE 0.3 MG: 1 INJECTION, SOLUTION INTRAMUSCULAR; INTRAVENOUS; SUBCUTANEOUS at 01:24

## 2020-05-06 RX ADMIN — GABAPENTIN 300 MG: 300 CAPSULE ORAL at 15:58

## 2020-05-06 RX ADMIN — ATORVASTATIN CALCIUM 10 MG: 10 TABLET, FILM COATED ORAL at 20:28

## 2020-05-06 RX ADMIN — CITALOPRAM HYDROBROMIDE 10 MG: 10 TABLET ORAL at 08:11

## 2020-05-06 RX ADMIN — SENNOSIDES AND DOCUSATE SODIUM 2 TABLET: 8.6; 5 TABLET ORAL at 08:10

## 2020-05-06 RX ADMIN — SODIUM CHLORIDE 1000 ML: 9 INJECTION, SOLUTION INTRAVENOUS at 08:21

## 2020-05-06 RX ADMIN — GABAPENTIN 300 MG: 300 CAPSULE ORAL at 20:28

## 2020-05-06 RX ADMIN — SENNOSIDES AND DOCUSATE SODIUM 2 TABLET: 8.6; 5 TABLET ORAL at 20:28

## 2020-05-06 RX ADMIN — HYDROMORPHONE HYDROCHLORIDE 2 MG: 2 TABLET ORAL at 20:27

## 2020-05-06 RX ADMIN — HYDROMORPHONE HYDROCHLORIDE 2 MG: 2 TABLET ORAL at 05:41

## 2020-05-06 RX ADMIN — ERTAPENEM SODIUM 1 G: 1 INJECTION, POWDER, LYOPHILIZED, FOR SOLUTION INTRAMUSCULAR; INTRAVENOUS at 21:02

## 2020-05-06 RX ADMIN — FAMOTIDINE 20 MG: 20 TABLET ORAL at 20:27

## 2020-05-06 RX ADMIN — GABAPENTIN 300 MG: 300 CAPSULE ORAL at 08:11

## 2020-05-06 RX ADMIN — ACETAMINOPHEN 975 MG: 325 TABLET, FILM COATED ORAL at 20:28

## 2020-05-06 RX ADMIN — ACETAMINOPHEN 975 MG: 325 TABLET, FILM COATED ORAL at 05:41

## 2020-05-06 RX ADMIN — BACITRACIN: 500 OINTMENT TOPICAL at 08:18

## 2020-05-06 RX ADMIN — SODIUM CHLORIDE: 4.5 INJECTION, SOLUTION INTRAVENOUS at 13:35

## 2020-05-06 RX ADMIN — HYDROMORPHONE HYDROCHLORIDE 0.3 MG: 1 INJECTION, SOLUTION INTRAMUSCULAR; INTRAVENOUS; SUBCUTANEOUS at 00:09

## 2020-05-06 RX ADMIN — DONEPEZIL HYDROCHLORIDE 10 MG: 10 TABLET ORAL at 20:28

## 2020-05-06 RX ADMIN — HYDROMORPHONE HYDROCHLORIDE 2 MG: 2 TABLET ORAL at 16:00

## 2020-05-06 RX ADMIN — ACETAMINOPHEN 975 MG: 325 TABLET, FILM COATED ORAL at 12:41

## 2020-05-06 ASSESSMENT — ACTIVITIES OF DAILY LIVING (ADL)
ADLS_ACUITY_SCORE: 16
ADLS_ACUITY_SCORE: 14
ADLS_ACUITY_SCORE: 16
ADLS_ACUITY_SCORE: 16
ADLS_ACUITY_SCORE: 14
ADLS_ACUITY_SCORE: 14

## 2020-05-06 NOTE — TELEPHONE ENCOUNTER
Acute pain due to acute process;  Pt currently in hospital .  Narcotics are not a chronic medications.

## 2020-05-06 NOTE — CONSULTS
"CLINICAL NUTRITION SERVICES  -  ASSESSMENT NOTE      Recommendations Ordered by Registered Dietitian (RD): Boost TID with meals    Malnutrition:   % Weight Loss:  > 10% in 6 months (severe malnutrition)  % Intake:  </= 50% for >/= 5 days (severe malnutrition)  Subcutaneous Fat Loss:  Unable to determine as above   Muscle Loss:  Unable to determine as above   Fluid Retention:  Mild 2+ in lower extremities     Malnutrition Diagnosis: Severe malnutrition  In Context of:  Acute illness or injury        REASON FOR ASSESSMENT  Kathie Wetzel is a 80 year old female seen by Registered Dietitian for Provider Order - Malnutrition       NUTRITION HISTORY  - Attempted to reach patient via telephone today but no answer.  Unable to visit in person during COVID-19 global pandemic.  Noted that on admission she had reported increased fatigue and poor appetite for two weeks prior to admission.  She had also reported losing 20# over the last 6 months.     Patient is now s/p VATS on 5/5      CURRENT NUTRITION ORDERS  Diet Order:     Regular     Current Intake/Tolerance:  Intake not recorded but patient has only ordered two very small meals since admit.    Breakfast today = Yogurt, fruit, and milk  Lunch today = Yogurt, grapes, and lemonade     Day #3 of very reduced intake since admit       NUTRITION FOCUSED PHYSICAL ASSESSMENT FOR DIAGNOSING MALNUTRITION)  No:  Unable to visit patient in person during COVID-19 global pandemic                 Observed:    N/A    Obtained from Chart/Interdisciplinary Team:  \"Severe malnutrition\"  Edema 2+ mild in lower extremities     ANTHROPOMETRICS  Height: 5'8.5\"  Weight: 81.3 kg (179#)(5/5)  Body mass index is 26.86 kg/m   Weight Status:  Overweight BMI 25-29.9  IBW: 60.9 kg   % IBW: 133%  Weight History:   Noted in April (per Care Everywhere) that patient was 166#  Current weight likely inflated (fluids??)    Wt Readings from Last 10 Encounters:   05/05/20 81.3 kg (179 lb 3.7 oz)   04/13/20 77.8 " kg (171 lb 9.6 oz)   09/26/19 87.5 kg (192 lb 14.4 oz)   06/27/19 90.4 kg (199 lb 3.2 oz)   04/01/19 94.2 kg (207 lb 11.2 oz)   02/04/19 95.3 kg (210 lb)   09/11/18 94.1 kg (207 lb 8 oz)   06/05/18 94.9 kg (209 lb 3.2 oz)   12/18/17 95.3 kg (210 lb)   11/10/17 94.3 kg (208 lb)     Using the Care Everywhere weight of 166#, patient would have lost 27# or 14% in 7 months which is significant     LABS  Labs reviewed    MEDICATIONS  Medications reviewed      ASSESSED NUTRITION NEEDS PER APPROVED PRACTICE GUIDELINES:    Dosing Weight 66 kg (adjusted)  Estimated Energy Needs: 6084-1484 kcals (25-30 Kcal/Kg)  Justification: overweight  Estimated Protein Needs:  grams protein (1.2-1.5 g pro/Kg)  Justification: post-op and hypercatabolism with acute illness  Estimated Fluid Needs: 6341-2886 mL (1 mL/Kcal)  Justification: maintenance    MALNUTRITION:  % Weight Loss:  > 10% in 6 months (severe malnutrition)  % Intake:  </= 50% for >/= 5 days (severe malnutrition)  Subcutaneous Fat Loss:  Unable to determine as above   Muscle Loss:  Unable to determine as above   Fluid Retention:  Mild 2+ in lower extremities     Malnutrition Diagnosis: Severe malnutrition  In Context of:  Acute illness or injury    NUTRITION DIAGNOSIS:  Inadequate oral intake related to poor appetite, recent surgery as evidenced by minimal intake since admit (fruit and yogurt)      NUTRITION INTERVENTIONS  Recommendations / Nutrition Prescription  Continue regular diet as tolerated  Boost TID with meals     Implementation  Nutrition education: Per Provider order if indicated   Medical Food Supplement:  Ordered as above     Nutrition Goals  Patient will order at least 3-4 items per meal and consume at least 50% of meals and supplement     MONITORING AND EVALUATION:  Progress towards goals will be monitored and evaluated per protocol and Practice Guidelines    Areli Arias RD, LD, CNSC   Clinical Dietitian - [facility]

## 2020-05-06 NOTE — PROGRESS NOTES
[facility]    HOSPITALIST PROGRESS NOTE :   --------------------------------------------------    Date of Admission:  5/4/2020    Cumulative Summary: Kathie Wetzel is a 80 year old female with past medical history significant for essential hypertension, hyperlipidemia, mild cognitive impairment, CKD, depression who was admitted as a direct admission from clinic with possible parapneumonic right pleural effusion on May 4.  Patient was a started on IV antibiotics and thoracic surgery was consulted.    Assessment & Plan     Parapneumonic right pleural effusion: She has been complaining of progressive fatigue and dyspnea over the past couple of weeks with prior work-up revealing right pleural effusion.  Patient underwent thoracentesis on April 30 and removed 550 mL fluid with Gram stain and culture showing GNR and GPC in broth and negative cytology.  She was initiated on a course of levofloxacin as an outpatient with worsening leukocytosis which was noticed on her follow-up with chest x-ray showing enlargement of effusion.  Patient was requested to be admitted.  CT scan of the chest without contrast was done,showing slight interval increase in size of loculated right pleural effusion predominantly in the posterior component , more loculated. Also now containing foci of gas which could be related to recent thoracentesis versus developing infection/empyema.  Patient was evaluated by thoracic surgery and underwent video-assisted thoracic go scoping, right thoracotomy and total decortication of right lung and right partial pleurectomy on May 5 and tolerated the procedure well.  This morning patient is noticed to have hypotension, has required bolus earlier in the morning, her hemoglobin is down to 1.5, thoracic surgery is requesting blood transfusion due to symptomatic anemia.  Continues to have leukocytosis with WBC count of 35.7 which is a upward trend from 24, hopefully her leukocytosis will start to  improve.  COVID ruled out; PCR came back negative for COVID-19  Acute hypoxia without respiratory distress: improved , on room air this morning.She was requiring 3 L of oxygen after hospitalization, most likely secondary to above  Elevated CRP: Most likely secondary to above, down to 182 from 280 on admission.          --Continue to monitor patient closely.  -- will order fluid bolus ( 1 liter in 2 hrs )followed by maintenance fluid at 100 ml/hr  -- will go ahead and will order 1 unit of PRBc to be transfused due to symptomatic anemia with hypotension and patient has been receiving bolus earlier in morning   --Appreciate thoracic surgery help, patient has 3 chest tubes in place, chest tube management as per thoracic surgery.  --Highly appreciate infectious disease help, currently patient is receiving ertapenem 1 g every 24 hours.  Infectious disease is following closely culture results, so far growing fusobacterium  --Repeat CBC and CRP tomorrow morning.    Severe malnutrition:   Reporting 20 pound unintentional weight loss over past 6 months.  Ready to patient she has early satiety, usually only eating 1 meal a day, she has been eating better in the past few weeks as she has been with her daughter.  -- nutrition consult  -- Patient will also benefit from age appropriate cancer screening.     CKD stage III:  Baseline Cr 0.9-1.0, admission Cr 1.19.  -- BMP in AM     HTN  -- Continue to hold PTA Losartan.     Hyperlipidemia  -- continue PTA Atorvastatin     Depression  -- continue PTA Celexa     Mild cognitive impairment  -- continue PTA Aricept     Neuropathy  -- continue PTA Gabapentin    Diet: Advance Diet as Tolerated: Regular Diet Adult    Ramirez Catheter: not present  DVT Prophylaxis: Pneumatic Compression Devices  Code Status: Full Code During Procedure, will be changed back to DNR/DNI tomorrow morning     The patient's care was discussed with the Patient.    Disposition Plan   Expected discharge: Expecting  patient to stay for another couple of nights, will continue to follow closely.will ask PT and OT to evaluate for safe disposition planning , will also post      Karely Mckinney MD, FACP  Text Page (7am - 6pm)    ----------------------------------------------------------------------------------------------------------------------    Interval History    Patient was seen and examined, sitting in bed, does have some mild cognitive deficit but able to provide good history, denying any pain at this point, denying any lightheadedness or dizziness, she has received couple of boluses and now receiving 1 unit of packed red blood cell for hypotension and symptomatic anemia.    -Data reviewed today: I reviewed all new labs and imaging results over the last 24 hours.    I personally reviewed the chest CT image(s) showing Right parapneumonic effusion as above.    Physical Exam   Temp: 98  F (36.7  C) Temp src: Axillary BP: (!) 79/51 Pulse: 76 Heart Rate: 75 Resp: 12 SpO2: 92 % O2 Device: None (Room air) Oxygen Delivery: 1 LPM  Vitals:    05/05/20 0500   Weight: 81.3 kg (179 lb 3.7 oz)     Vital Signs with Ranges  Temp:  [97.4  F (36.3  C)-98.1  F (36.7  C)] 98  F (36.7  C)  Pulse:  [] 76  Heart Rate:  [] 75  Resp:  [9-29] 12  BP: ()/(46-98) 79/51  SpO2:  [88 %-100 %] 92 %  I/O last 3 completed shifts:  In: 3545 [P.O.:220; I.V.:2325; IV Piggyback:500]  Out: 1149 [Urine:150; Blood:500; Chest Tube:499]    GENERAL: Alert , awake and oriented. NAD. Conversational, appropriate. On room air   HEENT: Normocephalic. EOMI. No icterus or injection. Nares normal.   LUNGS: Clear to auscultation , 3 chest tubes present   HEART: Regular rate. Extremities perfused.   ABDOMEN: Soft, nontender, and nondistended. Positive bowel sounds.   EXTREMITIES: No LE edema noted.   NEUROLOGIC: Moves extremities x4 on command. No acute focal neurologic abnormalities noted.     Medications     bupivacaine 0.5% in ON-Q  pump 4  mL/hr at 05/06/20 0141     - MEDICATION INSTRUCTIONS -         sodium chloride 0.9%  1,000 mL Intravenous Once     acetaminophen  975 mg Oral Q8H     atorvastatin  10 mg Oral At Bedtime     bacitracin   Topical Daily     citalopram  10 mg Oral QAM     donepezil  10 mg Oral At Bedtime     ertapenem (INVanz) IV  1 g Intravenous Q24H     famotidine  20 mg Oral QPM    Or     famotidine  20 mg Intravenous QPM     gabapentin  300 mg Oral TID     lactated ringers  500 mL Intravenous Once     senna-docusate  1 tablet Oral BID    Or     senna-docusate  2 tablet Oral BID     sodium chloride (PF)  3 mL Intracatheter Q8H       Data   Recent Labs   Lab 05/06/20  0501 05/05/20  1141 05/04/20  2034 05/04/20  1101   WBC 35.7*  --   --  24.2*   HGB 7.5* Canceled, Test credited  --  9.5*   MCV 89  --   --  89     --   --  436   INR  --   --  1.30*  --      --  136 136   POTASSIUM 5.0  --  4.2 4.1   CHLORIDE 105  --  102 102   CO2 25  --  26 27   BUN 25  --  28 30   CR 0.98  --  1.19* 1.22*   ANIONGAP 7  --  8 5   OMEGA 8.4*  --  8.6 8.4*   *  --  113* 100*   ALBUMIN  --   --  1.7* 1.7*   PROTTOTAL  --   --  6.4* 7.2   BILITOTAL  --   --  0.7 0.9   ALKPHOS  --   --  340* 350*   ALT  --   --  31 32   AST  --   --  46* 40       Imaging:   Recent Results (from the past 24 hour(s))   XR Chest Port 1 View    Narrative    CHEST ONE VIEW PORTABLE   5/5/2020 5:49 PM     HISTORY: Status post right lung decortication.    COMPARISON: 5/5/2020 at 9:22 AM.      Impression    IMPRESSION: There are 3 right-sided chest tubes. There is patchy  airspace disease in the right lung with minimal right pleural  thickening. No pneumothorax on either side. No significant pleural  effusion.    DUGLAS TAVERAS MD   XR Chest Port 1 View    Narrative    CHEST PORTABLE ONE VIEW May 6, 2020 5:40 AM     HISTORY: Status post right lung decortication.    COMPARISON: 5/5/2020 chest x-ray.    FINDINGS: The cardiomediastinal silhouette and pulmonary  vasculature  remain within normal limits. There are three right chest tubes. They  appear unchanged. No pneumothorax is evident. There is a little more  streaky opacity in the medial aspect of the left lung base. Extensive  right mid and lower lung field disease is unchanged. Small amount of  subcutaneous emphysema in the right chest again noted.      Impression    IMPRESSION:  1. Since yesterday, no change in the right chest tubes and the right  lung disease. There is no evidence of pneumothorax.  2. Small amount of left basilar atelectasis, new.

## 2020-05-06 NOTE — OP NOTE
Procedure Date: 05/05/2020      SURGEON:  Gary Shah MD      FIRST ASSISTANT:  Danna Hammond PA-C      PREOPERATIVE DIAGNOSIS:  Right empyema.      POSTOPERATIVE DIAGNOSIS:  Right empyema.      PROCEDURES:   1.  Right video-assisted thoracoscopy.   2.  Right thoracotomy.   3.  Total decortication the right lung.   4.  Parietal pleurectomy.      ANESTHESIA:  General with double endotracheal tube.      INDICATIONS:  An 80-year-old woman admitted with failure to thrive and weight loss.  She was found to have a large loculated pleural effusion.  Thoracentesis was done.  The Gram stain is positive for bacteria.  A CT scan done this morning shows multiple loculations of pleural fluid containing air.  All the findings are consistent with empyema.  Based on the imaging studies and her symptoms, a video-assisted thoracoscopy, possible thoracotomy, and decortication is indicated for treatment.      DESCRIPTION OF PROCEDURE:  The patient was brought to the OR and then placed in supine position.  Under general anesthesia with double endotracheal tube, the patient was placed in a left decubitus position.  The right chest was prepared and draped using ChloraPrep. Ventilation of the right lung was discontinued.    First, thoracoscopic incision was made.  The pleural space was entered.  There was some purulent fluid which was foul smelling.  The video thoracoscope was introduced.  On examination, there was a large amount of purulent material and significant parietal pleural thickening entrapping the lung.  Based on the findings, a posterolateral thoracotomy was made including the thoracoscopic incision.  The pleural space was entered in the sixth intercostal space dividing the seventh rib posteriorly.  There was significant parietal pleural thickening laterally.  This was all excised completing a parietal pleurectomy.  Then the lung was freed up circumferentially down to the hilum.  All the thickening and debris  covering the surface of diaphragm was removed.  The mediastinal parietal pleura were cleaned up.  The fissures were entered.  There was significant visceral pleural thickening.  A total decortication of the lung was performed removing all the thickening entrapping the right lung.      There was excellent reexpansion of the 3 lobes.  There was no evidence of air leak.  The pleural cavity was thoroughly irrigated with normal saline, which was aspirated.  Hemostasis was verified and was excellent.  Through separate stab wounds, two 28 straight chest tubes were placed anteriorly and posteriorly towards the apex, and a 28 right-angle chest tube was placed over the surface of the diaphragm.  On-Q catheters were placed.  Thirty milliliters of Marcaine 0.5% without epinephrine was injected as costal blocks.  Hemostasis was again verified.  Incision was closed with pericostal suture of Vicryl #1, running #1 Vicryl in muscular layer, running 2-0 Vicryl in the subcutaneous tissue and the skin closed with Insorb staples.  Estimated blood loss was 500  mL.  Needle and sponge count was correct.      Danna Hammond PA-C, was the first assistant during the procedure.  Her role as first assistant was essential and necessary in accomplishing the steps of the procedure as described above, providing exposure, retraction, and handling of the scope.         JUSTIN LOPEZ MD             D: 2020   T: 2020   MT: MILENA      Name:     JAY MENDOSA   MRN:      [id number]        Account:        [id number]   :      1940           Procedure Date: 2020      Document: [id number]

## 2020-05-06 NOTE — PLAN OF CARE
Pt somnolent. Alert and oriented. Neuros WDL. Soft blood pressures. On room air. Up Ax2 GB. Tolerating regular diet. Voiding. NS at 125/hr. Not yet passing gas. Pain controlled with PRN dilaudid. CT x3 to suction. No crepitus or airleaks. OnQ pump in place (secured and clamps open). NSR on tele. Thoracotomy site WDL. Plan to discharge to TCU when medically stable and cleared by surgery.

## 2020-05-06 NOTE — PROVIDER NOTIFICATION
Dr Desai notified about asymptomatic hypotension in 80s received orders for one time PRN bolus of 500ml LR.

## 2020-05-06 NOTE — PLAN OF CARE
Blood pressures soft, bolus given also received one unit of blood, MD aware, Pt alert, speech slightly slow, up with assist of one to two, weakness in legs, gait belt used when ambulating, chest tubes x3 in place, no air leak, dressing changed, incision clean dry and intact with steri strips, lungs diminished on right side, fine crackles in right base, O2 sats low 90's on room air, tolerates diet, voiding well, denies passing flatus, refused pain medications, did have scheduled tylenol,

## 2020-05-06 NOTE — PROGRESS NOTES
THORACIC SURGERY POD # 1    alert  Up in the chair  BP soft  Good U/O  No air leak  No bleeding    CXR r pleural space well drained    Satisfactory    CT suction  resp care ++  Ambulate  Antibiotics as per ID    Discussed findings and procedure    JUSTIN LOPEZ MD [facility] THORACIC SURGERY  CELL:  [phone]  OFFICE: [phone]

## 2020-05-06 NOTE — PLAN OF CARE
AVSS ex hypotension 80s on RA. Pain Controlled with PO and IV dilaudid. OnQ infusing; sensors taped; clamps open. Incisions CDI. 3x CT no air leak to suction; moderate bright red output. LS dim; slight crackles in RLL. Diet; advanced to regular; advised to take slow. Up with assist of 1; patient is strong and moving well, but chest tubes and other wires slowing movement. Up out of bed 2x ambulated to bathroom. BS active and audible; denies flatus. Voided small amount on second trip to bathroom; bladder scan shows 400ml retention, pt states that she is not uncomfortable and does not feel further urge to void.

## 2020-05-06 NOTE — PROVIDER NOTIFICATION
Dr Mckinney contacted: [phone] SY, S: HBG 7.5, BP 80s/40s. DR Shah requesting Hospitalist service put in orders for blood transfusion. Thanks

## 2020-05-06 NOTE — PLAN OF CARE
[id number]: Pt arrived to unit around 2000. Soft BPs at times (90's/60's), on O2 @ 4 LPM via NC. CT x3, to -20 suctions, no crepitus or air leak noted; bright red output. R flank incision is CDI. On-Q pump in place, sensors taped, clamps open. LS dim, BS+ hypoactive, flatus-, DTV. Pt is due to dangle, on clear liquid diet- denies N/V. Tele: NSR, PRN dilaudid for pain management.

## 2020-05-06 NOTE — PROGRESS NOTES
[facility]  Infectious Disease Progress Note          Assessment and Plan:   IMPRESSION:   1.  An 80-year-old female with 3 weeks of right-sided chest pain without major systemic infection symptoms, found to have a loculated pleural effusion and tap growing what appeared to be multiple anaerobes, presumed empyema.   2.  Malnutrition, significant weight loss over several months, question related to extended empyema versus other.   3.  Mild chronic renal insufficiency.   4.  PENICILLIN ALLERGY.      RECOMMENDATIONS:  1 Continue ertapenem , await full information and adjust accordingly. Anaerobes in cx, op gram stain cw same  Amount of antibiotics will depend on end findings.  Note Dr. Shah' surgical findings, full thoracotomy drainage        Interval History:   no new complaints and doing well; no cp, sob, n/v/d, or abd pain. Pain Ok T down WBC 35 K, creat now under 1              Medications:       sodium chloride 0.9%  1,000 mL Intravenous Once     acetaminophen  975 mg Oral Q8H     atorvastatin  10 mg Oral At Bedtime     bacitracin   Topical Daily     citalopram  10 mg Oral QAM     donepezil  10 mg Oral At Bedtime     ertapenem (INVanz) IV  1 g Intravenous Q24H     famotidine  20 mg Oral QPM    Or     famotidine  20 mg Intravenous QPM     gabapentin  300 mg Oral TID     lactated ringers  500 mL Intravenous Once     senna-docusate  1 tablet Oral BID    Or     senna-docusate  2 tablet Oral BID     sodium chloride (PF)  3 mL Intracatheter Q8H                  Physical Exam:   Blood pressure (!) 80/46, pulse 76, temperature 97.4  F (36.3  C), temperature source Axillary, resp. rate 13, weight 81.3 kg (179 lb 3.7 oz), SpO2 90 %, not currently breastfeeding.  Wt Readings from Last 2 Encounters:   05/05/20 81.3 kg (179 lb 3.7 oz)   04/13/20 77.8 kg (171 lb 9.6 oz)     Vital Signs with Ranges  Temp:  [97.4  F (36.3  C)-98.1  F (36.7  C)] 97.4  F (36.3  C)  Pulse:  [] 76  Heart Rate:   [] 78  Resp:  [9-29] 13  BP: ()/(46-98) 80/46  SpO2:  [88 %-100 %] 90 %    Constitutional: Awake, alert, cooperative, no apparent distress   Lungs: Clear to auscultation bilaterally, no crackles or wheezing CT sounds   Cardiovascular: Regular rate and rhythm, normal S1 and S2, and no murmur noted   Abdomen: Normal bowel sounds, soft, non-distended, non-tender   Skin: No rashes, no cyanosis, no edema   Other:           Data:   All microbiology laboratory data reviewed.  Recent Labs   Lab Test 05/06/20  0501 05/05/20  1141 05/04/20  1101 04/13/20  1024   WBC 35.7*  --  24.2* 7.4   HGB 7.5* Canceled, Test credited 9.5* 12.3   HCT 24.1*  --  30.1* 39.4   MCV 89  --  89 91     --  436 241     Recent Labs   Lab Test 05/06/20  0501 05/04/20  2034 05/04/20  1101   CR 0.98 1.19* 1.22*     Recent Labs   Lab Test 05/04/20  1101   *     Recent Labs   Lab Test 04/30/20  0840   CULT On day 2, isolated in broth only:  Gram negative rods  *  On day 2, isolated in broth only:  Gram positive cocci  *  Critical Value/Significant Value, preliminary result only, called to and read back by  Gifty Gold at 1340 on 5.2.20 kln.

## 2020-05-07 ENCOUNTER — APPOINTMENT (OUTPATIENT)
Dept: GENERAL RADIOLOGY | Facility: CLINIC | Age: 80
DRG: 163 | End: 2020-05-07
Attending: PHYSICIAN ASSISTANT
Payer: MEDICARE

## 2020-05-07 ENCOUNTER — APPOINTMENT (OUTPATIENT)
Dept: OCCUPATIONAL THERAPY | Facility: CLINIC | Age: 80
DRG: 163 | End: 2020-05-07
Attending: INTERNAL MEDICINE
Payer: MEDICARE

## 2020-05-07 ENCOUNTER — APPOINTMENT (OUTPATIENT)
Dept: PHYSICAL THERAPY | Facility: CLINIC | Age: 80
DRG: 163 | End: 2020-05-07
Attending: INTERNAL MEDICINE
Payer: MEDICARE

## 2020-05-07 LAB
ANION GAP SERPL CALCULATED.3IONS-SCNC: 5 MMOL/L (ref 3–14)
BASOPHILS # BLD AUTO: 0 10E9/L (ref 0–0.2)
BASOPHILS NFR BLD AUTO: 0.2 %
BUN SERPL-MCNC: 32 MG/DL (ref 7–30)
CALCIUM SERPL-MCNC: 7.8 MG/DL (ref 8.5–10.1)
CHLORIDE SERPL-SCNC: 104 MMOL/L (ref 94–109)
CO2 SERPL-SCNC: 26 MMOL/L (ref 20–32)
COPATH REPORT: NORMAL
CREAT SERPL-MCNC: 1.27 MG/DL (ref 0.52–1.04)
DIFFERENTIAL METHOD BLD: ABNORMAL
EOSINOPHIL # BLD AUTO: 0.2 10E9/L (ref 0–0.7)
EOSINOPHIL NFR BLD AUTO: 0.9 %
ERYTHROCYTE [DISTWIDTH] IN BLOOD BY AUTOMATED COUNT: 14.3 % (ref 10–15)
GFR SERPL CREATININE-BSD FRML MDRD: 40 ML/MIN/{1.73_M2}
GLUCOSE BLD-MCNC: NORMAL MG/DL (ref 70–99)
GLUCOSE SERPL-MCNC: 90 MG/DL (ref 70–99)
HCT VFR BLD AUTO: 23.6 % (ref 35–47)
HGB BLD-MCNC: 7.5 G/DL (ref 11.7–15.7)
IMM GRANULOCYTES # BLD: 1 10E9/L (ref 0–0.4)
IMM GRANULOCYTES NFR BLD: 4 %
LACTATE BLD-SCNC: 0.7 MMOL/L (ref 0.7–2)
LYMPHOCYTES # BLD AUTO: 2.1 10E9/L (ref 0.8–5.3)
LYMPHOCYTES NFR BLD AUTO: 8.5 %
MCH RBC QN AUTO: 28 PG (ref 26.5–33)
MCHC RBC AUTO-ENTMCNC: 31.8 G/DL (ref 31.5–36.5)
MCV RBC AUTO: 88 FL (ref 78–100)
MONOCYTES # BLD AUTO: 1.6 10E9/L (ref 0–1.3)
MONOCYTES NFR BLD AUTO: 6.3 %
NEUTROPHILS # BLD AUTO: 20.3 10E9/L (ref 1.6–8.3)
NEUTROPHILS NFR BLD AUTO: 80.1 %
PLATELET # BLD AUTO: 407 10E9/L (ref 150–450)
POTASSIUM SERPL-SCNC: 4.1 MMOL/L (ref 3.4–5.3)
RBC # BLD AUTO: 2.68 10E12/L (ref 3.8–5.2)
SODIUM SERPL-SCNC: 135 MMOL/L (ref 133–144)
WBC # BLD AUTO: 25.3 10E9/L (ref 4–11)

## 2020-05-07 PROCEDURE — 71045 X-RAY EXAM CHEST 1 VIEW: CPT

## 2020-05-07 PROCEDURE — 25800029 ZZH RX IP 258 OP 250: Performed by: INTERNAL MEDICINE

## 2020-05-07 PROCEDURE — 97530 THERAPEUTIC ACTIVITIES: CPT | Mod: GO

## 2020-05-07 PROCEDURE — 97161 PT EVAL LOW COMPLEX 20 MIN: CPT | Mod: GP

## 2020-05-07 PROCEDURE — 83605 ASSAY OF LACTIC ACID: CPT | Performed by: INTERNAL MEDICINE

## 2020-05-07 PROCEDURE — 36415 COLL VENOUS BLD VENIPUNCTURE: CPT | Performed by: INTERNAL MEDICINE

## 2020-05-07 PROCEDURE — 12000000 ZZH R&B MED SURG/OB

## 2020-05-07 PROCEDURE — 25000132 ZZH RX MED GY IP 250 OP 250 PS 637: Mod: GY | Performed by: PHYSICIAN ASSISTANT

## 2020-05-07 PROCEDURE — 99232 SBSQ HOSP IP/OBS MODERATE 35: CPT | Performed by: INTERNAL MEDICINE

## 2020-05-07 PROCEDURE — 97165 OT EVAL LOW COMPLEX 30 MIN: CPT | Mod: GO

## 2020-05-07 PROCEDURE — 85025 COMPLETE CBC W/AUTO DIFF WBC: CPT | Performed by: INTERNAL MEDICINE

## 2020-05-07 PROCEDURE — 25000128 H RX IP 250 OP 636: Performed by: PHYSICIAN ASSISTANT

## 2020-05-07 PROCEDURE — 97116 GAIT TRAINING THERAPY: CPT | Mod: GP

## 2020-05-07 PROCEDURE — 80048 BASIC METABOLIC PNL TOTAL CA: CPT | Performed by: INTERNAL MEDICINE

## 2020-05-07 PROCEDURE — 97530 THERAPEUTIC ACTIVITIES: CPT | Mod: GP

## 2020-05-07 PROCEDURE — 97110 THERAPEUTIC EXERCISES: CPT | Mod: GP

## 2020-05-07 RX ADMIN — ACETAMINOPHEN 975 MG: 325 TABLET, FILM COATED ORAL at 20:31

## 2020-05-07 RX ADMIN — HYDROMORPHONE HYDROCHLORIDE 2 MG: 2 TABLET ORAL at 03:55

## 2020-05-07 RX ADMIN — GABAPENTIN 300 MG: 300 CAPSULE ORAL at 15:45

## 2020-05-07 RX ADMIN — ATORVASTATIN CALCIUM 10 MG: 10 TABLET, FILM COATED ORAL at 21:11

## 2020-05-07 RX ADMIN — SODIUM CHLORIDE: 4.5 INJECTION, SOLUTION INTRAVENOUS at 04:34

## 2020-05-07 RX ADMIN — SENNOSIDES AND DOCUSATE SODIUM 2 TABLET: 8.6; 5 TABLET ORAL at 08:19

## 2020-05-07 RX ADMIN — DONEPEZIL HYDROCHLORIDE 10 MG: 10 TABLET ORAL at 21:11

## 2020-05-07 RX ADMIN — HYDROMORPHONE HYDROCHLORIDE 2 MG: 2 TABLET ORAL at 21:10

## 2020-05-07 RX ADMIN — HYDROMORPHONE HYDROCHLORIDE 2 MG: 2 TABLET ORAL at 00:08

## 2020-05-07 RX ADMIN — GABAPENTIN 300 MG: 300 CAPSULE ORAL at 21:11

## 2020-05-07 RX ADMIN — GABAPENTIN 300 MG: 300 CAPSULE ORAL at 08:19

## 2020-05-07 RX ADMIN — FAMOTIDINE 20 MG: 20 TABLET ORAL at 20:31

## 2020-05-07 RX ADMIN — ERTAPENEM SODIUM 1 G: 1 INJECTION, POWDER, LYOPHILIZED, FOR SOLUTION INTRAMUSCULAR; INTRAVENOUS at 21:10

## 2020-05-07 RX ADMIN — BACITRACIN: 500 OINTMENT TOPICAL at 08:20

## 2020-05-07 RX ADMIN — SENNOSIDES AND DOCUSATE SODIUM 2 TABLET: 8.6; 5 TABLET ORAL at 20:32

## 2020-05-07 RX ADMIN — ACETAMINOPHEN 975 MG: 325 TABLET, FILM COATED ORAL at 11:39

## 2020-05-07 RX ADMIN — CITALOPRAM HYDROBROMIDE 10 MG: 10 TABLET ORAL at 08:19

## 2020-05-07 RX ADMIN — ACETAMINOPHEN 975 MG: 325 TABLET, FILM COATED ORAL at 03:55

## 2020-05-07 RX ADMIN — HYDROMORPHONE HYDROCHLORIDE 1 MG: 2 TABLET ORAL at 18:07

## 2020-05-07 RX ADMIN — HYDROMORPHONE HYDROCHLORIDE 2 MG: 2 TABLET ORAL at 08:19

## 2020-05-07 ASSESSMENT — ACTIVITIES OF DAILY LIVING (ADL)
ADLS_ACUITY_SCORE: 15
PREVIOUS_RESPONSIBILITIES: DRIVING;FINANCES;MEDICATION MANAGEMENT
ADLS_ACUITY_SCORE: 18
ADLS_ACUITY_SCORE: 15
ADLS_ACUITY_SCORE: 18
ADLS_ACUITY_SCORE: 16
ADLS_ACUITY_SCORE: 15

## 2020-05-07 NOTE — PLAN OF CARE
POD 2 from a R VAT, thoracotomy & pleurectomy. A&Ox4. L LS diminished, R clear. Bowel sounds hypoactive, - flatus, tolerating regular diet. VSS/hypotension (80s/50s)/SiO2 min 87. Dressing CDI. Chest tubes patent, to -20 suction, no crepitus or air leak. Incision ANA PAULA, CDI with steri strips. Up with A2, GB, W to BR. C/o moderate to severe pain, decreased with PO Dilaudid & on Q pump. Continue to monitor.

## 2020-05-07 NOTE — PLAN OF CARE
Pt alert/oriented. Vitally stable on room air. CT x3 to -20H20 with serosang output. CT dressing changed today. Thoracotomy site with steri strips. Voiding adequately. Passing gas. Heavy Ax2 with walker and GB. Saline locked. Pain controlled with PO tylenol and dilaudid. No nausea. Clear lung sounds. Tolerating regular diet (poor appetite). PT/OT recommending TCU at discharge.

## 2020-05-07 NOTE — PROGRESS NOTES
Thoracic Surgery POD #2:  BP 94/52 (BP Location: Right arm)   Pulse 83   Temp 97.3  F (36.3  C) (Axillary)   Resp 16   Wt 81.3 kg (179 lb 3.7 oz)   LMP  (LMP Unknown)   SpO2 94%   BMI 26.86 kg/m    CT: serosang output, no air leak, 350 ml over 24 hours  Hgb: 7.5  WBC: 25.3  CXR: three right chest tubes in place, right pleural space well drained    S: No complaints- on room air- discussed chest tubes and importance of pulm toilet- will walk with PT hopefully today. Pain in control.  O: CTs: no air leak, no bleeding  P: Continue CTs to suction  Pulm toilet--- ambulate with PT  Antibiotics as per ID-- on ertapenem for now    Danna Hammond PA-C with Dr. Gary Shah  [facility]  Cell [phone]

## 2020-05-07 NOTE — PLAN OF CARE
Typically patient lives alone in an apartment, however since onset of symptoms patient living at [facility] house. At baseline patient was IND with functional mobility and PHAN with ADLs. Reports at baseline she is independent in driving, med management, financial management, light cooking and receives assist with cleaning and laundry. Reports her daughter does not work and 24 hour supervision can be provided. Of note, pt has baseline mild cognitive impairment.    Discharge Planner OT   Patient plan for discharge: Desires daughter's home but agreeable to TCU  Current status: Pt seated in chair and agreeable to therapy. Pt lethargic but maintained alertness. She required assist of two for LB dress as she required assist of two and cues to stand at walker. Completed stand to sit with modA. Pt educated regarding importance of elevating extremities to assist with edema management. Pt used incentive spirometer with cues for technique. Encouraged pt to sit in chair 3 times daily with staff assist.  Barriers to return to prior living situation: level of assist required (Ax2 for transfers/ambulation, toileting, LB dressing, bathing), decreased activity tolerance, impaired carryover of new learning  Recommendations for discharge: TCU  Rationale for recommendations: Pt functioning below baseline and will benefit from continued skilled OT to maximize safety and independence.         Entered by: Caridad Thakkar 05/07/2020 2:54 PM

## 2020-05-07 NOTE — PROGRESS NOTES
[facility]    HOSPITALIST PROGRESS NOTE :   --------------------------------------------------    Date of Admission:  5/4/2020    Cumulative Summary: Kathie Wetzel is a 80 year old female with past medical history significant for essential hypertension, hyperlipidemia, mild cognitive impairment, CKD, depression who was admitted as a direct admission from clinic with possible parapneumonic right pleural effusion on May 4.  Patient was a started on IV antibiotics and thoracic surgery was consulted.    Assessment & Plan     Parapneumonic right pleural effusion: She has been complaining of progressive fatigue and dyspnea over the past couple of weeks with prior work-up revealing right pleural effusion.  Patient underwent thoracentesis on April 30 and removed 550 mL fluid with Gram stain and culture showing GNR and GPC in broth and negative cytology.  She was initiated on a course of levofloxacin as an outpatient with worsening leukocytosis which was noticed on her follow-up with chest x-ray showing enlargement of effusion.  Patient was requested to be admitted.  CT scan of the chest without contrast was done,showed slight interval increase in size of loculated right pleural effusion predominantly in the posterior component , more loculated. Also now containing foci of gas which could be related to recent thoracentesis versus developing infection/empyema.  Patient was evaluated by thoracic surgery and underwent video-assisted thoracic go scoping, right thoracotomy and total decortication of right lung and right partial pleurectomy on May 5th and tolerated the procedure well.  Yesterday patient was noticed to be hypotensive required boluses and then administration of 1 unit of packed red blood cell transfusion, vitals are improved to 94/52.  Leukocytosis is also improving from 30 5K to 20 5K this morning.  She continues to have absolute neutrophilia.  Tolerating ertapenem well.    COVID ruled out; PCR  came back negative for COVID-19    Acute hypoxia without respiratory distress: improved , on room air this morning.She was requiring 3 L of oxygen after hospitalization, most likely secondary to above    Elevated CRP: Most likely secondary to above, down to 182 from 280 on admission.          --Continue to monitor patient closely.  -- will decrease rate of IV fluids to 75 ml/hr with the plan to stop it later in the afternoon  -- patient is status post 1 unit ID BC yesterday , hgb is again down to 7.5 this morning , continue to monitor , if drops below 7 , might need transfusion   -- Appreciate thoracic surgery help, patient has 3 chest tubes in place, chest tube management as per thoracic surgery.  -- Highly appreciate infectious disease help, currently patient is receiving ertapenem 1 g every 24 hours.  Infectious disease is following closely culture results, so far growing fusobacterium, with other Gram positive and Gr -ve cocci growing  --Repeat CBC and CRP tomorrow morning.    Severe malnutrition:   Reporting 20 pound unintentional weight loss over past 6 months.  Ready to patient she has early satiety, usually only eating 1 meal a day, she has been eating better in the past few weeks as she has been with her daughter.  -- nutrition consult  -- Patient will also benefit from age appropriate cancer screening.     CKD stage III:  Baseline Cr 0.9-1.0, admission Cr 1.19.  -- BMP in AM     HTN  -- Continue to hold PTA Losartan.     Hyperlipidemia  -- continue PTA Atorvastatin     Depression  -- continue PTA Celexa     Mild cognitive impairment  -- continue PTA Aricept     Neuropathy  -- continue PTA Gabapentin    Diet: Advance Diet as Tolerated: Regular Diet Adult  Snacks/Supplements Adult: Boost Plus; With Meals    Ramirez Catheter: not present  DVT Prophylaxis: Pneumatic Compression Devices  Code Status: DNR/DNI     The patient's care was discussed with the Patient.    Disposition Plan   Expected discharge: Expecting  patient to stay for another couple of nights, will continue to follow closely.will ask PT and OT to evaluate for safe disposition planning , will also post      Karely Mckinney MD, FACP  Text Page (7am - 6pm)    ----------------------------------------------------------------------------------------------------------------------    Interval History    Patient was seen and examined , sitting in chair , denying any discomfort, we discussed about increasing her mobilization, she is denying any lightheadedness on standing , encouraged her to increase mobilization.    -Data reviewed today: I reviewed all new labs and imaging results over the last 24 hours.    I personally reviewed the chest CT image(s) showing Right parapneumonic effusion as above.    Physical Exam   Temp: 97.8  F (36.6  C) Temp src: Oral BP: 92/54 Pulse: 83 Heart Rate: 81 Resp: 16 SpO2: 91 % O2 Device: None (Room air) Oxygen Delivery: 1 LPM  Vitals:    05/05/20 0500   Weight: 81.3 kg (179 lb 3.7 oz)     Vital Signs with Ranges  Temp:  [97.2  F (36.2  C)-98  F (36.7  C)] 97.8  F (36.6  C)  Pulse:  [72-83] 83  Heart Rate:  [70-82] 81  Resp:  [12-16] 16  BP: (79-95)/(46-77) 92/54  SpO2:  [86 %-95 %] 91 %  I/O last 3 completed shifts:  In: 3522.08 [P.O.:660; I.V.:2052.08; IV Piggyback:500]  Out: 751 [Urine:400; Chest Tube:351]    GENERAL: Alert , awake and oriented. NAD. Conversational, appropriate. On room air   HEENT: Normocephalic. EOMI. No icterus or injection. Nares normal.   LUNGS: Clear to auscultation , 3 chest tubes present   HEART: Regular rate. Extremities perfused.   ABDOMEN: Soft, nontender, and nondistended. Positive bowel sounds.   EXTREMITIES: No LE edema noted.   NEUROLOGIC: Moves extremities x4 on command. No acute focal neurologic abnormalities noted.     Medications     bupivacaine 0.5% in ON-Q  pump 4 mL/hr at 05/06/20 0141     - MEDICATION INSTRUCTIONS -       NaCl 75 mL/hr at 05/07/20 0821       acetaminophen  975 mg  Oral Q8H     atorvastatin  10 mg Oral At Bedtime     bacitracin   Topical Daily     citalopram  10 mg Oral QAM     donepezil  10 mg Oral At Bedtime     ertapenem (INVanz) IV  1 g Intravenous Q24H     famotidine  20 mg Oral QPM    Or     famotidine  20 mg Intravenous QPM     gabapentin  300 mg Oral TID     lactated ringers  500 mL Intravenous Once     senna-docusate  1 tablet Oral BID    Or     senna-docusate  2 tablet Oral BID     sodium chloride (PF)  3 mL Intracatheter Q8H       Data   Recent Labs   Lab 05/07/20  0441 05/06/20  1753 05/06/20  0501  05/04/20 2034 05/04/20  1101   WBC 25.3*  --  35.7*  --   --  24.2*   HGB 7.5* 8.3* 7.5*   < >  --  9.5*   MCV 88  --  89  --   --  89     --  437  --   --  436   INR  --   --   --   --  1.30*  --      --  137  --  136 136   POTASSIUM 4.1  --  5.0  --  4.2 4.1   CHLORIDE 104  --  105  --  102 102   CO2 26  --  25  --  26 27   BUN 32*  --  25  --  28 30   CR 1.27*  --  0.98  --  1.19* 1.22*   ANIONGAP 5  --  7  --  8 5   OMEGA 7.8*  --  8.4*  --  8.6 8.4*   GLC 90  Canceled, Test credited  --  118*  --  113* 100*   ALBUMIN  --   --   --   --  1.7* 1.7*   PROTTOTAL  --   --   --   --  6.4* 7.2   BILITOTAL  --   --   --   --  0.7 0.9   ALKPHOS  --   --   --   --  340* 350*   ALT  --   --   --   --  31 32   AST  --   --   --   --  46* 40    < > = values in this interval not displayed.       Imaging:   Recent Results (from the past 24 hour(s))   XR Chest Port 1 View    Narrative    EXAM: XR CHEST PORT 1 VW  LOCATION: [facility]  DATE/TIME: 5/7/2020 4:55 AM    INDICATION: Postop right lung decortication.  COMPARISON: 05/06/2020.    FINDINGS: Upright portable chest. 3 right chest tubes remain in place. No pneumothorax. Pleural thickening in the right hemithorax laterally and at the right lung base is similar to the prior exam. Right basilar infiltrate is unchanged. There is a band   of probable atelectasis at the left lung base. The left lung is  otherwise clear. The heart size is normal.      Impression    IMPRESSION: No pneumothorax.

## 2020-05-07 NOTE — CONSULTS
"CLINICAL NUTRITION SERVICES BRIEF NOTE  Consult received - Provider Order: \"hypoalbuminemia , decreased appetite , 20 pounds weight loss , admitted with empyema\"    Refer to RD note date 5/6 for full assessment.     - Attempted this afternoon to call patient in order to obtain preferences for Boost Plus. Again unable to reach.      Intervention  - Nutrition to continue following per protocol.     Amna Lin RD, LD  Pager: [phone]      "

## 2020-05-07 NOTE — PROGRESS NOTES
05/07/20 1340   Quick Adds   Type of Visit Initial PT Evaluation   Living Environment   Lives With child(zita), adult   Living Arrangements house   Home Accessibility stairs to enter home;stairs within home   Number of Stairs, Main Entrance 1   Number of Stairs, Within Home, Primary 2   Living Environment Comment Typically patient lives alone in an apartment, however since onset of symptoms patient living at daughters house with 1 step to enter home and 2 steps to bathroom with bilateral railing.   Self-Care   Usual Activity Tolerance good   Current Activity Tolerance fair   Equipment Currently Used at Home grab bar, toilet;grab bar, tub/shower  (owns walker but doesn't typically use it)   Activity/Exercise/Self-Care Comment At baseline patient was IND with functional mobility and PHAN with ADLs.   Functional Level Prior   Ambulation 0-->independent   Transferring 0-->independent   Fall history within last six months no   Which of the above functional risks had a recent onset or change? ambulation;transferring   General Information   Onset of Illness/Injury or Date of Surgery - Date 05/07/20   Referring Physician Karely Mckinney MD   Patient/Family Goals Statement To regain independence with mobility   Pertinent History of Current Problem (include personal factors and/or comorbidities that impact the POC) 80 year old female with past medical history significant for essential hypertension, hyperlipidemia, mild cognitive impairment, CKD, depression who was admitted as a direct admission from clinic with possible parapneumonic right pleural effusion on May 4.  Patient was a started on IV antibiotics and thoracic surgery was consulted.  (per Karely Mckinney MD note)   Precautions/Limitations fall precautions   General Observations Greeted patient sitting up in chair.   General Info Comments Activity: ambulate if no air leak   Cognitive Status Examination   Orientation orientation to person, place and time   Level of  Consciousness alert   Follows Commands and Answers Questions able to follow single-step instructions;75% of the time   Cognitive Comment Decreased intermittent alartness and requires repetition of cues.   Pain Assessment   Patient Currently in Pain No   Integumentary/Edema   Integumentary/Edema Comments 3 chest tubes intact; UE and LE swelling   Posture    Posture Protracted shoulders;Forward head position   Range of Motion (ROM)   ROM Comment B LE WFL   Strength   Strength Comments B LE functionally very weak   Bed Mobility   Bed Mobility Comments not observed, anticipate A x2 based on transfers & ambulation ability   Transfer Skills   Transfer Comments sit <> stand transfers with FWW at mod-maxAx2    Gait   Gait Comments 3 steps with FWW at Natalie x2, quite unsteady on feet, knees occasionally buckling, slow deliberate steps   Balance   Balance Comments normal seated balance; impaired standing dynamic balance with UE support on walker requiring Natalie for stability   Sensory Examination   Sensory Perception Comments denies numbness/tingling in B feet   General Therapy Interventions   Planned Therapy Interventions balance training;bed mobility training;gait training;neuromuscular re-education;strengthening;transfer training;home program guidelines;progressive activity/exercise   Clinical Impression   Criteria for Skilled Therapeutic Intervention yes, treatment indicated   PT Diagnosis impaired functional mobility   Influenced by the following impairments s/p lung surgery; global weakness, swelling of LE & UE, decreased activity tolerance, impaired balance   Functional limitations due to impairments bed mobility, transfers, ambulation, stairs   Clinical Presentation Evolving/Changing   Clinical Presentation Rationale PMH, current presentation, clinical judgement   Clinical Decision Making (Complexity) Low complexity   Therapy Frequency Daily   Anticipated Discharge Disposition Transitional Care Facility   Risk &  "Benefits of therapy have been explained Yes   Patient, Family & other staff in agreement with plan of care Yes   [facility] TM \"6 Clicks\"   2016, Trustees of [facility], under license to [employer].  All rights reserved.   6 Clicks Short Forms Basic Mobility Inpatient Short Form   [facility] AM-PAC  \"6 Clicks\" V.2 Basic Mobility Inpatient Short Form   1. Turning from your back to your side while in a flat bed without using bedrails? 2 - A Lot   2. Moving from lying on your back to sitting on the side of a flat bed without using bedrails? 2 - A Lot   3. Moving to and from a bed to a chair (including a wheelchair)? 2 - A Lot   4. Standing up from a chair using your arms (e.g., wheelchair, or bedside chair)? 2 - A Lot   5. To walk in hospital room? 2 - A Lot   6. Climbing 3-5 steps with a railing? 1 - Total   Basic Mobility Raw Score (Score out of 24.Lower scores equate to lower levels of function) 11   Total Evaluation Time   Total Evaluation Time (Minutes) 10     "

## 2020-05-07 NOTE — PROGRESS NOTES
[facility]  Infectious Disease Progress Note          Assessment and Plan:   IMPRESSION:   1.  An 80-year-old female with 3 weeks of right-sided chest pain without major systemic infection symptoms, found to have a loculated pleural effusion and tap growing what appeared to be multiple anaerobes, presumed empyema.   2.  Malnutrition, significant weight loss over several months, question related to extended empyema versus other.   3.  Mild chronic renal insufficiency.   4.  PENICILLIN ALLERGY. Childhood rash     RECOMMENDATIONS:  1 Continue ertapenem , await full information and adjust accordingly. Anaerobes in cx, op gram stain cw same  Amount of antibiotics will depend on end findings.  Note Dr. Shah' surgical findings, full thoracotomy drainage        Interval History:   no new complaints and doing well; no cp, sob, n/v/d, or abd pain. Pain Ok T down WBC 25 K, creat now under 1 cx fusobacter main org               Medications:       acetaminophen  975 mg Oral Q8H     atorvastatin  10 mg Oral At Bedtime     bacitracin   Topical Daily     citalopram  10 mg Oral QAM     donepezil  10 mg Oral At Bedtime     ertapenem (INVanz) IV  1 g Intravenous Q24H     famotidine  20 mg Oral QPM    Or     famotidine  20 mg Intravenous QPM     gabapentin  300 mg Oral TID     lactated ringers  500 mL Intravenous Once     senna-docusate  1 tablet Oral BID    Or     senna-docusate  2 tablet Oral BID     sodium chloride (PF)  3 mL Intracatheter Q8H                  Physical Exam:   Blood pressure 92/54, pulse 83, temperature 97.8  F (36.6  C), temperature source Oral, resp. rate 16, weight 81.3 kg (179 lb 3.7 oz), SpO2 91 %, not currently breastfeeding.  Wt Readings from Last 2 Encounters:   05/05/20 81.3 kg (179 lb 3.7 oz)   04/13/20 77.8 kg (171 lb 9.6 oz)     Vital Signs with Ranges  Temp:  [97.2  F (36.2  C)-98  F (36.7  C)] 97.8  F (36.6  C)  Pulse:  [72-83] 83  Heart Rate:  [70-82] 81  Resp:  [12-16]  16  BP: (79-95)/(46-77) 92/54  SpO2:  [86 %-95 %] 91 %    Constitutional: Awake, alert, cooperative, no apparent distress   Lungs: Clear to auscultation bilaterally, no crackles or wheezing CT sounds   Cardiovascular: Regular rate and rhythm, normal S1 and S2, and no murmur noted   Abdomen: Normal bowel sounds, soft, non-distended, non-tender   Skin: No rashes, no cyanosis, no edema   Other:           Data:   All microbiology laboratory data reviewed.  Recent Labs   Lab Test 05/07/20  0441 05/06/20  1753 05/06/20  0501  05/04/20  1101   WBC 25.3*  --  35.7*  --  24.2*   HGB 7.5* 8.3* 7.5*   < > 9.5*   HCT 23.6*  --  24.1*  --  30.1*   MCV 88  --  89  --  89     --  437  --  436    < > = values in this interval not displayed.     Recent Labs   Lab Test 05/07/20  0441 05/06/20  0501 05/04/20  2034   CR 1.27* 0.98 1.19*     Recent Labs   Lab Test 05/04/20  1101   *     Recent Labs   Lab Test 05/05/20  1603 04/30/20  0840   CULT Culture negative monitoring continues  On day 1, isolated in broth only:  Gram positive cocci  *  On day 1, isolated in broth only:  Gram positive rods  *  Culture in progress On day 2, isolated in broth only:  Fusobacterium nucleatum  Susceptibility testing in progress  *  On day 2, isolated in broth only:  Strain 2  Fusobacterium nucleatum  Susceptibility testing in progress  *  On day 2, isolated in broth only:  Gram negative cocci  *  On day 2, isolated in broth only:  Gram positive cocci  *  Critical Value/Significant Value, preliminary result only, called to and read back by  Gifty Gold at 1340 on 5.2.20 kln.    Culture in progress

## 2020-05-07 NOTE — PROGRESS NOTES
05/07/20 1443   Quick Adds   Type of Visit Initial Occupational Therapy Evaluation   Living Environment   Lives With child(zita), adult   Living Arrangements house   Home Accessibility stairs to enter home;stairs within home   Living Environment Comment Typically patient lives alone in an apartment, however since onset of symptoms patient living at daughters house with 1 step to enter home and 2 steps to bathroom with bilateral railing.   Self-Care   Usual Activity Tolerance good   Current Activity Tolerance fair   Equipment Currently Used at Home grab bar, toilet;grab bar, tub/shower  (standard height toilet)   Activity/Exercise/Self-Care Comment step in shower   Functional Level   Ambulation 0-->independent   Transferring 0-->independent   Toileting 1-->assistive equipment   Bathing 1-->assistive equipment   Dressing 0-->independent   Fall history within last six months no   Prior Functional Level Comment Of note, pt has baseline mild cognitive impairmen   General Information   Onset of Illness/Injury or Date of Surgery - Date 05/04/20   Referring Physician Karely Mckinney MD    Patient/Family Goals Statement Get stronger   Additional Occupational Profile Info/Pertinent History of Current Problem past medical history significant for essential hypertension, hyperlipidemia, mild cognitive impairment, CKD, depression who was admitted as a direct admission from clinic with possible parapneumonic right pleural effusion on May 4.  underwent video-assisted thoracic go scoping, right thoracotomy and total decortication of right lung and right partial pleurectomy on May 5   Precautions/Limitations fall precautions   Cognitive Status Examination   Orientation person;place;time   Level of Consciousness lethargic/somnolent;alert   Follows Commands (Cognition) follows one step commands;50-74% accuracy   Memory impaired   Attention Quiet environment required;Sustained attention impaired   Organization/Problem Solving Problem  solving impaired   Cognitive Comment Baseline mild cognitive impairment   Visual Perception   Visual Perception Wears glasses   Visual Perception Comments denies vision deficits   Sensory Examination   Sensory Comments Pt denies BUE/BLE numbness or tingling   Range of Motion (ROM)   ROM Comment wfl   Strength   Strength Comments Generalized weakness in BUEs   Coordination   Upper Extremity Coordination No deficits were identified   Transfer Skill: Sit to Stand   Level of [place]: Sit/Stand moderate assist (50% patients effort)   Bathing   Level of [place] maximum assist (25% patients effort)   Lower Body Dressing   Level of [place]: Dress Lower Body dependent (less than 25% patients effort)   Physical Assist/Nonphysical Assist: Dress Lower Body 2 person assist   Toileting   Level of [place]: Toilet dependent (less than 25% patients effort)   Instrumental Activities of Daily Living (IADL)   Previous Responsibilities driving;finances;medication management   IADL Comments Reports at baseline receives assist with shopping, laundry, cleaning   Activities of Daily Living Analysis   Impairments Contributing to Impaired Activities of Daily Living balance impaired;cognition impaired;strength decreased;pain   General Therapy Interventions   Planned Therapy Interventions ADL retraining;progressive activity/exercise;strengthening   Clinical Impression   Criteria for Skilled Therapeutic Interventions Met yes, treatment indicated   OT Diagnosis decline function   Influenced by the following impairments balance impaired;cognition impaired;strength decreased;pain   Assessment of Occupational Performance 5 or more Performance Deficits   Identified Performance Deficits dressing, grooming, toileting, bathing, functional mobility   Clinical Decision Making (Complexity) Low complexity   Therapy Frequency 5x/week   Predicted Duration of Therapy Intervention (days/wks) 5 days   Anticipated Equipment Needs at Discharge  "reacher;shower chair;raised toilet seat  (grab bars)   Anticipated Discharge Disposition Transitional Care Facility   Risks and Benefits of Treatment have been explained. Yes   Patient, Family & other staff in agreement with plan of care Yes   Clinical Impression Comments Pt functioning below baseline and will benefit from continued skilled OT to maximize safety and independence   [facility] TM \"6 Clicks\"   2016, Trustees of [facility], under license to [employer].  All rights reserved.   6 Clicks Short Forms Daily Activity Inpatient Short Form   [facility]-PAC  \"6 Clicks\" Daily Activity Inpatient Short Form   1. Putting on and taking off regular lower body clothing? 1 - Total   2. Bathing (including washing, rinsing, drying)? 2 - A Lot   3. Toileting, which includes using toilet, bedpan or urinal? 1 - Total   4. Putting on and taking off regular upper body clothing? 3 - A Little   5. Taking care of personal grooming such as brushing teeth? 3 - A Little   6. Eating meals? 4 - None   Daily Activity Raw Score (Score out of 24.Lower scores equate to lower levels of function) 14   Total Evaluation Time   Total Evaluation Time (Minutes) 10     "

## 2020-05-07 NOTE — PLAN OF CARE
PT orders received & acknowledged. Chart reviewed. Eval completed & treatment initiated.     Typically patient lives alone in an apartment, however since onset of symptoms patient living at daughters house with 1 step to enter home and 2 steps to bathroom with bilateral railing. At baseline patient was IND with functional mobility and PHAN with ADLs.    POD#2 - s/p   1.  Right video-assisted thoracoscopy.   2.  Right thoracotomy.   3.  Total decortication the right lung.   4.  Parietal pleurectomy.     Discharge Planner PT   Patient plan for discharge: Prefers home, agreeable to TCU if needed  Current status: Greeted patient sitting up in chair with 3 chest tubes. Engaged patient in multiple sit <> stand transfers with FWW at mod-maxAx2 requiring multiple attempts to achieve standing for each trial. Engaged patient in ambulation with FWW at minAx2 with recliner follow for 2 bouts of 4ft - cues for stepping sequence, knees occasionally buckling when fatigued. Patient required seated rest break in between due to fatigue & weakness. Patient declines further ambulation. Engaged patient in seated leg exercises. Overall patient demonstrates significantly global weakness and decreased alertness often requiring repetition of cues. Discussed discharge recommendation. Concluded session with patient sitting up in chair, alarm engaged and nursing notified of status.   Barriers to return to prior living situation: level of assist required (Ax2 for transfers/ambulation), decreased activity tolerance, LE weakness, significantly below baseline, medical status  Recommendations for discharge: TCU  Rationale for recommendations: Patient is below baseline for functional mobility & would benefit from continued physical therapy in the setting of a TCU for improving functional mobility, LE strength and tolerance for functional activities in order to return to baseline of functioning.          Entered by: Duyen Rosas 05/07/2020 2:19  PM

## 2020-05-08 ENCOUNTER — TELEPHONE (OUTPATIENT)
Dept: FAMILY MEDICINE | Facility: CLINIC | Age: 80
End: 2020-05-08

## 2020-05-08 ENCOUNTER — APPOINTMENT (OUTPATIENT)
Dept: OCCUPATIONAL THERAPY | Facility: CLINIC | Age: 80
DRG: 163 | End: 2020-05-08
Attending: HOSPITALIST
Payer: MEDICARE

## 2020-05-08 ENCOUNTER — APPOINTMENT (OUTPATIENT)
Dept: GENERAL RADIOLOGY | Facility: CLINIC | Age: 80
DRG: 163 | End: 2020-05-08
Attending: PHYSICIAN ASSISTANT
Payer: MEDICARE

## 2020-05-08 LAB
BASOPHILS # BLD AUTO: 0 10E9/L (ref 0–0.2)
BASOPHILS NFR BLD AUTO: 0 %
CREAT SERPL-MCNC: 0.85 MG/DL (ref 0.52–1.04)
DIFFERENTIAL METHOD BLD: ABNORMAL
EOSINOPHIL # BLD AUTO: 0 10E9/L (ref 0–0.7)
EOSINOPHIL NFR BLD AUTO: 0 %
ERYTHROCYTE [DISTWIDTH] IN BLOOD BY AUTOMATED COUNT: 14.3 % (ref 10–15)
GFR SERPL CREATININE-BSD FRML MDRD: 64 ML/MIN/{1.73_M2}
HCT VFR BLD AUTO: 26.4 % (ref 35–47)
HGB BLD-MCNC: 8.4 G/DL (ref 11.7–15.7)
IMM PLASMA CELLS NFR BLD: 1 %
LYMPHOCYTES # BLD AUTO: 1.6 10E9/L (ref 0.8–5.3)
LYMPHOCYTES NFR BLD AUTO: 7 %
MCH RBC QN AUTO: 28.2 PG (ref 26.5–33)
MCHC RBC AUTO-ENTMCNC: 31.8 G/DL (ref 31.5–36.5)
MCV RBC AUTO: 89 FL (ref 78–100)
METAMYELOCYTES # BLD: 0.2 10E9/L
METAMYELOCYTES NFR BLD MANUAL: 1 %
MONOCYTES # BLD AUTO: 0.7 10E9/L (ref 0–1.3)
MONOCYTES NFR BLD AUTO: 3 %
MYELOCYTES # BLD: 0.5 10E9/L
MYELOCYTES NFR BLD MANUAL: 2 %
NEUTROPHILS # BLD AUTO: 19.7 10E9/L (ref 1.6–8.3)
NEUTROPHILS NFR BLD AUTO: 86 %
PLASMA CELLS # BLD MANUAL: 0.2 10E9/L
PLATELET # BLD AUTO: 495 10E9/L (ref 150–450)
PLATELET # BLD EST: ABNORMAL 10*3/UL
RBC # BLD AUTO: 2.98 10E12/L (ref 3.8–5.2)
RBC MORPH BLD: ABNORMAL
WBC # BLD AUTO: 22.9 10E9/L (ref 4–11)

## 2020-05-08 PROCEDURE — 25000125 ZZHC RX 250: Performed by: INTERNAL MEDICINE

## 2020-05-08 PROCEDURE — 25000125 ZZHC RX 250: Performed by: PHYSICIAN ASSISTANT

## 2020-05-08 PROCEDURE — 97535 SELF CARE MNGMENT TRAINING: CPT | Mod: GO | Performed by: OCCUPATIONAL THERAPIST

## 2020-05-08 PROCEDURE — 71045 X-RAY EXAM CHEST 1 VIEW: CPT

## 2020-05-08 PROCEDURE — 99233 SBSQ HOSP IP/OBS HIGH 50: CPT | Performed by: INTERNAL MEDICINE

## 2020-05-08 PROCEDURE — 36415 COLL VENOUS BLD VENIPUNCTURE: CPT | Performed by: INTERNAL MEDICINE

## 2020-05-08 PROCEDURE — 25000132 ZZH RX MED GY IP 250 OP 250 PS 637: Mod: GY | Performed by: PHYSICIAN ASSISTANT

## 2020-05-08 PROCEDURE — 85025 COMPLETE CBC W/AUTO DIFF WBC: CPT | Performed by: INTERNAL MEDICINE

## 2020-05-08 PROCEDURE — 12000000 ZZH R&B MED SURG/OB

## 2020-05-08 PROCEDURE — 82565 ASSAY OF CREATININE: CPT | Performed by: INTERNAL MEDICINE

## 2020-05-08 PROCEDURE — 99207 ZZC CDG-MDM COMPONENT: MEETS MODERATE - UP CODED: CPT | Performed by: INTERNAL MEDICINE

## 2020-05-08 RX ORDER — CLINDAMYCIN PHOSPHATE 900 MG/50ML
900 INJECTION, SOLUTION INTRAVENOUS EVERY 8 HOURS
Status: DISCONTINUED | OUTPATIENT
Start: 2020-05-08 | End: 2020-05-09

## 2020-05-08 RX ADMIN — SENNOSIDES AND DOCUSATE SODIUM 2 TABLET: 8.6; 5 TABLET ORAL at 20:48

## 2020-05-08 RX ADMIN — HYDROMORPHONE HYDROCHLORIDE 1 MG: 2 TABLET ORAL at 03:40

## 2020-05-08 RX ADMIN — GABAPENTIN 300 MG: 300 CAPSULE ORAL at 17:50

## 2020-05-08 RX ADMIN — ACETAMINOPHEN 975 MG: 325 TABLET, FILM COATED ORAL at 03:40

## 2020-05-08 RX ADMIN — CLINDAMYCIN PHOSPHATE 900 MG: 900 INJECTION, SOLUTION INTRAVENOUS at 20:48

## 2020-05-08 RX ADMIN — CLINDAMYCIN PHOSPHATE 900 MG: 900 INJECTION, SOLUTION INTRAVENOUS at 11:35

## 2020-05-08 RX ADMIN — GABAPENTIN 300 MG: 300 CAPSULE ORAL at 10:01

## 2020-05-08 RX ADMIN — ATORVASTATIN CALCIUM 10 MG: 10 TABLET, FILM COATED ORAL at 21:38

## 2020-05-08 RX ADMIN — DONEPEZIL HYDROCHLORIDE 10 MG: 10 TABLET ORAL at 21:38

## 2020-05-08 RX ADMIN — GABAPENTIN 300 MG: 300 CAPSULE ORAL at 21:38

## 2020-05-08 RX ADMIN — HYDROMORPHONE HYDROCHLORIDE 1 MG: 2 TABLET ORAL at 00:44

## 2020-05-08 RX ADMIN — FAMOTIDINE 20 MG: 20 TABLET ORAL at 20:48

## 2020-05-08 RX ADMIN — BACITRACIN: 500 OINTMENT TOPICAL at 10:01

## 2020-05-08 RX ADMIN — CITALOPRAM HYDROBROMIDE 10 MG: 10 TABLET ORAL at 10:01

## 2020-05-08 ASSESSMENT — ACTIVITIES OF DAILY LIVING (ADL)
ADLS_ACUITY_SCORE: 18

## 2020-05-08 NOTE — CONSULTS
Care Transition Initial Assessment -      Met with: Patient and Daughter, Regi  Active Problems:    Pleural effusion on right       DATA  Lives With: child(zita), adult   Living Arrangements: house  Quality of Family Relationships: involved, supportive  Description of Support System: Involved, Supportive  Who is your support system?: Children  Support Assessment: Adequate family and caregiver support.   Identified issues/concerns regarding health management:    Quality of Family Relationships: involved, supportive    Per Care Transitions consult for discharge planning. Patient admitted to [facility] on 05/04/2020 with Pleural effusion on right . The tentative date of discharge is TBD. Reviewed chart and spoke with patient regarding discharge planning and therapy recommendations for TCU. Reviewed chart and spoke with patient. Patient asked writer to speak with her daughter, Regi. Per patient's daughter report, patient has their own apartment, but leading up to current admission patient was living with her daughter for two weeks. Patient's daughter reports patient and family do not want patient to attend TCU. Patient's daughter reports they would prefer with her to go home with home care. Patient's daughter states her, her , and adult child can all learn what patient would have to with therapy. Patient's daughter reports on the days therapy does not come family would work with patient. Patient's daughter states patient would never be left alone, and would stay on the main level of the home. The main level has a bedroom and bathroom for patient to use. Patient's daughter states the only stairs are into the home, and it is 3 stairs. Patient's daughter reports if patient needs food family would bring it to her.  Patient's daughter will provide transportation at discharge. Patient's daughter reports patient is in agreement with this plan.     ASSESSMENT  Cognitive Status:  awake, and  oriented  Concerns to be addressed: discharge planning.     PLAN  Financial costs for the patient includes NA .  Patient given options and choices for discharge Yes.  Patient/family is agreeable to the plan?  Yes, if patient can go home with home care services.   Transportation/person available to transport on day of discharge  is patient's daughter and have they been notified/set up upon discharge  Patient Goals and Preferences: Home with home care .  Patient anticipates discharging to:  Home with Home care .      AMANUEL Fuentes     [facility]

## 2020-05-08 NOTE — PROGRESS NOTES
[facility]    HOSPITALIST PROGRESS NOTE :   --------------------------------------------------    Date of Admission:  5/4/2020    Cumulative Summary: Kathie Wetzel is a 80 year old female with past medical history significant for essential hypertension, hyperlipidemia, mild cognitive impairment, CKD, depression who was admitted as a direct admission from clinic with possible parapneumonic right pleural effusion on May 4.  Patient was a started on IV antibiotics and thoracic surgery was consulted.    Assessment & Plan     Parapneumonic right pleural effusion: She has been complaining of progressive fatigue and dyspnea over the past couple of weeks with prior work-up revealing right pleural effusion.  Patient underwent thoracentesis on April 30 and removed 550 mL fluid with Gram stain and culture showing GNR and GPC in broth and negative cytology.  She was initiated on a course of levofloxacin as an outpatient with worsening leukocytosis which was noticed on her follow-up with chest x-ray showing enlargement of effusion.  Patient was requested to be admitted.  CT scan of the chest without contrast was done,showed slight interval increase in size of loculated right pleural effusion predominantly in the posterior component , more loculated. Also now containing foci of gas which could be related to recent thoracentesis versus developing infection/empyema.  Patient was evaluated by thoracic surgery and underwent video-assisted thoracic go scoping, right thoracotomy and total decortication of right lung and right partial pleurectomy on May 5th and tolerated the procedure well.  Postoperatively patient was noticed to be hypotensive required boluses and then administration of 1 unit of packed red blood cell transfusion, vitals are improved to 94/52.    Leukocytosis is also improving from 30 5K to 20 5K this morning.  She continues to have absolute neutrophilia.  Tolerating ertapenem well.    05/08/2020:his  "morning patient seems much more lethargic, also seems slightly depressed, according to bedside nursing, she also called her daughter and told her that \" patient is feeling that she is dying\".  The time of my evaluation, patient told me that she is feeling overwhelmed with hospitalization and is hoping that she can leave the hospital soon, she is denying any suicidal ideations.  We discussed about plan by CT surgery for removing chest tube soon, we also have discussion with Dr. Hernandez who was also concerned that ertapenem might also be playing a role in her presentation and will probably change the antibiotic.    COVID ruled out; PCR came back negative for COVID-19    Acute hypoxia without respiratory distress: improved , on room air this morning.She was requiring 3 L of oxygen after hospitalization, most likely secondary to above    Elevated CRP: Most likely secondary to above, down to 182 from 280 on admission.      --Continue to monitor patient closely.  --Encourage oral intake.  --WBC counts are slowly improving down to 22.9, hemoglobin is a stable around 8.4, her creatinine is normal.  --Discussed the plan of care with Dr. Benjamin from infectious disease who is planning to change her antibiotic to IV clindamycin.  --We will encourage patient to continue to increase her mobilization and work with physical and Occupational Therapy.  --Thoracic surgery help, managing the chest tube, are planning to start removing chest tube tomorrow.    Severe malnutrition in context of acute illness  Reporting 20 pound unintentional weight loss over past 6 months.  Ready to patient she has early satiety, usually only eating 1 meal a day, she has been eating better in the past few weeks as she has been with her daughter.  -- nutrition consult has been placed, patient has been started on boost 3 times daily with meals as per recommended by RD.  Appreciate their help  -- Patient will also benefit from age appropriate cancer " screening.     CKD stage III:  Baseline Cr 0.9-1.0, admission Cr 1.19.  -- BMP in AM     HTN  -- Continue to hold PTA Losartan.     Hyperlipidemia  -- Continue PTA Atorvastatin     Depression  -- Continue PTA Celexa     Mild cognitive impairment  -- continue PTA Aricept, likely patient has underlying dementia, patient is at risk for developing delirium secondary to hospitalization and being on antibiotics.     Neuropathy  -- Continue PTA Gabapentin    Diet: Advance Diet as Tolerated: Regular Diet Adult  Snacks/Supplements Adult: Boost Plus; With Meals    Ramirez Catheter: not present  DVT Prophylaxis: Pneumatic Compression Devices  Code Status: DNR/DNI     The patient's care was discussed with the Patient.    Disposition Plan   Expected discharge: Expecting patient to stay for another couple of nights, will continue to follow closely.she has been evaluated by physical and Occupational Therapy who are recommending patient to be discharged to transitional care unit.  Will post  to look into dispotion planning   I did have long discussion with patient's daughter, updated her regarding the clinical status, her daughter is wondering that considering patient underlying dementia, would be better if patient can do her therapies at home with assist of family, patient has been recently staying with her daughter.    Karely Mckinney MD, FACP  Text Page (7am - 6pm)    ----------------------------------------------------------------------------------------------------------------------    Interval History    She was seen and examined, lying in bed, initially was keeping her eyes closed, looks much more withdrawn this morning, but later was able to answer more questions.  Told me that she is feeling slightly depressed she would like to go home, denying any pain at this point, I discussed with her that if she I can update her daughter, patient was okay with that.    -Data reviewed today: I reviewed all new labs and  imaging results over the last 24 hours.    I personally reviewed the chest CT image(s) showing Right parapneumonic effusion as above.    Physical Exam   Temp: 97.8  F (36.6  C) Temp src: Oral BP: 116/68 Pulse: 86 Heart Rate: 85 Resp: 18 SpO2: 95 % O2 Device: None (Room air)    Vitals:    05/05/20 0500 05/08/20 0500   Weight: 81.3 kg (179 lb 3.7 oz) 84.5 kg (186 lb 4.6 oz)     Vital Signs with Ranges  Temp:  [97.2  F (36.2  C)-98.2  F (36.8  C)] 97.8  F (36.6  C)  Pulse:  [83-86] 86  Heart Rate:  [76-85] 85  Resp:  [16-20] 18  BP: ()/(52-76) 116/68  SpO2:  [92 %-95 %] 95 %  I/O last 3 completed shifts:  In: 100 [P.O.:100]  Out: 610 [Urine:500; Chest Tube:110]    GENERAL: Alert , awake and oriented. NAD. Conversational, appropriate. On room air   HEENT: Normocephalic. EOMI. No icterus or injection. Nares normal.   LUNGS: Clear to auscultation , 3 chest tubes present   HEART: Regular rate. Extremities perfused.   ABDOMEN: Soft, nontender, and nondistended. Positive bowel sounds.   EXTREMITIES: No LE edema noted.   NEUROLOGIC: Moves extremities x4 on command. No acute focal neurologic abnormalities noted.     Medications     bupivacaine 0.5% in ON-Q  pump 4 mL/hr at 05/06/20 0141     - MEDICATION INSTRUCTIONS -         acetaminophen  975 mg Oral Q8H     atorvastatin  10 mg Oral At Bedtime     bacitracin   Topical Daily     citalopram  10 mg Oral QAM     donepezil  10 mg Oral At Bedtime     ertapenem (INVanz) IV  1 g Intravenous Q24H     famotidine  20 mg Oral QPM    Or     famotidine  20 mg Intravenous QPM     gabapentin  300 mg Oral TID     lactated ringers  500 mL Intravenous Once     senna-docusate  1 tablet Oral BID    Or     senna-docusate  2 tablet Oral BID     sodium chloride (PF)  3 mL Intracatheter Q8H       Data   Recent Labs   Lab 05/07/20  0441 05/06/20  1753 05/06/20  0501  05/04/20 2034 05/04/20  1101   WBC 25.3*  --  35.7*  --   --  24.2*   HGB 7.5* 8.3* 7.5*   < >  --  9.5*   MCV 88  --  89   --   --  89     --  437  --   --  436   INR  --   --   --   --  1.30*  --      --  137  --  136 136   POTASSIUM 4.1  --  5.0  --  4.2 4.1   CHLORIDE 104  --  105  --  102 102   CO2 26  --  25  --  26 27   BUN 32*  --  25  --  28 30   CR 1.27*  --  0.98  --  1.19* 1.22*   ANIONGAP 5  --  7  --  8 5   OMEGA 7.8*  --  8.4*  --  8.6 8.4*   GLC 90  Canceled, Test credited  --  118*  --  113* 100*   ALBUMIN  --   --   --   --  1.7* 1.7*   PROTTOTAL  --   --   --   --  6.4* 7.2   BILITOTAL  --   --   --   --  0.7 0.9   ALKPHOS  --   --   --   --  340* 350*   ALT  --   --   --   --  31 32   AST  --   --   --   --  46* 40    < > = values in this interval not displayed.       Imaging:   Recent Results (from the past 24 hour(s))   XR Chest Port 1 View    Narrative    EXAM: XR CHEST PORT 1 VW  LOCATION: [facility]  DATE/TIME: 5/8/2020 5:00 AM    INDICATION: Status post right lung decortication  COMPARISON: 05/06/2020      Impression    IMPRESSION: Right-sided chest tubes. Unchanged right basilar airspace opacity, may reflect postsurgical change in combination with pleural effusion, atelectasis, and/or consolidation. Stable cardiomediastinal silhouette. Atherosclerotic aorta. No   pneumothorax.

## 2020-05-08 NOTE — PLAN OF CARE
Pt is A+Ox4, VSS. Pt pain controlled with 1 - 2 mg po dilaudid. Saline locked. On room air. Three chest tubes to suction, no air leak. Lung sounds diminished. Pt is extremely weak and a high fall risk. Heavy assist of 2, weak on feet with walker. Voiding adequately. Had small BM. OnQ pump infusing, clamps open, sensors taped. Pt was under impression that pain would be resolved by now, education provided and reinforced. Pt lethargic at times.

## 2020-05-08 NOTE — PLAN OF CARE
PT-  Attempted to see pt in AM but pt was on the phone with her daughter.  Upon returning pt was busy with another caregiver.  Unable to see pt for PT this AM.

## 2020-05-08 NOTE — PROGRESS NOTES
THORACIC SURGERY POD # 3    Stable  AVSS on RA  Serous CT output  No air leak    CXR r pleural space well drained    Start removing CT tomorrow  Antibiotics as per ID  resp care ++  Ambulate    JUSTIN LOPEZ MD [facility] ONCOLOGY THORACIC SURGERY  CELL:  [phone]  OFFICE: [phone]

## 2020-05-08 NOTE — PLAN OF CARE
VSS on RA, A/O x4 though forgetful. Lung sounds diminished, BS hypoactive, + flatus. Incisions ANA PAULA with steri strips. CT x3 with serosanguinous output, no air leak, no crepitous, dressing changed per POC. OnQ infusing with sensors taped and clamps open. Pain was controlled with scheduled Tylenol and repositioning. Voiding adequately - loose stool x2. Up with assist of 2 + GB + walker. Regular diet, though poor appetite, PO encouraged. Up in chair for dinner.

## 2020-05-08 NOTE — PROGRESS NOTES
[facility]  Infectious Disease Progress Note          Assessment and Plan:   IMPRESSION:   1.  An 80-year-old female with 3 weeks of right-sided chest pain without major systemic infection symptoms, found to have a loculated pleural effusion and tap growing what appeared to be multiple anaerobes, presumed empyema.   2.  Malnutrition, significant weight loss over several months, question related to extended empyema versus other.   3.  Mild chronic renal insufficiency.   4.  PENICILLIN ALLERGY. Childhood rash     RECOMMENDATIONS:  1 DC ertapenem , lethargic mild CRF, so well known CNS impacting med, with fusobacter as main org(some other anaerobes also in cx) clinda a valid option, IV while here with it as po when disposition, recheck labs in AM        Interval History:   no new complaints and doing OK but lethargic; no cp, sob, n/v/d, or abd pain. Pain Ok T down WBC 25 K, creat now under 1 cx fusobacter main org but 2 other anaerobes also              Medications:       acetaminophen  975 mg Oral Q8H     atorvastatin  10 mg Oral At Bedtime     bacitracin   Topical Daily     citalopram  10 mg Oral QAM     clindamycin  900 mg Intravenous Q8H     donepezil  10 mg Oral At Bedtime     famotidine  20 mg Oral QPM    Or     famotidine  20 mg Intravenous QPM     gabapentin  300 mg Oral TID     lactated ringers  500 mL Intravenous Once     senna-docusate  1 tablet Oral BID    Or     senna-docusate  2 tablet Oral BID     sodium chloride (PF)  3 mL Intracatheter Q8H                  Physical Exam:   Blood pressure 116/68, pulse 86, temperature 97.8  F (36.6  C), temperature source Oral, resp. rate 18, weight 84.5 kg (186 lb 4.6 oz), SpO2 95 %, not currently breastfeeding.  Wt Readings from Last 2 Encounters:   05/08/20 84.5 kg (186 lb 4.6 oz)   04/13/20 77.8 kg (171 lb 9.6 oz)     Vital Signs with Ranges  Temp:  [97.2  F (36.2  C)-98.2  F (36.8  C)] 97.8  F (36.6  C)  Pulse:  [83-86] 86  Heart Rate:   [76-85] 85  Resp:  [16-20] 18  BP: ()/(52-76) 116/68  SpO2:  [92 %-95 %] 95 %    Constitutional: Awake, alert, cooperative, no apparent distress   Lungs: Clear to auscultation bilaterally, no crackles or wheezing CT sounds   Cardiovascular: Regular rate and rhythm, normal S1 and S2, and no murmur noted   Abdomen: Normal bowel sounds, soft, non-distended, non-tender   Skin: No rashes, no cyanosis, no edema   Other:           Data:   All microbiology laboratory data reviewed.  Recent Labs   Lab Test 05/07/20  0441 05/06/20  1753 05/06/20  0501  05/04/20  1101   WBC 25.3*  --  35.7*  --  24.2*   HGB 7.5* 8.3* 7.5*   < > 9.5*   HCT 23.6*  --  24.1*  --  30.1*   MCV 88  --  89  --  89     --  437  --  436    < > = values in this interval not displayed.     Recent Labs   Lab Test 05/07/20  0441 05/06/20  0501 05/04/20  2034   CR 1.27* 0.98 1.19*     Recent Labs   Lab Test 05/04/20  1101   *     Recent Labs   Lab Test 05/05/20  1603 04/30/20  0840   CULT On day 1, isolated in broth only:  Possible  Anaerobic gram positive cocci  *  On day 1, isolated in broth only:  Possible  Anaerobic gram positive rods  *  Culture in progress  Referred to anaerobes for identification  Culture negative monitoring continues On day 2, isolated in broth only:  Fusobacterium nucleatum  Susceptibility testing in progress  *  On day 2, isolated in broth only:  Strain 2  Fusobacterium nucleatum  Susceptibility testing in progress  *  On day 2, isolated in broth only:  Dialister species  Identification obtained by MALDI-TOF mass spectrometry research use only database. Test   characteristics determined and verified by the [facility]   ([facility]) [place], [place].  Susceptibility testing not routinely done  *  On day 2, isolated in broth only:  Gram positive cocci  *  Critical Value/Significant Value, preliminary result only, called to and read back by  Gifty Gold at 1340 on 5.2.20 kln.     Culture in progress

## 2020-05-08 NOTE — TELEPHONE ENCOUNTER
Reason for call:  Other   Patient called regarding (reason for call): Daughter Regi is requesting a call back from Dr. Daugherty. To discuss the  details of what is going on with her Mother. She would like to know what the bacteria was in the biopsy and if there is any information about transitional care?     Additional comments: none    Phone number to reach patient:  Cell number on file:    Telephone Information:   home [phone]       Best Time:  any    Can we leave a detailed message on this number?  YES    Travel screening: Not Applicable     Jennifer Honeycutt on 5/8/2020 at 8:58 AM

## 2020-05-08 NOTE — PLAN OF CARE
Discharge Planner OT   Patient plan for discharge: Not discussed  Current status: Pt is alert but lethargic, very flat affect. Increased time for all activity due to 3 chest tubes. Needed Min A for bed mobility. Transfers from EOB, chair, toilet with Min A. Ambulated with WW to/from bathroom with Min A of 1. Pt incontinent of liquid stool while up. Needed Max A for dean hygiene, changing socks, donning brief. Up in chair at end of session with chair alarm, call light, RN present.  Barriers to return to prior living situation: current level of assist, medical status  Recommendations for discharge: TCU  Rationale for recommendations: Pt with limited activity tolerance; will benefit from continued inpatient therapies at TCU setting to maximize (I), safety and tolerance for ADL, IADL and mobility.       Entered by: Rain Davis 05/08/2020 12:47 PM

## 2020-05-09 ENCOUNTER — APPOINTMENT (OUTPATIENT)
Dept: GENERAL RADIOLOGY | Facility: CLINIC | Age: 80
DRG: 163 | End: 2020-05-09
Attending: PHYSICIAN ASSISTANT
Payer: MEDICARE

## 2020-05-09 ENCOUNTER — APPOINTMENT (OUTPATIENT)
Dept: OCCUPATIONAL THERAPY | Facility: CLINIC | Age: 80
DRG: 163 | End: 2020-05-09
Attending: HOSPITALIST
Payer: MEDICARE

## 2020-05-09 LAB
BASOPHILS # BLD AUTO: 0.2 10E9/L (ref 0–0.2)
BASOPHILS NFR BLD AUTO: 1 %
CRP SERPL-MCNC: 152 MG/L (ref 0–8)
DIFFERENTIAL METHOD BLD: ABNORMAL
EOSINOPHIL # BLD AUTO: 0.2 10E9/L (ref 0–0.7)
EOSINOPHIL NFR BLD AUTO: 1 %
ERYTHROCYTE [DISTWIDTH] IN BLOOD BY AUTOMATED COUNT: 14.4 % (ref 10–15)
ERYTHROCYTE [SEDIMENTATION RATE] IN BLOOD BY WESTERGREN METHOD: 99 MM/H (ref 0–30)
HCT VFR BLD AUTO: 26.1 % (ref 35–47)
HGB BLD-MCNC: 8.5 G/DL (ref 11.7–15.7)
LYMPHOCYTES # BLD AUTO: 1.1 10E9/L (ref 0.8–5.3)
LYMPHOCYTES NFR BLD AUTO: 6 %
MCH RBC QN AUTO: 28.5 PG (ref 26.5–33)
MCHC RBC AUTO-ENTMCNC: 32.6 G/DL (ref 31.5–36.5)
MCV RBC AUTO: 88 FL (ref 78–100)
METAMYELOCYTES # BLD: 0.6 10E9/L
METAMYELOCYTES NFR BLD MANUAL: 3 %
MONOCYTES # BLD AUTO: 0.9 10E9/L (ref 0–1.3)
MONOCYTES NFR BLD AUTO: 5 %
MYELOCYTES # BLD: 0.4 10E9/L
MYELOCYTES NFR BLD MANUAL: 2 %
NEUTROPHILS # BLD AUTO: 15.3 10E9/L (ref 1.6–8.3)
NEUTROPHILS NFR BLD AUTO: 82 %
PLATELET # BLD AUTO: 481 10E9/L (ref 150–450)
PLATELET # BLD EST: ABNORMAL 10*3/UL
RBC # BLD AUTO: 2.98 10E12/L (ref 3.8–5.2)
RBC MORPH BLD: ABNORMAL
WBC # BLD AUTO: 18.6 10E9/L (ref 4–11)

## 2020-05-09 PROCEDURE — 36415 COLL VENOUS BLD VENIPUNCTURE: CPT | Performed by: INTERNAL MEDICINE

## 2020-05-09 PROCEDURE — 71045 X-RAY EXAM CHEST 1 VIEW: CPT

## 2020-05-09 PROCEDURE — 25000128 H RX IP 250 OP 636: Performed by: INTERNAL MEDICINE

## 2020-05-09 PROCEDURE — 97530 THERAPEUTIC ACTIVITIES: CPT | Mod: GO

## 2020-05-09 PROCEDURE — 85652 RBC SED RATE AUTOMATED: CPT | Performed by: INTERNAL MEDICINE

## 2020-05-09 PROCEDURE — 25000132 ZZH RX MED GY IP 250 OP 250 PS 637: Mod: GY | Performed by: PHYSICIAN ASSISTANT

## 2020-05-09 PROCEDURE — 12000000 ZZH R&B MED SURG/OB

## 2020-05-09 PROCEDURE — 85025 COMPLETE CBC W/AUTO DIFF WBC: CPT | Performed by: INTERNAL MEDICINE

## 2020-05-09 PROCEDURE — 86140 C-REACTIVE PROTEIN: CPT | Performed by: INTERNAL MEDICINE

## 2020-05-09 PROCEDURE — 25000125 ZZHC RX 250: Performed by: INTERNAL MEDICINE

## 2020-05-09 PROCEDURE — 25800025 ZZH RX 258: Performed by: INTERNAL MEDICINE

## 2020-05-09 PROCEDURE — 99232 SBSQ HOSP IP/OBS MODERATE 35: CPT | Performed by: INTERNAL MEDICINE

## 2020-05-09 PROCEDURE — 97535 SELF CARE MNGMENT TRAINING: CPT | Mod: GO

## 2020-05-09 PROCEDURE — 85048 AUTOMATED LEUKOCYTE COUNT: CPT | Performed by: INTERNAL MEDICINE

## 2020-05-09 RX ADMIN — DEXTROSE AND SODIUM CHLORIDE: 5; 450 INJECTION, SOLUTION INTRAVENOUS at 23:30

## 2020-05-09 RX ADMIN — CLINDAMYCIN PHOSPHATE 900 MG: 900 INJECTION, SOLUTION INTRAVENOUS at 04:44

## 2020-05-09 RX ADMIN — METRONIDAZOLE 500 MG: 500 INJECTION, SOLUTION INTRAVENOUS at 20:09

## 2020-05-09 RX ADMIN — Medication 1 MG: at 21:24

## 2020-05-09 RX ADMIN — METRONIDAZOLE 500 MG: 500 INJECTION, SOLUTION INTRAVENOUS at 12:53

## 2020-05-09 RX ADMIN — DEXTROSE AND SODIUM CHLORIDE: 5; 450 INJECTION, SOLUTION INTRAVENOUS at 11:04

## 2020-05-09 RX ADMIN — CITALOPRAM HYDROBROMIDE 10 MG: 10 TABLET ORAL at 11:03

## 2020-05-09 RX ADMIN — BACITRACIN: 500 OINTMENT TOPICAL at 11:04

## 2020-05-09 RX ADMIN — DONEPEZIL HYDROCHLORIDE 10 MG: 10 TABLET ORAL at 21:19

## 2020-05-09 RX ADMIN — ATORVASTATIN CALCIUM 10 MG: 10 TABLET, FILM COATED ORAL at 21:19

## 2020-05-09 RX ADMIN — HYDROMORPHONE HYDROCHLORIDE 1 MG: 2 TABLET ORAL at 20:44

## 2020-05-09 RX ADMIN — GABAPENTIN 300 MG: 300 CAPSULE ORAL at 11:03

## 2020-05-09 RX ADMIN — GABAPENTIN 300 MG: 300 CAPSULE ORAL at 21:19

## 2020-05-09 RX ADMIN — FAMOTIDINE 20 MG: 20 TABLET ORAL at 20:09

## 2020-05-09 RX ADMIN — GABAPENTIN 300 MG: 300 CAPSULE ORAL at 18:03

## 2020-05-09 ASSESSMENT — ACTIVITIES OF DAILY LIVING (ADL)
ADLS_ACUITY_SCORE: 18
ADLS_ACUITY_SCORE: 17
ADLS_ACUITY_SCORE: 18

## 2020-05-09 NOTE — PLAN OF CARE
Discharge Planner OT   Patient plan for discharge: Home, but agreeable to TCU    Current status: Pt required mod A with grab bars and FWW for toilet transfer. Pt completed pericare with mod A and LE dressing with min A.     Barriers to return to prior living situation: Fall risk; Current level of A required; Stairs to enter home and within home    Recommendations for discharge: TCU    Rationale for recommendations: Pt limited by pain, impaired safety, and decreased activity tolerance and balance, but pt continues to make gains with OT. Pt agreeable to TCU stay.        Entered by: Marina Snell 05/09/2020 12:15 PM

## 2020-05-09 NOTE — PROGRESS NOTES
[facility]  Infectious Disease Progress Note          Assessment and Plan:   IMPRESSION:   1.  An 80-year-old female with 3 weeks of right-sided chest pain without major systemic infection symptoms, found to have a loculated pleural effusion and tap growing what appeared to be multiple anaerobes, presumed empyema.   2.  Malnutrition, significant weight loss over several months, question related to extended empyema versus other.   3.  Mild chronic renal insufficiency.   4.  PENICILLIN ALLERGY. Childhood rash     RECOMMENDATIONS:  1 DC ertapenem , lethargic mild CRF, so well known CNS impacting med, ? A bit better, sl nausea  All Gn anaerobes to flagyl now and if tolerates as home po med         Interval History:   no new complaints and doing OK but lethargic; no cp, sob, n/v/d, or abd pain. Pain Ok T down WBC 18 K, creat now under 1 cx fusobacter main org but 2 other anaerobes also              Medications:       atorvastatin  10 mg Oral At Bedtime     bacitracin   Topical Daily     citalopram  10 mg Oral QAM     donepezil  10 mg Oral At Bedtime     famotidine  20 mg Oral QPM    Or     famotidine  20 mg Intravenous QPM     gabapentin  300 mg Oral TID     lactated ringers  500 mL Intravenous Once     metroNIDAZOLE  500 mg Intravenous Q8H     senna-docusate  1 tablet Oral BID    Or     senna-docusate  2 tablet Oral BID     sodium chloride (PF)  3 mL Intracatheter Q8H                  Physical Exam:   Blood pressure 109/53, pulse 88, temperature 97.7  F (36.5  C), temperature source Oral, resp. rate 16, weight 82.1 kg (181 lb), SpO2 96 %, not currently breastfeeding.  Wt Readings from Last 2 Encounters:   05/09/20 82.1 kg (181 lb)   04/13/20 77.8 kg (171 lb 9.6 oz)     Vital Signs with Ranges  Temp:  [97.7  F (36.5  C)-98.3  F (36.8  C)] 97.7  F (36.5  C)  Pulse:  [78-91] 88  Heart Rate:  [82-90] 88  Resp:  [16-18] 16  BP: (109-120)/(53-72) 109/53  SpO2:  [93 %-96 %] 96 %    Constitutional:  Awake, alert, cooperative, no apparent distress   Lungs: Clear to auscultation bilaterally, no crackles or wheezing CT sounds   Cardiovascular: Regular rate and rhythm, normal S1 and S2, and no murmur noted   Abdomen: Normal bowel sounds, soft, non-distended, non-tender   Skin: No rashes, no cyanosis, no edema   Other:           Data:   All microbiology laboratory data reviewed.  Recent Labs   Lab Test 05/09/20  0857 05/08/20  1017 05/07/20  0441   WBC 18.6* 22.9* 25.3*   HGB 8.5* 8.4* 7.5*   HCT 26.1* 26.4* 23.6*   MCV 88 89 88   * 495* 407     Recent Labs   Lab Test 05/08/20  1017 05/07/20  0441 05/06/20  0501   CR 0.85 1.27* 0.98     Recent Labs   Lab Test 05/09/20  0857   SED 99*     Recent Labs   Lab Test 05/05/20  1603 04/30/20  0840   CULT Moderate growth  Fusobacterium nucleatum  Susceptibility testing done on previous specimen  *  Moderate growth  Strain 2  Fusobacterium nucleatum  Susceptibility testing done on previous specimen  *  Heavy growth  Parvimonas micra  Susceptibility testing not routinely done  *  Heavy growth  Anaerobic gram negative rods  *  Culture in progress  On day 1, isolated in broth only:  Possible  Anaerobic gram positive cocci  *  On day 1, isolated in broth only:  Possible  Anaerobic gram positive rods  *  Culture in progress  Referred to anaerobes for identification On day 2, isolated in broth only:  Fusobacterium nucleatum  *  On day 2, isolated in broth only:  Strain 2  Fusobacterium nucleatum  Susceptibility testing in progress  *  On day 2, isolated in broth only:  Dialister species  Identification obtained by MALDI-TOF mass spectrometry research use only database. Test   characteristics determined and verified by the Infectious Diseases Diagnostic Laboratory   ([facility]) [place], [place].  Susceptibility testing not routinely done  *  On day 2, isolated in broth only:  Parvimonas micra  Susceptibility testing not routinely done  *  Critical Value/Significant  Value, preliminary result only, called to and read back by  Gifty Gold at 1340 on 5.2.20 kln.

## 2020-05-09 NOTE — PLAN OF CARE
VSS on RA, A/O x4 though forgetful. Lung sounds diminished, BS active, + flatus. Incisions ANA PAULA with steri strips. CT x3 to -20 suction, no air leak or crepitous, dressing changed per POC. OnQ infusing, sensors taped and clamps open. Pain was controlled with repositioning. Voiding adequately, loose stool x2. Up with assist of 2, slightly unsteady when walking. Regular diet, continues to half poor appetite, IVF started. Up to chairs for meals.

## 2020-05-09 NOTE — PROGRESS NOTES
[facility]    HOSPITALIST PROGRESS NOTE :   --------------------------------------------------    Date of Admission:  5/4/2020    Cumulative Summary: Kathie Wetzel is a 80 year old female with past medical history significant for essential hypertension, hyperlipidemia, mild cognitive impairment, CKD, depression who was admitted as a direct admission from clinic with possible parapneumonic right pleural effusion on May 4.  Patient was a started on IV antibiotics and thoracic surgery was consulted.    Assessment & Plan     Parapneumonic right pleural effusion: She has been complaining of progressive fatigue and dyspnea over the past couple of weeks with prior work-up revealing right pleural effusion.  Patient underwent thoracentesis on April 30 and removed 550 mL fluid with Gram stain and culture showing GNR and GPC in broth and negative cytology.  She was initiated on a course of levofloxacin as an outpatient with worsening leukocytosis which was noticed on her follow-up with chest x-ray showing enlargement of effusion.  Patient was requested to be admitted.  CT scan of the chest without contrast was done,showed slight interval increase in size of loculated right pleural effusion predominantly in the posterior component , more loculated. Also now containing foci of gas which could be related to recent thoracentesis versus developing infection/empyema.  Patient was evaluated by thoracic surgery and underwent video-assisted thoracic go scoping, right thoracotomy and total decortication of right lung and right partial pleurectomy on May 5th and tolerated the procedure well.  Postoperatively patient was noticed to be hypotensive required boluses and then administration of 1 unit of packed red blood cell transfusion, vitals are improved to 94/52.    Leukocytosis is also improving from 30 5K to 20 5K this morning.  She continues to have absolute neutrophilia.  Tolerating ertapenem well.    05/08/2020:  patient seemed much more lethargic, also seems slightly depressed, was feeling overwhelmed with hospitalization and is hoping that she can leave the hospital soon, she is denying any suicidal ideations.  We discussed about plan by CT surgery for removing chest tube soon, we also have discussion with Dr. Benjamin who was also concerned that ertapenem causing CNS side effects and changing her antibiotics to Clindamyin IV till she is in patient     05/09/20: Seems to be in much better spirits this morning, sitting in bed, was a smiling, continues to have poor oral intake encourage her to increase her oral intake , understanding that hopefully she should be ready to leave the hospital in couple of days.    COVID ruled out; PCR came back negative for COVID-19    Acute hypoxia without respiratory distress: improved , on room air this morning.She was requiring 3 L of oxygen after hospitalization, most likely secondary to above    Elevated CRP: Most likely secondary to above, down to 182 from 280 on admission.    -- seems to be in better spirits today ,   -- start IV fluids D50.45 saline at 100 ml/hr for 24 hrs due to poor intake , hoping to stop fluids tomorrow , patient told me that hopefully she will do better with lunch and dinner   --Encourage oral intake.  --WBC counts are slowly improving down to 18.6 from 35 K on presentation , hemoglobin is a stable around 8.5 her creatinine is normal.  -- CRP is improving to 152 from 280 on admission   -- Appreciate Dr. Benjamin help , change her antibiotics from ertapenem to IV clindamycin, he is inching to IV Flagyl this morning with a plan for discharging her to oral p.o. Flagyl, following on culture results.  --We will encourage patient to continue to increase her mobilization and work with physical and Occupational Therapy.  So far they have been recommending patient to be discharged to TCU although at this time patient is interested in going home and family is also hoping that  they can provide home health care for patient at home.  --Thoracic surgery help, managing the chest tube, are planning to start removing chest tube tomorrow.    Severe malnutrition in context of acute illness  Reporting 20 pound unintentional weight loss over past 6 months.  Ready to patient she has early satiety, usually only eating 1 meal a day, she has been eating better in the past few weeks as she has been with her daughter.  -- nutrition consult has been placed, patient has been started on boost 3 times daily with meals as per recommended by RD.  Appreciate their help  -- Patient will also benefit from age appropriate cancer screening.     CKD stage III:  Baseline Cr 0.9-1.0, admission Cr 1.19.  -- BMP in AM     HTN  -- Continue to hold PTA Losartan.     Hyperlipidemia  -- Continue PTA Atorvastatin     Depression  -- Continue PTA Celexa     Mild cognitive impairment  -- continue PTA Aricept, likely patient has underlying dementia, patient is at risk for developing delirium secondary to hospitalization and being on antibiotics.     Neuropathy  -- Continue PTA Gabapentin    Diet: Advance Diet as Tolerated: Regular Diet Adult  Snacks/Supplements Adult: Boost Plus; With Meals    Ramirez Catheter: not present  DVT Prophylaxis: Pneumatic Compression Devices  Code Status: DNR/DNI     The patient's care was discussed with the Patient.    Disposition Plan   Expected discharge: Expecting patient to stay for another couple of nights, will continue to follow closely.she has been evaluated by physical and Occupational Therapy who are recommending patient to be discharged to transitional care unit.  Should services have been posted  I did have long discussion with patient's daughter yestertoday, updated her regarding the clinical status, her daughter is wondering that considering patient underlying dementia, would be better if patient can do her therapies at home with assist of family, patient has been recently staying with  her daughter.    Karely Mckinney MD, FACP  Text Page (7am - 6pm)    ----------------------------------------------------------------------------------------------------------------------    Interval History    Patient was seen and examined, lying in bed, looks significantly better as compared to yesterday from mood point of view, alert awake and oriented, pleasant and smiling, continues to have poor oral intake told me she has never been a big breakfast person but told me that she will try to do her best with lunch and dinner.  We discussed about the starting her on IV fluids due to her significant poor oral intake, encouraged her to increase her mobilization and told her that hopefully she should be able to leave the hospital soon as her chest tubes are removed.    -Data reviewed today: I reviewed all new labs and imaging results over the last 24 hours.    I personally reviewed the chest CT image(s) showing Right parapneumonic effusion as above.    Physical Exam   Temp: 97.7  F (36.5  C) Temp src: Oral BP: 109/53 Pulse: 88 Heart Rate: 88 Resp: 16 SpO2: 96 % O2 Device: None (Room air)    Vitals:    05/05/20 0500 05/08/20 0500 05/09/20 0600   Weight: 81.3 kg (179 lb 3.7 oz) 84.5 kg (186 lb 4.6 oz) 82.1 kg (181 lb)     Vital Signs with Ranges  Temp:  [97.7  F (36.5  C)-98.3  F (36.8  C)] 97.7  F (36.5  C)  Pulse:  [78-91] 88  Heart Rate:  [82-90] 88  Resp:  [16-18] 16  BP: (109-120)/(53-72) 109/53  SpO2:  [93 %-96 %] 96 %  I/O last 3 completed shifts:  In: 360 [P.O.:360]  Out: 368 [Chest Tube:368]    GENERAL: Alert , awake and oriented. NAD. Conversational, appropriate. On room air   HEENT: Normocephalic. EOMI. No icterus or injection. Nares normal.   LUNGS: Clear to auscultation , 3 chest tubes present   HEART: Regular rate. Extremities perfused.   ABDOMEN: Soft, nontender, and nondistended. Positive bowel sounds.   EXTREMITIES: No LE edema noted.   NEUROLOGIC: Moves extremities x4 on command. No acute focal neurologic  abnormalities noted.     Medications     bupivacaine 0.5% in ON-Q  pump 4 mL/hr at 05/06/20 0141     dextrose 5% and 0.45% NaCl       - MEDICATION INSTRUCTIONS -         atorvastatin  10 mg Oral At Bedtime     bacitracin   Topical Daily     citalopram  10 mg Oral QAM     clindamycin  900 mg Intravenous Q8H     donepezil  10 mg Oral At Bedtime     famotidine  20 mg Oral QPM    Or     famotidine  20 mg Intravenous QPM     gabapentin  300 mg Oral TID     lactated ringers  500 mL Intravenous Once     senna-docusate  1 tablet Oral BID    Or     senna-docusate  2 tablet Oral BID     sodium chloride (PF)  3 mL Intracatheter Q8H       Data   Recent Labs   Lab 05/09/20  0857 05/08/20  1017 05/07/20  0441  05/06/20  0501  05/04/20  2034 05/04/20  1101   WBC 18.6* 22.9* 25.3*  --  35.7*  --   --  24.2*   HGB 8.5* 8.4* 7.5*   < > 7.5*   < >  --  9.5*   MCV 88 89 88  --  89  --   --  89   * 495* 407  --  437  --   --  436   INR  --   --   --   --   --   --  1.30*  --    NA  --   --  135  --  137  --  136 136   POTASSIUM  --   --  4.1  --  5.0  --  4.2 4.1   CHLORIDE  --   --  104  --  105  --  102 102   CO2  --   --  26  --  25  --  26 27   BUN  --   --  32*  --  25  --  28 30   CR  --  0.85 1.27*  --  0.98  --  1.19* 1.22*   ANIONGAP  --   --  5  --  7  --  8 5   OMEGA  --   --  7.8*  --  8.4*  --  8.6 8.4*   GLC  --   --  90  Canceled, Test credited  --  118*  --  113* 100*   ALBUMIN  --   --   --   --   --   --  1.7* 1.7*   PROTTOTAL  --   --   --   --   --   --  6.4* 7.2   BILITOTAL  --   --   --   --   --   --  0.7 0.9   ALKPHOS  --   --   --   --   --   --  340* 350*   ALT  --   --   --   --   --   --  31 32   AST  --   --   --   --   --   --  46* 40    < > = values in this interval not displayed.       Imaging:   Recent Results (from the past 24 hour(s))   XR Chest Port 1 View    Narrative    XR CHEST PORT 1 VW 5/9/2020 5:30 AM    HISTORY: s/p right lung decortication    COMPARISON: Chest x-ray 5/8/2020       Impression    IMPRESSION: Stable right-sided chest tubes. Persistent opacities in  the right mid and lower lung. This may reflect postoperative changes,  atelectasis, and/or effusion. No pneumothorax. The left lung is clear.  The cardiac silhouette and pulmonary vasculature are within normal  limits.    MARIYA RIDLEY MD

## 2020-05-09 NOTE — PLAN OF CARE
VSS on RA. A/Ox 4, forgetful. Incisions CDI. Pt denied pain. Up with heavy assist of 2. Pt on regular diet, poor appetite. Denied N&V. +gas. Voiding adequate. LS diminished. IS: 1000. CT x3 to -20 suction, serosanguineous output. Will continue to monitor.

## 2020-05-09 NOTE — PROGRESS NOTES
THORACIC SURGERY POD # 4    AVSS on RA  Moderate CT output  CXR pleural space well drained    WBC still 18.6    Leave CT in and on suction for now  Antibiotics as per ID    JUSTIN LOPEZ MD [facility] ONCOLOGY THORACIC SURGERY  CELL:  [phone]  OFFICE: [phone]

## 2020-05-10 ENCOUNTER — APPOINTMENT (OUTPATIENT)
Dept: GENERAL RADIOLOGY | Facility: CLINIC | Age: 80
DRG: 163 | End: 2020-05-10
Attending: PHYSICIAN ASSISTANT
Payer: MEDICARE

## 2020-05-10 ENCOUNTER — APPOINTMENT (OUTPATIENT)
Dept: PHYSICAL THERAPY | Facility: CLINIC | Age: 80
DRG: 163 | End: 2020-05-10
Attending: HOSPITALIST
Payer: MEDICARE

## 2020-05-10 LAB
CREAT SERPL-MCNC: 0.68 MG/DL (ref 0.52–1.04)
CRP SERPL-MCNC: 90.9 MG/L (ref 0–8)
GFR SERPL CREATININE-BSD FRML MDRD: 82 ML/MIN/{1.73_M2}
SODIUM SERPL-SCNC: 137 MMOL/L (ref 133–144)
WBC # BLD AUTO: 16.9 10E9/L (ref 4–11)

## 2020-05-10 PROCEDURE — 25000132 ZZH RX MED GY IP 250 OP 250 PS 637: Mod: GY | Performed by: PHYSICIAN ASSISTANT

## 2020-05-10 PROCEDURE — 97116 GAIT TRAINING THERAPY: CPT | Mod: GP | Performed by: PHYSICAL THERAPY ASSISTANT

## 2020-05-10 PROCEDURE — 97530 THERAPEUTIC ACTIVITIES: CPT | Mod: GP | Performed by: PHYSICAL THERAPY ASSISTANT

## 2020-05-10 PROCEDURE — 99231 SBSQ HOSP IP/OBS SF/LOW 25: CPT | Performed by: INTERNAL MEDICINE

## 2020-05-10 PROCEDURE — 85048 AUTOMATED LEUKOCYTE COUNT: CPT | Performed by: INTERNAL MEDICINE

## 2020-05-10 PROCEDURE — 12000000 ZZH R&B MED SURG/OB

## 2020-05-10 PROCEDURE — 71045 X-RAY EXAM CHEST 1 VIEW: CPT

## 2020-05-10 PROCEDURE — 36415 COLL VENOUS BLD VENIPUNCTURE: CPT | Performed by: INTERNAL MEDICINE

## 2020-05-10 PROCEDURE — 82565 ASSAY OF CREATININE: CPT | Performed by: INTERNAL MEDICINE

## 2020-05-10 PROCEDURE — 86140 C-REACTIVE PROTEIN: CPT | Performed by: INTERNAL MEDICINE

## 2020-05-10 PROCEDURE — 84295 ASSAY OF SERUM SODIUM: CPT | Performed by: INTERNAL MEDICINE

## 2020-05-10 PROCEDURE — 25000128 H RX IP 250 OP 636: Performed by: INTERNAL MEDICINE

## 2020-05-10 PROCEDURE — 25000128 H RX IP 250 OP 636: Performed by: PHYSICIAN ASSISTANT

## 2020-05-10 PROCEDURE — 25000125 ZZHC RX 250: Performed by: PHYSICIAN ASSISTANT

## 2020-05-10 RX ADMIN — GABAPENTIN 300 MG: 300 CAPSULE ORAL at 21:16

## 2020-05-10 RX ADMIN — CITALOPRAM HYDROBROMIDE 10 MG: 10 TABLET ORAL at 09:40

## 2020-05-10 RX ADMIN — FAMOTIDINE 20 MG: 20 TABLET ORAL at 20:14

## 2020-05-10 RX ADMIN — METRONIDAZOLE 500 MG: 500 INJECTION, SOLUTION INTRAVENOUS at 04:48

## 2020-05-10 RX ADMIN — ONDANSETRON 4 MG: 2 INJECTION INTRAMUSCULAR; INTRAVENOUS at 21:21

## 2020-05-10 RX ADMIN — DONEPEZIL HYDROCHLORIDE 10 MG: 10 TABLET ORAL at 21:16

## 2020-05-10 RX ADMIN — METRONIDAZOLE 500 MG: 500 INJECTION, SOLUTION INTRAVENOUS at 12:07

## 2020-05-10 RX ADMIN — Medication 1 MG: at 21:16

## 2020-05-10 RX ADMIN — ATORVASTATIN CALCIUM 10 MG: 10 TABLET, FILM COATED ORAL at 21:16

## 2020-05-10 RX ADMIN — GABAPENTIN 300 MG: 300 CAPSULE ORAL at 17:55

## 2020-05-10 RX ADMIN — BACITRACIN: 500 OINTMENT TOPICAL at 09:40

## 2020-05-10 RX ADMIN — METRONIDAZOLE 500 MG: 500 INJECTION, SOLUTION INTRAVENOUS at 20:14

## 2020-05-10 RX ADMIN — GABAPENTIN 300 MG: 300 CAPSULE ORAL at 09:40

## 2020-05-10 ASSESSMENT — ACTIVITIES OF DAILY LIVING (ADL)
ADLS_ACUITY_SCORE: 16
ADLS_ACUITY_SCORE: 17
ADLS_ACUITY_SCORE: 17
ADLS_ACUITY_SCORE: 16
ADLS_ACUITY_SCORE: 16
ADLS_ACUITY_SCORE: 17

## 2020-05-10 NOTE — PLAN OF CARE
Discharge Planner PT   Patient plan for discharge: Prefers home, agreeable to TCU if needed  Current status: Pt performed bed mobility with min assist.  Sit to/from stand transfers with min assist.  Gait training x 100 ft using wheeled walker and CGA-min assist.  Mild unsteadiness noted.  Tolerated well.  Barriers to return to prior living situation: level of assist required, decreased activity tolerance, LE weakness, below baseline, medical status  Recommendations for discharge: TCU per plan established by the PT.   Rationale for recommendations: Patient is below baseline for functional mobility & would benefit from continued physical therapy in the setting of a TCU for improving functional mobility, LE strength and tolerance for functional activities in order to return to baseline of functioning.        Entered by: Jennifer Pemberton 05/10/2020 12:48 PM

## 2020-05-10 NOTE — PROGRESS NOTES
[facility]    HOSPITALIST PROGRESS NOTE :   --------------------------------------------------    Date of Admission:  5/4/2020    Cumulative Summary: Katihe Wetzel is a 80 year old female with past medical history significant for essential hypertension, hyperlipidemia, mild cognitive impairment, CKD, depression who was admitted as a direct admission from clinic with possible parapneumonic right pleural effusion on May 4.  Patient was a started on IV antibiotics and thoracic surgery was consulted.    Assessment & Plan     Parapneumonic right pleural effusion: She has been complaining of progressive fatigue and dyspnea over the past couple of weeks with prior work-up revealing right pleural effusion.  Patient underwent thoracentesis on April 30 and removed 550 mL fluid with Gram stain and culture showing GNR and GPC in broth and negative cytology.  She was initiated on a course of levofloxacin as an outpatient with worsening leukocytosis which was noticed on her follow-up with chest x-ray showing enlargement of effusion.  Patient was requested to be admitted.  CT scan of the chest without contrast was done,showed slight interval increase in size of loculated right pleural effusion predominantly in the posterior component , more loculated. Also now containing foci of gas which could be related to recent thoracentesis versus developing infection/empyema.  Patient was evaluated by thoracic surgery and underwent video-assisted thoracic go scoping, right thoracotomy and total decortication of right lung and right partial pleurectomy on May 5th and tolerated the procedure well.  Postoperatively patient was noticed to be hypotensive required boluses and then administration of 1 unit of packed red blood cell transfusion, vitals are improved since then   Leukocytosis is also improving from 30 5K to 16.9K this morning.      05/08/2020: patient seemed much more lethargic and depressed .Dr. Benjamin who was  concerned that ertapenem causing CNS side effects and changed her antibiotics to Clindamyin IV till she is in patient   05/09/20: neuro status improved, back to her pleasant self , ID changed IV clindamycin to IV Flagyl after culture results were back.  05/10/20 : Doing well, denying any new complaints, had good night sleep, looking forward to have chest tubes out and is hoping to be discharged soon. She continues to have baseline issues with poor oral intake but her oral intake is improved as compared to previous few days.    COVID ruled out; PCR came back negative for COVID-19  Acute hypoxia without respiratory distress: most likely sec to above, resolved   Elevated CRP: Most likely secondary to above, down to 182 from 280 on admission.    --Continue to monitor patient closely.  --We will discontinue IV fluids today her oral intake is improved.  --We will check CBC and CRP tomorrow morning.  --Appreciate ID help, continue patient on IV Flagyl plan to change antibiotic to oral on discharge.  --Appreciate CT surgery help, it seems like  is planning to remove 2 chest tubes tomorrow and then 1 on Tuesday.  --Patient has been evaluated by PT OT, recommending TCU, patient and family are more favorable to go to home with home health care due to her underlying dementia and cognitive issues.    Severe malnutrition in context of acute illness  Reporting 20 pound unintentional weight loss over past 6 months.  Ready to patient she has early satiety, usually only eating 1 meal a day, she has been eating better in the past few weeks as she has been with her daughter.  -- nutrition consult has been placed, patient has been started on boost 3 times daily with meals as per recommended by RD.  Appreciate their help  -- Patient will also benefit from age appropriate cancer screening.     CKD stage III:  Baseline Cr 0.9-1.0, admission Cr 1.19.  -- BMP in AM     HTN  -- Continue to hold PTA Losartan.     Hyperlipidemia  --  Continue PTA Atorvastatin     Depression  -- Continue PTA Celexa     Mild cognitive impairment  -- continue PTA Aricept, likely patient has underlying dementia, patient is at risk for developing delirium secondary to hospitalization and being on antibiotics.     Neuropathy  -- Continue PTA Gabapentin    Diet: Advance Diet as Tolerated: Regular Diet Adult  Snacks/Supplements Adult: Boost Plus; With Meals    Ramirez Catheter: not present  DVT Prophylaxis: Pneumatic Compression Devices  Code Status: DNR/DNI     The patient's care was discussed with the Patient.    Disposition Plan   Expected discharge:   Expecting patient to stay for another couple of nights, will continue to follow closely.  Physical and Occupational Therapy who are recommending patient to be discharged to TCU   are following     Karely Mckinney MD, FACP  Text Page (7am - 6pm)    ----------------------------------------------------------------------------------------------------------------------    Interval History    Patient was seen and examined, sitting in chair, was eating breakfast, encouraged her to continue to increase her oral intake, she is denying any fever or chills, looks back to her normal self, does not seem depressed she does have baseline cognitive issues, she is hoping to go home soon discussed with her that hopefully she should be able to leave the hospital as soon as the chest tubes are out I encouraged her to increase her mobilization so she can continue to gain some strength as at this point PT and OT are recommending TCU but patient and daughter prefer patient to go home with home health care services.    -Data reviewed today: I reviewed all new labs and imaging results over the last 24 hours.    I personally reviewed the chest CT image(s) showing Right parapneumonic effusion as above.    Physical Exam   Temp: 98.3  F (36.8  C) Temp src: Oral BP: 104/61 Pulse: 73 Heart Rate: 79 Resp: 15 SpO2: 93 % O2 Device: None (Room  air)    Vitals:    05/08/20 0500 05/09/20 0600 05/10/20 0500   Weight: 84.5 kg (186 lb 4.6 oz) 82.1 kg (181 lb) 82.2 kg (181 lb 3.5 oz)     Vital Signs with Ranges  Temp:  [97.5  F (36.4  C)-98.3  F (36.8  C)] 98.3  F (36.8  C)  Pulse:  [73-79] 73  Heart Rate:  [73-79] 79  Resp:  [15-16] 15  BP: (104-120)/(61-71) 104/61  SpO2:  [93 %-96 %] 93 %  I/O last 3 completed shifts:  In: 2127.33 [P.O.:340; I.V.:1787.33]  Out: 123 [Chest Tube:123]    GENERAL: Alert , awake and oriented. NAD. Conversational, appropriate. On room air   HEENT: Normocephalic. EOMI. No icterus or injection. Nares normal.   LUNGS: Clear to auscultation , 3 chest tubes present   HEART: Regular rate. Extremities perfused.   ABDOMEN: Soft, nontender, and nondistended. Positive bowel sounds.   EXTREMITIES: No LE edema noted.   NEUROLOGIC: Moves extremities x4 on command. No acute focal neurologic abnormalities noted.     Medications     bupivacaine 0.5% in ON-Q  pump 4 mL/hr at 05/06/20 0141     dextrose 5% and 0.45% NaCl 100 mL/hr at 05/10/20 0047     - MEDICATION INSTRUCTIONS -         atorvastatin  10 mg Oral At Bedtime     bacitracin   Topical Daily     citalopram  10 mg Oral QAM     donepezil  10 mg Oral At Bedtime     famotidine  20 mg Oral QPM    Or     famotidine  20 mg Intravenous QPM     gabapentin  300 mg Oral TID     lactated ringers  500 mL Intravenous Once     metroNIDAZOLE  500 mg Intravenous Q8H     senna-docusate  1 tablet Oral BID    Or     senna-docusate  2 tablet Oral BID     sodium chloride (PF)  3 mL Intracatheter Q8H       Data   Recent Labs   Lab 05/10/20  0740 05/09/20  0857 05/08/20  1017 05/07/20  0441  05/06/20  0501  05/04/20  2034 05/04/20  1101   WBC 16.9* 18.6* 22.9* 25.3*  --  35.7*  --   --  24.2*   HGB  --  8.5* 8.4* 7.5*   < > 7.5*   < >  --  9.5*   MCV  --  88 89 88  --  89  --   --  89   PLT  --  481* 495* 407  --  437  --   --  436   INR  --   --   --   --   --   --   --  1.30*  --      --   --  135  --   137  --  136 136   POTASSIUM  --   --   --  4.1  --  5.0  --  4.2 4.1   CHLORIDE  --   --   --  104  --  105  --  102 102   CO2  --   --   --  26  --  25  --  26 27   BUN  --   --   --  32*  --  25  --  28 30   CR 0.68  --  0.85 1.27*  --  0.98  --  1.19* 1.22*   ANIONGAP  --   --   --  5  --  7  --  8 5   OMEGA  --   --   --  7.8*  --  8.4*  --  8.6 8.4*   GLC  --   --   --  90  Canceled, Test credited  --  118*  --  113* 100*   ALBUMIN  --   --   --   --   --   --   --  1.7* 1.7*   PROTTOTAL  --   --   --   --   --   --   --  6.4* 7.2   BILITOTAL  --   --   --   --   --   --   --  0.7 0.9   ALKPHOS  --   --   --   --   --   --   --  340* 350*   ALT  --   --   --   --   --   --   --  31 32   AST  --   --   --   --   --   --   --  46* 40    < > = values in this interval not displayed.       Imaging:   Recent Results (from the past 24 hour(s))   XR Chest Port 1 View    Narrative    EXAM: XR CHEST PORTABLE 1 VIEW  LOCATION: [facility]  DATE/TIME: 05/10/2020, 5:01 AM    INDICATION: Follow-up right lung decortication.  COMPARISON: 05/08/2020.    FINDINGS: Upright portable chest. Three right chest tubes remain in place. No pneumothorax. There is pleural thickening in the right hemithorax similar to the previous exam. Improving right lung infiltrate. The left lung is clear. Heart size is normal.      Impression    IMPRESSION: No pneumothorax.

## 2020-05-10 NOTE — PLAN OF CARE
[id number]    Neuro: Aox4. Awoken by voice.     Respiratory: RA. Diminished LS.     Cardiac: WNL.     GI/: Bowel sounds audible x4 quads. Adequate urine OP.     Skin: UTV surgical incision; dressing CDI. On-Q pump present.     Pain: Denied.     Mobility: Ax2.     Diet: R.    IVF: D5 1/2.    Plan of Care: CT monitoring; pain control. Increasing PO intake. Reorient to surroundings; delirium precaution.      Will continue to monitor.

## 2020-05-10 NOTE — PROGRESS NOTES
[facility]  Infectious Disease Progress Note          Assessment and Plan:   IMPRESSION:   1.  An 80-year-old female with 3 weeks of right-sided chest pain without major systemic infection symptoms, found to have a loculated pleural effusion and tap growing what appeared to be multiple anaerobes, presumed empyema.   2.  Malnutrition, significant weight loss over several months, question related to extended empyema versus other.   3.  Mild chronic renal insufficiency.   4.  PENICILLIN ALLERGY. Childhood rash     RECOMMENDATIONS  1 All Gn anaerobes to flagyl now and if tolerates as home po med , mentation now nl, and no nausea, convert to po soon        Interval History:   no new complaints and doing better; no cp, sob, n/v/d, or abd pain. Pain Ok T down WBC 18 K, creat now under 1 cx fusobacter main org but 2 other anaerobes also WBc 17 k              Medications:       atorvastatin  10 mg Oral At Bedtime     bacitracin   Topical Daily     citalopram  10 mg Oral QAM     donepezil  10 mg Oral At Bedtime     famotidine  20 mg Oral QPM    Or     famotidine  20 mg Intravenous QPM     gabapentin  300 mg Oral TID     lactated ringers  500 mL Intravenous Once     metroNIDAZOLE  500 mg Intravenous Q8H     senna-docusate  1 tablet Oral BID    Or     senna-docusate  2 tablet Oral BID     sodium chloride (PF)  3 mL Intracatheter Q8H                  Physical Exam:   Blood pressure 104/61, pulse 73, temperature 98.3  F (36.8  C), temperature source Oral, resp. rate 15, weight 82.2 kg (181 lb 3.5 oz), SpO2 93 %, not currently breastfeeding.  Wt Readings from Last 2 Encounters:   05/10/20 82.2 kg (181 lb 3.5 oz)   04/13/20 77.8 kg (171 lb 9.6 oz)     Vital Signs with Ranges  Temp:  [97.5  F (36.4  C)-98.3  F (36.8  C)] 98.3  F (36.8  C)  Pulse:  [73-79] 73  Heart Rate:  [73-79] 79  Resp:  [15-16] 15  BP: (104-120)/(61-71) 104/61  SpO2:  [93 %-96 %] 93 %    Constitutional: Awake, alert, cooperative, no  apparent distress   Lungs: Clear to auscultation bilaterally, no crackles or wheezing CT sounds   Cardiovascular: Regular rate and rhythm, normal S1 and S2, and no murmur noted   Abdomen: Normal bowel sounds, soft, non-distended, non-tender   Skin: No rashes, no cyanosis, no edema   Other:           Data:   All microbiology laboratory data reviewed.  Recent Labs   Lab Test 05/10/20  0740 05/09/20  0857 05/08/20  1017 05/07/20  0441   WBC 16.9* 18.6* 22.9* 25.3*   HGB  --  8.5* 8.4* 7.5*   HCT  --  26.1* 26.4* 23.6*   MCV  --  88 89 88   PLT  --  481* 495* 407     Recent Labs   Lab Test 05/10/20  0740 05/08/20  1017 05/07/20  0441   CR 0.68 0.85 1.27*     Recent Labs   Lab Test 05/09/20  0857   SED 99*     Recent Labs   Lab Test 05/05/20  1603 04/30/20  0840   CULT Moderate growth  Fusobacterium nucleatum  Susceptibility testing done on previous specimen  *  Moderate growth  Strain 2  Fusobacterium nucleatum  Susceptibility testing done on previous specimen  *  Heavy growth  Parvimonas micra  Susceptibility testing not routinely done  *  Heavy growth  Anaerobic gram negative rods  *  Culture in progress  On day 1, isolated in broth only:  Possible  Anaerobic gram positive cocci  *  On day 1, isolated in broth only:  Possible  Anaerobic gram positive rods  *  Culture in progress  Referred to anaerobes for identification On day 2, isolated in broth only:  Fusobacterium nucleatum  *  On day 2, isolated in broth only:  Strain 2  Fusobacterium nucleatum  Susceptibility testing in progress  *  On day 2, isolated in broth only:  Dialister species  Identification obtained by MALDI-TOF mass spectrometry research use only database. Test   characteristics determined and verified by the Infectious Diseases Diagnostic Laboratory   ([facility]) [place], [place].  Susceptibility testing not routinely done  *  On day 2, isolated in broth only:  Parvimonas micra  Susceptibility testing not routinely done  *  Critical  Value/Significant Value, preliminary result only, called to and read back by  Gifty Gold at 1340 on 5.2.20 kln.

## 2020-05-10 NOTE — PROGRESS NOTES
THORACIC SURGERY POD # 5  Stable  AVSS on RA  m  Decreasing CT output  CXR OK    Plan to D/C 2 CT tomorrow, last one on Tuesday    Discussed with pt and daughter Regi LOPEZ MD [facility] ONCOLOGY THORACIC SURGERY  CELL:  [phone]  OFFICE: [phone]

## 2020-05-10 NOTE — PLAN OF CARE
VSS on RA, A/O x4 though forgetful. Lung sounds diminished on the right, BS active, + flatus. Incisions ANA PAULA with steri strips. Pain was controlled with repositioning. Voiding adequately, small, loose stools. Up with assist of 2 + GB + walker. Regular diet, poor appetite, in chair for meals.

## 2020-05-10 NOTE — PLAN OF CARE
VSS on RA. A/Ox 4, forgetful. Incisions CDI. Pt denied pain. Up with heavy assist of 2. Pt on regular diet, poor appetite. Denied N&V. +gas. Voiding adequate. LS diminished. IS: 1000. CT x3 to -20 suction, serosanguineous output. PRN Dilaudid 1mg. Will continue to monitor.   Detail Level: Zone Detail Level: Detailed

## 2020-05-11 ENCOUNTER — APPOINTMENT (OUTPATIENT)
Dept: GENERAL RADIOLOGY | Facility: CLINIC | Age: 80
DRG: 163 | End: 2020-05-11
Attending: PHYSICIAN ASSISTANT
Payer: MEDICARE

## 2020-05-11 ENCOUNTER — APPOINTMENT (OUTPATIENT)
Dept: OCCUPATIONAL THERAPY | Facility: CLINIC | Age: 80
DRG: 163 | End: 2020-05-11
Attending: HOSPITALIST
Payer: MEDICARE

## 2020-05-11 ENCOUNTER — APPOINTMENT (OUTPATIENT)
Dept: PHYSICAL THERAPY | Facility: CLINIC | Age: 80
DRG: 163 | End: 2020-05-11
Attending: HOSPITALIST
Payer: MEDICARE

## 2020-05-11 PROCEDURE — 25000132 ZZH RX MED GY IP 250 OP 250 PS 637: Mod: GY | Performed by: PHYSICIAN ASSISTANT

## 2020-05-11 PROCEDURE — 99232 SBSQ HOSP IP/OBS MODERATE 35: CPT | Performed by: INTERNAL MEDICINE

## 2020-05-11 PROCEDURE — 25000132 ZZH RX MED GY IP 250 OP 250 PS 637: Mod: GY | Performed by: INTERNAL MEDICINE

## 2020-05-11 PROCEDURE — 97530 THERAPEUTIC ACTIVITIES: CPT | Mod: GP

## 2020-05-11 PROCEDURE — 71045 X-RAY EXAM CHEST 1 VIEW: CPT

## 2020-05-11 PROCEDURE — 12000000 ZZH R&B MED SURG/OB

## 2020-05-11 PROCEDURE — 97116 GAIT TRAINING THERAPY: CPT | Mod: GP

## 2020-05-11 PROCEDURE — 25000128 H RX IP 250 OP 636: Performed by: PHYSICIAN ASSISTANT

## 2020-05-11 PROCEDURE — 25000128 H RX IP 250 OP 636: Performed by: INTERNAL MEDICINE

## 2020-05-11 PROCEDURE — 97535 SELF CARE MNGMENT TRAINING: CPT | Mod: GO

## 2020-05-11 RX ORDER — METRONIDAZOLE 500 MG/1
500 TABLET ORAL 3 TIMES DAILY
Status: DISCONTINUED | OUTPATIENT
Start: 2020-05-11 | End: 2020-05-13 | Stop reason: HOSPADM

## 2020-05-11 RX ORDER — MULTIPLE VITAMINS W/ MINERALS TAB 9MG-400MCG
1 TAB ORAL DAILY
Status: DISCONTINUED | OUTPATIENT
Start: 2020-05-11 | End: 2020-05-13 | Stop reason: HOSPADM

## 2020-05-11 RX ADMIN — ONDANSETRON 4 MG: 2 INJECTION INTRAMUSCULAR; INTRAVENOUS at 20:45

## 2020-05-11 RX ADMIN — FAMOTIDINE 20 MG: 20 TABLET ORAL at 20:45

## 2020-05-11 RX ADMIN — CITALOPRAM HYDROBROMIDE 10 MG: 10 TABLET ORAL at 08:09

## 2020-05-11 RX ADMIN — ACETAMINOPHEN 650 MG: 325 TABLET, FILM COATED ORAL at 05:31

## 2020-05-11 RX ADMIN — METRONIDAZOLE 500 MG: 500 TABLET ORAL at 22:09

## 2020-05-11 RX ADMIN — DONEPEZIL HYDROCHLORIDE 10 MG: 10 TABLET ORAL at 22:09

## 2020-05-11 RX ADMIN — GABAPENTIN 300 MG: 300 CAPSULE ORAL at 22:09

## 2020-05-11 RX ADMIN — METRONIDAZOLE 500 MG: 500 TABLET ORAL at 14:08

## 2020-05-11 RX ADMIN — MULTIPLE VITAMINS W/ MINERALS TAB 1 TABLET: TAB at 14:08

## 2020-05-11 RX ADMIN — ATORVASTATIN CALCIUM 10 MG: 10 TABLET, FILM COATED ORAL at 22:09

## 2020-05-11 RX ADMIN — GABAPENTIN 300 MG: 300 CAPSULE ORAL at 08:09

## 2020-05-11 RX ADMIN — GABAPENTIN 300 MG: 300 CAPSULE ORAL at 17:26

## 2020-05-11 RX ADMIN — ACETAMINOPHEN 650 MG: 325 TABLET, FILM COATED ORAL at 20:45

## 2020-05-11 RX ADMIN — METRONIDAZOLE 500 MG: 500 INJECTION, SOLUTION INTRAVENOUS at 04:03

## 2020-05-11 RX ADMIN — Medication 1 MG: at 22:09

## 2020-05-11 ASSESSMENT — ACTIVITIES OF DAILY LIVING (ADL)
ADLS_ACUITY_SCORE: 16

## 2020-05-11 NOTE — TELEPHONE ENCOUNTER
Daughter called back, she did hear from the care team at the hospital and no longer needs to speak with Dr. Daugherty.     Jennifer Honeycutt on 5/11/2020 at 11:07 AM

## 2020-05-11 NOTE — PROGRESS NOTES
CLINICAL NUTRITION SERVICES - REASSESSMENT NOTE    Recommendations Ordered by Registered Dietitian (RD):   Continue Boost Plus w/ meals (may be updated per patient preference - unable to reach patient x2 attempts today)    Start Thera vit M daily    Malnutrition: (/)  % Weight Loss:  > 10% in 6 months (severe malnutrition)  % Intake:  </= 50% for >/= 5 days (severe malnutrition)  Subcutaneous Fat Loss:  Unable to determine as above   Muscle Loss:  Unable to determine as above   Fluid Retention:  Mild 2+ in lower extremities      Malnutrition Diagnosis: Severe malnutrition  In Context of:  Acute illness or injury     EVALUATION OF PROGRESS TOWARD GOALS   Diet: Regular   Boost Plus w/ Meals TID.     Intake/Tolerance: 0-50% intakes recorded. Pt most often consumes ~25% of small meals ordered TID. Meals generally have 2-4 items per tray. This morning pt ordered cheerios, 8 oz whole milk, banana with sugar packets. She did not receive her scheduled Boost - by pt request.  Unable to reach patient x2 attempts. (by phone, per current protocol during COVID19 Pandemic). Could not verify tolerance of BoostPlus. Will continue to send for now given poor PO.     Per MD - patient more alert, answering questions appropriately this morning.     ASSESSED NUTRITION NEEDS:  Dosing Weight 66 kg (adjusted)  Estimated Energy Needs: 8547-5947 kcals (25-30 Kcal/Kg)  Justification: overweight  Estimated Protein Needs:  grams protein (1.2-1.5 g pro/Kg)  Justification: post-op and hypercatabolism with acute illness  Estimated Fluid Needs: 9787-7783 mL (1 mL/Kcal)  Justification: maintenance    NEW FINDINGS:   - Disposition: 2-3 more days likely.     - Labs: Reviewed  - Meds: Scheduled Bowel Meds  - Stooling: Last BM 5/10 (up to x6)  - Weight trendin.9 kg today  Trace-mild edema noted    Previous Goals:   Patient will order at least 3-4 items per meal and consume at least 50% of meals and supplement   Evaluation: Not met - orders  2-4 items with 25% intake most often    Previous Nutrition Diagnosis:   Inadequate oral intake related to poor appetite, recent surgery as evidenced by minimal intake since admit (fruit and yogurt)  Evaluation: No change - continues below, updated     CURRENT NUTRITION DIAGNOSIS  Inadequate oral intake related to poor appetite r/t recent surgery and altered mentation as evidenced by intake of 25% most often for small meals TID consisting of 2-4 items, reported 20# weight loss on admission.      INTERVENTIONS  Recommendations / Nutrition Prescription  Continue regular diet     Continue Boost Plus w/ meals (may be updated per patient preference - unable to reach patient x2 attempts today)    Start Thera vit M daily     Implementation  Medical Food Supplement: as above.     Goals  Intake of at least 50% meals BID-TID.       MONITORING AND EVALUATION:  Progress towards goals will be monitored and evaluated per protocol and Practice Guidelines    Amna Lin RD, LD  Pager: [phone]

## 2020-05-11 NOTE — PLAN OF CARE
VSS on RA. A/Ox 4. Incisions WDL ANA PAULA. CMS intact. PRN Tylenol for pain. Up with 1-2 GB/W. Tolerating reg diet. Had N&V early in night, gave Zofran, resolved. +gas, no bm on shift. Voiding adequate. LS clear, diminished on right side. IS: 1000. CT to -20 suction, dressing changed overnight. Will continue to monitor.

## 2020-05-11 NOTE — PLAN OF CARE
Discharge Planner OT   Patient plan for discharge: Home with daughter A     Current status: Pt completed bed mobility supine <> sit with SBA. CGA for sit <> stand transfer with FWW. Pt with one LOB in standing and needing A to correct. Pt ambulated to bathroom with CGA and FWW, slow but steady pace. Pt stood at sink to complete g/h tasks with set- up A and close SBA. mod A for toilet transfer (A for sit <> stand transfer), pt able to complete pericare. Pt demonstrates ability to don briefs with SBA.     Barriers to return to prior living situation: Fall risk; Current level of A required; Stairs to enter home and within home    Recommendations for discharge: TCU    Rationale for recommendations: Pt would benefit from continued skilled OT services to improve independence and safety with ADL's and functional transfers as pt is not at baseline. If pt declines TCU, rec home with 24/7 A from daughter and HHOT.        Entered by: Fernanda Perrin 05/11/2020 12:36 PM

## 2020-05-11 NOTE — PROGRESS NOTES
Thoracic Surgery POD #6:  /63 (BP Location: Right arm)   Pulse 77   Temp 98.1  F (36.7  C) (Oral)   Resp 18   Wt 83.9 kg (184 lb 15.5 oz)   LMP  (LMP Unknown)   SpO2 93%   BMI 27.71 kg/m    CXR: no PTX, right pleural space well drained  CT: serous output, 200 ml over 24 hours    S: No complaints- moving about with PT- eating well- pain in control.   O: Inc.: no erythema, no swelling, dry  On-Q: empty-- removed without complications  CTs: 2 apical chest tubes removed without complication. Occlusive dressing applied.  P: Anticipate removing final basilar chest tube tomorrow  Dispo as per Hospitalists--- SW is following. Of note, patient told me she was cleared to discharge to her daughter's home. I note PT is still recommending TCU in their charting    Danna Hammond PA-C with Dr. Gary Shah  [facility]  Cell [phone]

## 2020-05-11 NOTE — PLAN OF CARE
Discharge Planner PT   Patient plan for discharge: Home however open to TCU  Current status:     STS CGA and FWW. Pt ambulated 130' + 50' with FWW no LOB, slow mehdi, Cues for improved mechanics    Encouraged pt to sit up in chair however pt declining stating she was up to chair for breakfast.    Sit > supine CGA.     Barriers to return to prior living situation: Level of assist, impaired activity tolerance  Recommendations for discharge: TCU  Rationale for recommendations: Pt will benefit from continued skilled PT at TCU to improve strength, balance, gait, endurance to improve functional mobility prior to return home as pt currently below baseline for functional mobility         Entered by: Ofelia Connor 05/11/2020 9:50 AM

## 2020-05-11 NOTE — PROGRESS NOTES
[facility]  Infectious Disease Progress Note          Assessment and Plan:   IMPRESSION:   1.  An 80-year-old female with 3 weeks of right-sided chest pain without major systemic infection symptoms, found to have a loculated pleural effusion and tap growing what appeared to be multiple anaerobes, presumed empyema.   2.  Malnutrition, significant weight loss over several months, question related to extended empyema versus other.   3.  Mild chronic renal insufficiency.   4.  PENICILLIN ALLERGY. Childhood rash     RECOMMENDATIONS  1 All Gn anaerobes to flagyl  po  , mentation now nl, and no nausea, if tolerates 2 weeks po at dispositon, recheck WBC        Interval History:   no new complaints and doing better; no cp, sob, n/v/d, or abd pain. Pain Ok T down creat now under 1 cx fusobacter main org but 2 other anaerobes also WBc 16 k              Medications:       atorvastatin  10 mg Oral At Bedtime     bacitracin   Topical Daily     citalopram  10 mg Oral QAM     donepezil  10 mg Oral At Bedtime     famotidine  20 mg Oral QPM    Or     famotidine  20 mg Intravenous QPM     gabapentin  300 mg Oral TID     lactated ringers  500 mL Intravenous Once     metroNIDAZOLE  500 mg Oral TID     senna-docusate  1 tablet Oral BID    Or     senna-docusate  2 tablet Oral BID     sodium chloride (PF)  3 mL Intracatheter Q8H                  Physical Exam:   Blood pressure 107/63, pulse 77, temperature 98.1  F (36.7  C), temperature source Oral, resp. rate 18, weight 83.9 kg (184 lb 15.5 oz), SpO2 93 %, not currently breastfeeding.  Wt Readings from Last 2 Encounters:   05/11/20 83.9 kg (184 lb 15.5 oz)   04/13/20 77.8 kg (171 lb 9.6 oz)     Vital Signs with Ranges  Temp:  [97.6  F (36.4  C)-98.9  F (37.2  C)] 98.1  F (36.7  C)  Pulse:  [77-88] 77  Heart Rate:  [73-81] 73  Resp:  [16-18] 18  BP: (107-111)/(63-67) 107/63  SpO2:  [92 %-97 %] 93 %    Constitutional: Awake, alert, cooperative, no apparent distress    Lungs: Clear to auscultation bilaterally, no crackles or wheezing CT sounds   Cardiovascular: Regular rate and rhythm, normal S1 and S2, and no murmur noted   Abdomen: Normal bowel sounds, soft, non-distended, non-tender   Skin: No rashes, no cyanosis, no edema   Other:           Data:   All microbiology laboratory data reviewed.  Recent Labs   Lab Test 05/10/20  0740 05/09/20  0857 05/08/20  1017 05/07/20  0441   WBC 16.9* 18.6* 22.9* 25.3*   HGB  --  8.5* 8.4* 7.5*   HCT  --  26.1* 26.4* 23.6*   MCV  --  88 89 88   PLT  --  481* 495* 407     Recent Labs   Lab Test 05/10/20  0740 05/08/20  1017 05/07/20  0441   CR 0.68 0.85 1.27*     Recent Labs   Lab Test 05/09/20  0857   SED 99*     Recent Labs   Lab Test 05/05/20  1603 04/30/20  0840   CULT Moderate growth  Fusobacterium nucleatum  Susceptibility testing done on previous specimen  *  Moderate growth  Strain 2  Fusobacterium nucleatum  Susceptibility testing done on previous specimen  *  Heavy growth  Parvimonas micra  Susceptibility testing not routinely done  *  Heavy growth  Campylobacter species  Most closely resembling  Campylobacter rectus  *  Culture in progress  On day 1, isolated in broth only:  Anaerobic gram positive cocci  See anaerobic report for identification  *  On day 1, isolated in broth only:  Possible  Anaerobic gram positive rods  *  Culture in progress  Referred to anaerobes for identification On day 2, isolated in broth only:  Fusobacterium nucleatum  *  On day 2, isolated in broth only:  Strain 2  Fusobacterium nucleatum  Susceptibility testing in progress  *  On day 2, isolated in broth only:  Dialister species  Identification obtained by MALDI-TOF mass spectrometry research use only database. Test   characteristics determined and verified by the Infectious Diseases Diagnostic Laboratory   ([facility]) [place], [place].  Susceptibility testing not routinely done  *  On day 2, isolated in broth only:  Parvimonas micra  Susceptibility  testing not routinely done  *  Critical Value/Significant Value, preliminary result only, called to and read back by  Gifty Gold at 1340 on 5.2.20 kln.

## 2020-05-11 NOTE — PROGRESS NOTES
[facility]    Medicine Progress Note - Hospitalist Service       Date of Admission:  5/4/2020  Assessment & Plan   Kathie Wetzel is a 80 year old female with past medical history significant for essential hypertension, hyperlipidemia, mild cognitive impairment, CKD, depression who was admitted as a direct admission from clinic with possible parapneumonic right pleural effusion on May 4.  Patient was a started on IV antibiotics and thoracic surgery was consulted.      Parapneumonic right pleural effusion  She has been complaining of progressive fatigue and dyspnea over the past couple of weeks with prior work-up revealing right pleural effusion.  Patient underwent thoracentesis on April 30 and removed 550 mL fluid with Gram stain and culture showing GNR and GPC in broth and negative cytology.  She was initiated on a course of levofloxacin as an outpatient with worsening leukocytosis which was noticed on her follow-up with chest x-ray showing enlargement of effusion.  Patient was requested to be admitted.  CT scan of the chest without contrast was done,showed slight interval increase in size of loculated right pleural effusion predominantly in the posterior component , more loculated. Also now containing foci of gas which could be related to recent thoracentesis versus developing infection/empyema.  Patient was evaluated by thoracic surgery and underwent video-assisted thoracic go scoping, right thoracotomy and total decortication of right lung and right partial pleurectomy on May 5th and tolerated the procedure well.  Postoperatively patient was noticed to be hypotensive required boluses and then administration of 1 unit of packed red blood cell transfusion, vitals are improved since then   05/08/2020: patient seemed much more lethargic and depressed .Dr. Benjamin who was concerned that ertapenem causing CNS side effects and changed her antibiotics to Clindamyin IV till she is in patient   05/09/20:  neuro status improved, back to her pleasant self , ID changed IV clindamycin to IV Flagyl after culture results were back.  05/10/20 : Doing well, denying any new complaints, had good night sleep, looking forward to have chest tubes out and is hoping to be discharged soon. She continues to have baseline issues with poor oral intake but her oral intake is improved as compared to previous few days.  05/11/20: Continues to have improvement.  Answering questions appropriately this AM      COVID ruled out; PCR came back negative for COVID-19  Acute hypoxia without respiratory distress.  Resolved  Elevated CRP: Most likely secondary to above, down to 182 from 280 on admission.  - Continue to monitor patient closely  - Appreciate ID help, continue patient on IV Flagyl plan to change antibiotic to oral on discharge.  - Appreciate CT surgery help, it seems like  is planning to remove 2 chest tubes today and then 1 tomorrow  - Patient has been evaluated by PT OT, recommending TCU, patient and family are more favorable to go to home with home health care due to her underlying dementia and cognitive issues.     Severe malnutrition in context of acute illness  Reporting 20 pound unintentional weight loss over past 6 months.  Ready to patient she has early satiety, usually only eating 1 meal a day, she has been eating better in the past few weeks as she has been with her daughter.  - Nutrition consult has been placed, patient has been started on boost 3 times daily with meals as per recommended by RD.  Appreciate their help  - Patient will also benefit from age appropriate cancer screening.     CKD stage III  Baseline Cr 0.9-1.0, admission Cr 1.19.  Down to 0.68    HTN  Well controlled  - Continue to hold PTA Losartan     Hyperlipidemia  - Continue PTA Atorvastatin     Depression  - Continue PTA Celexa     Mild cognitive impairment  - Continue PTA Aricept, likely patient has underlying dementia, patient is at risk for  developing delirium secondary to hospitalization and being on antibiotics.     Neuropathy  - Continue PTA Gabapentin      Diet: Advance Diet as Tolerated: Regular Diet Adult  Snacks/Supplements Adult: Boost Plus; With Meals    DVT Prophylaxis: Pneumatic Compression Devices  Ramirez Catheter: not present  Code Status: DNR/DNI      Disposition Plan   Expected discharge: 2 - 3 days, once okay with consultants and disposition determined.  Sounds as if therapy has been recommending   Entered: Valdo Ross DO 05/11/2020, 9:39 AM       The patient's care was discussed with the Care Coordinator/ and Patient.    Valdo Ross DO  Hospitalist Service  [facility]    ______________________________________________________________________    Interval History   Patient seen and examined.  No acute events over night.  No pain at this time.  No further fevers or chills.  No difficulty breathing.    Data reviewed today: I reviewed all medications, new labs and imaging results over the last 24 hours. I personally reviewed no images or EKG's today.    Physical Exam   Vital Signs: Temp: 98.1  F (36.7  C) Temp src: Oral BP: 107/63 Pulse: 77 Heart Rate: 73 Resp: 18 SpO2: 93 % O2 Device: None (Room air)    Weight: 184 lbs 15.46 oz  General Appearance: Resting comfortably.  NAD   Respiratory: No audible wheezing. No respiratory distress  Cardiovascular: RRR.  Good perfusion  GI: Soft.  Non-distended   Skin: No rashes.  No cyanosis  Other: CT in place.  No lower extremity edema     Data   Recent Labs   Lab 05/10/20  0740 05/09/20  0857 05/08/20  1017 05/07/20  0441  05/06/20  0501  05/04/20  2034   WBC 16.9* 18.6* 22.9* 25.3*  --  35.7*   < >  --    HGB  --  8.5* 8.4* 7.5*   < > 7.5*   < >  --    MCV  --  88 89 88  --  89   < >  --    PLT  --  481* 495* 407  --  437   < >  --    INR  --   --   --   --   --   --   --  1.30*     --   --  135  --  137  --  136   POTASSIUM  --   --   --  4.1  --  5.0   --  4.2   CHLORIDE  --   --   --  104  --  105  --  102   CO2  --   --   --  26  --  25  --  26   BUN  --   --   --  32*  --  25  --  28   CR 0.68  --  0.85 1.27*  --  0.98  --  1.19*   ANIONGAP  --   --   --  5  --  7  --  8   OMEGA  --   --   --  7.8*  --  8.4*  --  8.6   GLC  --   --   --  90  Canceled, Test credited  --  118*  --  113*   ALBUMIN  --   --   --   --   --   --   --  1.7*   PROTTOTAL  --   --   --   --   --   --   --  6.4*   BILITOTAL  --   --   --   --   --   --   --  0.7   ALKPHOS  --   --   --   --   --   --   --  340*   ALT  --   --   --   --   --   --   --  31   AST  --   --   --   --   --   --   --  46*    < > = values in this interval not displayed.     Recent Results (from the past 24 hour(s))   XR Chest Port 1 View    Narrative    EXAM: XR CHEST PORT 1 VW  LOCATION: [facility]  DATE/TIME: 5/11/2020 5:00 AM    INDICATION: Status post right lung decortication  COMPARISON: 05/09/2020      Impression    IMPRESSION: 3 left-sided chest tubes in situ. Postsurgical changes consistent with decortication. No pneumothorax. Clear left lung. Stable cardiomediastinal silhouette.

## 2020-05-11 NOTE — PROGRESS NOTES
LIZETH  D: Writer spoke with patient and patient's daughter Regi. Both would like patient to go home with home care. The expected date of discharge is 5/13 or 5/14. Writer updated patient's daughter of that. Patient's daughter Regi can be reached at [phone]. Regi's home address is [address] [place] [address]. This would be patient's discharge address. Patient's daughter reports there are three steps into the home.     P: Would continue to follow     AMANUEL Fuentes     [facility]

## 2020-05-12 ENCOUNTER — APPOINTMENT (OUTPATIENT)
Dept: GENERAL RADIOLOGY | Facility: CLINIC | Age: 80
DRG: 163 | End: 2020-05-12
Attending: PHYSICIAN ASSISTANT
Payer: MEDICARE

## 2020-05-12 ENCOUNTER — APPOINTMENT (OUTPATIENT)
Dept: PHYSICAL THERAPY | Facility: CLINIC | Age: 80
DRG: 163 | End: 2020-05-12
Attending: HOSPITALIST
Payer: MEDICARE

## 2020-05-12 LAB
BACTERIA SPEC CULT: ABNORMAL
BASOPHILS # BLD AUTO: 0 10E9/L (ref 0–0.2)
BASOPHILS NFR BLD AUTO: 0 %
DIFFERENTIAL METHOD BLD: ABNORMAL
EOSINOPHIL # BLD AUTO: 0.1 10E9/L (ref 0–0.7)
EOSINOPHIL NFR BLD AUTO: 1 %
ERYTHROCYTE [DISTWIDTH] IN BLOOD BY AUTOMATED COUNT: 15 % (ref 10–15)
GLUCOSE BLDC GLUCOMTR-MCNC: 86 MG/DL (ref 70–99)
GLUCOSE BLDC GLUCOMTR-MCNC: 89 MG/DL (ref 70–99)
HCT VFR BLD AUTO: 24.4 % (ref 35–47)
HGB BLD-MCNC: 7.8 G/DL (ref 11.7–15.7)
LYMPHOCYTES # BLD AUTO: 1.2 10E9/L (ref 0.8–5.3)
LYMPHOCYTES NFR BLD AUTO: 8 %
MCH RBC QN AUTO: 28.5 PG (ref 26.5–33)
MCHC RBC AUTO-ENTMCNC: 32 G/DL (ref 31.5–36.5)
MCV RBC AUTO: 89 FL (ref 78–100)
METAMYELOCYTES # BLD: 0.6 10E9/L
METAMYELOCYTES NFR BLD MANUAL: 4 %
MONOCYTES # BLD AUTO: 0.3 10E9/L (ref 0–1.3)
MONOCYTES NFR BLD AUTO: 2 %
MYELOCYTES # BLD: 0.6 10E9/L
MYELOCYTES NFR BLD MANUAL: 4 %
NEUTROPHILS # BLD AUTO: 11.7 10E9/L (ref 1.6–8.3)
NEUTROPHILS NFR BLD AUTO: 81 %
NRBC # BLD AUTO: 0.1 10*3/UL
NRBC BLD AUTO-RTO: 1 /100
PLATELET # BLD AUTO: 482 10E9/L (ref 150–450)
PLATELET # BLD EST: ABNORMAL 10*3/UL
POLYCHROMASIA BLD QL SMEAR: SLIGHT
RBC # BLD AUTO: 2.74 10E12/L (ref 3.8–5.2)
RBC MORPH BLD: ABNORMAL
SPECIMEN SOURCE: ABNORMAL
SPECIMEN SOURCE: ABNORMAL
WBC # BLD AUTO: 14.4 10E9/L (ref 4–11)

## 2020-05-12 PROCEDURE — 25000132 ZZH RX MED GY IP 250 OP 250 PS 637: Mod: GY | Performed by: PHYSICIAN ASSISTANT

## 2020-05-12 PROCEDURE — 85025 COMPLETE CBC W/AUTO DIFF WBC: CPT | Performed by: INTERNAL MEDICINE

## 2020-05-12 PROCEDURE — 25000132 ZZH RX MED GY IP 250 OP 250 PS 637: Mod: GY | Performed by: INTERNAL MEDICINE

## 2020-05-12 PROCEDURE — 12000000 ZZH R&B MED SURG/OB

## 2020-05-12 PROCEDURE — 00000146 ZZHCL STATISTIC GLUCOSE BY METER IP

## 2020-05-12 PROCEDURE — 99232 SBSQ HOSP IP/OBS MODERATE 35: CPT | Performed by: INTERNAL MEDICINE

## 2020-05-12 PROCEDURE — 36415 COLL VENOUS BLD VENIPUNCTURE: CPT | Performed by: INTERNAL MEDICINE

## 2020-05-12 PROCEDURE — 97530 THERAPEUTIC ACTIVITIES: CPT | Mod: GP | Performed by: PHYSICAL THERAPIST

## 2020-05-12 PROCEDURE — 71045 X-RAY EXAM CHEST 1 VIEW: CPT

## 2020-05-12 RX ADMIN — Medication 1 MG: at 21:20

## 2020-05-12 RX ADMIN — ACETAMINOPHEN 650 MG: 325 TABLET, FILM COATED ORAL at 06:24

## 2020-05-12 RX ADMIN — METRONIDAZOLE 500 MG: 500 TABLET ORAL at 21:20

## 2020-05-12 RX ADMIN — GABAPENTIN 300 MG: 300 CAPSULE ORAL at 21:20

## 2020-05-12 RX ADMIN — CITALOPRAM HYDROBROMIDE 10 MG: 10 TABLET ORAL at 08:57

## 2020-05-12 RX ADMIN — FAMOTIDINE 20 MG: 20 TABLET ORAL at 20:25

## 2020-05-12 RX ADMIN — ATORVASTATIN CALCIUM 10 MG: 10 TABLET, FILM COATED ORAL at 21:20

## 2020-05-12 RX ADMIN — DONEPEZIL HYDROCHLORIDE 10 MG: 10 TABLET ORAL at 21:20

## 2020-05-12 RX ADMIN — MULTIPLE VITAMINS W/ MINERALS TAB 1 TABLET: TAB at 08:57

## 2020-05-12 RX ADMIN — METRONIDAZOLE 500 MG: 500 TABLET ORAL at 08:56

## 2020-05-12 RX ADMIN — METRONIDAZOLE 500 MG: 500 TABLET ORAL at 17:01

## 2020-05-12 RX ADMIN — GABAPENTIN 300 MG: 300 CAPSULE ORAL at 17:01

## 2020-05-12 RX ADMIN — GABAPENTIN 300 MG: 300 CAPSULE ORAL at 08:56

## 2020-05-12 ASSESSMENT — ACTIVITIES OF DAILY LIVING (ADL)
ADLS_ACUITY_SCORE: 16

## 2020-05-12 NOTE — PLAN OF CARE
Discharge Planner PT   Patient plan for discharge: None stated during session. Per chart pt is open to TCU.  Current status: Pt agreeable to session. Pt completed supine-sit with SBA. Transfers and ambulation of 200 feet with FWW and CGA, noted lateral path deviations and pt required cues to prevent running into the wall on the R during ambulation. Pt agreeable to sit up in chair at end of session.  Barriers to return to prior living situation: Level of assist, Decreased activity tolerance, Generalized weakness  Recommendations for discharge: TCU  Rationale for recommendations: Patient will benefit from continued skilled PT intervention while IP and at TCU in order to progress independence and safety with functional mobility.       Entered by: Juliana Pelayo 05/12/2020 12:15 PM

## 2020-05-12 NOTE — PROGRESS NOTES
Thoracic Surgery POD #7:  /72 (BP Location: Right arm)   Pulse 78   Temp 98  F (36.7  C) (Oral)   Resp 18   Wt 80.4 kg (177 lb 4 oz)   LMP  (LMP Unknown)   SpO2 95%   BMI 26.56 kg/m    CXR: right plerual space well drained, OK   CT: 70 ml over 24 hours, serous    S: No complaints- no SOB- pain in control- anxious to go home to daughter Regi's home.  O: Inc.: minimal erythema, dry, steris intact  Final basilar CT:  DCed without complication.  Occlusive dressing applied.  P: Dispo as per Hospitalist; looks like to daughter's home with OP PT  See Dr. Shah with CXR on May 27-- details added to discharge paperwork    Danna Hammond PA-C with Dr. Gary Shah  [facility]  Cell [phone]

## 2020-05-12 NOTE — PLAN OF CARE
VSS on RA. A&Ox 4. Incisions ANA PAULA. CMS intact. PRN Tylenol for pain. Up with 1 GB/W. Tolerating reg diet.  Passing gas, bm this shift. Voiding adequate. LS clear, diminished on right side. 2 chest tubes removed, 1 CT to -20 suction, dressing CDI.

## 2020-05-12 NOTE — DISCHARGE INSTRUCTIONS
[facility]  Discharge Orders & Follow-up Care  Video-Assisted Thoracoscopy or Thoracotomy    You already have your follow-up appointments scheduled for you:  Please go to [facility] for a chest x-ray at _1:45 pm on Wednesday, May 27__. [facility] is located in the same building ([facility], [address] [place] [address]) as Dr. Shah' office. They are in [address].  Please then go for your post-operative follow-up appointment in Dr. Shah' office, on the second floor of the [facility], [address] at _2:15 pm on Wednesday, May 27__. You will see either Danna Hammond PA-C or Dr. Shah during your appointment.      Call Meme at Dr. Shah  office at [phone] with a ny scheduling questions/concerns      A. Patient Care:  Call Dr Shah  office @ [phone] if you experience:  *Severe chills or a fever or 101 F or higher on two occasions  *Increased incisional pain that cannot be relieved with rest or pain medications  *Presence of unusual incisional or chest tube site drainage that is odorous, green or yellow in color, or if your incision is warm, red or swollen  *Coughing up bright red blood or greenish-yellow secretions  *Chest pain that gets worse with deep breathing or a significant increase in shortness of breath  *Inability to urinate or have a bowel movement  *New pain or swelling in your legs    In an emergency, call [phone] or have someone drive you to the nearest Emergency Department    Activity:  _XXX__ No heavy lifting greater than 8-10 pounds on the operative side for 4-6 weeks for a thoracotomy    Wound Care:  *You should look at your incision each day and keep it clean while it heals  *Do not apply any creams, salves such as Bacitracin, or ointments on the incision while it is healing  * Steri strips (thin white paper strips) will be present on the incision(s) and they will peel off as your incision heals-- otherwise, they  will be removed at your post-op appointment.    *Remove the dressings covering your chest tube site at noon on Thursday, May 14. You may then shower.  Wash the incision and chest tube site(s) daily with soap and water. No bathing or immersing incision underwater for approximately 2 weeks or until the chest tube sites are completely healed.     Place a dry gauze dressing (and tape) over the chest tube site because it is normal to have some drainage for a few days after the chest tube is removed.  Do not be alarmed if a large amount of fluid drains (should be pink or yellow) either spontaneously or with coughing or exertion. If this happens, just place a larger dry gauze dressing over the chest tube site-- it will stop and scab over in about a week or so. Once the drainage stops, you can stop covering the chest tube site with a dressing. Call our office if the drainage is milky or green in color or foul-smelling.    B. Respiratory:  _XXX__ Utilize Incentive spirometer and flutter valve/acapella (if you received one) 10 times in a row every few hours while awake for a few weeks after discharging home from the hospital    C. Activity:  It may take a few months to regain your normal energy level/stamina. It is important during your recovery to get regular physical activity:  *walk each day at a comfortable pace  *climb stairs as tolerated  *take some rest periods each day but try not to take too many long naps, as this can affect your sleep at night      Revised November 2018

## 2020-05-12 NOTE — PROGRESS NOTES
[facility]    Medicine Progress Note - Hospitalist Service       Date of Admission:  5/4/2020  Assessment & Plan   Kathie Wetzel is a 80 year old female with past medical history significant for essential hypertension, hyperlipidemia, mild cognitive impairment, CKD, depression who was admitted as a direct admission from clinic with possible parapneumonic right pleural effusion on May 4.  Patient was a started on IV antibiotics and thoracic surgery was consulted.      Parapneumonic right pleural effusion  She has been complaining of progressive fatigue and dyspnea over the past couple of weeks with prior work-up revealing right pleural effusion.  Patient underwent thoracentesis on April 30 and removed 550 mL fluid with Gram stain and culture showing GNR and GPC in broth and negative cytology.  She was initiated on a course of levofloxacin as an outpatient with worsening leukocytosis which was noticed on her follow-up with chest x-ray showing enlargement of effusion.  Patient was requested to be admitted.  CT scan of the chest without contrast was done,showed slight interval increase in size of loculated right pleural effusion predominantly in the posterior component , more loculated. Also now containing foci of gas which could be related to recent thoracentesis versus developing infection/empyema.  Patient was evaluated by thoracic surgery and underwent video-assisted thoracic go scoping, right thoracotomy and total decortication of right lung and right partial pleurectomy on May 5th and tolerated the procedure well.  Postoperatively patient was noticed to be hypotensive required boluses and then administration of 1 unit of packed red blood cell transfusion, vitals are improved since then   05/08/2020: patient seemed much more lethargic and depressed .Dr. Benjamin who was concerned that ertapenem causing CNS side effects and changed her antibiotics to Clindamyin IV till she is in patient   05/09/20:  neuro status improved, back to her pleasant self , ID changed IV clindamycin to IV Flagyl after culture results were back.  05/10/20 : Doing well, denying any new complaints, had good night sleep, looking forward to have chest tubes out and is hoping to be discharged soon. She continues to have baseline issues with poor oral intake but her oral intake is improved as compared to previous few days.  05/11/20: Continues to have improvement.  Answering questions appropriately this AM.  2 chest tubes removed by CT surgery       COVID ruled out; PCR came back negative for COVID-19  Acute hypoxia without respiratory distress.  Resolved  Elevated CRP: Most likely secondary to above, down to 182 from 280 on admission.  - Continue to monitor patient closely  - Appreciate ID help, continue patient on IV Flagyl plan to change antibiotic to oral on discharge.  - Appreciate CT surgery help, it seems like  removed 2 chest tubes on 5/11, plan to remove last CT today  - Patient has been evaluated by PT/OT, recommending TCU, patient and family are more favorable to go to home with home health care due to her underlying dementia and cognitive issues.     Severe malnutrition in context of acute illness  Reporting 20 pound unintentional weight loss over past 6 months.  Ready to patient she has early satiety, usually only eating 1 meal a day, she has been eating better in the past few weeks as she has been with her daughter.  - Nutrition consult has been placed, patient has been started on boost 3 times daily with meals as per recommended by RD.  Appreciate their help  - Patient will also benefit from age appropriate cancer screening.     CKD stage III  Baseline Cr 0.9-1.0, admission Cr 1.19.  Down to 0.68    HTN  Well controlled  - Continue to hold PTA Losartan     Hyperlipidemia  - Continue PTA Atorvastatin     Depression  - Continue PTA Celexa     Mild cognitive impairment  - Continue PTA Aricept, likely patient has  underlying dementia, patient is at risk for developing delirium secondary to hospitalization and being on antibiotics.     Neuropathy  - Continue PTA Gabapentin      Diet: Advance Diet as Tolerated: Regular Diet Adult  Snacks/Supplements Adult: Boost Plus; With Meals    DVT Prophylaxis: Pneumatic Compression Devices  Ramirez Catheter: not present  Code Status: DNR/DNI           Disposition Plan   Expected discharge: 1-2 days, recommended to prior living arrangement once last chest tube removed and cleared by surgery.  Entered: Valdo Ross DO 05/12/2020, 10:51 AM       The patient's care was discussed with the Bedside Nurse, Care Coordinator/ and Patient.    Valdo Ross DO  Hospitalist Service  [facility]    ______________________________________________________________________    Interval History   Patient seen and examined.  No acute events over night.  No fevers or chills.   Pain is well controlled.   Tolerating diet.    Data reviewed today: I reviewed all medications, new labs and imaging results over the last 24 hours. I personally reviewed no images or EKG's today.    Physical Exam   Vital Signs: Temp: 97.8  F (36.6  C) Temp src: Oral BP: 102/62 Pulse: 88 Heart Rate: 78 Resp: 16 SpO2: 96 % O2 Device: None (Room air)    Weight: 177 lbs 4 oz  General Appearance: Resting comfortably in bed.  NAD   Respiratory: Clear to auscultation.  No respiratory distress  Cardiovascular: RRR.  No murmurs  GI: Bowel sounds present.  Non-tender  Skin: No rashes.  No cyanosis  Other: CT in place.  No edema.  No calf tenderness      Data   Recent Labs   Lab 05/12/20  0700 05/10/20  0740 05/09/20  0857 05/08/20  1017 05/07/20  0441  05/06/20  0501   WBC 14.4* 16.9* 18.6* 22.9* 25.3*  --  35.7*   HGB 7.8*  --  8.5* 8.4* 7.5*   < > 7.5*   MCV 89  --  88 89 88  --  89   *  --  481* 495* 407  --  437   NA  --  137  --   --  135  --  137   POTASSIUM  --   --   --   --  4.1  --  5.0    CHLORIDE  --   --   --   --  104  --  105   CO2  --   --   --   --  26  --  25   BUN  --   --   --   --  32*  --  25   CR  --  0.68  --  0.85 1.27*  --  0.98   ANIONGAP  --   --   --   --  5  --  7   OMEGA  --   --   --   --  7.8*  --  8.4*   GLC  --   --   --   --  90  Canceled, Test credited  --  118*    < > = values in this interval not displayed.     Recent Results (from the past 24 hour(s))   XR Chest Port 1 View    Narrative    EXAM: CHEST SINGLE VIEW PORTABLE  LOCATION: [facility]  DATE/TIME: 5/12/2020 5:00 AM    INDICATION: Status post right lung decortication.  COMPARISON: 05/11/2020 at 0443 hours.      Impression    IMPRESSION:   1. Interval removal of two pleural drains that had projected over the medial aspect of the upper right hemithorax. Another right pleural drain remains with its distal tip projecting over the inferior right hemithorax.  2. Small right pleural effusion. No pneumothorax.  3. The left lung is clear.

## 2020-05-12 NOTE — PROGRESS NOTES
[facility]  Infectious Disease Progress Note          Assessment and Plan:   IMPRESSION:   1.  An 80-year-old female with 3 weeks of right-sided chest pain without major systemic infection symptoms, found to have a loculated pleural effusion and tap growing what appeared to be multiple anaerobes, presumed empyema.   2.  Malnutrition, significant weight loss over several months, question related to extended empyema versus other.   3.  Mild chronic renal insufficiency.   4.  PENICILLIN ALLERGY. Childhood rash     RECOMMENDATIONS  1 All GN anaerobes, attempting flagyl  po  , mentation now nl, and no nausea, 2 weeks po at dispositon, recheck WBC still 14        Interval History:   no new complaints and doing better; no cp, sob, n/v/d, or abd pain. Pain Ok T down creat now under 1 cx fusobacter main org but 2 other anaerobes also WBc 14 k 1 tube in              Medications:       atorvastatin  10 mg Oral At Bedtime     bacitracin   Topical Daily     citalopram  10 mg Oral QAM     donepezil  10 mg Oral At Bedtime     famotidine  20 mg Oral QPM    Or     famotidine  20 mg Intravenous QPM     gabapentin  300 mg Oral TID     lactated ringers  500 mL Intravenous Once     metroNIDAZOLE  500 mg Oral TID     multivitamin w/minerals  1 tablet Oral Daily     senna-docusate  1 tablet Oral BID    Or     senna-docusate  2 tablet Oral BID     sodium chloride (PF)  3 mL Intracatheter Q8H                  Physical Exam:   Blood pressure 102/62, pulse 88, temperature 97.8  F (36.6  C), temperature source Oral, resp. rate 16, weight 80.4 kg (177 lb 4 oz), SpO2 96 %, not currently breastfeeding.  Wt Readings from Last 2 Encounters:   05/12/20 80.4 kg (177 lb 4 oz)   04/13/20 77.8 kg (171 lb 9.6 oz)     Vital Signs with Ranges  Temp:  [97.8  F (36.6  C)-98.5  F (36.9  C)] 97.8  F (36.6  C)  Pulse:  [80-94] 88  Heart Rate:  [77-94] 78  Resp:  [16-18] 16  BP: (102-113)/(60-72) 102/62  SpO2:  [93 %-96 %] 96  %    Constitutional: Awake, alert, cooperative, no apparent distress   Lungs: Clear to auscultation bilaterally, no crackles or wheezing CT sounds   Cardiovascular: Regular rate and rhythm, normal S1 and S2, and no murmur noted   Abdomen: Normal bowel sounds, soft, non-distended, non-tender   Skin: No rashes, no cyanosis, no edema   Other:           Data:   All microbiology laboratory data reviewed.  Recent Labs   Lab Test 05/12/20  0700 05/10/20  0740 05/09/20  0857 05/08/20  1017   WBC 14.4* 16.9* 18.6* 22.9*   HGB 7.8*  --  8.5* 8.4*   HCT 24.4*  --  26.1* 26.4*   MCV 89  --  88 89   *  --  481* 495*     Recent Labs   Lab Test 05/10/20  0740 05/08/20  1017 05/07/20  0441   CR 0.68 0.85 1.27*     Recent Labs   Lab Test 05/09/20  0857   SED 99*     Recent Labs   Lab Test 05/05/20  1603 04/30/20  0840   CULT On day 1, isolated in broth only:  Anaerobic gram positive cocci  See anaerobic report for identification  *  On day 1, isolated in broth only:  Possible  Anaerobic gram positive rods  Unable to isolate  *  Moderate growth  Fusobacterium nucleatum  Susceptibility testing done on previous specimen  *  Moderate growth  Strain 2  Fusobacterium nucleatum  Susceptibility testing done on previous specimen  *  Heavy growth  Parvimonas micra  Susceptibility testing not routinely done  *  Heavy growth  Campylobacter species  Most closely resembling  Campylobacter rectus  *  Culture in progress On day 2, isolated in broth only:  Fusobacterium nucleatum  *  On day 2, isolated in broth only:  Strain 2  Fusobacterium nucleatum  Susceptibility testing in progress  *  On day 2, isolated in broth only:  Dialister species  Identification obtained by MALDI-TOF mass spectrometry research use only database. Test   characteristics determined and verified by the [facility]   ([facility]) [place], [place].  Susceptibility testing not routinely done  *  On day 2, isolated in broth  only:  Parvimonas micra  Susceptibility testing not routinely done  *  Critical Value/Significant Value, preliminary result only, called to and read back by  Gifty Gold at 1340 on 5.2.20 kln.

## 2020-05-12 NOTE — PLAN OF CARE
CT with 30 cc'c dark blood overnight. Up with SBA to BR. Tylenol given for headache with good relief.Warm pack to low back for discomfort.

## 2020-05-12 NOTE — PLAN OF CARE
VSS.  Pt denies pain.  Pt's last CT removed by Danna WATERS today.  Informed the pt's daughter Regi that the pt may be discharged in the next 1-2 days per Dr. Ross's note.  Pt sat in the chair most of the day.  Pt states she is feeling well and is ready to go home.

## 2020-05-12 NOTE — PROGRESS NOTES
See SW notes for Case management assessment. I spoke to patient regarding HHC services and she is ok with services as selected by her daughter Regi. Patient defers me to Regi. I called Regi at [phone]. We discussed HHC and that is still the desire and not TCU. Choice offered, Pt/family was provided with the Medicare Compare list for Home Care.  Discussed associated Medicare star ratings to assist with choice for referrals/discharge planning Yes    Education was given to pt/family that star ratings are updated/maintained by Medicare and can be reviewed by visiting www.medicare.gov Yes    Daughter Regi selected [facility]. Referral sent via standard process. Name and number left for any questions.

## 2020-05-12 NOTE — PLAN OF CARE
Neuro: Aox4.     Respiratory: RA. Diminished R lobe sounds.     Cardiac: WNL.     GI/: Bowel sounds audible x4 quads. Adequate urine OP. BM 05/11.     Skin: UTV CT incision; dressing CDI. R flank WNL; adhesive strips present.     Pain: Attributed to R flank; refer to FS, MAR.     Mobility: Ax1; GB, walker.     Diet: R.     IVF: -     Plan of Care: CT pull 05/12. Pain control. Discharge to daughter's home c/ HH; expected 05/13.

## 2020-05-13 ENCOUNTER — APPOINTMENT (OUTPATIENT)
Dept: OCCUPATIONAL THERAPY | Facility: CLINIC | Age: 80
DRG: 163 | End: 2020-05-13
Attending: HOSPITALIST
Payer: MEDICARE

## 2020-05-13 VITALS
SYSTOLIC BLOOD PRESSURE: 108 MMHG | BODY MASS INDEX: 26.49 KG/M2 | WEIGHT: 176.81 LBS | TEMPERATURE: 98.4 F | RESPIRATION RATE: 18 BRPM | OXYGEN SATURATION: 96 % | HEART RATE: 81 BPM | DIASTOLIC BLOOD PRESSURE: 64 MMHG

## 2020-05-13 LAB
BACTERIA SPEC CULT: ABNORMAL
Lab: ABNORMAL
SPECIMEN SOURCE: ABNORMAL

## 2020-05-13 PROCEDURE — 99239 HOSP IP/OBS DSCHRG MGMT >30: CPT | Performed by: INTERNAL MEDICINE

## 2020-05-13 PROCEDURE — 25000132 ZZH RX MED GY IP 250 OP 250 PS 637: Mod: GY | Performed by: INTERNAL MEDICINE

## 2020-05-13 PROCEDURE — 97535 SELF CARE MNGMENT TRAINING: CPT | Mod: GO

## 2020-05-13 PROCEDURE — 25000132 ZZH RX MED GY IP 250 OP 250 PS 637: Mod: GY | Performed by: PHYSICIAN ASSISTANT

## 2020-05-13 RX ORDER — METRONIDAZOLE 500 MG/1
500 TABLET ORAL 3 TIMES DAILY
Qty: 42 TABLET | Refills: 0 | Status: SHIPPED | OUTPATIENT
Start: 2020-05-13 | End: 2020-06-02

## 2020-05-13 RX ORDER — FAMOTIDINE 20 MG/1
20 TABLET, FILM COATED ORAL EVERY EVENING
Qty: 30 TABLET | Refills: 0 | Status: SHIPPED | OUTPATIENT
Start: 2020-05-13 | End: 2020-09-03

## 2020-05-13 RX ADMIN — GABAPENTIN 300 MG: 300 CAPSULE ORAL at 08:00

## 2020-05-13 RX ADMIN — METRONIDAZOLE 500 MG: 500 TABLET ORAL at 08:00

## 2020-05-13 RX ADMIN — MULTIPLE VITAMINS W/ MINERALS TAB 1 TABLET: TAB at 08:00

## 2020-05-13 RX ADMIN — CITALOPRAM HYDROBROMIDE 10 MG: 10 TABLET ORAL at 08:00

## 2020-05-13 ASSESSMENT — ACTIVITIES OF DAILY LIVING (ADL)
ADLS_ACUITY_SCORE: 16

## 2020-05-13 NOTE — PROGRESS NOTES
[facility]  Infectious Disease Progress Note          Assessment and Plan:   IMPRESSION:   1.  An 80-year-old female with 3 weeks of right-sided chest pain without major systemic infection symptoms, found to have a loculated pleural effusion and tap growing what appeared to be multiple anaerobes, presumed empyema.   2.  Malnutrition, significant weight loss over several months, question related to extended empyema versus other.   3.  Mild chronic renal insufficiency.   4.  PENICILLIN ALLERGY. Childhood rash     RECOMMENDATIONS  1 All GN anaerobes, attempting flagyl  po  , mentation now nl, and no nausea, 2 weeks po at dispositon, recheck WBC still 14 but otherwise doing very well, tubes all out        Interval History:   no new complaints and doing better; no cp, sob, n/v/d, or abd pain. Pain Ok T down creat now under 1 cx fusobacter main org but 2 other anaerobes also WBc 14 k 1 tube in              Medications:       atorvastatin  10 mg Oral At Bedtime     bacitracin   Topical Daily     citalopram  10 mg Oral QAM     donepezil  10 mg Oral At Bedtime     famotidine  20 mg Oral QPM    Or     famotidine  20 mg Intravenous QPM     gabapentin  300 mg Oral TID     lactated ringers  500 mL Intravenous Once     metroNIDAZOLE  500 mg Oral TID     multivitamin w/minerals  1 tablet Oral Daily     senna-docusate  1 tablet Oral BID    Or     senna-docusate  2 tablet Oral BID     sodium chloride (PF)  3 mL Intracatheter Q8H                  Physical Exam:   Blood pressure 108/62, pulse 81, temperature 98.6  F (37  C), temperature source Oral, resp. rate 18, weight 80.2 kg (176 lb 12.9 oz), SpO2 96 %, not currently breastfeeding.  Wt Readings from Last 2 Encounters:   05/13/20 80.2 kg (176 lb 12.9 oz)   04/13/20 77.8 kg (171 lb 9.6 oz)     Vital Signs with Ranges  Temp:  [98  F (36.7  C)-98.6  F (37  C)] 98.6  F (37  C)  Pulse:  [67-81] 81  Heart Rate:  [80-85] 85  Resp:  [16-18] 18  BP: ()/(51-72)  108/62  SpO2:  [93 %-96 %] 96 %    Constitutional: Awake, alert, cooperative, no apparent distress   Lungs: Clear to auscultation bilaterally, no crackles or wheezing CT sounds   Cardiovascular: Regular rate and rhythm, normal S1 and S2, and no murmur noted   Abdomen: Normal bowel sounds, soft, non-distended, non-tender   Skin: No rashes, no cyanosis, no edema   Other:           Data:   All microbiology laboratory data reviewed.  Recent Labs   Lab Test 05/12/20  0700 05/10/20  0740 05/09/20  0857 05/08/20  1017   WBC 14.4* 16.9* 18.6* 22.9*   HGB 7.8*  --  8.5* 8.4*   HCT 24.4*  --  26.1* 26.4*   MCV 89  --  88 89   *  --  481* 495*     Recent Labs   Lab Test 05/10/20  0740 05/08/20  1017 05/07/20  0441   CR 0.68 0.85 1.27*     Recent Labs   Lab Test 05/09/20  0857   SED 99*     Recent Labs   Lab Test 05/05/20  1603 04/30/20  0840   CULT Moderate growth  Fusobacterium nucleatum  Susceptibility testing done on previous specimen  *  Moderate growth  Strain 2  Fusobacterium nucleatum  Susceptibility testing done on previous specimen  *  Heavy growth  Parvimonas micra  Susceptibility testing not routinely done  *  Heavy growth  Campylobacter species  Most closely resembling  Campylobacter rectus  *  On day 1, isolated in broth only:  Anaerobic gram positive cocci  See anaerobic report for identification  *  On day 1, isolated in broth only:  Possible  Anaerobic gram positive rods  Unable to isolate  * On day 2, isolated in broth only:  Fusobacterium nucleatum  *  On day 2, isolated in broth only:  Strain 2  Fusobacterium nucleatum  *  On day 2, isolated in broth only:  Dialister species  Identification obtained by MALDI-TOF mass spectrometry research use only database. Test   characteristics determined and verified by the Infectious Diseases Diagnostic Laboratory   ([facility]) [place], [place].  Susceptibility testing not routinely done  *  On day 2, isolated in broth only:  Parvimonas micra  Susceptibility  testing not routinely done  *  Critical Value/Significant Value, preliminary result only, called to and read back by  Gifty Gold at 1340 on 5.2.20 kln.

## 2020-05-13 NOTE — PLAN OF CARE
Neuro: Aox4.     Respiratory: RA. Diminished R lobe sounds.     Cardiac: WNL.     GI/: Bowel sounds audible x4 quads. Adequate urine OP. BM 05/12, per pt.     Skin: UTV CT incision; dressing CDI. R flank WNL; adhesive strips present.     Pain: Denies.     Mobility: Ax1; GB, walker.     Diet: R.     IVF: -     Plan of Care: Discharge to daughter's home c/ HH; expected 05/13.

## 2020-05-13 NOTE — DISCHARGE SUMMARY
[facility]  Hospitalist Discharge Summary      Date of Admission:  5/4/2020  Date of Discharge:  5/13/2020  4:14 PM  Discharging Provider: Valdo Ross DO      Discharge Diagnoses   1. Parapneumonic right pleural effusion s/p video assisted thoroscopy with right thoracotomy and total decortication    2. Acute hypoxic respiratory distress due to above   3. COVID 19 ruled out   4. Severe malnutrition in context of acute illness   5. CKD stage III   6. HTN   7. HLP   8. Depression   9. 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 Neuropathy, NOS     Follow-ups Needed After Discharge   Follow-up Appointments     Follow-up and recommended labs and tests       See Dr. Shah (Thoracic surgery) on Wednesday, May 27.  Please see   below for the time and location of the already scheduled appointments for   a chest x-ray and appointment on that date.         Follow-up and recommended labs and tests       Follow up with primary care provider, Cammie Daugherty, within 1-2   weeks, for hospital follow- up. No follow up labs or test are needed.  Follow up with Dr. Shah as planned           Unresulted Labs Ordered in the Past 30 Days of this Admission     No orders found from 4/4/2020 to 5/5/2020.        Discharge Disposition   Discharged to home  Condition at discharge: Stable    Hospital Course   Kathie Wetzel is a 80 year old female with past medical history significant for essential hypertension, hyperlipidemia, mild cognitive impairment, CKD, depression who was admitted as a direct admission from clinic with possible parapneumonic right pleural effusion on May 4.  Patient was a started on IV antibiotics and thoracic surgery was consulted.      Parapneumonic right pleural effusion  She has been complaining of progressive fatigue and dyspnea over the past couple of weeks with prior work-up revealing right pleural effusion.  Patient underwent thoracentesis on April 30 and removed 550 mL fluid with  Gram stain and culture showing GNR and GPC in broth and negative cytology.  She was initiated on a course of levofloxacin as an outpatient with worsening leukocytosis which was noticed on her follow-up with chest x-ray showing enlargement of effusion.  Patient was requested to be admitted.  CT scan of the chest without contrast was done,showed slight interval increase in size of loculated right pleural effusion predominantly in the posterior component , more loculated. Also now containing foci of gas which could be related to recent thoracentesis versus developing infection/empyema.  Patient was evaluated by thoracic surgery and underwent video-assisted thoracic go scoping, right thoracotomy and total decortication of right lung and right partial pleurectomy on May 5th and tolerated the procedure well.  Postoperatively patient was noticed to be hypotensive required boluses and then administration of 1 unit of packed red blood cell transfusion, vitals are improved since then   05/08/2020: patient seemed much more lethargic and depressed .Dr. Benjamin who was concerned that ertapenem causing CNS side effects and changed her antibiotics to Clindamyin IV till she is in patient   05/09/20: neuro status improved, back to her pleasant self , ID changed IV clindamycin to IV Flagyl after culture results were back.  05/10/20 : Doing well, denying any new complaints, had good night sleep, looking forward to have chest tubes out and is hoping to be discharged soon. She continues to have baseline issues with poor oral intake but her oral intake is improved as compared to previous few days.  05/11/20: Continues to have improvement.  Answering questions appropriately this AM.  2 chest tubes removed by CT surgery    05/12/20: Last chest tube removed without issue     COVID ruled out; PCR came back negative for COVID-19  Acute hypoxia without respiratory distress.  Resolved  Elevated CRP: Most likely secondary to above, down to 182  from 280 on admission.  - Continue to monitor patient closely  - Appreciate ID help, continue patient on IV Flagyl plan to change antibiotic to oral on discharge.  - Appreciate CT surgery help, it seems like  removed 2 chest tubes on 5/11, plan to remove last CT today  - Patient has been evaluated by PT/OT, recommending TCU, patient and family are more favorable to go to home with home health care due to her underlying dementia and cognitive issues     Severe malnutrition in context of acute illness  Reporting 20 pound unintentional weight loss over past 6 months.  Ready to patient she has early satiety, usually only eating 1 meal a day, she has been eating better in the past few weeks as she has been with her daughter.  - Nutrition consult has been placed, patient has been started on boost 3 times daily with meals as per recommended by RD.  Appreciate their help  - Patient will also benefit from age appropriate cancer screening.     CKD stage III  Baseline Cr 0.9-1.0, admission Cr 1.19.  Down to 0.68     HTN  Well controlled  - Continue to hold PTA Losartan     Hyperlipidemia  - Continue PTA Atorvastatin     Depression  - Continue PTA Celexa     Mild cognitive impairment  - Continue PTA Aricept, likely patient has underlying dementia, patient is at risk for developing delirium secondary to hospitalization and being on antibiotics.      Consultations This Hospital Stay   THORACIC SURGERY IP CONSULT  NUTRITION SERVICES ADULT IP CONSULT  INFECTIOUS DISEASES IP CONSULT  OCCUPATIONAL THERAPY ADULT IP CONSULT  PHYSICAL THERAPY ADULT IP CONSULT  NUTRITION SERVICES ADULT IP CONSULT  CARE TRANSITION RN/SW IP CONSULT  SOCIAL WORK IP CONSULT    Code Status   DNR/DNI    Time Spent on this Encounter   IValdo DO, personally saw the patient today and spent greater than 30 minutes discharging this patient.       Valdo Ross DO  [facility]  Hospital  ______________________________________________________________________    Physical Exam   Vital Signs: Temp: 98.4  F (36.9  C) Temp src: Oral BP: 108/64 Pulse: 81 Heart Rate: 83 Resp: 18 SpO2: 96 % O2 Device: None (Room air)    Weight: 176 lbs 12.94 oz  General Appearance: Resting comfortably.  NAD   Respiratory: Clear to auscultation.  No respiratory distress  Cardiovascular: RRR.  No murmurs  GI: Bowel sounds present.  Non-tender   Skin: No rashes.  No cyanosis  Other: No edema.  No calf tenderness         Primary Care Physician   Cammie Daugherty    Discharge Orders      Home Care PT Referral for Hospital Discharge      Home Care OT Referral for Hospital Discharge      Home care nursing referral      Activity    Your activity upon discharge: No lifting greater than 8 pounds with your right arm for the next 4 weeks     Wound care and dressings    Instructions to care for your wound at home: Please see below     Follow-up and recommended labs and tests     See Dr. Shah (Thoracic surgery) on Wednesday, May 27.  Please see below for the time and location of the already scheduled appointments for a chest x-ray and appointment on that date.     Discharge Instructions    You are being discharged with home care services through [facility]. [facility] will contact you to schedule initial visit. If you have any questions prior to this call, please contact the 24 hour patient line at [phone].     Reason for your hospital stay    Parapneumonic effusion     Follow-up and recommended labs and tests     Follow up with primary care provider, Cammie Daugherty, within 1-2 weeks, for hospital follow- up. No follow up labs or test are needed.  Follow up with Dr. Shah as planned     MD face to face encounter    Documentation of Face to Face and Certification for Home Health Services    I certify that patient: Kathie Wetzel is under my care and that I, or a nurse practitioner or  physician's assistant working with me, had a face-to-face encounter that meets the physician face-to-face encounter requirements with this patient on: 5/13/2020.    This encounter with the patient was in whole, or in part, for the following medical condition, which is the primary reason for home health care: Parapneumonic effusions.    I certify that, based on my findings, the following services are medically necessary home health services: Nursing, Occupational Therapy and Physical Therapy.    My clinical findings support the need for the above services because: Nurse is needed: For complex aftercare of surgical procedures because the patient needs instruction and cannot perform care on their own due to: concerns with dementia.., Occupational Therapy Services are needed to assess and treat cognitive ability and address ADL safety due to impairment in cognition. and Physical Therapy Services are needed to assess and treat the following functional impairments: deconditioning.    Further, I certify that my clinical findings support that this patient is homebound (i.e. absences from home require considerable and taxing effort and are for medical reasons or [facility] services or infrequently or of short duration when for other reasons) because: Patient should not drive given her baseline dementia.    Based on the above findings. I certify that this patient is confined to the home and needs intermittent skilled nursing care, physical therapy and/or speech therapy.  The patient is under my care, and I have initiated the establishment of the plan of care.  This patient will be followed by a physician who will periodically review the plan of care.  Physician/Provider to provide follow up care: Cammie Daugherty    Attending hospital physician (the Medicare certified [facility] provider): Valdo Ross,   Physician Signature: See electronic signature associated with these discharge orders.  Date: 5/13/2020     Diet     Follow this diet upon discharge: Orders Placed This Encounter      Snacks/Supplements Adult: Boost Plus; With Meals      Advance Diet as Tolerated: Regular Diet Adult       Significant Results and Procedures   Most Recent 3 CBC's:  Recent Labs   Lab Test 05/12/20  0700 05/10/20  0740 05/09/20  0857 05/08/20  1017   WBC 14.4* 16.9* 18.6* 22.9*   HGB 7.8*  --  8.5* 8.4*   MCV 89  --  88 89   *  --  481* 495*     Most Recent 3 BMP's:  Recent Labs   Lab Test 05/10/20  0740 05/08/20  1017 05/07/20  0441 05/06/20  0501 05/04/20  2034     --  135 137 136   POTASSIUM  --   --  4.1 5.0 4.2   CHLORIDE  --   --  104 105 102   CO2  --   --  26 25 26   BUN  --   --  32* 25 28   CR 0.68 0.85 1.27* 0.98 1.19*   ANIONGAP  --   --  5 7 8   OMEGA  --   --  7.8* 8.4* 8.6   GLC  --   --  90  Canceled, Test credited 118* 113*     Most Recent 6 Bacteria Isolates From Any Culture (See EPIC Reports for Culture Details):  Recent Labs   Lab Test 05/05/20  1603 04/30/20  0840   CULT Moderate growth  Fusobacterium nucleatum  Susceptibility testing done on previous specimen  *  Moderate growth  Strain 2  Fusobacterium nucleatum  Susceptibility testing done on previous specimen  *  Heavy growth  Parvimonas micra  Susceptibility testing not routinely done  *  Heavy growth  Campylobacter species  Most closely resembling  Campylobacter rectus  *  On day 1, isolated in broth only:  Anaerobic gram positive cocci  See anaerobic report for identification  *  On day 1, isolated in broth only:  Possible  Anaerobic gram positive rods  Unable to isolate  * On day 2, isolated in broth only:  Fusobacterium nucleatum  *  On day 2, isolated in broth only:  Strain 2  Fusobacterium nucleatum  *  On day 2, isolated in broth only:  Dialister species  Identification obtained by MALDI-TOF mass spectrometry research use only database. Test   characteristics determined and verified by the [facility]   ([facility])  [place], [place].  Susceptibility testing not routinely done  *  On day 2, isolated in broth only:  Parvimonas micra  Susceptibility testing not routinely done  *  Critical Value/Significant Value, preliminary result only, called to and read back by  Gifty Gold at 1340 on 5.2.20 kln.         Discharge Medications   Current Discharge Medication List      START taking these medications    Details   famotidine (PEPCID) 20 MG tablet Take 1 tablet (20 mg) by mouth every evening  Qty: 30 tablet, Refills: 0    Comments: Future refills by PCP Dr. Cammie Daugherty with phone number [phone].  Associated Diagnoses: Pleural effusion on right      metroNIDAZOLE (FLAGYL) 500 MG tablet Take 1 tablet (500 mg) by mouth 3 times daily for 14 days  Qty: 42 tablet, Refills: 0    Comments: Future refills by PCP Dr. Cammie Daugherty with phone number [phone].  Associated Diagnoses: Pleural effusion on right         CONTINUE these medications which have NOT CHANGED    Details   atorvastatin (LIPITOR) 10 MG tablet TAKE 1 TABLET(10 MG) BY MOUTH AT BEDTIME  Qty: 90 tablet, Refills: 1    Associated Diagnoses: Hyperlipidemia LDL goal <100      Cholecalciferol (VITAMIN D PO) Take 1,000 Units by mouth every morning       citalopram (CELEXA) 10 MG tablet Take 1 tablet (10 mg) by mouth daily  Qty: 90 tablet, Refills: 1    Associated Diagnoses: Anxiety      donepezil (ARICEPT) 10 MG tablet Take 1 tablet (10 mg) by mouth At Bedtime  Qty: 90 tablet, Refills: 3    Associated Diagnoses: Memory impairment of gradual onset      gabapentin (NEURONTIN) 300 MG capsule Take 1 capsule (300 mg) by mouth 3 times daily  Qty: 90 capsule, Refills: 0    Associated Diagnoses: Postherpetic neuralgia      loratadine (CLARITIN) 10 MG tablet Take 10 mg by mouth every morning       Methylcobalamin (B-12) 5000 MCG TBDP Take 5,000 mcg by mouth daily  Qty: 100 tablet, Refills: 3    Associated Diagnoses: Low vitamin B12 level      Multiple  Vitamins-Minerals (PRESERVISION AREDS PO) Take 1 tablet by mouth every morning       oxyCODONE (ROXICODONE) 5 MG tablet Take 1 tablet (5 mg) by mouth every 6 hours as needed for severe pain  Qty: 12 tablet, Refills: 0    Associated Diagnoses: Acute right-sided thoracic back pain; Pleural effusion, bacterial      traZODone (DESYREL) 50 MG tablet Take 1-1.5 tablets (50-75 mg) by mouth At Bedtime  Qty: 180 tablet, Refills: 1    Associated Diagnoses: Sleep disturbance         STOP taking these medications       levofloxacin (LEVAQUIN) 750 MG tablet Comments:   Reason for Stopping:         losartan (COZAAR) 50 MG tablet Comments:   Reason for Stopping:             Allergies   Allergies   Allergen Reactions     Simvastatin      Buttock pain     Penicillins Rash

## 2020-05-13 NOTE — PLAN OF CARE
Discharge Planner OT   Patient plan for discharge: To daughter's home   Current status: Initiated the [facility] Cognitive Screen to assess cognitive skills and the implications on I/ADLs. Completed 75% of the screen, before pt suddenly declined to finish the screen. Education provided on the purpose of the screen on providing recommendations for I/ADLs. Pt declined to complete the SLUMS and declined further OT today. On the portion of the screen that was completed, pt demonstrated difficulty with cognitive skills in delayed recall, sequencing, and executive function, however unable to provide a score as pt did not complete the screen in full. Further cognitive testing warranted.     Barriers to return to prior living situation: Cognition,  Fall risk; Current level of A required; Stairs to enter home and within home  Recommendations for discharge: TCU  Rationale for recommendations: Pt would benefit from continued skilled OT services to address cognition, and independence and safety with ADL's, and functional transfers, as pt is not at baseline. If pt declines TCU, recommend  home with 24/7 A from daughter for all I/ADLs and HHOT. Recommend a driving eval prior to returning to driving.        Entered by: Emmanuel Colunga 05/13/2020 1:35 PM

## 2020-05-13 NOTE — PROGRESS NOTES
VSS.  Pt discharge to home per MD orders.  Discharge instructions explained to the pt's and her daughter via facetime on the pt's tablet.  Pt and her daughter stated they understood discharge instructions including medication instructions.  Daughter to drive the pt home.

## 2020-05-14 ENCOUNTER — TELEPHONE (OUTPATIENT)
Dept: FAMILY MEDICINE | Facility: CLINIC | Age: 80
End: 2020-05-14

## 2020-05-14 ENCOUNTER — VIRTUAL VISIT (OUTPATIENT)
Dept: FAMILY MEDICINE | Facility: CLINIC | Age: 80
End: 2020-05-14
Payer: MEDICARE

## 2020-05-14 DIAGNOSIS — R41.3 MEMORY IMPAIRMENT OF GRADUAL ONSET: ICD-10-CM

## 2020-05-14 DIAGNOSIS — R70.0 ELEVATED ERYTHROCYTE SEDIMENTATION RATE: ICD-10-CM

## 2020-05-14 DIAGNOSIS — J91.8 PARAPNEUMONIC EFFUSION: Primary | ICD-10-CM

## 2020-05-14 DIAGNOSIS — J18.9 PARAPNEUMONIC EFFUSION: Primary | ICD-10-CM

## 2020-05-14 DIAGNOSIS — R79.82 ELEVATED C-REACTIVE PROTEIN (CRP): ICD-10-CM

## 2020-05-14 PROCEDURE — 99495 TRANSJ CARE MGMT MOD F2F 14D: CPT | Mod: 95 | Performed by: INTERNAL MEDICINE

## 2020-05-14 NOTE — TELEPHONE ENCOUNTER
Patient has a scheduled visit with Dr. Daugherty today- the care team will be calling shortly.     Jenn Velarde RN on 5/14/2020 at 1:12 PM

## 2020-05-14 NOTE — PROGRESS NOTES
"Kathie Wetzel is a 80 year old female who is being evaluated via a billable video visit.      Text message to [phone]    The patient has been notified of following:     \"This video visit will be conducted via a call between you and your physician/provider. We have found that certain health care needs can be provided without the need for an in-person physical exam.  This service lets us provide the care you need with a video conversation.  If a prescription is necessary we can send it directly to your pharmacy.  If lab work is needed we can place an order for that and you can then stop by our lab to have the test done at a later time.    Video visits are billed at different rates depending on your insurance coverage.  Please reach out to your insurance provider with any questions.    If during the course of the call the physician/provider feels a video visit is not appropriate, you will not be charged for this service.\"    Patient has given verbal consent for Video visit? Yes    How would you like to obtain your AVS? Mail a copy    Patient would like the video invitation sent by: Text to cell phone: [phone]    Will anyone else be joining your video visit? Her daughter will be with her during the visit       Subjective     Kathie Wetzel is a 80 year old female who presents today via video visit for the following health issues:    \[facility]\""    Hospital Follow-up Visit:    OT and PT will be scheduling to come in.   Vitals being checked by nurse    Today was having some back pain and took Tylenol 500 mg 2 this am   Only has 1 oxycodone tablet left   Is saving for severe pain    5/13/2020  Prior to discharge  B/p 108/64 P 81  R 13  Wt 176 lb  O2 96%  5/14/2020  B/P 92/58  P 88  O2 91%  T 98.9 oral     Hospital/Nursing Home/IP Rehab Facility: [facility]  Date of Admission: 5/4/2020  Date of Discharge: 5/13/2020  Reason(s) for Admission: SOB     pleural effusion      Was your hospitalization related " to COVID-19? No   Problems taking medications regularly:  None  Medication changes since discharge: None  Problems adhering to non-medication therapy:  None      Summary of hospitalization:  [facility] discharge summary reviewed  Diagnostic Tests/Treatments reviewed.  Follow up needed: follow up with surgeon on 5/27/2020 as scheduled;   Other Healthcare Providers Involved in Patient s Care:         Surgical follow-up appointment - Dr. Shah, Physical Therapy and OT to be scheduled.   Update since discharge: improved. Post Discharge Medication Reconciliation: discharge medications reconciled and changed, per note/orders (see AVS).  Plan of care communicated with patient and family            Patient Active Problem List   Diagnosis     Hyperlipidemia LDL goal <100     Benign essential hypertension     Anxiety     Retinal tear, left     Memory impairment of gradual onset     Lumbar radicular pain- right side.     S/P lumbar microdiscectomy     Sleep disturbance     CKD (chronic kidney disease) stage 3, GFR 30-59 ml/min (H)     Cellulitis of right upper extremity     Pleural effusion on right     Past Surgical History:   Procedure Laterality Date     APPENDECTOMY OPEN       CHOLECYSTECTOMY  2007     DISCECTOMY LUMBAR POSTERIOR MICROSCOPIC ONE LEVEL Right 11/10/2017    Procedure: DISCECTOMY LUMBAR POSTERIOR MICROSCOPIC ONE LEVEL;  Right L3-4 hemilaminectomy, medial facetectomy, foraminotomy with microdiscectomy and use of X-ray and intraoperative microscope ;  Surgeon: Al Dailey MD;  Location: [facility] OR     PARATHYROIDECTOMY  3/14/2014    Procedure: PARATHYROIDECTOMY;  Neck Exploration, Excision Right  Parathyroid Adenoma, Pre Operative Sestimibi Injection, Rapid PTH Assay ;  Surgeon: Natividad Fischer MD;  Location: [facility] OR     THORACOSCOPIC DECORTICATION LUNG Right 5/5/2020    Procedure: RIGHT VIDEO ASSISTED THORACOSCOPY, RIGHT THORACOTOMY, RIGHT DECORTICATION, RIGHT PARIETAL PLEURECTOMY;  Surgeon:  Gary Shah MD;  Location: [facility] OR     THORACOTOMY Right 2020    Procedure: THORACOTOMY ;  Surgeon: Gary Shah MD;  Location: [facility] OR       Social History     Tobacco Use     Smoking status: Former Smoker     Packs/day: 0.00     Last attempt to quit: 3/10/1972     Years since quittin.2     Smokeless tobacco: Never Used   Substance Use Topics     Alcohol use: Yes     Comment: 2 week      Family History   Problem Relation Age of Onset     Thyroid Disease Maternal Aunt      Thyroid Disease Other         cousin          Current Outpatient Medications   Medication Sig Dispense Refill     atorvastatin (LIPITOR) 10 MG tablet TAKE 1 TABLET(10 MG) BY MOUTH AT BEDTIME (Patient taking differently: Take 10 mg by mouth At Bedtime TAKE 1 TABLET(10 MG) BY MOUTH AT BEDTIME) 90 tablet 1     Cholecalciferol (VITAMIN D PO) Take 1,000 Units by mouth every morning        citalopram (CELEXA) 10 MG tablet Take 1 tablet (10 mg) by mouth daily (Patient taking differently: Take 10 mg by mouth every morning ) 90 tablet 1     donepezil (ARICEPT) 10 MG tablet Take 1 tablet (10 mg) by mouth At Bedtime 90 tablet 3     famotidine (PEPCID) 20 MG tablet Take 1 tablet (20 mg) by mouth every evening 30 tablet 0     gabapentin (NEURONTIN) 300 MG capsule Take 1 capsule (300 mg) by mouth 3 times daily 90 capsule 0     loratadine (CLARITIN) 10 MG tablet Take 10 mg by mouth every morning        Methylcobalamin (B-12) 5000 MCG TBDP Take 5,000 mcg by mouth daily (Patient taking differently: Take 5,000 mcg by mouth every morning ) 100 tablet 3     metroNIDAZOLE (FLAGYL) 500 MG tablet Take 1 tablet (500 mg) by mouth 3 times daily for 14 days 42 tablet 0     Multiple Vitamins-Minerals (PRESERVISION AREDS PO) Take 1 tablet by mouth every morning        oxyCODONE (ROXICODONE) 5 MG tablet Take 1 tablet (5 mg) by mouth every 6 hours as needed for severe pain 12 tablet 0     traZODone (DESYREL) 50 MG tablet Take 1-1.5 tablets (50-75  mg) by mouth At Bedtime (Patient taking differently: Take 100 mg by mouth At Bedtime RX is for 1 to 1.5 tablets (50mg to 75 mg) but she was told she can take 2 tablets (100 mg) at bedtime and this is what she has been taking..) 180 tablet 1     Allergies   Allergen Reactions     Simvastatin      Buttock pain     Penicillins Rash     Recent Labs   Lab Test 05/10/20  0740 05/08/20  1017 05/07/20  0441 05/06/20  0501 05/04/20  2034 05/04/20  1101 04/19/20 04/13/20  1024 06/05/18  0836 03/20/18  1757  06/21/17  0846   A1C  --   --   --   --   --   --   --  5.6  --   --   --   --    LDL  --   --   --   --   --   --   --  65 78  --   --  88   HDL  --   --   --   --   --   --   --  43* 44*  --   --  47*   TRIG  --   --   --   --   --   --   --  89 185*  --   --  187*   ALT  --   --   --   --  31 32 19 17 28 22  --  27   CR 0.68 0.85 1.27* 0.98 1.19* 1.22* 1.00 0.94 0.98 0.99   < > 0.97   GFRESTIMATED 82 64 40* 54* 43* 42* 53* 57* 55* 54*   < > 56*   GFRESTBLACK >90 75 46* 63 50* 48* >60 66 66 65   < > 67   POTASSIUM  --   --  4.1 5.0 4.2 4.1 3.9 3.9 4.2 3.8   < > 4.2   TSH  --   --   --   --   --   --   --  2.43  --  2.46  --   --     < > = values in this interval not displayed.      BP Readings from Last 3 Encounters:   05/13/20 108/64   04/30/20 106/62   04/13/20 92/54    Wt Readings from Last 3 Encounters:   05/13/20 80.2 kg (176 lb 12.9 oz)   04/13/20 77.8 kg (171 lb 9.6 oz)   09/26/19 87.5 kg (192 lb 14.4 oz)                    Reviewed and updated as needed this visit by Provider         Review of Systems   CONSTITUTIONAL: NEGATIVE for fever, chills, change in weight  INTEGUMENTARY/SKIN: chest tube sites with minimal drainage; healing process  ENT/MOUTH: NEGATIVE for ear, mouth and throat problems  RESP: NEGATIVE for significant cough or SOB  CV: NEGATIVE for chest pain, palpitations or peripheral edema  GI: NEGATIVE for nausea, abdominal pain, heartburn, or change in bowel habits  MUSCULOSKELETAL: getting back on  "her feet slowly; staying with Regi and her family  NEURO: no numbness or tingling  ENDOCRINE: NEGATIVE for temperature intolerance, skin/hair changes  HEME/ALLERGY/IMMUNE: NEGATIVE for bleeding problems  PSYCHIATRIC: memory- taking Donepizil.      Objective    LMP  (LMP Unknown)   Estimated body mass index is 26.49 kg/m  as calculated from the following:    Height as of 9/26/19: 1.74 m (5' 8.5\").    Weight as of 5/13/20: 80.2 kg (176 lb 12.9 oz).  Physical Exam     GENERAL: Healthy, alert and no distress  EYES: Eyes grossly normal to inspection.  No discharge or erythema, or obvious scleral/conjunctival abnormalities.  RESP: No audible wheeze, cough, or visible cyanosis.  No visible retractions or increased work of breathing.    MS: No gross musculoskeletal defects noted.  Normal range of motion.  No visible edema.  SKIN: Visible skin clear. No significant rash, abnormal pigmentation or lesions.  NEURO: Cranial nerves grossly intact.  Mentation and speech appropriate for age.  PSYCH: Mentation appears normal, affect normal/bright, judgement and insight intact, normal speech and appearance well-groomed.      Diagnostic Test Results:  Labs reviewed in Epic        Assessment & Plan     (J18.9,  J91.8) Parapneumonic effusion  (primary encounter diagnosis)  Comment: s/p VAT and decortication 5/5/20; ID and abx   Plan: CBC with platelets and differential, Basic         metabolic panel          (R41.3) Memory impairment of gradual onset  Comment: post hospitalization, declined TCU; she is staying with daughter, home care and therapies via home home care.   Plan: continue Aricept    (R70.0) Elevated erythrocyte sedimentation rate  Comment: elevated before/after surgery;  Time to resolve.  Plan: ESR: Erythrocyte sedimentation rate          (R79.82) Elevated C-reactive protein (CRP)  Comment: related to parapneumonic effusion- empyema  Plan: CRP, inflammation        Time to resolve.         MEDICATIONS:  Continue current " medications without change  Reviewed future [facility]        Video-Visit Details    Type of service:  Video Visit     Video Start Time:   2:50 PM    Video End Time:   3:17 PM    Originating Location (pt. Location): Home she is at her daughter's Regi Millan home in [place]    Distant Location (provider location):  [facility]     Platform used for Video Visit: Doximity    Return in 2 weeks (on 5/28/2020) for home care, PT and OT to set up. .       Cammie Daugherty MD  Internal Medicine  electronically signed

## 2020-05-14 NOTE — PLAN OF CARE
Occupational Therapy Discharge Summary    Reason for therapy discharge:    Discharged to home.    Progress towards therapy goal(s). See goals on Care Plan in [facility] electronic health record for goal details.  Goals not met.  Barriers to achieving goals:   discharge from facility.    Therapy recommendation(s):    Continued therapy is recommended.  Rationale/Recommendations:  TCU was recommended by evaluating therapist- if returning home, 24/7 A and  OT was recommended.

## 2020-05-14 NOTE — TELEPHONE ENCOUNTER
Please contact patient for In-patient follow up.  [phone] (home)     Visit date: 5/13/2020  Diagnosis listed:Benign Essential Hypertension, Pleural Effusion on Right  Number of visits in past 12 months:0/1

## 2020-05-14 NOTE — TELEPHONE ENCOUNTER
Michelle with [facility] calling for orders:   PT/OT- eval and treat   And nursing for 2x a week for two weeks and one time a week for one week. Verbal order given. Patient has hospital f/u scheduled with Dr. Daugherty today.     Jenn Velarde, RN on 5/14/2020 at 11:28 AM

## 2020-05-15 ENCOUNTER — TELEPHONE (OUTPATIENT)
Dept: FAMILY MEDICINE | Facility: CLINIC | Age: 80
End: 2020-05-15

## 2020-05-15 NOTE — TELEPHONE ENCOUNTER
[facility] now requests orders and shares plan of care/discharge summaries for some patients through [employer].  Please REPLY TO THIS MESSAGE OR ROUTE BACK TO THE AUTHOR in order to give authorization for orders when needed.  This is considered a verbal order, you will still receive a faxed copy of orders for signature.  Thank you for your assistance in improving collaboration for our patients.    ORDER  PT 2W2, 1W2 for therex, gait/balance training, increasing activity tolerance, education.     MD SUMMARY/PLAN OF CARE  Pt. presents today with decreased LE strength, poor activity tolerance..only able to ambulate x 1 minute and 23 seconds before needing to rest due to fatigue, decreased balance, and overall limited mobilty.  She will benefit from PT 2W2, 1W2 for therex, gait/balance training, increasing activity tolerance, education.     Khadijah Darby, PT  [facility]  [phone]  [email or web address]

## 2020-05-22 LAB
ANION GAP SERPL CALCULATED.3IONS-SCNC: 6 MMOL/L (ref 3–14)
BASOPHILS # BLD AUTO: 0.1 10E9/L (ref 0–0.2)
BASOPHILS NFR BLD AUTO: 0.6 %
BUN SERPL-MCNC: 13 MG/DL (ref 7–30)
CALCIUM SERPL-MCNC: 8.5 MG/DL (ref 8.5–10.1)
CHLORIDE SERPL-SCNC: 104 MMOL/L (ref 94–109)
CO2 SERPL-SCNC: 28 MMOL/L (ref 20–32)
CREAT SERPL-MCNC: 0.78 MG/DL (ref 0.52–1.04)
CRP SERPL-MCNC: 12.1 MG/L (ref 0–8)
DIFFERENTIAL METHOD BLD: ABNORMAL
EOSINOPHIL # BLD AUTO: 0.2 10E9/L (ref 0–0.7)
EOSINOPHIL NFR BLD AUTO: 2.5 %
ERYTHROCYTE [DISTWIDTH] IN BLOOD BY AUTOMATED COUNT: 15.3 % (ref 10–15)
ERYTHROCYTE [SEDIMENTATION RATE] IN BLOOD BY WESTERGREN METHOD: 103 MM/H (ref 0–30)
GFR SERPL CREATININE-BSD FRML MDRD: 71 ML/MIN/{1.73_M2}
GLUCOSE SERPL-MCNC: 103 MG/DL (ref 70–99)
HCT VFR BLD AUTO: 30.8 % (ref 35–47)
HGB BLD-MCNC: 8.8 G/DL (ref 11.7–15.7)
IMM GRANULOCYTES # BLD: 0.1 10E9/L (ref 0–0.4)
IMM GRANULOCYTES NFR BLD: 0.6 %
LYMPHOCYTES # BLD AUTO: 1.4 10E9/L (ref 0.8–5.3)
LYMPHOCYTES NFR BLD AUTO: 16.8 %
MCH RBC QN AUTO: 27.2 PG (ref 26.5–33)
MCHC RBC AUTO-ENTMCNC: 28.6 G/DL (ref 31.5–36.5)
MCV RBC AUTO: 95 FL (ref 78–100)
MONOCYTES # BLD AUTO: 0.7 10E9/L (ref 0–1.3)
MONOCYTES NFR BLD AUTO: 8.8 %
NEUTROPHILS # BLD AUTO: 5.8 10E9/L (ref 1.6–8.3)
NEUTROPHILS NFR BLD AUTO: 70.7 %
NRBC # BLD AUTO: 0 10*3/UL
NRBC BLD AUTO-RTO: 0 /100
PLATELET # BLD AUTO: 524 10E9/L (ref 150–450)
POTASSIUM SERPL-SCNC: 4 MMOL/L (ref 3.4–5.3)
RBC # BLD AUTO: 3.23 10E12/L (ref 3.8–5.2)
SODIUM SERPL-SCNC: 138 MMOL/L (ref 133–144)
WBC # BLD AUTO: 8.1 10E9/L (ref 4–11)

## 2020-05-22 PROCEDURE — 85652 RBC SED RATE AUTOMATED: CPT | Performed by: INTERNAL MEDICINE

## 2020-05-22 PROCEDURE — 85025 COMPLETE CBC W/AUTO DIFF WBC: CPT | Performed by: INTERNAL MEDICINE

## 2020-05-22 PROCEDURE — 80048 BASIC METABOLIC PNL TOTAL CA: CPT | Performed by: INTERNAL MEDICINE

## 2020-05-22 PROCEDURE — 86140 C-REACTIVE PROTEIN: CPT | Performed by: INTERNAL MEDICINE

## 2020-05-27 DIAGNOSIS — B02.29 POSTHERPETIC NEURALGIA: ICD-10-CM

## 2020-05-27 NOTE — TELEPHONE ENCOUNTER
Reason for call:  Other   Patient called regarding (reason for call): prescription   Daughter Reg[facility] should continue taking gabapentin 300MG ?      Additional comments: Has questions about how to taper off as well.     Phone number to reach patient:  Cell number on file:    Telephone Information:   Mobile [phone]       Best Time:  any    Can we leave a detailed message on this number?  YES    Travel screening: Not Applicable     Jennifer Honeycutt on 5/27/2020 at 3:19 PM''

## 2020-05-28 DIAGNOSIS — Z53.9 DIAGNOSIS NOT YET DEFINED: Primary | ICD-10-CM

## 2020-05-28 PROCEDURE — G0180 MD CERTIFICATION HHA PATIENT: HCPCS | Performed by: INTERNAL MEDICINE

## 2020-05-28 NOTE — TELEPHONE ENCOUNTER
Please see note below.  gabapentin      Last Written Prescription Date:  4/21/20  Last Fill Quantity: 90,   # refills: 0  Last Office Visit: 5/4/20 virtual  Future Office visit:       Routing refill request to provider for review/approval because:  Drug not on the [facility], [facility] or [facility] refill protocol or controlled substance  Isis Villalobos RN

## 2020-05-29 ENCOUNTER — TELEPHONE (OUTPATIENT)
Dept: FAMILY MEDICINE | Facility: CLINIC | Age: 80
End: 2020-05-29

## 2020-05-29 NOTE — TELEPHONE ENCOUNTER
[facility] now requests orders and shares plan of care/discharge summaries for some patients through [employer].  Please REPLY TO THIS MESSAGE OR ROUTE BACK TO THE AUTHOR in order to give authorization for orders when needed.  This is considered a verbal order, you will still receive a faxed copy of orders for signature.  Thank you for your assistance in improving collaboration for our patients.    ORDER  PT 1W2, 2W2, 1W1 for ongoing therex, gait/balance/strength training, increasing activity tolerance, education.     MD SUMMARY/PLAN OF CARE  Pt. Is continuing to progress with physical therapy, however, her progress is very slow.  She continues to fatigue easily, however slowing increasing her activity tolerance.  Pt. Will benefit from adding more physical therapy visits onto our current plan of care, for therex, gait/balance/strength training, education, increasing activity tolerance.     Khadijah Darby, PT  [facility]  [phone]  [email or web address]

## 2020-05-29 NOTE — TELEPHONE ENCOUNTER
Home health certification and plan of care   Arrived dates 05 14 20          to   07 12 20           Placed in PCP bin       PCP signed and I sent to abstract

## 2020-06-01 RX ORDER — GABAPENTIN 300 MG/1
300 CAPSULE ORAL 3 TIMES DAILY
Qty: 90 CAPSULE | Refills: 1 | Status: SHIPPED | OUTPATIENT
Start: 2020-06-01 | End: 2020-09-03

## 2020-06-02 ENCOUNTER — VIRTUAL VISIT (OUTPATIENT)
Dept: FAMILY MEDICINE | Facility: CLINIC | Age: 80
End: 2020-06-02
Payer: MEDICARE

## 2020-06-02 DIAGNOSIS — J86.9 EMPYEMA OF RIGHT PLEURAL SPACE (H): ICD-10-CM

## 2020-06-02 DIAGNOSIS — R63.4 WEIGHT LOSS: ICD-10-CM

## 2020-06-02 DIAGNOSIS — I10 BENIGN ESSENTIAL HYPERTENSION: ICD-10-CM

## 2020-06-02 DIAGNOSIS — R70.0 ELEVATED ERYTHROCYTE SEDIMENTATION RATE: ICD-10-CM

## 2020-06-02 DIAGNOSIS — R79.89 LOW VITAMIN B12 LEVEL: ICD-10-CM

## 2020-06-02 DIAGNOSIS — N18.30 CKD (CHRONIC KIDNEY DISEASE) STAGE 3, GFR 30-59 ML/MIN (H): ICD-10-CM

## 2020-06-02 DIAGNOSIS — D63.8 ANEMIA DUE TO CHRONIC ILLNESS: ICD-10-CM

## 2020-06-02 DIAGNOSIS — R79.82 ELEVATED C-REACTIVE PROTEIN (CRP): Primary | ICD-10-CM

## 2020-06-02 PROCEDURE — 99443 ZZC PHYSICIAN TELEPHONE EVALUATION 21-30 MIN: CPT | Performed by: INTERNAL MEDICINE

## 2020-06-02 RX ORDER — GABAPENTIN 100 MG/1
CAPSULE ORAL
Qty: 60 CAPSULE | Refills: 1 | Status: SHIPPED | OUTPATIENT
Start: 2020-06-02 | End: 2021-02-08

## 2020-06-02 NOTE — PROGRESS NOTES
"Kathie Wetzel is a 80 year old female who is being evaluated via a billable telephone visit.      The patient has been notified of following:     \"This telephone visit will be conducted via a call between you and your physician/provider. We have found that certain health care needs can be provided without the need for a physical exam.  This service lets us provide the care you need with a short phone conversation.  If a prescription is necessary we can send it directly to your pharmacy.  If lab work is needed we can place an order for that and you can then stop by our lab to have the test done at a later time.    Telephone visits are billed at different rates depending on your insurance coverage. During this emergency period, for some insurers they may be billed the same as an in-person visit.  Please reach out to your insurance provider with any questions.    If during the course of the call the physician/provider feels a telephone visit is not appropriate, you will not be charged for this service.\"    Patient has given verbal consent for Telephone visit?  Yes    What phone number would you like to be contacted at? Regi [phone]    How would you like to obtain your AVS? Mail a copy    Subjective     Kathie Wetzel is a 80 year old female who presents via phone visit today for the following health issues:    HPI  Concern -   Chief Complaint   Patient presents with     Recheck Medication     Last week was recommended to slowly come off of the Gabapentin   On sunday noted chills (improved sunday night), fatigue, disoriented,daughter is wondering if it is withdrawl or if something else is going on.   Incision site is tender ( no redness noted)        Her daughter is concerned because she seems to have regressed and is now holding onto her daughter when walking and moving around.    Her daughter is wondering about lowering the dose of Gabapentin but still keeping her on some because it did seem to help with the " pain.     Daughter is wondering if she should have labs drawn again because she was previously still on antibiotics at that time.       Onset: Sunday afternoon    Description:   (started decreasing Gabapentin in Tuesday)  Down from 3 pills to 2 daily then were decreasing to 1 pill on Sunday.   Pt feels very fatigued, more disoriented and her daughter cancelled therapy on Monday due to weakness.     Intensity: moderate    Progression of Symptoms:  same    Accompanying Signs & Symptoms:  Chills have improved but otherwise just seems weak        Therapies Tried and outcome: home care with nurse ends this week; they have monitored BP and doing well off BLOOD PRESSURE meds;  Eating well; participating in therapy; home PT has requested additional therapy; improving but slowly.       Patient Active Problem List   Diagnosis     Hyperlipidemia LDL goal <100     Benign essential hypertension     Anxiety     Retinal tear, left     Memory impairment of gradual onset     Lumbar radicular pain- right side.     S/P lumbar microdiscectomy     Sleep disturbance     CKD (chronic kidney disease) stage 3, GFR 30-59 ml/min (H)     Cellulitis of right upper extremity     Pleural effusion on right     Past Surgical History:   Procedure Laterality Date     APPENDECTOMY OPEN       CHOLECYSTECTOMY  2007     DISCECTOMY LUMBAR POSTERIOR MICROSCOPIC ONE LEVEL Right 11/10/2017    Procedure: DISCECTOMY LUMBAR POSTERIOR MICROSCOPIC ONE LEVEL;  Right L3-4 hemilaminectomy, medial facetectomy, foraminotomy with microdiscectomy and use of X-ray and intraoperative microscope ;  Surgeon: Al Dailey MD;  Location:  OR     PARATHYROIDECTOMY  3/14/2014    Procedure: PARATHYROIDECTOMY;  Neck Exploration, Excision Right  Parathyroid Adenoma, Pre Operative Sestimibi Injection, Rapid PTH Assay ;  Surgeon: Natividad Fischer MD;  Location:  OR     THORACOSCOPIC DECORTICATION LUNG Right 5/5/2020    Procedure: RIGHT VIDEO ASSISTED  THORACOSCOPY, RIGHT THORACOTOMY, RIGHT DECORTICATION, RIGHT PARIETAL PLEURECTOMY;  Surgeon: Gary Shah MD;  Location: [facility] OR     THORACOTOMY Right 2020    Procedure: THORACOTOMY ;  Surgeon: Gary Shah MD;  Location: [facility] OR       Social History     Tobacco Use     Smoking status: Former Smoker     Packs/day: 0.00     Last attempt to quit: 3/10/1972     Years since quittin.2     Smokeless tobacco: Never Used   Substance Use Topics     Alcohol use: Yes     Comment: 2 week      Family History   Problem Relation Age of Onset     Thyroid Disease Maternal Aunt      Thyroid Disease Other         cousin          Current Outpatient Medications   Medication Sig Dispense Refill     atorvastatin (LIPITOR) 10 MG tablet TAKE 1 TABLET(10 MG) BY MOUTH AT BEDTIME (Patient taking differently: Take 10 mg by mouth At Bedtime TAKE 1 TABLET(10 MG) BY MOUTH AT BEDTIME) 90 tablet 1     Cholecalciferol (VITAMIN D PO) Take 1,000 Units by mouth every morning        citalopram (CELEXA) 10 MG tablet Take 1 tablet (10 mg) by mouth daily (Patient taking differently: Take 10 mg by mouth every morning ) 90 tablet 1     donepezil (ARICEPT) 10 MG tablet Take 1 tablet (10 mg) by mouth At Bedtime 90 tablet 3     famotidine (PEPCID) 20 MG tablet Take 1 tablet (20 mg) by mouth every evening 30 tablet 0     gabapentin (NEURONTIN) 100 MG capsule 200 mg per day, then wean to 100 mg per day 60 capsule 1     loratadine (CLARITIN) 10 MG tablet Take 10 mg by mouth every morning        Methylcobalamin (B-12) 5000 MCG TBDP Take 5,000 mcg by mouth daily (Patient taking differently: Take 5,000 mcg by mouth every morning ) 100 tablet 3     Multiple Vitamins-Minerals (PRESERVISION AREDS PO) Take 1 tablet by mouth every morning        oxyCODONE (ROXICODONE) 5 MG tablet Take 1 tablet (5 mg) by mouth every 6 hours as needed for severe pain 12 tablet 0     traZODone (DESYREL) 50 MG tablet Take 1-1.5 tablets (50-75 mg) by mouth At Bedtime  (Patient taking differently: Take 100 mg by mouth At Bedtime RX is for 1 to 1.5 tablets (50mg to 75 mg) but she was told she can take 2 tablets (100 mg) at bedtime and this is what she has been taking..) 180 tablet 1     gabapentin (NEURONTIN) 300 MG capsule Take 1 capsule (300 mg) by mouth 3 times daily 90 capsule 1     Allergies   Allergen Reactions     Simvastatin      Buttock pain     Penicillins Rash     Recent Labs   Lab Test 05/22/20  1010 05/10/20  0740  05/07/20  0441  05/04/20  2034 05/04/20  1101 04/19/20 04/13/20  1024 06/05/18  0836 03/20/18  1757  06/21/17  0846   A1C  --   --   --   --   --   --   --   --  5.6  --   --   --   --    LDL  --   --   --   --   --   --   --   --  65 78  --   --  88   HDL  --   --   --   --   --   --   --   --  43* 44*  --   --  47*   TRIG  --   --   --   --   --   --   --   --  89 185*  --   --  187*   ALT  --   --   --   --   --  31 32 19 17 28 22  --  27   CR 0.78 0.68   < > 1.27*   < > 1.19* 1.22* 1.00 0.94 0.98 0.99   < > 0.97   GFRESTIMATED 71 82   < > 40*   < > 43* 42* 53* 57* 55* 54*   < > 56*   GFRESTBLACK 83 >90   < > 46*   < > 50* 48* >60 66 66 65   < > 67   POTASSIUM 4.0  --   --  4.1   < > 4.2 4.1 3.9 3.9 4.2 3.8   < > 4.2   TSH  --   --   --   --   --   --   --   --  2.43  --  2.46  --   --     < > = values in this interval not displayed.      BP Readings from Last 3 Encounters:   05/13/20 108/64   04/30/20 106/62   04/13/20 92/54    Wt Readings from Last 3 Encounters:   05/13/20 80.2 kg (176 lb 12.9 oz)   04/13/20 77.8 kg (171 lb 9.6 oz)   09/26/19 87.5 kg (192 lb 14.4 oz)              Reviewed and updated as needed this visit by Provider  Tobacco  Allergies  Meds  Problems  Med Hx  Surg Hx  Fam Hx         Review of Systems   CONSTITUTIONAL: NEGATIVE for fever, chills, change in weight  ENT/MOUTH: NEGATIVE for ear, mouth and throat problems  RESP: NEGATIVE for significant cough or SOB  CV: NEGATIVE for chest pain, palpitations or peripheral edema  GI:  no bowel concerns.   MUSCULOSKELETAL: physical therapy going well; adding therapy sessions  NEURO: denies weakness  ENDOCRINE: NEGATIVE for temperature intolerance, skin/hair changes  PSYCHIATRIC: NEGATIVE for changes in mood or affect       Objective   Reported vitals:  LMP  (LMP Unknown)    Pt more fatigued  Sleepy  Walking with assist      Diagnostic Test Results:  Labs reviewed in Epic        Assessment/Plan:  (R79.82) Elevated C-reactive protein (CRP)  (primary encounter diagnosis)  Comment: post surgery related to empyema slowly improving.  continue rehab at home of daughter,.  Plan: CRP, inflammation        Monitor; pt had episode of chills last week and now better; slow rehab process.     (R70.0) Elevated erythrocyte sedimentation rate  Comment: elevated related to empyema; on 5/22/2020 was still elevated at 107; rehab at home of daughter with home therapies.  Plan: CRP, inflammation, ESR: Erythrocyte         sedimentation rate        Monitor; pt had episode of chills last week and now better; slow rehab process.    (J86.9) Empyema of right pleural space (H)  Comment: VAT per Dr. Shah; doing well; slow recovery process. Extended physical therapy at home  Plan: gabapentin (NEURONTIN) 100 MG capsule, CBC with platelets; pain management   reviewed weaning Gabapentin and wound care; ok to cover with bandage to avoid or minimize irritation as it continues to heal.          (N18.3) CKD (chronic kidney disease) stage 3, GFR 30-59 ml/min (H)  Comment:   Lab Results   Component Value Date    CR 0.78 05/22/2020    CR 0.68 05/10/2020   Doing well; healthy eating continues.   Plan: gabapentin (NEURONTIN) 100 MG capsule,         Comprehensive metabolic panel (BMP + Alb, Alk         Phos, ALT, AST, Total. Bili, TP)          (D63.8) Anemia due to chronic illness  Comment: 5/22/20 HGB 8.8; post surgery; recovery phrase; has continued to be more fatigued; sleeping well (with meds)  Plan: Comprehensive metabolic panel (BMP  + Alb, Alk         Phos, ALT, AST, Total. Bili, TP), CBC with         platelets        Continue good nutritional support; no active blood loss; anticipate improvement over time.    (E53.8) Low vitamin B12 level  Comment: nutritional supplement; monitor  Plan: Vitamin B12        Good nutritional support and B12 supplement; if still low, could increase to B12 5000 mcg per day    (I10) Benign essential hypertension  Comment: since surgery and recent weight loss; she has been ff BP lowering meds; monitor; may not need meds added back; monitor off meds for now.  Plan: Comprehensive metabolic panel (BMP + Alb, Alk         Phos, ALT, AST, Total. Bili, TP)          (R63.4) Weight loss  Comment: loss of weight since last Fall; has been stable since surgery; nutritional support has been good.   Plan: Prealbumin        Continue nutritional support; Ensure daily as well as eating healthy 2-3 meals per day ( staying with daughter in [place])       Return in about 2 weeks (around 6/16/2020) for follow up labs within 1-2 weeks; they would like labs doen at [facility]. .      Phone call duration:  27 minutes    Cammie Daugherty MD  Internal Medicine  electronically signed

## 2020-06-04 DIAGNOSIS — R70.0 ELEVATED ERYTHROCYTE SEDIMENTATION RATE: ICD-10-CM

## 2020-06-04 DIAGNOSIS — N18.30 CKD (CHRONIC KIDNEY DISEASE) STAGE 3, GFR 30-59 ML/MIN (H): ICD-10-CM

## 2020-06-04 DIAGNOSIS — R79.82 ELEVATED C-REACTIVE PROTEIN (CRP): ICD-10-CM

## 2020-06-04 DIAGNOSIS — J86.9 EMPYEMA OF RIGHT PLEURAL SPACE (H): ICD-10-CM

## 2020-06-04 DIAGNOSIS — I10 BENIGN ESSENTIAL HYPERTENSION: ICD-10-CM

## 2020-06-04 DIAGNOSIS — R79.89 LOW VITAMIN B12 LEVEL: ICD-10-CM

## 2020-06-04 DIAGNOSIS — D63.8 ANEMIA DUE TO CHRONIC ILLNESS: ICD-10-CM

## 2020-06-04 DIAGNOSIS — R63.4 WEIGHT LOSS: ICD-10-CM

## 2020-06-04 LAB
CRP SERPL-MCNC: <2.9 MG/L (ref 0–8)
ERYTHROCYTE [DISTWIDTH] IN BLOOD BY AUTOMATED COUNT: 15.9 % (ref 10–15)
ERYTHROCYTE [SEDIMENTATION RATE] IN BLOOD BY WESTERGREN METHOD: 53 MM/H (ref 0–30)
HCT VFR BLD AUTO: 36.8 % (ref 35–47)
HGB BLD-MCNC: 11.1 G/DL (ref 11.7–15.7)
MCH RBC QN AUTO: 28 PG (ref 26.5–33)
MCHC RBC AUTO-ENTMCNC: 30.2 G/DL (ref 31.5–36.5)
MCV RBC AUTO: 93 FL (ref 78–100)
PLATELET # BLD AUTO: 318 10E9/L (ref 150–450)
RBC # BLD AUTO: 3.96 10E12/L (ref 3.8–5.2)
VIT B12 SERPL-MCNC: 1931 PG/ML (ref 193–986)
WBC # BLD AUTO: 6.7 10E9/L (ref 4–11)

## 2020-06-04 PROCEDURE — 85027 COMPLETE CBC AUTOMATED: CPT | Performed by: INTERNAL MEDICINE

## 2020-06-04 PROCEDURE — 82607 VITAMIN B-12: CPT | Performed by: INTERNAL MEDICINE

## 2020-06-04 PROCEDURE — 86140 C-REACTIVE PROTEIN: CPT | Performed by: INTERNAL MEDICINE

## 2020-06-04 PROCEDURE — 80053 COMPREHEN METABOLIC PANEL: CPT | Performed by: INTERNAL MEDICINE

## 2020-06-04 PROCEDURE — 36415 COLL VENOUS BLD VENIPUNCTURE: CPT | Performed by: INTERNAL MEDICINE

## 2020-06-04 PROCEDURE — 85652 RBC SED RATE AUTOMATED: CPT | Performed by: INTERNAL MEDICINE

## 2020-06-04 PROCEDURE — 84134 ASSAY OF PREALBUMIN: CPT | Performed by: INTERNAL MEDICINE

## 2020-06-05 LAB
ALBUMIN SERPL-MCNC: 2.9 G/DL (ref 3.4–5)
ALP SERPL-CCNC: 86 U/L (ref 40–150)
ALT SERPL W P-5'-P-CCNC: 23 U/L (ref 0–50)
ANION GAP SERPL CALCULATED.3IONS-SCNC: 5 MMOL/L (ref 3–14)
AST SERPL W P-5'-P-CCNC: 22 U/L (ref 0–45)
BILIRUB SERPL-MCNC: 0.3 MG/DL (ref 0.2–1.3)
BUN SERPL-MCNC: 16 MG/DL (ref 7–30)
CALCIUM SERPL-MCNC: 9 MG/DL (ref 8.5–10.1)
CHLORIDE SERPL-SCNC: 105 MMOL/L (ref 94–109)
CO2 SERPL-SCNC: 29 MMOL/L (ref 20–32)
CREAT SERPL-MCNC: 0.8 MG/DL (ref 0.52–1.04)
GFR SERPL CREATININE-BSD FRML MDRD: 70 ML/MIN/{1.73_M2}
GLUCOSE SERPL-MCNC: 105 MG/DL (ref 70–99)
POTASSIUM SERPL-SCNC: 4 MMOL/L (ref 3.4–5.3)
PREALB SERPL IA-MCNC: 26 MG/DL (ref 15–45)
PROT SERPL-MCNC: 7.5 G/DL (ref 6.8–8.8)
SODIUM SERPL-SCNC: 139 MMOL/L (ref 133–144)

## 2020-06-11 ENCOUNTER — TELEPHONE (OUTPATIENT)
Dept: FAMILY MEDICINE | Facility: CLINIC | Age: 80
End: 2020-06-11

## 2020-06-11 NOTE — TELEPHONE ENCOUNTER
Huddled with Dr. Daugherty-     Per Dr. Daugherty this was added because of the GFR, and it is just a reminder that we have to adjust medications as people get older- and when they get down to 30 it can be worrisome, right now this is not worrisome.     This just gives us reminders to continue to monitor BP's to be sure they are under control and to stay well hydrated.    Called Regi back and informed of above. Regi verbalized understanding and is agreeable to plan. Regi asking where pt could have BP check. Informed this could be done at the pharmacy or can schedule a nurse only appt.     Jenn BHATT RN

## 2020-06-11 NOTE — TELEPHONE ENCOUNTER
"Reason for call:  Other   Patient called regarding (reason for call): After visit summary questions.  Additional comments: Pt's daughter, Regi, called in regards to the after visit summary. Regi wanted to confirm that Dr. Daugherty wanted the labs redone. Regi also stated that the after summary states pt has \"stage 3 kidney disease\" and Regi was never informed of this Dx. Regi would like a call back to go over this summary and how they should proceed with the CKD.    Phone number to reach patient:  Other phone number:  [phone]    Best Time:  any    Can we leave a detailed message on this number?  YES    Travel screening: Not Applicable    "

## 2020-06-16 ENCOUNTER — MEDICAL CORRESPONDENCE (OUTPATIENT)
Dept: HEALTH INFORMATION MANAGEMENT | Facility: CLINIC | Age: 80
End: 2020-06-16

## 2020-06-17 DIAGNOSIS — R70.0 ELEVATED ERYTHROCYTE SEDIMENTATION RATE: ICD-10-CM

## 2020-06-17 DIAGNOSIS — J91.8 PARAPNEUMONIC EFFUSION: ICD-10-CM

## 2020-06-17 DIAGNOSIS — J18.9 PARAPNEUMONIC EFFUSION: ICD-10-CM

## 2020-06-17 DIAGNOSIS — R79.82 ELEVATED C-REACTIVE PROTEIN (CRP): ICD-10-CM

## 2020-06-17 LAB
BASOPHILS # BLD AUTO: 0.1 10E9/L (ref 0–0.2)
BASOPHILS NFR BLD AUTO: 0.9 %
CRP SERPL-MCNC: <2.9 MG/L (ref 0–8)
DIFFERENTIAL METHOD BLD: ABNORMAL
EOSINOPHIL # BLD AUTO: 0.3 10E9/L (ref 0–0.7)
EOSINOPHIL NFR BLD AUTO: 6 %
ERYTHROCYTE [DISTWIDTH] IN BLOOD BY AUTOMATED COUNT: 14.8 % (ref 10–15)
ERYTHROCYTE [SEDIMENTATION RATE] IN BLOOD BY WESTERGREN METHOD: 21 MM/H (ref 0–30)
HCT VFR BLD AUTO: 39.9 % (ref 35–47)
HGB BLD-MCNC: 12.1 G/DL (ref 11.7–15.7)
LYMPHOCYTES # BLD AUTO: 1.7 10E9/L (ref 0.8–5.3)
LYMPHOCYTES NFR BLD AUTO: 30.8 %
MCH RBC QN AUTO: 27.8 PG (ref 26.5–33)
MCHC RBC AUTO-ENTMCNC: 30.3 G/DL (ref 31.5–36.5)
MCV RBC AUTO: 92 FL (ref 78–100)
MONOCYTES # BLD AUTO: 0.5 10E9/L (ref 0–1.3)
MONOCYTES NFR BLD AUTO: 9.9 %
NEUTROPHILS # BLD AUTO: 2.8 10E9/L (ref 1.6–8.3)
NEUTROPHILS NFR BLD AUTO: 52.4 %
PLATELET # BLD AUTO: 243 10E9/L (ref 150–450)
RBC # BLD AUTO: 4.35 10E12/L (ref 3.8–5.2)
WBC # BLD AUTO: 5.4 10E9/L (ref 4–11)

## 2020-06-17 PROCEDURE — 80048 BASIC METABOLIC PNL TOTAL CA: CPT | Performed by: INTERNAL MEDICINE

## 2020-06-17 PROCEDURE — 86140 C-REACTIVE PROTEIN: CPT | Performed by: INTERNAL MEDICINE

## 2020-06-17 PROCEDURE — 85652 RBC SED RATE AUTOMATED: CPT | Performed by: INTERNAL MEDICINE

## 2020-06-17 PROCEDURE — 36415 COLL VENOUS BLD VENIPUNCTURE: CPT | Performed by: INTERNAL MEDICINE

## 2020-06-17 PROCEDURE — 85025 COMPLETE CBC W/AUTO DIFF WBC: CPT | Performed by: INTERNAL MEDICINE

## 2020-06-18 LAB
ANION GAP SERPL CALCULATED.3IONS-SCNC: 4 MMOL/L (ref 3–14)
BUN SERPL-MCNC: 17 MG/DL (ref 7–30)
CALCIUM SERPL-MCNC: 8.8 MG/DL (ref 8.5–10.1)
CHLORIDE SERPL-SCNC: 106 MMOL/L (ref 94–109)
CO2 SERPL-SCNC: 29 MMOL/L (ref 20–32)
CREAT SERPL-MCNC: 0.86 MG/DL (ref 0.52–1.04)
GFR SERPL CREATININE-BSD FRML MDRD: 63 ML/MIN/{1.73_M2}
GLUCOSE SERPL-MCNC: 97 MG/DL (ref 70–99)
POTASSIUM SERPL-SCNC: 4.7 MMOL/L (ref 3.4–5.3)
SODIUM SERPL-SCNC: 139 MMOL/L (ref 133–144)

## 2020-08-19 ENCOUNTER — ALLIED HEALTH/NURSE VISIT (OUTPATIENT)
Dept: NURSING | Facility: CLINIC | Age: 80
End: 2020-08-19
Payer: MEDICARE

## 2020-08-19 VITALS — HEART RATE: 78 BPM | SYSTOLIC BLOOD PRESSURE: 112 MMHG | DIASTOLIC BLOOD PRESSURE: 70 MMHG

## 2020-08-19 DIAGNOSIS — I10 BENIGN ESSENTIAL HYPERTENSION: Primary | ICD-10-CM

## 2020-08-19 PROCEDURE — 99207 ZZC NO CHARGE NURSE ONLY: CPT

## 2020-08-19 NOTE — PROGRESS NOTES
Kathie Wetzel is a 80 year old patient who comes in today for a Blood Pressure check.  Initial BP:  /70   Pulse 78   LMP  (LMP Unknown)      78  Disposition: results routed to provider    Brenda KERR M.A.

## 2020-08-27 ENCOUNTER — TELEPHONE (OUTPATIENT)
Dept: FAMILY MEDICINE | Facility: CLINIC | Age: 80
End: 2020-08-27

## 2020-08-27 NOTE — TELEPHONE ENCOUNTER
Regi (daughter) called and said, patient is in pain when she coughs and sneezes and has been going on for one week.   Regi [phone] can leave a message    Irena

## 2020-08-27 NOTE — TELEPHONE ENCOUNTER
Calling Yanci-     She told me it hurts when she coughs and sneezing where she had the surgery. No brusing or redness at sight. Had surgery a couple of months ago- and really has not complained of pain. No other symptoms. Regi wants patient to be seen by Dr. Daugherty.     Pain is about at least a 5-6- does not want to go to urgent care and wants to see Dr. Daugherty. Appt scheduled advised Yanci to take patient to urgent care/ER if symptoms worsen- Regi verbalized understanding and is agreeable to plan.     Jenn Velarde RN on 8/27/2020 at 1:56 PM

## 2020-08-28 ENCOUNTER — HOSPITAL ENCOUNTER (EMERGENCY)
Facility: CLINIC | Age: 80
Discharge: HOME | End: 2020-08-29
Attending: EMERGENCY MEDICINE | Admitting: EMERGENCY MEDICINE
Payer: MEDICARE

## 2020-08-28 ENCOUNTER — APPOINTMENT (OUTPATIENT)
Dept: GENERAL RADIOLOGY | Facility: CLINIC | Age: 80
End: 2020-08-28
Attending: EMERGENCY MEDICINE
Payer: MEDICARE

## 2020-08-28 VITALS
BODY MASS INDEX: 23.77 KG/M2 | HEART RATE: 85 BPM | SYSTOLIC BLOOD PRESSURE: 118 MMHG | TEMPERATURE: 98 F | WEIGHT: 166 LBS | OXYGEN SATURATION: 98 % | RESPIRATION RATE: 17 BRPM | DIASTOLIC BLOOD PRESSURE: 72 MMHG | HEIGHT: 70 IN

## 2020-08-28 DIAGNOSIS — R07.9 RIGHT-SIDED CHEST PAIN: ICD-10-CM

## 2020-08-28 DIAGNOSIS — Z87.09 HISTORY OF PLEURAL EFFUSION: ICD-10-CM

## 2020-08-28 LAB
ALBUMIN SERPL-MCNC: 3.7 G/DL (ref 3.4–5)
ALP SERPL-CCNC: 73 U/L (ref 40–150)
ALT SERPL W P-5'-P-CCNC: 18 U/L (ref 0–50)
ANION GAP SERPL CALCULATED.3IONS-SCNC: 4 MMOL/L (ref 3–14)
AST SERPL W P-5'-P-CCNC: 10 U/L (ref 0–45)
BASOPHILS # BLD AUTO: 0.1 10E9/L (ref 0–0.2)
BASOPHILS NFR BLD AUTO: 0.9 %
BILIRUB SERPL-MCNC: 0.4 MG/DL (ref 0.2–1.3)
BUN SERPL-MCNC: 20 MG/DL (ref 7–30)
CALCIUM SERPL-MCNC: 8.7 MG/DL (ref 8.5–10.1)
CHLORIDE SERPL-SCNC: 104 MMOL/L (ref 94–109)
CO2 SERPL-SCNC: 28 MMOL/L (ref 20–32)
CREAT SERPL-MCNC: 1.01 MG/DL (ref 0.52–1.04)
D DIMER PPP FEU-MCNC: 0.3 UG/ML FEU (ref 0–0.5)
DIFFERENTIAL METHOD BLD: NORMAL
EOSINOPHIL # BLD AUTO: 0.3 10E9/L (ref 0–0.7)
EOSINOPHIL NFR BLD AUTO: 4.7 %
ERYTHROCYTE [DISTWIDTH] IN BLOOD BY AUTOMATED COUNT: 13.4 % (ref 10–15)
GFR SERPL CREATININE-BSD FRML MDRD: 52 ML/MIN/{1.73_M2}
GLUCOSE SERPL-MCNC: 88 MG/DL (ref 70–99)
HCT VFR BLD AUTO: 41 % (ref 35–47)
HGB BLD-MCNC: 13.1 G/DL (ref 11.7–15.7)
IMM GRANULOCYTES # BLD: 0 10E9/L (ref 0–0.4)
IMM GRANULOCYTES NFR BLD: 0.1 %
INTERPRETATION ECG - MUSE: NORMAL
LIPASE SERPL-CCNC: 113 U/L (ref 73–393)
LYMPHOCYTES # BLD AUTO: 2.5 10E9/L (ref 0.8–5.3)
LYMPHOCYTES NFR BLD AUTO: 36.7 %
MCH RBC QN AUTO: 27.6 PG (ref 26.5–33)
MCHC RBC AUTO-ENTMCNC: 32 G/DL (ref 31.5–36.5)
MCV RBC AUTO: 87 FL (ref 78–100)
MONOCYTES # BLD AUTO: 0.6 10E9/L (ref 0–1.3)
MONOCYTES NFR BLD AUTO: 8.1 %
NEUTROPHILS # BLD AUTO: 3.4 10E9/L (ref 1.6–8.3)
NEUTROPHILS NFR BLD AUTO: 49.5 %
NRBC # BLD AUTO: 0 10*3/UL
NRBC BLD AUTO-RTO: 0 /100
PLATELET # BLD AUTO: 237 10E9/L (ref 150–450)
POTASSIUM SERPL-SCNC: 3.8 MMOL/L (ref 3.4–5.3)
PROT SERPL-MCNC: 7.4 G/DL (ref 6.8–8.8)
RBC # BLD AUTO: 4.74 10E12/L (ref 3.8–5.2)
SODIUM SERPL-SCNC: 136 MMOL/L (ref 133–144)
WBC # BLD AUTO: 6.8 10E9/L (ref 4–11)

## 2020-08-28 PROCEDURE — 80053 COMPREHEN METABOLIC PANEL: CPT | Performed by: EMERGENCY MEDICINE

## 2020-08-28 PROCEDURE — 71046 X-RAY EXAM CHEST 2 VIEWS: CPT

## 2020-08-28 PROCEDURE — 99285 EMERGENCY DEPT VISIT HI MDM: CPT | Mod: 25

## 2020-08-28 PROCEDURE — 83690 ASSAY OF LIPASE: CPT | Performed by: EMERGENCY MEDICINE

## 2020-08-28 PROCEDURE — U0003 INFECTIOUS AGENT DETECTION BY NUCLEIC ACID (DNA OR RNA); SEVERE ACUTE RESPIRATORY SYNDROME CORONAVIRUS 2 (SARS-COV-2) (CORONAVIRUS DISEASE [COVID-19]), AMPLIFIED PROBE TECHNIQUE, MAKING USE OF HIGH THROUGHPUT TECHNOLOGIES AS DESCRIBED BY CMS-2020-01-R: HCPCS | Performed by: EMERGENCY MEDICINE

## 2020-08-28 PROCEDURE — 85379 FIBRIN DEGRADATION QUANT: CPT | Performed by: EMERGENCY MEDICINE

## 2020-08-28 PROCEDURE — 85025 COMPLETE CBC W/AUTO DIFF WBC: CPT | Performed by: EMERGENCY MEDICINE

## 2020-08-28 PROCEDURE — 93005 ELECTROCARDIOGRAM TRACING: CPT

## 2020-08-28 ASSESSMENT — MIFFLIN-ST. JEOR: SCORE: 1303.22

## 2020-08-28 NOTE — ED AVS SNAPSHOT
Emergency Department  [address] [place] [id number]  Phone:  [phone]  Fax:  [phone]                                    Kathie Wetzel   MRN: [id number]    Department:   Emergency Department   Date of Visit:  8/28/2020           After Visit Summary Signature Page    I have received my discharge instructions, and my questions have been answered. I have discussed any challenges I see with this plan with the nurse or doctor.    ..........................................................................................................................................  Patient/Patient Representative Signature      ..........................................................................................................................................  Patient Representative Print Name and Relationship to Patient    ..................................................               ................................................  Date                                   Time    ..........................................................................................................................................  Reviewed by Signature/Title    ...................................................              ..............................................  Date                                               Time          22EPIC Rev 08/18

## 2020-08-29 LAB
SARS-COV-2 RNA SPEC QL NAA+PROBE: NOT DETECTED
SPECIMEN SOURCE: NORMAL

## 2020-08-29 NOTE — ED TRIAGE NOTES
One week hx of right chest wall pain near incision from thoracotomy 5/2020.  Pain increases with movement - cough - breathing treatments.  Denies fever - no change in bowel/bladder. Pain increases with inspiration.  Incision site clean and well healed.  Tolerating po intake without difficulty.

## 2020-08-29 NOTE — DISCHARGE INSTRUCTIONS
Fortunately, there are no signs of fluid building up again, serious infection, heart condition, or other dangerous process at this time.  You can take over the counter pain medication if desired for your discomfort.

## 2020-08-29 NOTE — ED PROVIDER NOTES
"History     Chief Complaint:  Chest pain      HPI history supplemented by daughter at bedside, electronic chart review.  History limited as patient is a poor historian.  Kathie Wetzel is a 80 year old female with a history of hypertension and parathyroid adenoma who presents for evaluation of chest pain. Per chart review the patient underwent parapneumonic right pleural effusion thoracotomy and total decortication by Dr. Shah in May 2020.  The patient reports that for the last week she has had pain with coughing and sneezing and movement in her right lateral chest near the area of her pleural effusion incision site. The pain is also exacerbated pain while laying down on the side. She states that her cough is more of a \"tickle\" and is not new. She denies any rash, diarrhea, vomiting, diarrhea.  She initially denied any shortness of breath, though later stated she felt short of breath.    Allergies:  Simvastatin  Penicillins      Medications:   Lipitor   Celexa  Aricept   Gabapentin   Claritin   Desyrel     Medical History:   Hypertension   Hypercholesterolemia   Parathyroid adenoma    Surgical History   Appendectomy   Cholecystectomy   Discectomy lumbar posterior   Parathyroidectomy   Thoracoscopic decortication   Thoracotomy     Family History:   Thyroid disease    Social History:  Patient was accompanied to the ED by her daughter.  Smoking Status: Former Smoker    Type: Cigarettes    Quit 1972  Smokeless Tobacco: Never Used   Alcohol Use: Positive    Drug Use: Negative   Primary Care: Cammie Daugherty      Review of Systems   ROS: 10 point ROS neg other than the symptoms noted above in the HPI.     Physical Exam     Patient Vitals for the past 24 hrs:   BP Temp Temp src Pulse Resp SpO2 Height Weight   08/28/20 1939 118/72 98  F (36.7  C) Temporal 85 17 95 % 1.778 m (5' 10\") 75.3 kg (166 lb)      Physical Exam  General: Nontoxic-appearing woman sitting upright in room 20, daughter at bedside  HENT: mucous " membranes moist  CV: rate as above, regular rhythm, no lower extremity edema, no JVD, palpable symmetric radial pulses  Resp: normal effort, speaks in full phrases, no stridor, no cough observed  GI: abdomen soft and nontender, no guarding, negative Ulloa's sign  MSK: reproducible tenderness to right lateral chest wall without crepitus  Skin: appropriately warm and dry, no erythema or vesicles to chest wall, healed scar to R lateral chest  Neuro: alert, clear speech, oriented though somewhat poor historian  Psych: cooperative    Emergency Department Course     ECG:  ECG taken at 1943, ECG read at 2228  Normal sinus rhythm  Normal ECG  No significant change since prior EKG.   Rate 69 bpm. IN interval 200 ms. QRS duration 86 ms. QT/QTc 404/432 ms. P-R-T axes 43 13 66.    Imaging:  Radiology results were communicated with the patient who voiced understanding of the findings.    Chest XR,  PA & LAT  There is mild pleural scarring at the right costophrenic  angle. Tortuous calcified thoracic aorta. Chest otherwise negative.  Heart size and pulmonary vascularity are within normal limits.    GERALD LEE MD    Reading per radiology     Laboratory:  Laboratory findings were communicated with the patient who voiced understanding of the findings.    Lipase: 113  D Dimer (Collected 2254): 0.3     COVID-19 Virus (Coronavirus), PCR NP Swab: pending       CBC: WBC 6.8, HGB 13.1,   CMP: GFR 52 (L) (Creatinine 1.01) o/w WNL       Emergency Department Course:  1943 EKG obtained as noted above.     2015 The patient was sent for XR while in the emergency department, results above.    2222 Nursing notes and vitals reviewed.    I performed electronic chart review in [facility].  The patient was placed on continuous cardiac and pulse ox monitoring.    2230 I performed an exam of the patient as documented above.     2254 IV was inserted and blood was drawn for laboratory testing, results above.    2351 Patient rechecked and updated.       2351 Findings and plan explained to the Patient. Patient discharged home with instructions regarding supportive care, medications, and reasons to return. The importance of close follow-up was reviewed.     Impression & Plan     Medical Decision Making:  With reproducible discomfort to palpation along with movements, I think this is likely musculoskeletal right lateral chest discomfort, without evidence of underlying rib fracture, recurrent pneumonia, pleural effusion, or other more serious process, though I certainly considered the possibility of thoracic abscess, ACS, pulmonary embolism, cardiac arrhythmia, and referred pain from upper abdominal conditions as well.  She repeatedly declined any treatments here.  She is completely benign abdominal exam.  Negative d-dimer makes pulmonary embolism extremely unlikely in this situation.  Reassurance was provided to the patient and her daughter, will acknowledging the limitations of today's testing.  Advised Tylenol as needed and follow-up soon through clinic, returning here for unexpected sudden worsening.    Covid-19  Kathie Wetzel was evaluated during a global COVID-19 pandemic, which necessitated consideration that the patient might be at risk for infection with the SARS-CoV-2 virus that causes COVID-19.   Applicable protocols for evaluation were followed during the patient's care.   COVID-19 was considered as part of the patient's evaluation. The plan for testing is:  a test was obtained during this visit.    Diagnosis:     ICD-10-CM    1. Right-sided chest pain  R07.9    2. History of pleural effusion  Z87.09         Disposition:  Discharged to home.    This note was completed in part using Dragon voice recognition software. Although reviewed after completion, some word and grammatical errors may occur.     Scribe Disclosure:  I, Teto Ye, am serving as a scribe at 10:23 PM on 8/28/2020 to document services personally performed by Donnie Carroll,  MD based on my observations and the provider's statements to me.              Donnie Carroll MD  08/30/20 0000

## 2020-09-02 NOTE — PROGRESS NOTES
Subjective     Kathie Wetzel is a 80 year old female who presents to clinic today for the following health issues:    HPI     Emergency room follow up   2020 for right sided rib and chest pain.  States symptoms are about the same      Hyperlipidemia Follow-Up      Are you regularly taking any medication or supplement to lower your cholesterol?   Yes- atorvastatin    Are you having muscle aches or other side effects that you think could be caused by your cholesterol lowering medication?  No    Anxiety Follow-Up    How are you doing with your anxiety since your last visit? No change    Are you having other symptoms that might be associated with anxiety? No    Have you had a significant life event? No     Are you feeling depressed? No    Do you have any concerns with your use of alcohol or other drugs? No    Social History     Tobacco Use     Smoking status: Former Smoker     Packs/day: 0.00     Last attempt to quit: 3/10/1972     Years since quittin.5     Smokeless tobacco: Never Used   Substance Use Topics     Alcohol use: Yes     Comment: 2 week      Drug use: No     STEPHENIE-7 SCORE 2019   Total Score 0 0     PHQ 2019   PHQ-9 Total Score 0 4   Q9: Thoughts of better off dead/self-harm past 2 weeks Not at all Not at all     Last PHQ-9 2020   1.  Little interest or pleasure in doing things 0   2.  Feeling down, depressed, or hopeless 0   3.  Trouble falling or staying asleep, or sleeping too much 1   4.  Feeling tired or having little energy 0   5.  Poor appetite or overeating 3   6.  Feeling bad about yourself 0   7.  Trouble concentrating 0   8.  Moving slowly or restless 0   Q9: Thoughts of better off dead/self-harm past 2 weeks 0   PHQ-9 Total Score 4   Difficulty at work, home, or with people Not difficult at all     SETPHENIE-7  2020   1. Feeling nervous, anxious, or on edge 0   2. Not being able to stop or control worrying 0   3. Worrying too much about different things  "0   4. Trouble relaxing 0   5. Being so restless that it is hard to sit still 0   6. Becoming easily annoyed or irritable 0   7. Feeling afraid, as if something awful might happen 0   STEPHENIE-7 Total Score 0   If you checked any problems, how difficult have they made it for you to do your work, take care of things at home, or get along with other people? Not difficult at all         How many servings of fruits and vegetables do you eat daily?  2-3    On average, how many sweetened beverages do you drink each day (Examples: soda, juice, sweet tea, etc.  Do NOT count diet or artificially sweetened beverages)?   0    How many days per week do you exercise enough to make your heart beat faster? 7    How many minutes a day do you exercise enough to make your heart beat faster? 20 - 29    How many days per week do you miss taking your medication? 0    Annual Wellness Visit    Are you in the first 12 months of your Medicare Part B coverage?  No    Physical Health:    In general, how would you rate your overall physical health? good    Outside of work, how many days during the week do you exercise?6-7 days/week    Outside of work, approximately how many minutes a day do you exercise?15-30 minutes    If you drink alcohol do you typically have >3 drinks per day or >7 drinks per week? Not Applicable    Do you usually eat at least 4 servings of fruit and vegetables a day, include whole grains & fiber and avoid regularly eating high fat or \"junk\" foods? Yes    Do you have any problems taking medications regularly? No    Do you have any side effects from medications? none    Needs assistance for the following daily activities: no assistance needed    Which of the following safety concerns are present in your home?  none identified     Hearing impairment: Yes, Need to ask people to speak up or repeat themselves.    In the past 6 months, have you been bothered by leaking of urine? no    Mental Health:    In general, how would you rate " "your overall mental or emotional health? good  PHQ-2 Score: 0    Do you feel safe in your environment? Yes    Have you ever done Advance Care Planning? (For example, a Health Directive, POLST, or a discussion with a medical provider or your loved ones about your wishes)? Yes, patient states has an Advance Care Planning document and will bring a copy to the clinic.    Fall risk:  Fallen 2 or more times in the past year?: No  Any fall with injury in the past year?: No    Cognitive Screening: pt with known dementia, on Aricept.  1) Repeat 3 items (Leader, Season, Table)    2) Clock draw: ABNORMAL [place], numbers not in alignment  3) 3 item recall: Recalls 1 object   Results: ABNORMAL clock, 1-2 items recalled: PROBABLE COGNITIVE IMPAIRMENT, **INFORM PROVIDER**    Mini-CogTM Copyright S Emilee. Licensed by the author for use in [facility] [employer]; reprinted with permission ([email or web address]). All rights reserved.      Do you have sleep apnea, excessive snoring or daytime drowsiness?: no    Current providers sharing in care for this patient include:   Patient Care Team:  Cammie Daugherty MD as PCP - General (Internal Medicine)  Cammie Daugherty MD as Assigned PCP      Review of Systems   CONSTITUTIONAL: NEGATIVE for fever, chills, change in weight  ENT/MOUTH: NEGATIVE for ear, mouth and throat problems  RESP: NEGATIVE for significant cough or SOB  CV: NEGATIVE for chest pain, palpitations or peripheral edema  MUSCULOSKELETAL: right sided chest wall pain.  NEURO: NEGATIVE for weakness, dizziness or paresthesias  PSYCHIATRIC: memory impairment, Aricept is well tolerated and family feels it is effective      Objective    /68   Pulse 64   Temp 98  F (36.7  C) (Oral)   Resp 16   Ht 1.74 m (5' 8.5\")   Wt 76.9 kg (169 lb 9.6 oz)   LMP  (LMP Unknown)   SpO2 96%   BMI 25.41 kg/m    Body mass index is 25.41 kg/m .  Physical Exam   GENERAL: healthy, alert and no distress  NECK: no adenopathy, no asymmetry, " masses, or scars and thyroid normal to palpation  RESP: lungs clear to auscultation - no rales, rhonchi or wheezes  CV: regular rates and rhythm, normal S1 S2, no S3 or S4, peripheral pulses strong and no peripheral edema  ABDOMEN: soft, nontender and bowel sounds normal  MS: extremities normal- no gross deformities noted; right chest wall without bony abnormality; scar from VAT procedure noted  NEURO: Normal strength and tone and sensory exam grossly normal  PSYCH: mentation appears normal, affect normal/bright          Assessment & Plan     (R07.81) Pleuritic chest pain  (primary encounter diagnosis)  Comment: past surgery; lung exam normal; no decreased lung sounds at bases. Hx of talc pleurodesis.   Plan: CT Chest w Contrast          (E78.5) Hyperlipidemia LDL goal <100  Comment:   Lab Results   Component Value Date    LDL 65 04/13/2020    LDL 78 06/05/2018   At goal; medication well tolerated.   Plan: atorvastatin (LIPITOR) 10 MG tablet            (F41.9) Anxiety  Comment: Citalopram for years; felt to be effective.   Plan: citalopram (CELEXA) 10 MG tablet          (R41.3) Memory impairment of gradual onset  Comment: past cognitive evaluation; on aricept; well tolerated;   Plan: donepezil (ARICEPT) 10 MG tablet          (G47.9) Sleep disturbance  Comment: sleep concerns reviewed: Trazodone well tolerated;  Denies grogginess with emdications.   Plan: traZODone (DESYREL) 50 MG tablet         (Z98.890) History of thoracotomy  Comment: hx of large recurrent pleural effusion   Plan: CT Chest w Contrast           (B02.29) Postherpetic neuralgia vs nerve pain  Comment: nerve pain from past surgery; discussed pain.  Plan: gabapentin (NEURONTIN) 300 MG capsule          (Z23) Need for prophylactic vaccination and inoculation against influenza  Comment: Pt request. CDC guidelines met    Plan: FLUZONE HIGH DOSE 65+  [66859], ADMIN INFLUENZA        (For MEDICARE Patients ONLY) []               BMI:   Estimated body  "mass index is 25.41 kg/m  as calculated from the following:    Height as of this encounter: 1.74 m (5' 8.5\").    Weight as of this encounter: 76.9 kg (169 lb 9.6 oz).              Return in about 4 weeks (around 10/1/2020) for reassess if not improved.    Cammie Daugherty MD  Internal Medicine   [facility]    "

## 2020-09-03 ENCOUNTER — OFFICE VISIT (OUTPATIENT)
Dept: FAMILY MEDICINE | Facility: CLINIC | Age: 80
End: 2020-09-03
Payer: MEDICARE

## 2020-09-03 VITALS
DIASTOLIC BLOOD PRESSURE: 68 MMHG | HEART RATE: 64 BPM | WEIGHT: 169.6 LBS | RESPIRATION RATE: 16 BRPM | BODY MASS INDEX: 25.12 KG/M2 | SYSTOLIC BLOOD PRESSURE: 106 MMHG | TEMPERATURE: 98 F | OXYGEN SATURATION: 96 % | HEIGHT: 69 IN

## 2020-09-03 DIAGNOSIS — R07.81 PLEURITIC CHEST PAIN: Primary | ICD-10-CM

## 2020-09-03 DIAGNOSIS — R41.3 MEMORY IMPAIRMENT OF GRADUAL ONSET: ICD-10-CM

## 2020-09-03 DIAGNOSIS — B02.29 POSTHERPETIC NEURALGIA: ICD-10-CM

## 2020-09-03 DIAGNOSIS — Z23 NEED FOR PROPHYLACTIC VACCINATION AND INOCULATION AGAINST INFLUENZA: ICD-10-CM

## 2020-09-03 DIAGNOSIS — G47.9 SLEEP DISTURBANCE: ICD-10-CM

## 2020-09-03 DIAGNOSIS — F41.9 ANXIETY: ICD-10-CM

## 2020-09-03 DIAGNOSIS — Z98.890 HISTORY OF THORACOTOMY: ICD-10-CM

## 2020-09-03 DIAGNOSIS — E78.5 HYPERLIPIDEMIA LDL GOAL <100: ICD-10-CM

## 2020-09-03 PROCEDURE — 90662 IIV NO PRSV INCREASED AG IM: CPT | Performed by: INTERNAL MEDICINE

## 2020-09-03 PROCEDURE — G0008 ADMIN INFLUENZA VIRUS VAC: HCPCS | Performed by: INTERNAL MEDICINE

## 2020-09-03 PROCEDURE — G0439 PPPS, SUBSEQ VISIT: HCPCS | Performed by: INTERNAL MEDICINE

## 2020-09-03 PROCEDURE — 99214 OFFICE O/P EST MOD 30 MIN: CPT | Mod: 25 | Performed by: INTERNAL MEDICINE

## 2020-09-03 RX ORDER — CITALOPRAM HYDROBROMIDE 10 MG/1
10 TABLET ORAL EVERY MORNING
Qty: 90 TABLET | Refills: 1 | Status: SHIPPED | OUTPATIENT
Start: 2020-09-03 | End: 2021-06-04

## 2020-09-03 RX ORDER — GABAPENTIN 300 MG/1
300 CAPSULE ORAL 2 TIMES DAILY PRN
Qty: 90 CAPSULE | Refills: 1 | Status: SHIPPED | OUTPATIENT
Start: 2020-09-03 | End: 2020-12-16

## 2020-09-03 RX ORDER — TRAZODONE HYDROCHLORIDE 50 MG/1
100 TABLET, FILM COATED ORAL AT BEDTIME
Qty: 180 TABLET | Refills: 1 | Status: SHIPPED | OUTPATIENT
Start: 2020-09-03 | End: 2021-03-04

## 2020-09-03 RX ORDER — DONEPEZIL HYDROCHLORIDE 10 MG/1
10 TABLET, FILM COATED ORAL AT BEDTIME
Qty: 90 TABLET | Refills: 3 | Status: SHIPPED | OUTPATIENT
Start: 2020-09-03 | End: 2021-08-03

## 2020-09-03 RX ORDER — ATORVASTATIN CALCIUM 10 MG/1
10 TABLET, FILM COATED ORAL AT BEDTIME
Qty: 90 TABLET | Refills: 3 | Status: SHIPPED | OUTPATIENT
Start: 2020-09-03 | End: 2021-08-03

## 2020-09-03 ASSESSMENT — MIFFLIN-ST. JEOR: SCORE: 1295.74

## 2020-09-03 NOTE — ED AVS SNAPSHOT
[facility] Emergency Department    [address]    [facility] [address]    Phone:  [phone]    Fax:  [phone]                                       Kathie Wetzel   MRN: [id number]    Department:  [facility] Emergency Department   Date of Visit:  3/20/2018           After Visit Summary Signature Page     I have received my discharge instructions, and my questions have been answered. I have discussed any challenges I see with this plan with the nurse or doctor.    ..........................................................................................................................................  Patient/Patient Representative Signature      ..........................................................................................................................................  Patient Representative Print Name and Relationship to Patient    ..................................................               ................................................  Date                                            Time    ..........................................................................................................................................  Reviewed by Signature/Title    ...................................................              ..............................................  Date                                                            Time           recovery, then home

## 2020-09-11 ENCOUNTER — HOSPITAL ENCOUNTER (OUTPATIENT)
Dept: CT IMAGING | Facility: CLINIC | Age: 80
Discharge: HOME | End: 2020-09-11
Attending: INTERNAL MEDICINE | Admitting: INTERNAL MEDICINE
Payer: MEDICARE

## 2020-09-11 DIAGNOSIS — Z98.890 HISTORY OF THORACOTOMY: ICD-10-CM

## 2020-09-11 DIAGNOSIS — R07.81 PLEURITIC CHEST PAIN: ICD-10-CM

## 2020-09-11 PROCEDURE — 25000125 ZZHC RX 250: Performed by: INTERNAL MEDICINE

## 2020-09-11 PROCEDURE — 25000128 H RX IP 250 OP 636: Performed by: INTERNAL MEDICINE

## 2020-09-11 PROCEDURE — 71260 CT THORAX DX C+: CPT

## 2020-09-11 RX ORDER — IOPAMIDOL 755 MG/ML
500 INJECTION, SOLUTION INTRAVASCULAR ONCE
Status: COMPLETED | OUTPATIENT
Start: 2020-09-11 | End: 2020-09-11

## 2020-09-11 RX ADMIN — IOPAMIDOL 80 ML: 755 INJECTION, SOLUTION INTRAVENOUS at 14:21

## 2020-09-11 RX ADMIN — SODIUM CHLORIDE 60 ML: 9 INJECTION, SOLUTION INTRAVENOUS at 14:21

## 2020-09-15 ENCOUNTER — TELEPHONE (OUTPATIENT)
Dept: FAMILY MEDICINE | Facility: CLINIC | Age: 80
End: 2020-09-15

## 2020-09-15 DIAGNOSIS — N28.9 KIDNEY LESION, NATIVE, LEFT: Primary | ICD-10-CM

## 2020-09-15 NOTE — TELEPHONE ENCOUNTER
Deisi w/ AB and call back to pt's daughter.     CT chest looks good- right sided pleural fluid nearly completely resolved. A few sm pulm nodules noted, but sometimes seen after a procedue like she had in May. DN would like her to get a repeat CT chest in about 1 year.   Incidental finding of Left kidney lesion. DN would recommend Renal US.     Daughter would like us to go ahead and order the US.       Caridad FERRIS RN

## 2020-09-15 NOTE — TELEPHONE ENCOUNTER
Reason for call:  Results   Name of test or procedure: CT  Date of test or procedure: 9/11/20  Location of test or procedure: [facility]    Additional comments: Regi would like to know the results of the CT    Phone number to reach patient:  Other phone number:  [phone]    Best Time:  any    Can we leave a detailed message on this number?  YES    Travel screening: Not Applicable

## 2020-09-17 ENCOUNTER — HOSPITAL ENCOUNTER (OUTPATIENT)
Dept: ULTRASOUND IMAGING | Facility: CLINIC | Age: 80
Discharge: HOME | End: 2020-09-17
Attending: INTERNAL MEDICINE | Admitting: INTERNAL MEDICINE
Payer: MEDICARE

## 2020-09-17 DIAGNOSIS — N28.9 KIDNEY LESION, NATIVE, LEFT: ICD-10-CM

## 2020-09-17 PROCEDURE — 76770 US EXAM ABDO BACK WALL COMP: CPT

## 2020-09-21 ENCOUNTER — TELEPHONE (OUTPATIENT)
Dept: FAMILY MEDICINE | Facility: CLINIC | Age: 80
End: 2020-09-21

## 2020-09-21 NOTE — TELEPHONE ENCOUNTER
Notified daughter that Dr. Daugherty would still have to read results, but looked normal with cyst that would be benign. Will let you know if DN says something else.     Caridad FERRIS RN

## 2020-09-21 NOTE — TELEPHONE ENCOUNTER
Test Results    Who is calling?:  Pt's daughter, Regi.     Who ordered the test:  PCP Dr. Daugherty    Type of test: Lab and Ultrasound    Date of test:  9/17/2020    Where was the test performed:  [facility]    What are your questions/concerns?:  Pt don't have [employer] account set up and daughter will like to know the findings.      Daughter would like a call back to [phone] from Dr. Daugherty or her nurse.     Okay to leave a detailed message?:  Yes

## 2020-09-30 ENCOUNTER — DOCUMENTATION ONLY (OUTPATIENT)
Dept: OTHER | Facility: CLINIC | Age: 80
End: 2020-09-30

## 2020-10-09 ENCOUNTER — TRANSFERRED RECORDS (OUTPATIENT)
Dept: HEALTH INFORMATION MANAGEMENT | Facility: CLINIC | Age: 80
End: 2020-10-09

## 2020-12-13 ENCOUNTER — OFFICE VISIT (OUTPATIENT)
Dept: URGENT CARE | Facility: URGENT CARE | Age: 80
End: 2020-12-13
Payer: MEDICARE

## 2020-12-13 ENCOUNTER — NURSE TRIAGE (OUTPATIENT)
Dept: NURSING | Facility: CLINIC | Age: 80
End: 2020-12-13

## 2020-12-13 VITALS
SYSTOLIC BLOOD PRESSURE: 111 MMHG | WEIGHT: 174 LBS | TEMPERATURE: 98.6 F | BODY MASS INDEX: 26.07 KG/M2 | DIASTOLIC BLOOD PRESSURE: 64 MMHG | OXYGEN SATURATION: 96 % | HEART RATE: 65 BPM

## 2020-12-13 DIAGNOSIS — K21.00 GASTROESOPHAGEAL REFLUX DISEASE WITH ESOPHAGITIS WITHOUT HEMORRHAGE: ICD-10-CM

## 2020-12-13 DIAGNOSIS — R00.1 BRADYCARDIA: Primary | ICD-10-CM

## 2020-12-13 DIAGNOSIS — R79.89 ELEVATED SERUM CREATININE: ICD-10-CM

## 2020-12-13 DIAGNOSIS — R14.2 FLATULENCE, ERUCTATION AND GAS PAIN: ICD-10-CM

## 2020-12-13 DIAGNOSIS — R14.1 FLATULENCE, ERUCTATION AND GAS PAIN: ICD-10-CM

## 2020-12-13 DIAGNOSIS — R14.3 FLATULENCE, ERUCTATION AND GAS PAIN: ICD-10-CM

## 2020-12-13 LAB
ALBUMIN SERPL-MCNC: 3.3 G/DL (ref 3.4–5)
ALP SERPL-CCNC: 53 U/L (ref 40–150)
ALT SERPL W P-5'-P-CCNC: 17 U/L (ref 0–50)
ANION GAP SERPL CALCULATED.3IONS-SCNC: <1 MMOL/L (ref 3–14)
AST SERPL W P-5'-P-CCNC: 18 U/L (ref 0–45)
BILIRUB SERPL-MCNC: 0.6 MG/DL (ref 0.2–1.3)
BUN SERPL-MCNC: 15 MG/DL (ref 7–30)
CALCIUM SERPL-MCNC: 9.1 MG/DL (ref 8.5–10.1)
CHLORIDE SERPL-SCNC: 107 MMOL/L (ref 94–109)
CO2 SERPL-SCNC: 34 MMOL/L (ref 20–32)
CREAT SERPL-MCNC: 1.2 MG/DL (ref 0.52–1.04)
ERYTHROCYTE [DISTWIDTH] IN BLOOD BY AUTOMATED COUNT: 13.3 % (ref 10–15)
GFR SERPL CREATININE-BSD FRML MDRD: 42 ML/MIN/{1.73_M2}
GLUCOSE SERPL-MCNC: 84 MG/DL (ref 70–99)
HCT VFR BLD AUTO: 38.6 % (ref 35–47)
HGB BLD-MCNC: 12.3 G/DL (ref 11.7–15.7)
MCH RBC QN AUTO: 29.3 PG (ref 26.5–33)
MCHC RBC AUTO-ENTMCNC: 31.9 G/DL (ref 31.5–36.5)
MCV RBC AUTO: 92 FL (ref 78–100)
PLATELET # BLD AUTO: 211 10E9/L (ref 150–450)
POTASSIUM SERPL-SCNC: 4.6 MMOL/L (ref 3.4–5.3)
PROT SERPL-MCNC: 6.2 G/DL (ref 6.8–8.8)
RBC # BLD AUTO: 4.2 10E12/L (ref 3.8–5.2)
SODIUM SERPL-SCNC: 139 MMOL/L (ref 133–144)
WBC # BLD AUTO: 7.3 10E9/L (ref 4–11)

## 2020-12-13 PROCEDURE — 36415 COLL VENOUS BLD VENIPUNCTURE: CPT | Performed by: FAMILY MEDICINE

## 2020-12-13 PROCEDURE — 99214 OFFICE O/P EST MOD 30 MIN: CPT | Performed by: FAMILY MEDICINE

## 2020-12-13 PROCEDURE — 83735 ASSAY OF MAGNESIUM: CPT | Performed by: FAMILY MEDICINE

## 2020-12-13 PROCEDURE — 93000 ELECTROCARDIOGRAM COMPLETE: CPT | Performed by: FAMILY MEDICINE

## 2020-12-13 PROCEDURE — 80053 COMPREHEN METABOLIC PANEL: CPT | Performed by: FAMILY MEDICINE

## 2020-12-13 PROCEDURE — 85027 COMPLETE CBC AUTOMATED: CPT | Performed by: FAMILY MEDICINE

## 2020-12-13 RX ORDER — SIMETHICONE 80 MG
80 TABLET,CHEWABLE ORAL EVERY 6 HOURS PRN
Qty: 30 TABLET | Refills: 0 | Status: SHIPPED | OUTPATIENT
Start: 2020-12-13 | End: 2021-06-25

## 2020-12-13 NOTE — PROGRESS NOTES
SUBJECTIVE:  Kathie Wetzel is a 80 year old female who presents to the office with the CC of low heart rate.    Patient has been feeling unwell with GI symptoms, this started about 1 week aog, this is improving but not back to normal yet.  No one else with similar symptoms, denies eating anything new or different.  Developed low heart rate 50's today.    Initial symptoms - woke up and felt nauseate and ended up throwing up and had diarrhea on Monday morning.  Had green diarrhea for 3 days and diarrhea has finally slowed down and did resolve now.  No further episode of vomiting.  Is able to eat better today as appetite if finally improving.  Wondering about burping, this has been ongoing for a while already.  No close ill contacts for positive COVID, lives alone and is good about self isolation.    Heart rate usually in the 60's, this morning had checked and noted HR in 50's so had called nurse line and told that needed to be seen in UC today.    Has a follow up with primary provider for persistent right sided chest pain, s/p thoracotomy and had recent scan and was all normal.      Patient endorsed history of stomach problems years ago, used to take TUMS and tagament.    Past Medical History:   Diagnosis Date     High cholesterol      HTN (hypertension)      Parathyroid adenoma      PONV (postoperative nausea and vomiting)      Thyroid disorder          Current Outpatient Medications:      atorvastatin (LIPITOR) 10 MG tablet, Take 1 tablet (10 mg) by mouth At Bedtime TAKE 1 TABLET(10 MG) BY MOUTH AT BEDTIME, Disp: 90 tablet, Rfl: 3     Cholecalciferol (VITAMIN D PO), Take 1,000 Units by mouth every morning , Disp: , Rfl:      citalopram (CELEXA) 10 MG tablet, Take 1 tablet (10 mg) by mouth every morning, Disp: 90 tablet, Rfl: 1     donepezil (ARICEPT) 10 MG tablet, Take 1 tablet (10 mg) by mouth At Bedtime, Disp: 90 tablet, Rfl: 3     gabapentin (NEURONTIN) 100 MG capsule, 200 mg per day, then wean to 100 mg per  day, Disp: 60 capsule, Rfl: 1     gabapentin (NEURONTIN) 300 MG capsule, Take 1 capsule (300 mg) by mouth 2 times daily as needed for other (right sided pain), Disp: 90 capsule, Rfl: 1     loratadine (CLARITIN) 10 MG tablet, Take 10 mg by mouth every morning , Disp: , Rfl:      Methylcobalamin (B-12) 5000 MCG TBDP, Take 5,000 mcg by mouth daily (Patient taking differently: Take 5,000 mcg by mouth every morning ), Disp: 100 tablet, Rfl: 3     Multiple Vitamins-Minerals (PRESERVISION AREDS PO), Take 1 tablet by mouth every morning , Disp: , Rfl:      traZODone (DESYREL) 50 MG tablet, Take 2 tablets (100 mg) by mouth At Bedtime, Disp: 180 tablet, Rfl: 1    Social History     Tobacco Use     Smoking status: Former Smoker     Packs/day: 0.00     Quit date: 3/10/1972     Years since quittin.7     Smokeless tobacco: Never Used   Substance Use Topics     Alcohol use: Yes     Comment: 2 week        ROS:Review of systems negative except as stated above.    EXAM:  /64   Pulse 65   Temp 98.6  F (37  C) (Tympanic)   Wt 78.9 kg (174 lb)   LMP  (LMP Unknown)   SpO2 96%   BMI 26.07 kg/m    GENERAL APPEARANCE: healthy, alert and no distress  EYES: EOMI,  PERRL, conjunctiva clear  RESP: lungs with no audible wheezes, no increase work of breathing.  Right sided thoracotomy repair and drains healed, no erythema or drainage.  PSYCH: mentation appears normal and affect normal/bright     Office EKG demonstrates:  sinus bradycardia, appears normal, no acute ST/T changes c/w ischemia    Results for orders placed or performed in visit on 20   CBC with platelets     Status: None   Result Value Ref Range    WBC 7.3 4.0 - 11.0 10e9/L    RBC Count 4.20 3.8 - 5.2 10e12/L    Hemoglobin 12.3 11.7 - 15.7 g/dL    Hematocrit 38.6 35.0 - 47.0 %    MCV 92 78 - 100 fl    MCH 29.3 26.5 - 33.0 pg    MCHC 31.9 31.5 - 36.5 g/dL    RDW 13.3 10.0 - 15.0 %    Platelet Count 211 150 - 450 10e9/L   Comprehensive metabolic panel     Status:  Abnormal   Result Value Ref Range    Sodium 139 133 - 144 mmol/L    Potassium 4.6 3.4 - 5.3 mmol/L    Chloride 107 94 - 109 mmol/L    Carbon Dioxide 34 (H) 20 - 32 mmol/L    Anion Gap <1 (L) 3 - 14 mmol/L    Glucose 84 70 - 99 mg/dL    Urea Nitrogen 15 7 - 30 mg/dL    Creatinine 1.20 (H) 0.52 - 1.04 mg/dL    GFR Estimate 42 (L) >60 mL/min/[1.73_m2]    GFR Estimate If Black 48 (L) >60 mL/min/[1.73_m2]    Calcium 9.1 8.5 - 10.1 mg/dL    Bilirubin Total 0.6 0.2 - 1.3 mg/dL    Albumin 3.3 (L) 3.4 - 5.0 g/dL    Protein Total 6.2 (L) 6.8 - 8.8 g/dL    Alkaline Phosphatase 53 40 - 150 U/L    ALT 17 0 - 50 U/L    AST 18 0 - 45 U/L         ASSESSMENT/PLAN:  (R00.1) Bradycardia  (primary encounter diagnosis)  Plan: EKG 12-lead complete w/read - Clinics, CBC with        platelets, Comprehensive metabolic panel,         Magnesium            (R79.89) Elevated serum creatinine  Comment: recent diarrhea  Plan: increase fluids    (R14.3,  R14.1,  R14.2) Flatulence, eructation and gas pain  Plan: simethicone (MYLICON) 80 MG chewable tablet            (K21.00) Gastroesophageal reflux disease with esophagitis without hemorrhage  Plan: omeprazole (PRILOSEC) 20 MG DR capsule            Patient appears well, non toxic and no acute distress.  Vitals reassuring and reviewed EKG and initial labs obtained.  Reviewed that GI symptoms with diarrhea most likely caused mild dehydration which caused elevated creatinine and encourage to push more fluids.  BRAT diet to help with diarrhea symptoms, symptoms seems to be resolving.  Discussed burping and possible GERD being etiology for symptoms, RX simethicone and RX omeprazole given to see if this will help with symptoms.  Reviewed foods that can minimize that worsens GI symptoms    Follow up with primary provider in 1 week    Cisco Hernandez MD  December 13, 2020 4:56 PM

## 2020-12-13 NOTE — TELEPHONE ENCOUNTER
Last Sunday was sick with vomiting and diarrhea, had chills, hot flashes. On bathroom floor for four hours. Now she is better. Appetite is better. Vitals:  pulse 52-55, 166 lbs. now 172 pounds. Her daughter will take Genevieve to the urgent care for them to evaluate her and if they feel it's necessary based on evaluation, they will send her to the ER. I advised they will have her follow up with her primary MD this week. They said they already have an appointment for Wednesday.  Mona Freitas RN  [facility]      Additional Information    Negative: Passed out (i.e., lost consciousness, collapsed and was not responding)    Negative: Shock suspected (e.g., cold/pale/clammy skin, too weak to stand, low BP, rapid pulse)    Negative: Difficult to awaken or acting confused (e.g., disoriented, slurred speech)    Negative: Visible sweat on face or sweat dripping down face    Negative: Unable to walk, or can only walk with assistance (e.g., requires support)    Negative: [1] Received SHOCK from implantable cardiac defibrillator AND [2] persisting symptoms (i.e., palpitations, lightheadedness)    Negative: Sounds like a life-threatening emergency to the triager    Negative: Chest pain    Negative: Difficulty breathing    Negative: Dizziness, lightheadedness, or weakness    Negative: [1] Heart beating very rapidly (e.g., > 140 / minute) AND [2] present now  (Exception: during exercise)    Negative: Heart beating very slowly (e.g., < 50 / minute)  (Exception: athlete)    Negative: New or worsened shortness of breath with activity (dyspnea on exertion)    Negative: Patient sounds very sick or weak to the triager    Negative: [1] Heart beating very rapidly (e.g., > 140 / minute) AND [2] not present now  (Exception: during exercise)    Negative: [1] Skipped or extra beat(s) AND [2] increases with exercise or exertion    Negative: [1] Skipped or extra beat(s) AND [2] occurs 4 or more times per minute    Negative: New or  worsened ankle swelling    Negative: History of heart disease  (i.e., heart attack, bypass surgery, angina, angioplasty, CHF) (Exception: brief heart beat symptoms that went away and now feels well)    Age > 60 years (Exception: brief heart beat symptoms that went away and now feels well)    Protocols used: HEART RATE AND HEARTBEAT [id number]

## 2020-12-13 NOTE — PATIENT INSTRUCTIONS
"Drink lots of fluids  BRAT diet - bananas, rice, applesauce and toast to help with diarrhea  Okay to take simethicone (gas-x) to help with gas and burping  Okay to take omeprazole daily for at least 2 weeks      Patient Education     Viral Gastroenteritis (Adult)    Gastroenteritis is commonly called the \"stomach flu,\" although it has nothing to do with influenza. It is most often caused by a virus that affects the stomach and intestinal tract and usually lasts from 2 to 7 days. Common viruses causing gastroenteritis include norovirus, rotavirus, and hepatitis A. Non-viral causes of gastroenteritis include bacteria, parasites, and toxins.  The danger from repeated vomiting or diarrhea is dehydration. This is the loss of too much fluid from the body. When this occurs, body fluids must be replaced. Antibiotics don't help with this illness because it is usually viral. Simple home treatment will be helpful.  Symptoms of viral gastroenteritis may include:    Watery, loose stools    Stomach pain or abdominal cramps    Fever and chills    Nausea and vomiting    Loss of bowel control    Headache  Home care  Gastroenteritis is transmitted by contact with the stool or vomit of an infected person. This can occur from person to person or from contact with a contaminated surface.  Follow these guidelines when caring for yourself at home:    If symptoms are severe, rest at home for the next 24 hours or until you are feeling better.    Wash your hands with soap and water or use alcohol-based  to prevent the spread of infection. Wash your hands after touching anyone who is sick.    Wash your hands or use alcohol-based  after using the toilet and before meals. Clean the toilet after each use.  Remember these tips when preparing food:    People with diarrhea should not prepare or serve food to others. When preparing foods, wash your hands before and after.    Wash your hands after using cutting boards, countertops, " knives, or utensils that have been in contact with raw food.    Dry your hands with a single use towel.    Keep uncooked meats away from cooked and ready-to-eat foods.  Medicine  You may use acetaminophen or NSAID medicines like ibuprofen or naproxen to control fever unless another medicine was given. If you have chronic liver or kidney disease, talk with your healthcare provider before using these medicines. Also talk with your provider if you've had a stomach ulcer or gastrointestinal bleeding. Don't give aspirin to anyone under 18 years of age who is ill with a fever. It may cause severe liver damage. Don't use NSAIDS is you are already taking one for another condition (like arthritis) or are on aspirin (such as for heart disease or after a stroke).  If medicine for vomiting or diarrhea are prescribed, take these only as directed. Nausea and diarrhea medicines are generally OK unless you have bleeding, fever, or severe abdominal pain.  Diet  Follow these guidelines for food:    Water and liquids are important so you don't get dehydrated. Drink a small amount at a time or suck on ice chips if you are vomiting.    If you eat, avoid fatty, greasy, spicy, or fried foods.    Don't eat dairy if you have diarrhea. This can make diarrhea worse.    Avoid tobacco, alcohol, and caffeine which may worsen symptoms.  During the first 24 hours (the first full day), follow the diet below:    Beverages. Sports drinks, soft drinks without caffeine, ginger ale, mineral water (plain or flavored), decaffeinated tea and coffee. If you are very dehydrated, sports drinks aren't a good choice. They have too much sugar and not enough electrolytes. In this case, commercially available products called oral rehydration solutions, are best.    Soups. Eat clear broth, consommé, and bouillon.    Desserts. Eat gelatin, ice pops, and fruit juice bars.  During the next 24 hours (the second day), you may add the following to the above:    Hot  cereal, plain toast, bread, rolls, and crackers    Plain noodles, rice, mashed potatoes, chicken noodle or rice soup    Unsweetened canned fruit (avoid pineapple), bananas    Limit fat intake to less than 15 grams per day. Do this by avoiding margarine, butter, oils, mayonnaise, sauces, gravies, fried foods, peanut butter, meat, poultry, and fish.    Limit fiber and avoid raw or cooked vegetables, fresh fruits (except bananas), and bran cereals.    Limit caffeine and chocolate. Don't use spices or seasonings other than salt.    Limit dairy products.    Avoid alcohol.  During the next 24 hours:    Gradually resume a normal diet as you feel better and your symptoms improve.    If at any time it starts getting worse again, go back to clear liquids until you feel better.  Follow-up care  Follow up with your healthcare provider, or as advised. Call your provider if you don't get better within 24 hours or if diarrhea lasts more than a week. Also follow up if you are unable to keep down liquids and get dehydrated. If a stool (diarrhea) sample was taken, call as directed for the results.  Call 911  Call 911 if any of these occur:    Trouble breathing    Chest pain    Confused    Severe drowsiness or trouble awakening    Fainting or loss of consciousness    Rapid heart rate    Seizure    Stiff neck  When to seek medical advice  Call your healthcare provider right away if any of these occur:    Abdominal pain that gets worse    Continued vomiting (unable to keep liquids down)    Frequent diarrhea (more than 5 times a day)    Blood in vomit or stool (black or red color)    Dark urine, reduced urine output, or extreme thirst    Weakness or dizziness    Drowsiness    Fever of 100.4 F (38 C) or higher, or as directed by your healthcare provider    New rash  [employer] last reviewed this educational content on 6/1/2018 2000-2020 [employer]. [address] [facility], [place] [address]. All rights reserved. This  information is not intended as a substitute for professional medical care. Always follow your healthcare professional's instructions.           Patient Education     Creatinine (Blood)  Does this test have other names?  Serum creatinine, blood creatinine  What is this test?  This is a blood test that measures how well your kidneys work. Clearing and filtering waste products out of your blood are important kidney functions.  Creatinine is a normal waste product that builds up in your blood from using your muscles. Your body produces creatinine at a constant rate all the time, and healthy kidneys remove almost all of this creatinine. By comparing the amount of creatinine in your blood with a standard normal amount, your healthcare provider can get a good idea of how well your kidneys are working.  Why do I need this test?  You may need this test as part of your regular medical checkup. It's often included in routine blood tests to check your overall health.  You may need this test if you have signs or symptoms of kidney disease. Your risk for kidney disease is higher if you are an older adult, have high blood pressure, have a family history of kidney disease, or have diabetes. You may also be at increased risk if you are , , , , or . Signs and symptoms of kidney disease include:    Frequent tiredness    Swelling in your feet or ankles    Poor appetite    Puffiness around your eyes    Dry, itchy skin    Muscle cramps    Frequent urination    Painful urination    Blood or protein in your urine  If you are being treated for kidney disease, you may also need this test to see how well your treatment is working.  What other tests might I have along with this test?  Your healthcare provider may use your blood creatinine level, along with your age, race, sex, and other factors, to calculate your glomerular filtration rate (GFR). The GFR is considered the best measure  of kidney function.  Blood urea nitrogen (BUN) is another blood test that's often done with a creatinine test. BUN is a waste product that comes from the digestive process. Healthcare providers also measure it to see how your kidneys are functioning.  You may also have a test that measures the amount of creatinine in your urine.  What do my test results mean?  Test results may vary depending on your age, gender, health history, the method used for the test, and other things. Your test results may not mean you have a problem. Ask your healthcare provider what your test results mean for you.   A normal level of creatinine depends on how much muscle mass you have. A normal level for a man is higher than it is for a woman. Children have lower levels than both men and women. Creatinine is measured in milligrams per deciliter (mg/dL). Here are the normal values by age:    0.9 to 1.3 mg/dL for adult males    0.6 to 1.1 mg/dL for adult females    0.5 to 1.0 mg/dL for children ages 3 to 18 years    0.3 to 0.7 mg/dL for children under age 3  If your creatinine is high, it may mean you have:    Kidney disease    Blockage in your urinary system    Muscle disease    Congestive heart failure    Diabetes    Dehydration    Overactive thyroid gland    Shock  If your creatinine is low, it may mean you have:    Muscle loss    Severe liver disease    Not enough protein in your diet  How is this test done?  The test is done with a blood sample. A needle is used to draw blood from a vein in your arm or hand.   Does this test pose any risks?  Having a blood test with a needle carries some risks. These include bleeding, infection, bruising, and feeling lightheaded. When the needle pricks your arm or hand, you may feel a slight sting or pain. Afterward, the site may be sore.  What might affect my test results?  Some factors that could interfere with your creatinine test include:    Pregnancy    Eating a lot of meat recently    Taking large  doses of vitamin C    Taking certain medicines, especially antibiotics  How do I get ready for this test?  You don't need to prepare for this test. Tell your healthcare provider if you are pregnant. Be sure your healthcare provider knows about all medicines, herbs, vitamins, and supplements you are taking. This includes medicines that don't need a prescription and any illicit drugs you may use.  [employer] last reviewed this educational content on 9/1/2017 2000-2020 [employer]. [address] [place] [address]. All rights reserved. This information is not intended as a substitute for professional medical care. Always follow your healthcare professional's instructions.           Patient Education     GERD (Adult)    The esophagus is a tube that carries food from the mouth to the stomach. A valve (the LES, lower esophageal sphincter) at the lower end of the esophagus prevents stomach acid from flowing upward. When this valve doesn't work properly, stomach contents may repeatedly flow back up (reflux) into the esophagus. This is called gastroesophageal reflux disease (GERD). GERD can irritate the esophagus. It can cause problems with pain, swallowing or breathing. In severe cases, GERD can cause recurrent pneumonia (from aspiration or breathing in particles) or other serious problems.  Symptoms of reflux include burning, pressure or sharp pain in the upper abdomen or mid to lower chest. The pain can spread to the neck, back, or shoulder. There may be belching, an acid taste in the back of the throat, chronic cough, or sore throat, or hoarseness. GERD symptoms often occur during the day after a big meal. They can also occur at night when lying down.   Home care  Lifestyle changes can help reduce symptoms. If needed, your healthcare provider may prescribe medicines. Symptoms often improve with treatment, but if treatment is stopped, the symptoms often return after a few months. So most persons  "with GERD will need to continue treatment or get treatment on and off.  Lifestyle changes    Limit or avoid fatty, fried, and spicy foods, as well as coffee, chocolate, mint, and foods with high acid content such as tomatoes and citrus fruit and juices (orange, grapefruit, lemon).    Don t eat large meals, especially at night. Frequent, smaller meals are best. Don't lie down right after eating. And don t eat anything 3 hours before going to bed.    Don't drink alcohol or smoke. As much as possible, stay away from second hand smoke.    If you are overweight, losing weight will reduce symptoms.     Don't wear tight clothing around your stomach area.    If your symptoms occur during sleep, use a foam wedge to elevate your upper body (not just your head.) Or, place 4\" blocks under the head of your bed. Or use 2 bed risers under your bedframe.  Medicines  If needed, medicines can help relieve the symptoms of GERD and prevent damage to the esophagus. Discuss a medicine plan with your healthcare provider. This may include one or more of the following medicines:    Antacids to help neutralize the normal acids in your stomach.    Acid blockers (Histamine or H2 blockers) to decrease acid production.    Acid inhibitors (proton pump inhibitors PPIs) to decrease acid production in a different way than the blockers. They may work better, but can take a little longer to take effect.  Take an antacid 30 to 60 minutes after eating and at bedtime, but not at the same time as an acid blocker.  Try not to take medicines such as ibuprofen and aspirin. If you are taking aspirin for your heart or other medical reasons, talk to your healthcare provider about stopping it.  Follow-up care  Follow up with your healthcare provider or as advised by our staff.  When to seek medical advice  Call your healthcare provider if any of the following occur:    Stomach pain gets worse or moves to the lower right abdomen (appendix area)    Chest pain " appears or gets worse, or spreads to the back, neck, shoulder, or arm    An over-the-counter trial of medicine doesn't relieve your symptoms    Weight loss that can't be explained    Trouble or pain swallowing    Frequent vomiting (can t keep down liquids)    Blood in the stool or vomit (red or black in color)    Feeling weak or dizzy    Fever of 100.4 F (38 C) or higher, or as directed by your healthcare provider  [employer] last reviewed this educational content on 3/1/2018    7706-3780 [employer]. [address] [place] [address]. All rights reserved. This information is not intended as a substitute for professional medical care. Always follow your healthcare professional's instructions.

## 2020-12-14 LAB — MAGNESIUM SERPL-MCNC: 2.3 MG/DL (ref 1.6–2.3)

## 2020-12-16 ENCOUNTER — VIRTUAL VISIT (OUTPATIENT)
Dept: FAMILY MEDICINE | Facility: CLINIC | Age: 80
End: 2020-12-16
Payer: MEDICARE

## 2020-12-16 DIAGNOSIS — R07.81 PLEURITIC CHEST PAIN: ICD-10-CM

## 2020-12-16 DIAGNOSIS — R00.1 BRADYCARDIA: Primary | ICD-10-CM

## 2020-12-16 DIAGNOSIS — R60.1 GENERALIZED EDEMA: ICD-10-CM

## 2020-12-16 DIAGNOSIS — R63.5 WEIGHT GAIN: ICD-10-CM

## 2020-12-16 PROCEDURE — 99214 OFFICE O/P EST MOD 30 MIN: CPT | Mod: 95 | Performed by: INTERNAL MEDICINE

## 2020-12-16 NOTE — PROGRESS NOTES
"Kathie Wetzel is a 80 year old female who is being evaluated via a billable video visit.      [phone]    The patient has been notified of following:     \"This video visit will be conducted via a call between you and your physician/provider. We have found that certain health care needs can be provided without the need for an in-person physical exam.  This service lets us provide the care you need with a video conversation.  If a prescription is necessary we can send it directly to your pharmacy.  If lab work is needed we can place an order for that and you can then stop by our lab to have the test done at a later time.    Video visits are billed at different rates depending on your insurance coverage.  Please reach out to your insurance provider with any questions.    If during the course of the call the physician/provider feels a video visit is not appropriate, you will not be charged for this service.\"    Patient has given verbal consent for Video visit? Yes  How would you like to obtain your AVS? MyChart  If you are dropped from the video visit, the video invite should be resent to: Text to cell phone: [phone]  Will anyone else be joining your video visit? Her daughter will be on the line   Regi  (this is her daughters phone)     Subjective     Kathie Wetzel is a 80 year old female who presents today via video visit for the following health issues:    HPI     States continues to have right sided rib pain and discomfort and may be increasing a little since Gabapentin.   Has started on Medical Marijuana to help with sleep and is taking with her Trazodone.    Daughter, Regi is concerned about how little her mom eats at a given time.  Feels that she eats 3 times a day but only eating smaller amounts.   Has tried Ensure but does not like the flavors.     How many servings of fruits and vegetables do you eat daily?  0-1    On average, how many sweetened beverages do you drink each day (Examples: " soda, juice, sweet tea, etc.  Do NOT count diet or artificially sweetened beverages)?   0    How many days per week do you exercise enough to make your heart beat faster? 7    How many minutes a day do you exercise enough to make your heart beat faster? 20 - 29 walking and stretching     How many days per week do you miss taking your medication? 0    ED/ Followup:    Facility:  [facility]  Date of visit: 12/13/2020  Reason for visit: low pulse, weight increase, decreased appetite  Current Status: Pulse has been running in the  60's.  Weight went up from 166-174.         records reviewed. Pt was seen in  with concerns of Bradycardia.   EKG done 12/13/20 in  and reviewed today: HR 59 and no ectopy noted. No pauses.   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  Medications reviewed.  She has been using medical marijuana fairly regular basis and has found it to be helpful      Objective           Vitals:  No vitals were obtained today due to virtual visit.    Physical Exam     GENERAL: Healthy, alert and no distress  EYES: Eyes grossly normal to inspection.  No discharge or erythema, or obvious  scleral/conjunctival abnormalities.  RESP: No audible wheeze, cough, or visible cyanosis.  No visible retractions or increased work of breathing.    SKIN: Visible skin clear. No significant rash, abnormal pigmentation or lesions.  NEURO: Cranial nerves grossly intact.  Mentation and speech appropriate for age.  PSYCH: Mentation appears normal, affect normal/bright, judgement and insight intact, normal speech and appearance well-groomed.            Assessment & Plan     (R00.1) Bradycardia  (primary encounter diagnosis)  Comment: pt reporting HR in mid 50's, noting intermittent right sided pleuritic pain; hx of empyema s/p VATs procedure. she is on NO BB or CCB meds.   Plan: Echocardiogram Complete, Zio Patch Holter Adult        Pediatric Greater than 48 hrs        Leadless EKG can help under stand duration and rhythm patterns.  It is possible that the medical marijuana may be interacting with her other medications allowing for lower heart rate but certainly not at a dangerous level    (R07.81) Pleuritic chest pain  Comment: She continues to have the right sided pleuritic pain.  Chest x-rays have been done in the past.  Of note she had a right sided empyema requiring a VATs procedure.  While this was successful she may have some scar tissue that is contributing to her discomfort.  She is oxygenating well.  An echocardiogram has been ordered to help assess cardiac function and also help look at that right sided pleural space.  Plan: Echocardiogram Complete          (R63.5) Weight gain  Comment: 9 # weight gain in a week; denies swelling or edema; she had previously lost a fair amount of weight so this weight gain may be getting her back to her normal weight.  She and her daughter will continue to monitor this closely and encourage Genevieve to eat healthy, well-balanced meals.  This is certainly easier to do when she is staying with her daughter and some times more challenging when she is back home in her  apartment.  Plan: Echocardiogram Complete          (R60.1) Generalized edema   Comment: Weight fluctuations have been noted.  Encourage well-balanced meals containing a bit more protein then carbohydrates.  The echocardiogram will help assess cardiac function.  Plan: Echocardiogram Complete                 Regular exercise  Healthy eating    No follow-ups on file.    Cammie Daugherty MD  [facility]      Video-Visit Details    Type of service:  Video Visit    Video End Time: 3:03 PM    Originating Location (pt. Location): Home    Distant Location (provider location):  [facility] [id number]     Platform used for Video Visit: Natalia

## 2021-01-04 ENCOUNTER — HOSPITAL ENCOUNTER (OUTPATIENT)
Dept: CARDIOLOGY | Facility: CLINIC | Age: 81
Discharge: HOME | End: 2021-01-04
Attending: INTERNAL MEDICINE | Admitting: INTERNAL MEDICINE
Payer: MEDICARE

## 2021-01-04 DIAGNOSIS — R00.1 BRADYCARDIA: ICD-10-CM

## 2021-01-04 PROCEDURE — 93244 EXT ECG>48HR<7D REV&INTERPJ: CPT | Performed by: INTERNAL MEDICINE

## 2021-01-04 PROCEDURE — 93242 EXT ECG>48HR<7D RECORDING: CPT

## 2021-01-15 ENCOUNTER — HOSPITAL ENCOUNTER (OUTPATIENT)
Dept: CARDIOLOGY | Facility: CLINIC | Age: 81
Discharge: HOME | End: 2021-01-15
Attending: INTERNAL MEDICINE | Admitting: INTERNAL MEDICINE
Payer: MEDICARE

## 2021-01-15 DIAGNOSIS — R63.5 WEIGHT GAIN: ICD-10-CM

## 2021-01-15 DIAGNOSIS — R60.1 GENERALIZED EDEMA: ICD-10-CM

## 2021-01-15 DIAGNOSIS — R00.1 BRADYCARDIA: ICD-10-CM

## 2021-01-15 DIAGNOSIS — R07.81 PLEURITIC CHEST PAIN: ICD-10-CM

## 2021-01-15 PROCEDURE — 93306 TTE W/DOPPLER COMPLETE: CPT

## 2021-01-15 PROCEDURE — 93306 TTE W/DOPPLER COMPLETE: CPT | Mod: 26 | Performed by: INTERNAL MEDICINE

## 2021-01-30 ENCOUNTER — IMMUNIZATION (OUTPATIENT)
Dept: NURSING | Facility: CLINIC | Age: 81
End: 2021-01-30
Payer: MEDICARE

## 2021-01-30 PROCEDURE — 0001A PR COVID VAC PFIZER DIL RECON 30 MCG/0.3 ML IM: CPT

## 2021-01-30 PROCEDURE — 91300 PR COVID VAC PFIZER DIL RECON 30 MCG/0.3 ML IM: CPT

## 2021-02-02 DIAGNOSIS — I49.8 OTHER CARDIAC ARRHYTHMIA: Primary | ICD-10-CM

## 2021-02-05 NOTE — PROGRESS NOTES
CARDIOLOGY CONSULT    REASON FOR CONSULT: Bradycardia    PRIMARY CARE PHYSICIAN:  Cammie Daugherty    HISTORY OF PRESENT ILLNESS:  81-year-old female seen for bradycardia.  She has hypertension, dyslipidemia, CKD.    At baseline she does some light walking, but nothing more strenuous.  She denies any lightheadedness, dizziness, or syncope.  Blood pressure tends to run 120 with heart rate 60s to 70s on her home monitor.    May 2020 she had pneumonia with a loculated left pleural effusion and underwent thoracotomy.  She has since had some pain around the right side.  She also has some chronic dyspnea.  The pain is not worse with exertion.    7-day ZIO monitor January 2021 showed sinus rhythm, average heart rate 72, nighttime heart rate 50s, 2 SVT runs up to 10 beats, single dropped QRS consistent with second-degree AV block type I, no prolonged pauses or significant bradycardia.    Echo January 2021 showed EF 60%, normal RV, no valve disease, aorta 4.2 cm.    PAST MEDICAL HISTORY:  Past Medical History:   Diagnosis Date     High cholesterol      HTN (hypertension)      Parathyroid adenoma      PONV (postoperative nausea and vomiting)      Thyroid disorder        MEDICATIONS:  Current Outpatient Medications   Medication     atorvastatin (LIPITOR) 10 MG tablet     Cholecalciferol (VITAMIN D PO)     citalopram (CELEXA) 10 MG tablet     donepezil (ARICEPT) 10 MG tablet     gabapentin (NEURONTIN) 100 MG capsule     medical cannabis (Patient's own supply)     Methylcobalamin (B-12) 5000 MCG TBDP     Multiple Vitamins-Minerals (PRESERVISION AREDS PO)     omeprazole (PRILOSEC) 20 MG DR capsule     simethicone (MYLICON) 80 MG chewable tablet     traZODone (DESYREL) 50 MG tablet     No current facility-administered medications for this visit.        ALLERGIES:  Allergies   Allergen Reactions     Simvastatin      Buttock pain     Penicillins Rash       SOCIAL HISTORY:  I have reviewed this patient's social history and updated  "it with pertinent information if needed. Kathie Wetzel  reports that she quit smoking about 48 years ago. She smoked 0.00 packs per day. She has never used smokeless tobacco. She reports current alcohol use. She reports that she does not use drugs.    FAMILY HISTORY:  I have reviewed this patient's family history and updated it with pertinent information if needed.   Family History   Problem Relation Age of Onset     Thyroid Disease Maternal Aunt      Thyroid Disease Other         cousin        REVIEW OF SYSTEMS:  Constitutional:  No weight loss, fever, chills, weakness or fatigue.  HEENT:  Eyes:  No visual loss, blurred vision, double vision or yellow sclerae. No hearing loss, sneezing, congestion, runny nose or sore throat.  Skin:  No rash or itching.  Cardiovascular: per HPI  Respiratory: per HPI  GI:  No anorexia, nausea, vomiting or diarrhea. No abdominal pain or blood.  :  No dysurea, hematuria  Neurologic:  No headache, dizziness, syncope, paralysis, ataxia, numbness or tingling in the extremities. No change in bowel or bladder control.  Musculoskeletal:  No muscle, back pain, joint pain or stiffness.  Hematologic:  No anemia, bleeding or bruising.  Lymphatics:  No enlarged nodes. No history of splenectomy.  Psychiatric:  No history of depression or anxiety.  Endocrine:  No reports of sweating, cold or heat intolerance. No polyuria or polydipsia.  Allergies:  No history of asthma, hives, eczema or rhinitis.    PHYSICAL EXAM:  /82 (BP Location: Right arm, Patient Position: Sitting, Cuff Size: Adult Regular)   Pulse 74   Ht 1.778 m (5' 10\")   Wt 81.6 kg (180 lb)   LMP  (LMP Unknown)   SpO2 96%   BMI 25.83 kg/m      Constitutional: awake, alert, no distress  Eyes: PERRL, sclera nonicteric  ENT: trachea midline  Respiratory: Lungs clear  Cardiovascular: Regular rate and rhythm, no murmurs  GI: nondistended, nontender, bowel sounds present  Lymph/Hematologic: no lymphadenopathy  Skin: dry, no " rash  Musculoskeletal: good muscle tone, strength 5/5 in upper and lower extremities  Neurologic: no focal deficits  Neuropsychiatric: appropriate affact    DATA:  Labs:     Recent Labs   Lab Test 04/13/20  1024 06/05/18  0836   CHOL 126 159   HDL 43* 44*   LDL 65 78   TRIG 89 185*     ASSESSMENT:  81-year-old female seen for bradycardia.  Overall heart rate averages in the 70 range.  She had no sustained bradycardia on her ZIO monitor.  She had a single dropped QRS, but no sustained AV block.  She is not on any rate controlling medication.  ZIO monitor will be repeated in 1 year to make sure there is no progression of any conduction disease.    Her echo was normal.  She had mild dilation of the ascending aorta, repeat echo will be done next year.    Suspect her dyspnea is more pulmonary related.  She could undergo PFTs for further evaluation.    RECOMMENDATIONS:  1.  Brief episode second-degree AV block  - Repeat 3-day ZIO monitor in 1 year    2.  Mild dilation of ascending aorta  -Echocardiogram in 1 year    Follow-up in 1 year with MIRLANDE after ZIO monitor and echo.    Melchor Stevenson MD  Cardiology - [facility]  Pager:  [phone]  Text Page  February 8, 2021

## 2021-02-08 ENCOUNTER — OFFICE VISIT (OUTPATIENT)
Dept: CARDIOLOGY | Facility: CLINIC | Age: 81
End: 2021-02-08
Attending: INTERNAL MEDICINE
Payer: MEDICARE

## 2021-02-08 VITALS
DIASTOLIC BLOOD PRESSURE: 82 MMHG | HEIGHT: 70 IN | SYSTOLIC BLOOD PRESSURE: 127 MMHG | WEIGHT: 180 LBS | HEART RATE: 74 BPM | OXYGEN SATURATION: 96 % | BODY MASS INDEX: 25.77 KG/M2

## 2021-02-08 DIAGNOSIS — I44.1 AV BLOCK, MOBITZ 1: ICD-10-CM

## 2021-02-08 DIAGNOSIS — I77.810 ASCENDING AORTA DILATION (H): Primary | ICD-10-CM

## 2021-02-08 DIAGNOSIS — I49.8 OTHER CARDIAC ARRHYTHMIA: ICD-10-CM

## 2021-02-08 PROCEDURE — 99214 OFFICE O/P EST MOD 30 MIN: CPT | Performed by: INTERNAL MEDICINE

## 2021-02-08 ASSESSMENT — MIFFLIN-ST. JEOR: SCORE: 1361.72

## 2021-02-08 NOTE — LETTER
2/8/2021    Cammie Daugherty MD  [id number] [facility] [address]    RE: Kathie Wetzel       Dear Colleague,    I had the pleasure of seeing Kathie Wetzel in the [facility].    CARDIOLOGY CONSULT    REASON FOR CONSULT: Bradycardia    PRIMARY CARE PHYSICIAN:  Cammie Daugherty    HISTORY OF PRESENT ILLNESS:  81-year-old female seen for bradycardia.  She has hypertension, dyslipidemia, CKD.    At baseline she does some light walking, but nothing more strenuous.  She denies any lightheadedness, dizziness, or syncope.  Blood pressure tends to run 120 with heart rate 60s to 70s on her home monitor.    May 2020 she had pneumonia with a loculated left pleural effusion and underwent thoracotomy.  She has since had some pain around the right side.  She also has some chronic dyspnea.  The pain is not worse with exertion.    7-day ZIO monitor January 2021 showed sinus rhythm, average heart rate 72, nighttime heart rate 50s, 2 SVT runs up to 10 beats, single dropped QRS consistent with second-degree AV block type I, no prolonged pauses or significant bradycardia.    Echo January 2021 showed EF 60%, normal RV, no valve disease, aorta 4.2 cm.    PAST MEDICAL HISTORY:  Past Medical History:   Diagnosis Date     High cholesterol      HTN (hypertension)      Parathyroid adenoma      PONV (postoperative nausea and vomiting)      Thyroid disorder        MEDICATIONS:  Current Outpatient Medications   Medication     atorvastatin (LIPITOR) 10 MG tablet     Cholecalciferol (VITAMIN D PO)     citalopram (CELEXA) 10 MG tablet     donepezil (ARICEPT) 10 MG tablet     gabapentin (NEURONTIN) 100 MG capsule     medical cannabis (Patient's own supply)     Methylcobalamin (B-12) 5000 MCG TBDP     Multiple Vitamins-Minerals (PRESERVISION AREDS PO)     omeprazole (PRILOSEC) 20 MG DR capsule     simethicone (MYLICON) 80 MG chewable tablet     traZODone (DESYREL) 50 MG tablet  "    No current facility-administered medications for this visit.        ALLERGIES:  Allergies   Allergen Reactions     Simvastatin      Buttock pain     Penicillins Rash       SOCIAL HISTORY:  I have reviewed this patient's social history and updated it with pertinent information if needed. Katihe Wetzel  reports that she quit smoking about 48 years ago. She smoked 0.00 packs per day. She has never used smokeless tobacco. She reports current alcohol use. She reports that she does not use drugs.    FAMILY HISTORY:  I have reviewed this patient's family history and updated it with pertinent information if needed.   Family History   Problem Relation Age of Onset     Thyroid Disease Maternal Aunt      Thyroid Disease Other         cousin        REVIEW OF SYSTEMS:  Constitutional:  No weight loss, fever, chills, weakness or fatigue.  HEENT:  Eyes:  No visual loss, blurred vision, double vision or yellow sclerae. No hearing loss, sneezing, congestion, runny nose or sore throat.  Skin:  No rash or itching.  Cardiovascular: per HPI  Respiratory: per HPI  GI:  No anorexia, nausea, vomiting or diarrhea. No abdominal pain or blood.  :  No dysurea, hematuria  Neurologic:  No headache, dizziness, syncope, paralysis, ataxia, numbness or tingling in the extremities. No change in bowel or bladder control.  Musculoskeletal:  No muscle, back pain, joint pain or stiffness.  Hematologic:  No anemia, bleeding or bruising.  Lymphatics:  No enlarged nodes. No history of splenectomy.  Psychiatric:  No history of depression or anxiety.  Endocrine:  No reports of sweating, cold or heat intolerance. No polyuria or polydipsia.  Allergies:  No history of asthma, hives, eczema or rhinitis.    PHYSICAL EXAM:  /82 (BP Location: Right arm, Patient Position: Sitting, Cuff Size: Adult Regular)   Pulse 74   Ht 1.778 m (5' 10\")   Wt 81.6 kg (180 lb)   LMP  (LMP Unknown)   SpO2 96%   BMI 25.83 kg/m      Constitutional: awake, alert, no " distress  Eyes: PERRL, sclera nonicteric  ENT: trachea midline  Respiratory: Lungs clear  Cardiovascular: Regular rate and rhythm, no murmurs  GI: nondistended, nontender, bowel sounds present  Lymph/Hematologic: no lymphadenopathy  Skin: dry, no rash  Musculoskeletal: good muscle tone, strength 5/5 in upper and lower extremities  Neurologic: no focal deficits  Neuropsychiatric: appropriate affact    DATA:  Labs:     Recent Labs   Lab Test 04/13/20  1024 06/05/18  0836   CHOL 126 159   HDL 43* 44*   LDL 65 78   TRIG 89 185*     ASSESSMENT:  81-year-old female seen for bradycardia.  Overall heart rate averages in the 70 range.  She had no sustained bradycardia on her ZIO monitor.  She had a single dropped QRS, but no sustained AV block.  She is not on any rate controlling medication.  ZIO monitor will be repeated in 1 year to make sure there is no progression of any conduction disease.    Her echo was normal.  She had mild dilation of the ascending aorta, repeat echo will be done next year.    Suspect her dyspnea is more pulmonary related.  She could undergo PFTs for further evaluation.    RECOMMENDATIONS:  1.  Brief episode second-degree AV block  - Repeat 3-day ZIO monitor in 1 year    2.  Mild dilation of ascending aorta  -Echocardiogram in 1 year    Follow-up in 1 year with MIRLANDE after ZIO monitor and echo.    Melchor Stevenson MD  Cardiology - [facility]  Pager:  [phone]  Text Page  February 8, 2021      Thank you for allowing me to participate in the care of your patient.    Sincerely,     Melchor Stevenson MD     [facility]

## 2021-02-08 NOTE — PATIENT INSTRUCTIONS
Your recent heart monitor showed that there were a few times where your heart would slow down a little or skipped a heartbeat.  However this is not serious enough to warrant any further treatment or pacemaker.     Your echocardiogram showed that your heart function and heart valves are normal.  The aorta, which is the main blood vessel that comes off the top of the heart, was a few millimeters larger than average size.  This would not cause you any symptoms or cause any problems, but is something that should be monitored periodically.    We would like to see you back in about 1 years time and repeat another heart monitor and another heart ultrasound to see if there are any changes.

## 2021-02-20 ENCOUNTER — IMMUNIZATION (OUTPATIENT)
Dept: NURSING | Facility: CLINIC | Age: 81
End: 2021-02-20
Payer: MEDICARE

## 2021-02-20 PROCEDURE — 0002A PR COVID VAC PFIZER DIL RECON 30 MCG/0.3 ML IM: CPT

## 2021-02-20 PROCEDURE — 91300 PR COVID VAC PFIZER DIL RECON 30 MCG/0.3 ML IM: CPT

## 2021-03-03 DIAGNOSIS — G47.9 SLEEP DISTURBANCE: ICD-10-CM

## 2021-03-04 NOTE — TELEPHONE ENCOUNTER
Routing refill request to provider for review/approval because:  Drug interaction warning    Nicole Piña RN

## 2021-03-05 RX ORDER — TRAZODONE HYDROCHLORIDE 50 MG/1
TABLET, FILM COATED ORAL
Qty: 180 TABLET | Refills: 1 | Status: SHIPPED | OUTPATIENT
Start: 2021-03-05 | End: 2021-08-03

## 2021-04-23 ENCOUNTER — TRANSFERRED RECORDS (OUTPATIENT)
Dept: HEALTH INFORMATION MANAGEMENT | Facility: CLINIC | Age: 81
End: 2021-04-23

## 2021-06-25 ENCOUNTER — APPOINTMENT (OUTPATIENT)
Dept: MRI IMAGING | Facility: CLINIC | Age: 81
End: 2021-06-25
Attending: EMERGENCY MEDICINE
Payer: MEDICARE

## 2021-06-25 ENCOUNTER — HOSPITAL ENCOUNTER (OUTPATIENT)
Facility: CLINIC | Age: 81
Setting detail: OBSERVATION
Discharge: HOME | End: 2021-06-26
Attending: EMERGENCY MEDICINE | Admitting: INTERNAL MEDICINE
Payer: MEDICARE

## 2021-06-25 DIAGNOSIS — R26.9 ABNORMAL GAIT: ICD-10-CM

## 2021-06-25 DIAGNOSIS — I95.1 ORTHOSTATIC HYPOTENSION: Primary | ICD-10-CM

## 2021-06-25 DIAGNOSIS — R42 DIZZINESS: ICD-10-CM

## 2021-06-25 LAB
ALBUMIN SERPL-MCNC: 3.5 G/DL (ref 3.4–5)
ALBUMIN UR-MCNC: NEGATIVE MG/DL
ALP SERPL-CCNC: 60 U/L (ref 40–150)
ALT SERPL W P-5'-P-CCNC: 19 U/L (ref 0–50)
ANION GAP SERPL CALCULATED.3IONS-SCNC: <1 MMOL/L (ref 3–14)
APPEARANCE UR: CLEAR
AST SERPL W P-5'-P-CCNC: 11 U/L (ref 0–45)
BASOPHILS # BLD AUTO: 0.1 10E9/L (ref 0–0.2)
BASOPHILS NFR BLD AUTO: 1.4 %
BILIRUB SERPL-MCNC: 0.5 MG/DL (ref 0.2–1.3)
BILIRUB UR QL STRIP: NEGATIVE
BUN SERPL-MCNC: 20 MG/DL (ref 7–30)
CALCIUM SERPL-MCNC: 9.1 MG/DL (ref 8.5–10.1)
CHLORIDE SERPL-SCNC: 108 MMOL/L (ref 94–109)
CO2 SERPL-SCNC: 32 MMOL/L (ref 20–32)
COLOR UR AUTO: NORMAL
CREAT SERPL-MCNC: 0.95 MG/DL (ref 0.52–1.04)
DIFFERENTIAL METHOD BLD: ABNORMAL
EOSINOPHIL # BLD AUTO: 0.3 10E9/L (ref 0–0.7)
EOSINOPHIL NFR BLD AUTO: 6.4 %
ERYTHROCYTE [DISTWIDTH] IN BLOOD BY AUTOMATED COUNT: 12.8 % (ref 10–15)
GFR SERPL CREATININE-BSD FRML MDRD: 56 ML/MIN/{1.73_M2}
GLUCOSE SERPL-MCNC: 96 MG/DL (ref 70–99)
GLUCOSE UR STRIP-MCNC: NEGATIVE MG/DL
HCT VFR BLD AUTO: 42.6 % (ref 35–47)
HGB BLD-MCNC: 13.3 G/DL (ref 11.7–15.7)
HGB UR QL STRIP: NEGATIVE
IMM GRANULOCYTES # BLD: 0 10E9/L (ref 0–0.4)
IMM GRANULOCYTES NFR BLD: 0.4 %
INTERPRETATION ECG - MUSE: NORMAL
KETONES UR STRIP-MCNC: NEGATIVE MG/DL
LABORATORY COMMENT REPORT: NORMAL
LEUKOCYTE ESTERASE UR QL STRIP: NEGATIVE
LYMPHOCYTES # BLD AUTO: 1.3 10E9/L (ref 0.8–5.3)
LYMPHOCYTES NFR BLD AUTO: 27.5 %
MCH RBC QN AUTO: 28.3 PG (ref 26.5–33)
MCHC RBC AUTO-ENTMCNC: 31.2 G/DL (ref 31.5–36.5)
MCV RBC AUTO: 91 FL (ref 78–100)
MONOCYTES # BLD AUTO: 0.4 10E9/L (ref 0–1.3)
MONOCYTES NFR BLD AUTO: 7.6 %
NEUTROPHILS # BLD AUTO: 2.8 10E9/L (ref 1.6–8.3)
NEUTROPHILS NFR BLD AUTO: 56.7 %
NITRATE UR QL: NEGATIVE
NRBC # BLD AUTO: 0 10*3/UL
NRBC BLD AUTO-RTO: 0 /100
PH UR STRIP: 6.5 PH (ref 5–7)
PLATELET # BLD AUTO: 218 10E9/L (ref 150–450)
POTASSIUM SERPL-SCNC: 4.4 MMOL/L (ref 3.4–5.3)
PROT SERPL-MCNC: 7.1 G/DL (ref 6.8–8.8)
RBC # BLD AUTO: 4.7 10E12/L (ref 3.8–5.2)
RBC #/AREA URNS AUTO: 1 /HPF (ref 0–2)
SARS-COV-2 RNA RESP QL NAA+PROBE: NEGATIVE
SODIUM SERPL-SCNC: 140 MMOL/L (ref 133–144)
SOURCE: NORMAL
SP GR UR STRIP: 1.01 (ref 1–1.03)
SPECIMEN SOURCE: NORMAL
SQUAMOUS #/AREA URNS AUTO: <1 /HPF (ref 0–1)
TROPONIN I SERPL-MCNC: <0.015 UG/L (ref 0–0.04)
UROBILINOGEN UR STRIP-MCNC: 0 MG/DL (ref 0–2)
WBC # BLD AUTO: 4.9 10E9/L (ref 4–11)
WBC #/AREA URNS AUTO: 1 /HPF (ref 0–5)

## 2021-06-25 PROCEDURE — 250N000013 HC RX MED GY IP 250 OP 250 PS 637: Performed by: INTERNAL MEDICINE

## 2021-06-25 PROCEDURE — 93005 ELECTROCARDIOGRAM TRACING: CPT

## 2021-06-25 PROCEDURE — 258N000003 HC RX IP 258 OP 636: Performed by: EMERGENCY MEDICINE

## 2021-06-25 PROCEDURE — 80053 COMPREHEN METABOLIC PANEL: CPT | Performed by: EMERGENCY MEDICINE

## 2021-06-25 PROCEDURE — C9803 HOPD COVID-19 SPEC COLLECT: HCPCS

## 2021-06-25 PROCEDURE — 250N000013 HC RX MED GY IP 250 OP 250 PS 637: Performed by: NURSE PRACTITIONER

## 2021-06-25 PROCEDURE — 87635 SARS-COV-2 COVID-19 AMP PRB: CPT | Performed by: EMERGENCY MEDICINE

## 2021-06-25 PROCEDURE — 85025 COMPLETE CBC W/AUTO DIFF WBC: CPT | Performed by: EMERGENCY MEDICINE

## 2021-06-25 PROCEDURE — 84484 ASSAY OF TROPONIN QUANT: CPT | Performed by: EMERGENCY MEDICINE

## 2021-06-25 PROCEDURE — 258N000003 HC RX IP 258 OP 636: Performed by: INTERNAL MEDICINE

## 2021-06-25 PROCEDURE — G0378 HOSPITAL OBSERVATION PER HR: HCPCS

## 2021-06-25 PROCEDURE — 70551 MRI BRAIN STEM W/O DYE: CPT | Mod: ME

## 2021-06-25 PROCEDURE — 99285 EMERGENCY DEPT VISIT HI MDM: CPT | Mod: 25

## 2021-06-25 PROCEDURE — 99220 PR INITIAL OBSERVATION CARE,LEVEL III: CPT | Performed by: INTERNAL MEDICINE

## 2021-06-25 PROCEDURE — 81001 URINALYSIS AUTO W/SCOPE: CPT | Performed by: EMERGENCY MEDICINE

## 2021-06-25 RX ORDER — SODIUM CHLORIDE 9 MG/ML
INJECTION, SOLUTION INTRAVENOUS CONTINUOUS
Status: DISCONTINUED | OUTPATIENT
Start: 2021-06-25 | End: 2021-06-25

## 2021-06-25 RX ORDER — CITALOPRAM HYDROBROMIDE 10 MG/1
10 TABLET ORAL DAILY
Status: DISCONTINUED | OUTPATIENT
Start: 2021-06-25 | End: 2021-06-26 | Stop reason: HOSPADM

## 2021-06-25 RX ORDER — SODIUM CHLORIDE, SODIUM LACTATE, POTASSIUM CHLORIDE, CALCIUM CHLORIDE 600; 310; 30; 20 MG/100ML; MG/100ML; MG/100ML; MG/100ML
INJECTION, SOLUTION INTRAVENOUS CONTINUOUS
Status: DISCONTINUED | OUTPATIENT
Start: 2021-06-25 | End: 2021-06-26 | Stop reason: HOSPADM

## 2021-06-25 RX ORDER — ACETAMINOPHEN 650 MG/1
650 SUPPOSITORY RECTAL EVERY 4 HOURS PRN
Status: DISCONTINUED | OUTPATIENT
Start: 2021-06-25 | End: 2021-06-26 | Stop reason: HOSPADM

## 2021-06-25 RX ORDER — TRAZODONE HYDROCHLORIDE 50 MG/1
100 TABLET, FILM COATED ORAL
Status: DISCONTINUED | OUTPATIENT
Start: 2021-06-25 | End: 2021-06-26 | Stop reason: HOSPADM

## 2021-06-25 RX ORDER — DONEPEZIL HYDROCHLORIDE 10 MG/1
10 TABLET, FILM COATED ORAL AT BEDTIME
Status: DISCONTINUED | OUTPATIENT
Start: 2021-06-25 | End: 2021-06-26 | Stop reason: HOSPADM

## 2021-06-25 RX ORDER — MULTIVITAMIN,THERAPEUTIC
1 TABLET ORAL DAILY
COMMUNITY

## 2021-06-25 RX ORDER — ACETAMINOPHEN 325 MG/1
650 TABLET ORAL EVERY 4 HOURS PRN
Status: DISCONTINUED | OUTPATIENT
Start: 2021-06-25 | End: 2021-06-26 | Stop reason: HOSPADM

## 2021-06-25 RX ORDER — ONDANSETRON 2 MG/ML
4 INJECTION INTRAMUSCULAR; INTRAVENOUS EVERY 6 HOURS PRN
Status: DISCONTINUED | OUTPATIENT
Start: 2021-06-25 | End: 2021-06-26 | Stop reason: HOSPADM

## 2021-06-25 RX ORDER — ONDANSETRON 4 MG/1
4 TABLET, ORALLY DISINTEGRATING ORAL EVERY 6 HOURS PRN
Status: DISCONTINUED | OUTPATIENT
Start: 2021-06-25 | End: 2021-06-26 | Stop reason: HOSPADM

## 2021-06-25 RX ADMIN — DONEPEZIL HYDROCHLORIDE 10 MG: 10 TABLET ORAL at 22:25

## 2021-06-25 RX ADMIN — TRAZODONE HYDROCHLORIDE 100 MG: 50 TABLET ORAL at 22:45

## 2021-06-25 RX ADMIN — SODIUM CHLORIDE 1000 ML: 9 INJECTION, SOLUTION INTRAVENOUS at 08:00

## 2021-06-25 RX ADMIN — CITALOPRAM HYDROBROMIDE 10 MG: 10 TABLET ORAL at 17:28

## 2021-06-25 RX ADMIN — SODIUM CHLORIDE, POTASSIUM CHLORIDE, SODIUM LACTATE AND CALCIUM CHLORIDE: 600; 310; 30; 20 INJECTION, SOLUTION INTRAVENOUS at 17:29

## 2021-06-25 ASSESSMENT — MIFFLIN-ST. JEOR: SCORE: 1329.97

## 2021-06-25 ASSESSMENT — ENCOUNTER SYMPTOMS
TREMORS: 1
NAUSEA: 1
DIZZINESS: 1
VOMITING: 0
NECK PAIN: 0

## 2021-06-25 NOTE — H&P
"[facility]    History and Physical - Hospitalist Service       Date of Admission:  6/25/2021    Assessment & Plan      Kathie Wetzel is a 81 year old female with a history of HTN, HLP and parathyroid adenoma who presented to the ED on 6/25/2021 with dizziness/light headedness    Gait instability vs light headedness  This morning the patient woke up to use the restroom.  When she stood up she reports \"dizziness\" with feeling as if her knees would buckle.  Upon further discussion it sounded more like a light headedness with some gait unsteadiness.  She was eventually able to make it to the restroom and back but her light headedness persisted. This prompted her calling her daughter and when her daughter arrived she noted the patient's home to be 80 degrees.  The daughter was then able to help her on to her walker and bring her in to the ED.   In the ED CBC and BMP are essentially normal.  UA did not show evidence of UTI.  EKG without evidence of arrhythmia or infarction  * MRI brain:  1. No acute intracranial abnormality.  2. Brain atrophy and presumed chronic small vessel ischemic disease, as described.  3. 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 No midline shift/herniation.  4. Moderate polypoid mucosal thickening in the right maxillary sinus has increased compared to the prior exam.  - Admission to observation   - Continue IVF with LR at 75 mL/hr. Has already received 1 L of NS in the ED  - Orthostatic vital signs   - PT consulted for vestibular evaluation   - Neurology consulted     HTN   HLP   Blood pressures appear controlled and not currently on any anti-hypertensives  - Holding PTA Lipitor as observation status     Cognitive impairment  - PTA Aricept   - PTA Celexa          Diet:   Regular diet   DVT Prophylaxis: Low Risk/Ambulatory with no VTE prophylaxis indicated  Ramirez Catheter: Not present  Central Lines: " "None  Code Status:   DNR/DNI    Risk Factors Present on Admission                   Disposition Plan   Expected discharge: Tomorrow, recommended to prior living arrangement once evaluation complete.     The patient's care was discussed with the Patient, Patient's Family and ED provider.    Valdo Ross DO  [employer]  Securely message with the Vocera Web Console (learn more here)  Text page via [employer] Paging/Directory      ______________________________________________________________________    Chief Complaint   Dizziness    History is obtained from the patient    History of Present Illness   Kathie Wetzel is a 81 year old female ith a history of HTN, HLP and parathyroid adenoma who presented to the ED on 6/25/2021 with dizziness/light headedness.  This morning the patient woke up to use the restroom.  When she stood up she reports \"dizziness\" with feeling as if her knees would buckle.  Upon further discussion it sounded more like a light headedness with some gait unsteadiness.  She was eventually able to make it to the restroom and back but her light headedness persisted. This prompted her calling her daughter and when her daughter arrived she noted the patient's home to be 80 degrees.  The daughter was then able to help her on to her walker and bring her in to the ED.  Currently the patient only has mild light headedness.  She denies any fevers, chills, chest pain, SOB, abdominal pain, N/V/D, diaphoresis, difficulties with urination.      Review of Systems    The 10 point Review of Systems is negative other than noted in the HPI    Past Medical History    I have reviewed this patient's medical history and updated it with pertinent information if needed.   Past Medical History:   Diagnosis Date     High cholesterol      HTN (hypertension)      Parathyroid adenoma      PONV (postoperative nausea and vomiting)      Thyroid disorder        Past Surgical History   I have reviewed this " patient's surgical history and updated it with pertinent information if needed.  Past Surgical History:   Procedure Laterality Date     APPENDECTOMY OPEN       CHOLECYSTECTOMY       DISCECTOMY LUMBAR POSTERIOR MICROSCOPIC ONE LEVEL Right 11/10/2017    Procedure: DISCECTOMY LUMBAR POSTERIOR MICROSCOPIC ONE LEVEL;  Right L3-4 hemilaminectomy, medial facetectomy, foraminotomy with microdiscectomy and use of X-ray and intraoperative microscope ;  Surgeon: Al Dailey MD;  Location: [facility] OR     PARATHYROIDECTOMY  3/14/2014    Procedure: PARATHYROIDECTOMY;  Neck Exploration, Excision Right  Parathyroid Adenoma, Pre Operative Sestimibi Injection, Rapid PTH Assay ;  Surgeon: Natividad Fischer MD;  Location: [facility] OR     THORACOSCOPIC DECORTICATION LUNG Right 2020    Procedure: RIGHT VIDEO ASSISTED THORACOSCOPY, RIGHT THORACOTOMY, RIGHT DECORTICATION, RIGHT PARIETAL PLEURECTOMY;  Surgeon: Gary Shah MD;  Location: [facility] OR     THORACOTOMY Right 2020    Procedure: THORACOTOMY ;  Surgeon: Gary Shah MD;  Location:  OR       Social History   I have reviewed this patient's social history and updated it with pertinent information if needed.  Social History     Tobacco Use     Smoking status: Former Smoker     Packs/day: 0.00     Quit date: 3/10/1972     Years since quittin.3     Smokeless tobacco: Never Used   Substance Use Topics     Alcohol use: Yes     Comment: 2 week      Drug use: No       Family History   I have reviewed this patient's family history and updated it with pertinent information if needed.  Family History   Problem Relation Age of Onset     Thyroid Disease Maternal Aunt      Thyroid Disease Other         cousin        Prior to Admission Medications   Prior to Admission Medications   Prescriptions Last Dose Informant Patient Reported? Taking?   Methylcobalamin (B-12) 5000 MCG TBDP 2021 at am  No Yes   Sig: Take 5,000 mcg by mouth daily   Multiple  Vitamins-Minerals (PRESERVISION AREDS) CAPS 6/24/2021 at am  Yes Yes   Sig: Take 1 tablet by mouth every morning    atorvastatin (LIPITOR) 10 MG tablet 6/24/2021 at pm  No Yes   Sig: Take 1 tablet (10 mg) by mouth At Bedtime TAKE 1 TABLET(10 MG) BY MOUTH AT BEDTIME   cholecalciferol 25 MCG (1000 UT) TABS 6/24/2021 at am  Yes Yes   Sig: Take 1 tablet by mouth daily    citalopram (CELEXA) 10 MG tablet 6/24/2021 at am  No Yes   Sig: TAKE 1 TABLET(10 MG) BY MOUTH EVERY MORNING   donepezil (ARICEPT) 10 MG tablet 6/24/2021 at pm  No Yes   Sig: Take 1 tablet (10 mg) by mouth At Bedtime   medical cannabis (Patient's own supply)   Yes Yes   Sig: Take 1 Dose by mouth See Admin Instructions (The purpose of this order is to document that the patient reports taking medical cannabis.  This is not a prescription, and is not used to certify that the patient has a qualifying medical condition.)   4.3 mg THC and .7mg CBD.   ( at bedtime)   multivitamin, therapeutic (THERA-VIT) TABS tablet 6/24/2021 at am  Yes Yes   Sig: Take 1 tablet by mouth daily   traZODone (DESYREL) 50 MG tablet 6/24/2021 at pm  No Yes   Sig: TAKE 2 TABLETS(100 MG) BY MOUTH AT BEDTIME      Facility-Administered Medications: None     Allergies   Allergies   Allergen Reactions     Simvastatin      Buttock pain     Penicillins Rash       Physical Exam   Vital Signs: Temp: 96.8  F (36  C) Temp src: Temporal BP: 133/69 Pulse: 71   Resp: 16 SpO2: 96 % O2 Device: None (Room air)    Weight: 173 lbs 0 oz    General Appearance: Resting comfortably.  NAD  Eyes: EOMI.  Normal conjuctiva  HEENT: NC/AT.  Moist mucous membranes  Respiratory: Clear to auscultation.  No respiratory distress  Cardiovascular: RRR.  No obvious murmurs  GI: Soft.  Non-distended   Skin: No obvious rashes or cyanosis  Musculoskeletal: No edema.  No calf tenderness  Neurologic: CN appear intact.  Moving all extremities grossly.  Did not assess gait   Psychiatric: Alert and pleasant     Data   Data  reviewed today: I reviewed all medications, new labs and imaging results over the last 24 hours. I personally reviewed MRI results as below  EKG:  NSR.  No evidence of acute ischemia     Recent Labs   Lab 06/25/21  0759   WBC 4.9   HGB 13.3   MCV 91         POTASSIUM 4.4   CHLORIDE 108   CO2 32   BUN 20   CR 0.95   ANIONGAP <1*   OMEGA 9.1   GLC 96   ALBUMIN 3.5   PROTTOTAL 7.1   BILITOTAL 0.5   ALKPHOS 60   ALT 19   AST 11   TROPI <0.015     Recent Results (from the past 24 hour(s))   MR Brain w/o Contrast    Narrative    MRI BRAIN WITHOUT CONTRAST June 25, 2021 10:49 AM    HISTORY: Transient ischemic attack (TIA).    TECHNIQUE: Multiplanar, multisequence MRI of the brain without  gadolinium IV contrast material.      COMPARISON: MRI head 3/20/2018.    FINDINGS: No abnormal intracranial restricted diffusion to suggest  acute infarct. The ventricles are normal in size and configuration.  There is moderate generalized brain parenchymal volume loss. Mild to  moderate scattered nonspecific patchy T2 FLAIR hyperintense signal in  the cerebral white matter, as before, most likely representing chronic  small vessel ischemic disease. As before, there is prominence of the  extra-axial space overlying the lateral aspect of both frontal lobes  with mild associated mass effect, likely representing small subdural  hygromas. These appear to measure up to approximate 14 mm in thickness  on the left and 11 mm in thickness on the right, unchanged. No  evidence for acute intracranial hemorrhage or new/progressive mass  effect. There is no herniation.    The major arterial flow voids of the skull base are grossly  maintained. There is moderate polypoid mucosal thickening in the right  maxillary sinus with redemonstrated retention cyst in the right  maxillary sinus. Mild scattered paranasal sinus mucosal thickening  elsewhere. Bilateral lens replacements. The calvarium, skull base, and  midface otherwise appear grossly  unremarkable.      Impression    IMPRESSION:  1. No acute intracranial abnormality.  2. Brain atrophy and presumed chronic small vessel ischemic disease,  as described.  3. Unchanged bilateral frontal convexity chronic probable subdural  hygromas with mild associated mass effect. No midline  shift/herniation.  4. Moderate polypoid mucosal thickening in the right maxillary sinus  has increased compared to the prior exam.

## 2021-06-25 NOTE — ED NOTES
Patient resting in bed with daughter at bedside. Given coffee per request. Will continue to monitor.

## 2021-06-25 NOTE — PHARMACY-ADMISSION MEDICATION HISTORY
Pharmacy Medication History  Admission medication history interview status for the 6/25/2021  admission is complete. See EPIC admission navigator for prior to admission medications     Location of Interview: Patient room  Medication history sources: Patient    Significant changes made to the medication list:  Stopped omeprazole and simethicone    In the past week, patient estimated taking medication this percent of the time: greater than 90%    Additional medication history information:   none    Medication reconciliation completed by provider prior to medication history? No    Time spent in this activity: 10 minutes    Prior to Admission medications    Medication Sig Last Dose Taking? Auth Provider   atorvastatin (LIPITOR) 10 MG tablet Take 1 tablet (10 mg) by mouth At Bedtime TAKE 1 TABLET(10 MG) BY MOUTH AT BEDTIME 6/24/2021 at pm Yes Cammie Daugherty MD   cholecalciferol 25 MCG (1000 UT) TABS Take 1 tablet by mouth daily  6/24/2021 at am Yes Unknown, Entered By History   citalopram (CELEXA) 10 MG tablet TAKE 1 TABLET(10 MG) BY MOUTH EVERY MORNING 6/24/2021 at am Yes Cammie Daugherty MD   donepezil (ARICEPT) 10 MG tablet Take 1 tablet (10 mg) by mouth At Bedtime 6/24/2021 at pm Yes Cammie Daugherty MD   medical cannabis (Patient's own supply) Take 1 Dose by mouth See Admin Instructions (The purpose of this order is to document that the patient reports taking medical cannabis.  This is not a prescription, and is not used to certify that the patient has a qualifying medical condition.)   4.3 mg THC and .7mg CBD.   ( at bedtime)  Yes Nicolás Khanna MD   Methylcobalamin (B-12) 5000 MCG TBDP Take 5,000 mcg by mouth daily  Patient taking differently: Take 5,000 mcg by mouth every morning  6/24/2021 at am Yes Cammie Daugherty MD   Multiple Vitamins-Minerals (PRESERVISION AREDS) CAPS Take 1 tablet by mouth every morning  6/24/2021 at am Yes Reported, Patient   traZODone (DESYREL) 50 MG tablet TAKE 2  TABLETS(100 MG) BY MOUTH AT BEDTIME 6/24/2021 at pm Yes Cammie Daugherty MD       The information provided in this note is only as accurate as the sources available at the time of update(s)     Maura Sprague, PharmD  Inpatient Clinical Pharmacist  [phone]

## 2021-06-25 NOTE — PROVIDER NOTIFICATION
"MD Notification    Notified Person: MD    Notified Person Name:  Ross    Notification Date/Time: 15:05    Notification Interaction: page    Purpose of Notification: \"FYI: Orthostatics-  Lyin/70, HR- 63  Sittin/71, HR- 67  Standin/67, HR- 81  Also, can you please enter diet order?   Thanks!\"    Orders Received: regular diet, continue with IV fluids    "

## 2021-06-25 NOTE — PLAN OF CARE
PRIMARY DIAGNOSIS: GENERALIZED WEAKNESS    OUTPATIENT/OBSERVATION GOALS TO BE MET BEFORE DISCHARGE  1. Orthostatic performed: Yes:          Lying Orthostatic BP: 134/70         Sitting Orthostatic BP: 114/71         Standing Orthostatic BP: 125/67     2. Tolerating PO medications: Yes    3. Return to near baseline physical activity: No    4. Cleared for discharge by consultants (if involved): No    Discharge Planner Nurse   Safe discharge environment identified: No  Barriers to discharge: Yes       Entered by: Evelyn Sharpe 06/25/2021 4:12 PM     Please review provider order for any additional goals.   Nurse to notify provider when observation goals have been met and patient is ready for discharge.    A&Ox4. Assist x2, GB and walker to stand at bedside. BUE tremors. PT and neuro consulted. Regular diet. Denies pain.

## 2021-06-25 NOTE — ED NOTES
[facility]  ED Nurse Handoff Report    ED Chief complaint: Dizziness and Generalized Weakness      ED Diagnosis:   Final diagnoses:   None       Code Status: Full Code    Allergies:   Allergies   Allergen Reactions     Simvastatin      Buttock pain     Penicillins Rash       Patient Story: Sudden onset of dizziness and weakness since this morning 0500. On the way to bathroom felt lightheaded and dizzy, feeling too shaky to walk, family stated that she was unable to walker and just ended up sitting on her walker to get pushed around this morning.  On recheck post MRI patient was unable to walk.  Unable to take a step when up to ambulated in the room, she is super shaky and insisted oon sitting down right away.  Reports light headed and a little dizzy.   Focused Assessment:   Labs Ordered and Resulted from Time of ED Arrival Up to the Time of Departure from the ED   CBC WITH PLATELETS DIFFERENTIAL - Abnormal; Notable for the following components:       Result Value    MCHC 31.2 (*)     All other components within normal limits   COMPREHENSIVE METABOLIC PANEL - Abnormal; Notable for the following components:    Anion Gap <1 (*)     GFR Estimate 56 (*)     All other components within normal limits   TROPONIN I   ROUTINE UA WITH MICROSCOPIC   PATIENT CARE ORDER     MR Brain w/o Contrast   Preliminary Result   IMPRESSION:   1. No acute intracranial abnormality.   2. Brain atrophy and presumed chronic small vessel ischemic disease,   as described.   3. 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 No midline   shift/herniation.   4. Moderate polypoid mucosal thickening in the right maxillary sinus   has increased compared to the prior exam.            Treatments and/or interventions provided: MAR: fluids MRI to R/O changes  Patient's response to treatments and/or interventions: good    To be done/followed up on inpatient unit:  Neurology or  "PT/OT consult for vertigo    Does this patient have any cognitive concerns?: NA    Activity level - Baseline/Home:  Wheelchair  Activity Level - Current:   Wheelchair    Patient's Preferred language: English   Needed?: No    Isolation: None  Infection: Not Applicable  Patient tested for COVID 19 prior to admission: YES  Bariatric?: No    Vital Signs:   Vitals:    06/25/21 0723   BP: 119/62   Pulse: 73   Resp: 16   Temp: 96.8  F (36  C)   TempSrc: Temporal   SpO2: 98%   Weight: 78.5 kg (173 lb)   Height: 1.778 m (5' 10\")       Cardiac Rhythm:     Was the PSS-3 completed:   Yes  What interventions are required if any?               Family Comments: Daughter is at the BS  OBS brochure/video discussed/provided to patient/family: Yes              Observation Brochure and Video    Patient and family informed of observation status based on provider's order.  Observation brochure was given and the video watched. Patient/Family stated understanding. Questions answered.  Kiki Lopez RN        For the majority of the shift this patient's behavior was Green.   Behavioral interventions performed were NA.    ED NURSE PHONE NUMBER: [id number]         "

## 2021-06-25 NOTE — ED TRIAGE NOTES
Sudden onset of dizziness and weakness since this morning 0500. On the way to bathroom felt lightheaded and dizzy, feeling too shaky to walk.

## 2021-06-25 NOTE — ED NOTES
Unable to take a step when up to ambulated in the room, she is super shaky and insisted oon sitting down right away.  Reports light headed and a little dizzy.

## 2021-06-25 NOTE — ED PROVIDER NOTES
History     Chief Complaint:  Dizziness     HPI   Kathie Wetzel is a 81 year old female with history of hypertension, hyperlipidemia, CKD, parathyroid adenoma, who presents for evaluation of dizziness. The patient reports that at 0500 this morning she got up to use the bathroom when she suddenly felt dizzy and shaky upon siting up. She states that she could hard stand and after using the bathroom laid back down for 15 minutes, however, when she tried again to get up, the symptoms started again. The patient endorses associated nausea. The patient is unsure if her dizziness is a room spinning sensation or lightheadedness. Her daughter notes that when she arrived to the patient's apartment today it was very hot, around 80F, and the patient is very unsteady on her feet to the point that she can't walk, but otherwise looks well. She describes that the patient's whole body will shake when she stands and she tilts forward. The patient denies neck pain, vomiting, or pain. The patient denies concerns for dehydration.     Review of Systems   Constitutional:        No pain   Gastrointestinal: Positive for nausea. Negative for vomiting.   Musculoskeletal: Negative for neck pain.   Neurological: Positive for dizziness and tremors.   All other systems reviewed and are negative.      Allergies:  Simvastatin  Penicillins    Medications:  atorvastatin   citalopram   donepezil   omeprazole  simethicone  Trazodone     Past Medical History:    Hyperlipidemia   Hypertension   Parathyroid adenoma   PONV   Thyroid disorder   Bradycardia   pleur tic chest pain   Pleural effusion   Cellulitis   CKD   Anxiety   Retinal tear   Memory impairment   Lumbar radiculopathy      Past Surgical History:    lumbar microdiscectomy  Appendectomy   Choleystectomy  Parathyroidectomy  thoracoscopic decoration lung   Thoracotomy    Family History:    Thyroid disease    Social History:  The patient was accompanied to the ED by daughter.  Patient lives in  "an apartment.     Physical Exam     Patient Vitals for the past 24 hrs:   BP Temp Temp src Pulse Resp SpO2 Height Weight   06/25/21 1215 133/69 -- -- 71 -- 96 % -- --   06/25/21 0730 132/74 -- -- 72 -- -- -- --   06/25/21 0723 119/62 96.8  F (36  C) Temporal 73 16 98 % 1.778 m (5' 10\") 78.5 kg (173 lb)     Physical Exam  Vitals: reviewed by me  General: Pt seen on [facility], cooperative, and alert to conversation  Eyes: Tracking well, clear conjunctiva BL  ENT: MMM, midline trachea.   Lungs: No tachypnea, no accessory muscle use. No respiratory distress.   CV: Rate as above  Abd: Soft, non tender, no guarding, no rebound. Non distended  MSK: no joint effusion.  No evidence of trauma  Skin: No rash  Neuro: Clear speech and no facial droop.  Bilateral upper and bilateral lower extremities with sensation intact light touch and 5 out of 5 motor throughout.  Normal finger-to-nose, normal rapid alternating movements.  Has a significant amount of difficulty standing up, unable to assess tandem gait.  Psych: Not RIS, no e/o AH/VH      Emergency Department Course     ECG:  ECG taken at 0955  Normal sinus rhythm  Normal ECG  Rate 70 bpm. AZ interval 196 ms. QRS duration 90 ms. QT/QTc 408/440 ms. P-R-T axes 28 -16 48.    Imaging:    MR Brain w/o Contrast  1. No acute intracranial abnormality.  2. Brain atrophy and presumed chronic small vessel ischemic disease,  as described.  3. Unchanged bilateral frontal convexity chronic probable subdural  hygromas with mild associated mass effect. No midline  shift/herniation.  4. Moderate polypoid mucosal thickening in the right maxillary sinus  has increased compared to the prior exam.  Reading per radiology     Laboratory:    Asymptomatic COVID-19 Virus (Coronavirus) by PCR Nasopharyngeal swab: p    UA: WNL.    CBC: WBC 4.9, HGB 13.3,   CMP: Anion Gap <1 (L), GFR 56 (L), o/w WNL (Creatinine 0.95)    Troponin (Collected 0759): <0.015     Emergency Department " Course:    Reviewed:    I reviewed the patient's nursing notes, vitals, past medical records, Care Everywhere.     Assessments:    0743 I performed an exam of the patient as documented above.     Consults:     6435 I spoke with Dr. Ross of the hospitalist service from [facility] regarding patient's presentation, findings, and plan of care.    Interventions:  0800 NS 1000 mL IV    Disposition:  The patient was admitted to the hospital under the care of Dr. Ross.     Impression & Plan        Covid-19  Kathie Wetzel was evaluated during a global COVID-19 pandemic, which necessitated consideration that the patient might be at risk for infection with the SARS-CoV-2 virus that causes COVID-19.   Applicable protocols for evaluation were followed during the patient's care.   COVID-19 was considered as part of the patient's evaluation. The plan for testing is:  a test was obtained during this visit.    Medical Decision Making:  Kathie Wetzel is a very pleasant 81 year old female who presents to the emergency department today for evaluation of dizziness and trouble walking. This happened acutely this morning, and while she does have a normal neurologic exam, based on her inability to stand, I did do a MRI Of the brain, which is thankfully normally and shows no evidence of CVA. She still has trouble walking here, and tells me that her legs give up, but also endorses a mild degree of vertiginous symptoms as well. Regardless, since she is not able to ambulate steadily here, despite wonderful mentation and individually being intact in all her extremities, she certainly needs to come in in my opinion. I do think that she would benefit from a neurology evaluation. They may want to evaluate her neck vessels, though again she has no pain or discomfort or abnormal neurologic finding to evidence a dissection at this point. Will also do orthostatics, replete her hydration to ensure she is not dehydrated. thankfully no  evidence of sepsis was found. Will plan to admit to Dr. Ross who has kindly agreed to accept the patient.     Diagnosis:    ICD-10-CM    1. Abnormal gait  R26.9    2. Dizziness  R42      Scribe Disclosure:  I, Orla Severson, am serving as a scribe at 7:28 AM on 6/25/2021 to document services personally performed by Aba Schofield MD based on my observations and the provider's statements to me.     [facility] EMERGENCY DEPT         Aba Schofield MD  06/25/21 1719

## 2021-06-25 NOTE — CONSULTS
[facility]  [facility]    Neurology Consultation    Kathie Wetzel MRN# [id number]   YOB: 1940 Age: 81 year old    Code Status:No CPR- Do NOT Intubate   Date of Admission: 6/25/2021  Date of Consult:06/25/2021                                                                                       Assessment and Plan:                                         #. Dizziness and fainty feeling  --The patient had home keeps the air temperature is about 80 F, drinks about a liter of water a day, goes to empty her bladder 5 or 6 times a day.  She had a orthostatic drop in blood pressure of about 20 mmHg from lying to sitting position.    In my opinion she has had orthostatic hypotension most likely from dehydration.  Her neurological examination otherwise is quite normal.  Her shakiness was in all likelihood related to her hypotensive episodes.    I would not recommend any neurological intervention.  She just needs to hydrate herself and keep yourself hydrated.    #Scleroderma    It is a incidental finding.  She does have some joint pains.  At this point in time it is something that can be followed up with rheumatology at a later date.  She does not have any other stigmata of rheumatological condition.    #Bilateral frontal hygromas  These are chronic and not the reason for her current symptoms.    Rob Gonzalez MD  Neurologist  [facility]  Office [phone]        ----------------------------------------------------------------------------------  ----------------------------------------------------------------------------------  Reason for consult: I was asked by Dr. Ross to evaluate this patient for dizziness and lightheadedness.       Chief Complaint:   Lightheadedness and dizziness  History is obtained from the patient, her daughter, granddaughter/ chart       History of Present Illness:   This patient is a 81 year old female who  presents with lightheadedness and dizziness and a fainting feeling.    She says that on the morning of admission she went to the restroom and as she stood up she felt dizzy and felt weak all over and as if her knees would buckle and she will fall down.  The room was not spinning.  There was no nausea or vomiting.  She just felt lightheaded.  She was able to go to the bathroom and come back but her dizziness was persistent.  She called her daughter who came and found her to be very weak to the point that she had hard time putting on her garments.  She was then brought to the ER and was admitted.  She states that as she was feeling lightheaded she also felt tremulous all over.  The tremulousness got better when she laid down and the dizziness went away.         Past Medical History:     Past Medical History:   Diagnosis Date     High cholesterol      HTN (hypertension)      Parathyroid adenoma      PONV (postoperative nausea and vomiting)      Thyroid disorder          Past Surgical History:     Past Surgical History:   Procedure Laterality Date     APPENDECTOMY OPEN       CHOLECYSTECTOMY  2007     DISCECTOMY LUMBAR POSTERIOR MICROSCOPIC ONE LEVEL Right 11/10/2017    Procedure: DISCECTOMY LUMBAR POSTERIOR MICROSCOPIC ONE LEVEL;  Right L3-4 hemilaminectomy, medial facetectomy, foraminotomy with microdiscectomy and use of X-ray and intraoperative microscope ;  Surgeon: Al Dailey MD;  Location:  OR     PARATHYROIDECTOMY  3/14/2014    Procedure: PARATHYROIDECTOMY;  Neck Exploration, Excision Right  Parathyroid Adenoma, Pre Operative Sestimibi Injection, Rapid PTH Assay ;  Surgeon: Natividad Fischer MD;  Location: [facility] OR     THORACOSCOPIC DECORTICATION LUNG Right 5/5/2020    Procedure: RIGHT VIDEO ASSISTED THORACOSCOPY, RIGHT THORACOTOMY, RIGHT DECORTICATION, RIGHT PARIETAL PLEURECTOMY;  Surgeon: Gary Shah MD;  Location:  OR     THORACOTOMY Right 5/5/2020    Procedure: THORACOTOMY ;   Surgeon: Gary Shah MD;  Location:  OR          Social History:     Social History     Socioeconomic History     Marital status: Single     Spouse name: Not on file     Number of children: Not on file     Years of education: Not on file     Highest education level: Not on file   Occupational History     Not on file   Social Needs     Financial resource strain: Not on file     Food insecurity     Worry: Not on file     Inability: Not on file     Transportation needs     Medical: Not on file     Non-medical: Not on file   Tobacco Use     Smoking status: Former Smoker     Packs/day: 0.00     Quit date: 3/10/1972     Years since quittin.3     Smokeless tobacco: Never Used   Substance and Sexual Activity     Alcohol use: Yes     Comment: 2 week      Drug use: No     Sexual activity: Not Currently   Lifestyle     Physical activity     Days per week: Not on file     Minutes per session: Not on file     Stress: Not on file   Relationships     Social connections     Talks on phone: Not on file     Gets together: Not on file     Attends Taoist service: Not on file     Active member of club or organization: Not on file     Attends meetings of clubs or organizations: Not on file     Relationship status: Not on file     Intimate partner violence     Fear of current or ex partner: Not on file     Emotionally abused: Not on file     Physically abused: Not on file     Forced sexual activity: Not on file   Other Topics Concern     Parent/sibling w/ CABG, MI or angioplasty before 65F 55M? No   Social History Narrative     Not on file     Patient denies smoking, no significant alcohol intake, denies illicit drugs use       Family History:     Family History   Problem Relation Age of Onset     Thyroid Disease Maternal Aunt      Thyroid Disease Other         cousin      Reviewed and not felt to be contributory.        Home Medications:     Prior to Admission Medications   Prescriptions Last Dose Informant Patient  Reported? Taking?   Methylcobalamin (B-12) 5000 MCG TBDP 6/24/2021 at am  No Yes   Sig: Take 5,000 mcg by mouth daily   Multiple Vitamins-Minerals (PRESERVISION AREDS) CAPS 6/24/2021 at am  Yes Yes   Sig: Take 1 tablet by mouth every morning    atorvastatin (LIPITOR) 10 MG tablet 6/24/2021 at pm  No Yes   Sig: Take 1 tablet (10 mg) by mouth At Bedtime TAKE 1 TABLET(10 MG) BY MOUTH AT BEDTIME   cholecalciferol 25 MCG (1000 UT) TABS 6/24/2021 at am  Yes Yes   Sig: Take 1 tablet by mouth daily    citalopram (CELEXA) 10 MG tablet 6/24/2021 at am  No Yes   Sig: TAKE 1 TABLET(10 MG) BY MOUTH EVERY MORNING   donepezil (ARICEPT) 10 MG tablet 6/24/2021 at pm  No Yes   Sig: Take 1 tablet (10 mg) by mouth At Bedtime   medical cannabis (Patient's own supply)   Yes Yes   Sig: Take 1 Dose by mouth See Admin Instructions (The purpose of this order is to document that the patient reports taking medical cannabis.  This is not a prescription, and is not used to certify that the patient has a qualifying medical condition.)   4.3 mg THC and .7mg CBD.   ( at bedtime)   multivitamin, therapeutic (THERA-VIT) TABS tablet 6/24/2021 at am  Yes Yes   Sig: Take 1 tablet by mouth daily   traZODone (DESYREL) 50 MG tablet 6/24/2021 at pm  No Yes   Sig: TAKE 2 TABLETS(100 MG) BY MOUTH AT BEDTIME      Facility-Administered Medications: None          Allergy:     Allergies   Allergen Reactions     Simvastatin      Buttock pain     Penicillins Rash          Inpatient Medications:   Scheduled Meds:    citalopram  10 mg Oral Daily     donepezil  10 mg Oral At Bedtime     PRN Meds: acetaminophen, acetaminophen, melatonin, ondansetron **OR** ondansetron        Review of Systems    The Review of Systems is negative other than noted in the HPI  A comprehensive review of  10 systems was performed and found to be negative except as described in this note  CONSTITUTIONAL: negative for fever, chills, change in weight  INTEGUMENTARY/SKIN: no rash or obvious new  "lesions  ENT/MOUTH: no sore throat, new sinus pain or nasal drainage, no neck mass noted  RESP: No pleuretic pain, No cough, no hemoptysis, No SOB   CV: negative for chest pain, palpitations or peripheral edema  GI: no nausea, vomiting, change in stools  : no dysuria or hematuria  MUSCULOSKELETAL: no myalgias, arthralgias or join efffusion  ENDOCRINE: no history of polyuria, polydyspsia or symptoms of thyroid dysfunction  PSYCHIATRIC: no change in mood stable  LYMPHATIC: no new lymphadenopathy  HEME: no bleeding or easy bruisability  NEURO: see HPI       Physical Exam:   Physical Exam   Vitals:  Height:5' 10\"  Weight:173 lbs 0 oz   Temp: 96.6  F (35.9  C) Temp src: Oral BP: 129/68 Pulse: 60   Resp: 16 SpO2: 95 % O2 Device: None (Room air)    General Appearance:  No acute distress  Neuro:       Mental Status Exam:    She is alert and oriented x3 her mental status exam is normal she does not have any speech or language deficits her remote recent and immediate recall are normal       Cranial Nerves:   Visual fields are normal pupils are equal round reacting to light extraocular movements are full without any nystagmus there is no sensory loss of the face no weakness of the jaw no facial asymmetry no palatal weakness tongue is midline there is no weakness of the neck flexors sternomastoid and shoulder shrug are normal bilaterally.           Motor:   No pronator drift or fine finger movements rapid alternating movements are normal her strength is 5/5 bilaterally symmetrical           Reflexes: DTRs are 2+ bilaterally symmetrical       Sensory: No sensory deficits                   Coordination:   No incoordination       Gait: She was made to sit up at the edge of the bed and she felt very lightheaded and so the gait was not formally assessed.  She was made to lie down.  Skin; she seems to have scleroderma.  There is no evidence of telangiectasias, or subcutaneous calcinosis.  Extremities: No clubbing, no cyanosis, no " edema         Data:   ROUTINE IP LABS   CBC RESULTS:     Recent Labs   Lab 06/25/21  0759   WBC 4.9   RBC 4.70   HGB 13.3   HCT 42.6        Basic Metabolic Panel:   Recent Labs   Lab Test 06/25/21  0759 12/13/20  1504 08/28/20  2254    139 136   POTASSIUM 4.4 4.6 3.8   CHLORIDE 108 107 104   CO2 32 34* 28   BUN 20 15 20   CR 0.95 1.20* 1.01   GLC 96 84 88   OMEGA 9.1 9.1 8.7     Liver panel:  Recent Labs   Lab Test 06/25/21  0759 12/13/20  1504 08/28/20  2254 06/04/20  1110 05/04/20  2034   PROTTOTAL 7.1 6.2* 7.4 7.5 6.4*   ALBUMIN 3.5 3.3* 3.7 2.9* 1.7*   BILITOTAL 0.5 0.6 0.4 0.3 0.7   ALKPHOS 60 53 73 86 340*   AST 11 18 10 22 46*   ALT 19 17 18 23 31     INR:  Recent Labs   Lab Test 05/04/20  2034   INR 1.30*      Lipid Profile:  Recent Labs   Lab Test 04/13/20  1024 06/05/18  0836 06/21/17  0846   CHOL 126 159 172   HDL 43* 44* 47*   LDL 65 78 88   TRIG 89 185* 187*     Thyroid Panel:  Recent Labs   Lab Test 04/13/20  1024 03/20/18  1757 02/28/17  0949   TSH 2.43 2.46 2.05      Vitamin B12:   Recent Labs   Lab Test 06/04/20  1110 05/04/20  1101 04/13/20  1024   B12 1,931* 2,225* 185*      Vitamin D level:   Recent Labs   Lab Test 05/04/20  1101   VITDT 31     A1C:   Recent Labs   Lab Test 04/13/20  1024   A1C 5.6     Troponin I:   Recent Labs   Lab Test 06/25/21  0759 03/20/18  1757   TROPI <0.015 <0.015     CRP inflammation:   Recent Labs   Lab Test 06/17/20  1103 06/04/20  1110 05/22/20  1010 05/10/20  0740 05/09/20  0857   CRP <2.9 <2.9 12.1* 90.9* 152.0*     ESR:   Recent Labs   Lab Test 06/17/20  1103 06/04/20  1110 05/22/20  1010   SED 21 53* 103*       DANISHA: No lab results found.    ANCA: No lab results found.      IMAGING:   All imaging studies were reviewed personally  MRI head  --1. No acute intracranial abnormality.  2. Brain atrophy and presumed chronic small vessel ischemic disease,  as described.  3. Unchanged bilateral frontal convexity chronic probable subdural  hygromas with mild  associated mass effect. No midline  shift/herniation.  4. Moderate polypoid mucosal thickening in the right maxillary sinus  has increased compared to the prior exam.    --

## 2021-06-26 ENCOUNTER — APPOINTMENT (OUTPATIENT)
Dept: PHYSICAL THERAPY | Facility: CLINIC | Age: 81
End: 2021-06-26
Attending: INTERNAL MEDICINE
Payer: MEDICARE

## 2021-06-26 VITALS
OXYGEN SATURATION: 95 % | HEART RATE: 64 BPM | BODY MASS INDEX: 24.77 KG/M2 | RESPIRATION RATE: 20 BRPM | WEIGHT: 173 LBS | DIASTOLIC BLOOD PRESSURE: 75 MMHG | TEMPERATURE: 95.8 F | SYSTOLIC BLOOD PRESSURE: 125 MMHG | HEIGHT: 70 IN

## 2021-06-26 LAB
ANION GAP SERPL CALCULATED.3IONS-SCNC: 2 MMOL/L (ref 3–14)
BASOPHILS # BLD AUTO: 0.1 10E9/L (ref 0–0.2)
BASOPHILS NFR BLD AUTO: 0.8 %
BUN SERPL-MCNC: 14 MG/DL (ref 7–30)
CALCIUM SERPL-MCNC: 8.5 MG/DL (ref 8.5–10.1)
CHLORIDE SERPL-SCNC: 108 MMOL/L (ref 94–109)
CO2 SERPL-SCNC: 28 MMOL/L (ref 20–32)
CREAT SERPL-MCNC: 0.96 MG/DL (ref 0.52–1.04)
DIFFERENTIAL METHOD BLD: NORMAL
EOSINOPHIL # BLD AUTO: 0.3 10E9/L (ref 0–0.7)
EOSINOPHIL NFR BLD AUTO: 5.5 %
ERYTHROCYTE [DISTWIDTH] IN BLOOD BY AUTOMATED COUNT: 12.8 % (ref 10–15)
GFR SERPL CREATININE-BSD FRML MDRD: 55 ML/MIN/{1.73_M2}
GLUCOSE SERPL-MCNC: 85 MG/DL (ref 70–99)
HCT VFR BLD AUTO: 38.7 % (ref 35–47)
HGB BLD-MCNC: 12.4 G/DL (ref 11.7–15.7)
IMM GRANULOCYTES # BLD: 0 10E9/L (ref 0–0.4)
IMM GRANULOCYTES NFR BLD: 0.3 %
LYMPHOCYTES # BLD AUTO: 1.7 10E9/L (ref 0.8–5.3)
LYMPHOCYTES NFR BLD AUTO: 28.3 %
MCH RBC QN AUTO: 28.8 PG (ref 26.5–33)
MCHC RBC AUTO-ENTMCNC: 32 G/DL (ref 31.5–36.5)
MCV RBC AUTO: 90 FL (ref 78–100)
MONOCYTES # BLD AUTO: 0.6 10E9/L (ref 0–1.3)
MONOCYTES NFR BLD AUTO: 9.3 %
NEUTROPHILS # BLD AUTO: 3.4 10E9/L (ref 1.6–8.3)
NEUTROPHILS NFR BLD AUTO: 55.8 %
NRBC # BLD AUTO: 0 10*3/UL
NRBC BLD AUTO-RTO: 0 /100
PLATELET # BLD AUTO: 195 10E9/L (ref 150–450)
POTASSIUM SERPL-SCNC: 3.9 MMOL/L (ref 3.4–5.3)
RBC # BLD AUTO: 4.31 10E12/L (ref 3.8–5.2)
SODIUM SERPL-SCNC: 138 MMOL/L (ref 133–144)
WBC # BLD AUTO: 6.2 10E9/L (ref 4–11)

## 2021-06-26 PROCEDURE — 99217 PR OBSERVATION CARE DISCHARGE: CPT | Performed by: PHYSICIAN ASSISTANT

## 2021-06-26 PROCEDURE — 97161 PT EVAL LOW COMPLEX 20 MIN: CPT | Mod: GP

## 2021-06-26 PROCEDURE — 80048 BASIC METABOLIC PNL TOTAL CA: CPT | Performed by: PHYSICIAN ASSISTANT

## 2021-06-26 PROCEDURE — 36415 COLL VENOUS BLD VENIPUNCTURE: CPT | Performed by: PHYSICIAN ASSISTANT

## 2021-06-26 PROCEDURE — 258N000003 HC RX IP 258 OP 636: Performed by: INTERNAL MEDICINE

## 2021-06-26 PROCEDURE — 250N000013 HC RX MED GY IP 250 OP 250 PS 637: Performed by: INTERNAL MEDICINE

## 2021-06-26 PROCEDURE — G0378 HOSPITAL OBSERVATION PER HR: HCPCS

## 2021-06-26 PROCEDURE — 97116 GAIT TRAINING THERAPY: CPT | Mod: GP

## 2021-06-26 PROCEDURE — 85025 COMPLETE CBC W/AUTO DIFF WBC: CPT | Performed by: PHYSICIAN ASSISTANT

## 2021-06-26 RX ADMIN — CITALOPRAM HYDROBROMIDE 10 MG: 10 TABLET ORAL at 08:27

## 2021-06-26 RX ADMIN — SODIUM CHLORIDE, POTASSIUM CHLORIDE, SODIUM LACTATE AND CALCIUM CHLORIDE: 600; 310; 30; 20 INJECTION, SOLUTION INTRAVENOUS at 06:32

## 2021-06-26 NOTE — CONSULTS
Care Management Initial Consult    General Information  Assessment completed with:  ,         Primary Care Provider verified and updated as needed:     Readmission within the last 30 days:           Advance Care Planning:            Communication Assessment  Patient's communication style: spoken language (English or Bilingual)    Hearing Difficulty or Deaf: yes   Wear Glasses or Blind: yes    Cognitive  Cognitive/Neuro/Behavioral: WDL                      Living Environment:   People in home:       Current living Arrangements: apartment      Able to return to prior arrangements:         Family/Social Support:  Care provided by:    Provides care for:                  Description of Support System:           Current Resources:   Patient receiving home care services:       Community Resources:    Equipment currently used at home: walker, rolling(4WW only at night)  Supplies currently used at home:      Employment/Financial:  Employment Status:          Financial Concerns:             Lifestyle & Psychosocial Needs:        Socioeconomic History     Marital status: Single     Spouse name: Not on file     Number of children: Not on file     Years of education: Not on file     Highest education level: Not on file     Tobacco Use     Smoking status: Former Smoker     Packs/day: 0.00     Quit date: 3/10/1972     Years since quittin.3     Smokeless tobacco: Never Used   Substance and Sexual Activity     Alcohol use: Yes     Comment: 2 week      Drug use: No     Sexual activity: Not Currently       Functional Status:  Prior to admission patient needed assistance:              Mental Health Status:          Chemical Dependency Status:                Values/Beliefs:  Spiritual, Cultural Beliefs, Buddhism Practices, Values that affect care:                 Additional Information:  Met with patient and family to discuss discharge planning. Pt lives indep in apartment.  Had episode of dizziness and lightheadness. MD felt she  might need home care. Pt plans to go stay at daughter's house in [place] at discharge. Family feels she is  to baseline and no need for home care. Daughter can assist as needed. Voiced no equipment needs.   Care Management Discharge Note    Discharge Date: 06/26/21       Discharge Disposition:      Discharge Services:      Discharge DME:      Discharge Transportation: family or friend will provide    Private pay costs discussed: Not applicable    PAS Confirmation Code:    Patient/family educated on Medicare website which has current facility and service quality ratings:      Education Provided on the Discharge Plan:    Persons Notified of Discharge Plans: Pt/family/bedside RN  Patient/Family in Agreement with the Plan:      Handoff Referral Completed: No    Additional Information:  Daughter to transport. Family wishes to make Follow-up appointment.   Bedside RN to roni BENAVIDEZ for discharge orders and review AVS.         Olivia Ramirez, RN

## 2021-06-26 NOTE — PROGRESS NOTES
A&OX4, VSS R/A, Regular diet, continent, A1 to BSC, Lactated Ringer's running at 75mL, PT consult

## 2021-06-26 NOTE — PLAN OF CARE
Pt A/Ox4. VSS on room air. Up with assist of 1-2, with GB and walker to BSC. Regular diet. LR infusing at 75ml/hr. Neurology consulted, PT consult pending. Discharge plan pending.

## 2021-06-26 NOTE — PLAN OF CARE
AO. Reviewed discharge orders/summary: all questions answered: pt indicated understanding. Pt left facility with all belongings accompanied by son and daughter. Son for transport for short stay at daughter's house prior to independent home.

## 2021-06-26 NOTE — PROGRESS NOTES
Observation goals PRIOR TO DISCHARGE     Comments: -diagnostic tests and consults completed and resulted: not met  -vital signs normal or at patient baseline: met  Nurse to notify provider when observation goals have been met and patient is ready for discharge.

## 2021-06-26 NOTE — PROVIDER NOTIFICATION
MD Notification    Notified Person: MD    Notified Person Name: Khris Nagy MO    Notification Date/Time:2234 6/25/21    Notification Interaction:web page     Purpose of Notification:  Pt requesting Trazodone for sleep.       Orders Received:    Comments:

## 2021-06-26 NOTE — PLAN OF CARE
Observation goals PRIOR TO DISCHARGE     Comments:   -diagnostic tests and consults completed and resulted: NOT MET   -vital signs normal or at patient baseline: PARTIALLY MET    Nurse to notify provider when observation goals have been met and patient is ready for discharge.

## 2021-06-26 NOTE — PROVIDER NOTIFICATION
MD Notification    Notified Person: MD    Notified Person Name: Nuno Cormier    Notification Date/Time: 6/26/21 @0621    Notification Interaction: [employer]    Purpose of Notification: Patient was walking to bathroom and felt her legs gave out, staff lowered patient to ground. Please advise thank you    Orders Received:    Comments:

## 2021-06-26 NOTE — PLAN OF CARE
Physical Therapy Discharge Summary    Reason for therapy discharge:    All goals and outcomes met, no further needs identified.    Progress towards therapy goal(s). See goals on Care Plan in [facility] electronic health record for goal details.  Goals met    Therapy recommendation(s):    No further therapy is recommended.

## 2021-06-26 NOTE — PROGRESS NOTES
Observation goals PRIOR TO DISCHARGE     Comments: -diagnostic tests and consults completed and resulted - no  -vital signs normal or at patient baseline - yes  Nurse to notify provider when observation goals have been met and patient is ready for discharge.

## 2021-06-26 NOTE — DISCHARGE SUMMARY
"[facility]  Hospitalist Discharge Summary      Date of Admission:  6/25/2021  Date of Discharge:  6/26/2021  Discharging Provider: Martha Varma PA-C    Discharge Diagnoses   Gait instability vs light headedness, suspect orthostatic hypotension    Follow-ups Needed After Discharge   Follow-up Appointments     Follow-up and recommended labs and tests       Follow up with primary care provider, Cammie Daugherty, within 7 days   for hospital follow-up.  No follow up labs or test are needed.           Unresulted Labs Ordered in the Past 30 Days of this Admission     No orders found for last 31 day(s).      These results will be followed up by PCP    Discharge Disposition   Discharged to home  Condition at discharge: Stable    Hospital Course   Kathie Wetzel is a 81 year old female with a history of HTN, HLP and parathyroid adenoma who presented to the ED on 6/25/2021 with dizziness/light headedness     Gait instability vs light headedness, suspect orthostatic hypotension  The AM of admission, patient woke up to use the restroom.  When she stood up she reported \"dizziness\" with feeling as if her knees would buckle.  Upon further discussion it sounded more like a light headedness with some gait unsteadiness.  She was eventually able to make it to the restroom and back but her light headedness persisted. This prompted her calling her daughter and when her daughter arrived she noted the patient's home to be 80 degrees.  The daughter was then able to help her on to her walker and bring her in to the ED.   In the ED CBC and BMP are essentially normal.  UA did not show evidence of UTI.  EKG without evidence of arrhythmia or infarction  * MRI brain:  1. No acute intracranial abnormality.  2. Brain atrophy and presumed chronic small vessel ischemic disease, as described.  3. 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 " No midline shift/herniation.  4. Moderate polypoid mucosal thickening in the right maxillary sinus has increased compared to the prior exam.  * Pt was hydrated overnight with complete resolve of sx   * Orthostatic BPs in the ED positive from laying to sitting, subsequent checks with improvement while on IVF   - General Neurology consulted, see formal note by Dr. Gonzalez. No further neuro evaluation indicated   - PT consulted, recommended home with assist. She will be staying with her daughter.   - Encouraged adequate oral fluid hydration and keeping apartment temperature a bit cooler   - Close PCP follow-up upon discharge   - Return to the ED with worsening sx      HTN   HLP: Blood pressures appear controlled and not currently on any anti-hypertensives  - Holding PTA Lipitor as observation status      Cognitive impairment  - PTA Aricept   - PTA Celexa     Consultations This Hospital Stay   NEUROLOGY IP CONSULT  CARE MANAGEMENT / SOCIAL WORK IP CONSULT  PHYSICAL THERAPY ADULT IP CONSULT  CARE MANAGEMENT / SOCIAL WORK IP CONSULT  CARE MANAGEMENT / SOCIAL WORK IP CONSULT    Code Status   No CPR- Do NOT Intubate    Time Spent on this Encounter   I, Martha Varma PA-C, personally saw the patient today and spent greater than 30 minutes discharging this patient.    Case discussed with Dr. Simental who agrees with the plan.        Martha Varma PA-C  [facility]  [address] [place] [id number]  Phone: [phone]  ______________________________________________________________________    Physical Exam   Vital Signs: Temp: 95.8  F (35.4  C) Temp src: Oral BP: 125/75 Pulse: 64   Resp: 20 SpO2: 95 % O2 Device: None (Room air)    Weight: 173 lbs 0 oz    CONSTITUTIONAL: Pt laying in bed, dressed in hospital garb. Appears comfortable. Cooperative with interview. Accompanied by family at bedside.  HEENT: Normocephalic, atraumatic.   CARDIOVASCULAR:  RRR, no murmurs, rubs, or extra heart sounds appreciated. Pulses +2/4 and regular in upper and lower extremities, bilaterally.   RESPIRATORY: No increased work of breathing.  CTA, bilat; no wheezes, rales, or rhonchi appreciated.  GASTROINTESTINAL:  Abdomen soft, non-distended. BS auscultated in all four quadrants. Negative for tenderness to palpation.  No masses or organomegaly noted.  MUSCULOSKELETAL: No gross deformities noted. Normal muscle tone.   HEMATOLOGIC/LYMPHATIC/IMMUNOLOGIC: No anterior or posterior cervical LAD, bilaterally. Negative for lower extremity edema, bilaterally.  NEUROLOGIC: Alert and oriented to person, place, and time.  strength intact. No focal neuro deficits.   SKIN: Warm, dry, intact. No jaundice noted. Negative for suspicious lesions, rashes, bruising, open sores or abrasions.        Primary Care Physician   Cammie Daugherty    Discharge Orders      Reason for your hospital stay    You were hospitalized for further evaluation and treatment of dizziness, suspected to be related to orthostatic hypotension (blood pressure drops with position changes) secondary to dehydration. You were hydrated with IV fluids with improvement in symptoms.     Follow-up and recommended labs and tests     Follow up with primary care provider, Cammie Daugherty, within 7 days for hospital follow-up.  No follow up labs or test are needed.     Activity    Your activity upon discharge: activity as tolerated     Discharge Instructions    1) No medication changes at discharge   2) Adequate oral fluid intake strongly encouraged   3) Close follow-up with your primary care provider for hospital follow-up   4) Return to the ED for recurrent or worsening symptoms   5) Home cares ordered at discharge     Diet    Follow this diet upon discharge: Orders Placed This Encounter      Regular Diet Adult       Significant Results and Procedures   Most Recent 3 CBC's:  Recent Labs   Lab Test 06/26/21  1213 06/25/21  0755  12/13/20  1504   WBC 6.2 4.9 7.3   HGB 12.4 13.3 12.3   MCV 90 91 92    218 211     Most Recent 3 BMP's:  Recent Labs   Lab Test 06/26/21  1213 06/25/21  0759 12/13/20  1504    140 139   POTASSIUM 3.9 4.4 4.6   CHLORIDE 108 108 107   CO2 28 32 34*   BUN 14 20 15   CR 0.96 0.95 1.20*   ANIONGAP 2* <1* <1*   OMEGA 8.5 9.1 9.1   GLC 85 96 84     Most Recent 2 LFT's:  Recent Labs   Lab Test 06/25/21  0759 12/13/20  1504   AST 11 18   ALT 19 17   ALKPHOS 60 53   BILITOTAL 0.5 0.6   ,   Results for orders placed or performed during the hospital encounter of 06/25/21   MR Brain w/o Contrast    Narrative    MRI BRAIN WITHOUT CONTRAST June 25, 2021 10:49 AM    HISTORY: Transient ischemic attack (TIA).    TECHNIQUE: Multiplanar, multisequence MRI of the brain without  gadolinium IV contrast material.      COMPARISON: MRI head 3/20/2018.    FINDINGS: No abnormal intracranial restricted diffusion to suggest  acute infarct. The ventricles are normal in size and configuration.  There is moderate generalized brain parenchymal volume loss. Mild to  moderate scattered nonspecific patchy T2 FLAIR hyperintense signal in  the cerebral white matter, as before, most likely representing chronic  small vessel ischemic disease. As before, there is prominence of the  extra-axial space overlying the lateral aspect of both frontal lobes  with mild associated mass effect, likely representing small subdural  hygromas. These appear to measure up to approximate 14 mm in thickness  on the left and 11 mm in thickness on the right, unchanged. No  evidence for acute intracranial hemorrhage or new/progressive mass  effect. There is no herniation.    The major arterial flow voids of the skull base are grossly  maintained. There is moderate polypoid mucosal thickening in the right  maxillary sinus with redemonstrated retention cyst in the right  maxillary sinus. Mild scattered paranasal sinus mucosal thickening  elsewhere. Bilateral lens  replacements. The calvarium, skull base, and  midface otherwise appear grossly unremarkable.      Impression    IMPRESSION:  1. No acute intracranial abnormality.  2. Brain atrophy and presumed chronic small vessel ischemic disease,  as described.  3. Unchanged bilateral frontal convexity chronic probable subdural  hygromas with mild associated mass effect. No midline  shift/herniation.  4. Moderate polypoid mucosal thickening in the right maxillary sinus  has increased compared to the prior exam.    GEORGE TAPIA MD       Discharge Medications   Current Discharge Medication List      CONTINUE these medications which have NOT CHANGED    Details   atorvastatin (LIPITOR) 10 MG tablet Take 1 tablet (10 mg) by mouth At Bedtime TAKE 1 TABLET(10 MG) BY MOUTH AT BEDTIME  Qty: 90 tablet, Refills: 3    Associated Diagnoses: Hyperlipidemia LDL goal <100      cholecalciferol 25 MCG (1000 UT) TABS Take 1 tablet by mouth daily       citalopram (CELEXA) 10 MG tablet TAKE 1 TABLET(10 MG) BY MOUTH EVERY MORNING  Qty: 90 tablet, Refills: 0    Associated Diagnoses: Anxiety      donepezil (ARICEPT) 10 MG tablet Take 1 tablet (10 mg) by mouth At Bedtime  Qty: 90 tablet, Refills: 3    Associated Diagnoses: Memory impairment of gradual onset      medical cannabis (Patient's own supply) Take 1 Dose by mouth See Admin Instructions (The purpose of this order is to document that the patient reports taking medical cannabis.  This is not a prescription, and is not used to certify that the patient has a qualifying medical condition.)   4.3 mg THC and .7mg CBD.   ( at bedtime)      Methylcobalamin (B-12) 5000 MCG TBDP Take 5,000 mcg by mouth daily  Qty: 100 tablet, Refills: 3    Associated Diagnoses: Low vitamin B12 level      Multiple Vitamins-Minerals (PRESERVISION AREDS) CAPS Take 1 tablet by mouth every morning       multivitamin, therapeutic (THERA-VIT) TABS tablet Take 1 tablet by mouth daily      traZODone (DESYREL) 50 MG tablet TAKE 2  TABLETS(100 MG) BY MOUTH AT BEDTIME  Qty: 180 tablet, Refills: 1    Associated Diagnoses: Sleep disturbance           Allergies   Allergies   Allergen Reactions     Simvastatin      Buttock pain     Penicillins Rash

## 2021-06-28 ENCOUNTER — TELEPHONE (OUTPATIENT)
Dept: FAMILY MEDICINE | Facility: CLINIC | Age: 81
End: 2021-06-28

## 2021-06-28 NOTE — TELEPHONE ENCOUNTER
Patient has a hospital f/u appt scheduled génesis with Rhett Velarde RN on 6/28/2021 at 12:10 PM

## 2021-06-28 NOTE — TELEPHONE ENCOUNTER
Please contact patient for In-patient follow up.  [phone] (home)     Visit date: 6/26  Diagnosis listed:Orthostatic Hypotension,  Number of visits in past 12 months:0/0

## 2021-06-29 ENCOUNTER — OFFICE VISIT (OUTPATIENT)
Dept: FAMILY MEDICINE | Facility: CLINIC | Age: 81
End: 2021-06-29
Payer: MEDICARE

## 2021-06-29 VITALS
TEMPERATURE: 98.4 F | OXYGEN SATURATION: 94 % | RESPIRATION RATE: 16 BRPM | WEIGHT: 179 LBS | BODY MASS INDEX: 25.68 KG/M2

## 2021-06-29 DIAGNOSIS — N18.30 STAGE 3 CHRONIC KIDNEY DISEASE, UNSPECIFIED WHETHER STAGE 3A OR 3B CKD (H): ICD-10-CM

## 2021-06-29 DIAGNOSIS — R42 LIGHTHEADEDNESS: Primary | ICD-10-CM

## 2021-06-29 PROBLEM — E43 UNSPECIFIED SEVERE PROTEIN-CALORIE MALNUTRITION (H): Status: ACTIVE | Noted: 2021-06-29

## 2021-06-29 PROCEDURE — 99495 TRANSJ CARE MGMT MOD F2F 14D: CPT | Performed by: PHYSICIAN ASSISTANT

## 2021-06-29 ASSESSMENT — PAIN SCALES - GENERAL: PAINLEVEL: NO PAIN (0)

## 2021-06-29 NOTE — PROGRESS NOTES
Assessment & Plan     Lightheadedness  Recent hospitalization for suspected orthostatic hypotension. Labs, EKG, MRI without concerning findings. Symptoms improved with IV fluids. Neuro consulted and did not need further neuro evaluation. Met with physical therapy - no plans for ongoing physical therapy. She feels better but did have some lightheadedness when getting out of bed this morning. No red flag symptoms. Orthostatic vitals normal today. Recommend continuing with focusing on hydration and follow-up if worsening or no improvement. Can consider physical therapy. Risks and benefits of treatment plan discussed. Patient and/or parent acknowledges and agrees with plan of care, all questions answered.      Stage 3 chronic kidney disease, unspecified whether stage 3a or 3b CKD  Chronic, follows with PCP      Return in about 1 month (around 7/29/2021) for PCP as scheduled.    Estelita Alejandro PA-C  [employer] JARON Vallejo is a 81 year old who presents for the following health issues  accompanied by her daughter:    [facility]      Hospital Follow-up Visit:    Hospital/Nursing Home/ Rehab Facility: [facility]  Date of Admission:6/25/2021  Date of Discharge: 6/26/2021  Reason(s) for Admission: Dizziness      Was your hospitalization related to COVID-19? No   Problems taking medications regularly:  None  Medication changes since discharge: None  Problems adhering to non-medication therapy:  None    Summary of hospitalization:  [facility] discharge summary reviewed  Diagnostic Tests/Treatments reviewed.  Follow up needed: none  Other Healthcare Providers Involved in Patient s Care: None  Update since discharge: improved, did have some lightheadedness this morning when she woke up, legs felt a little weak today  Post Discharge Medication Reconciliation: discharge medications reconciled, continue medications without change.  Plan of care communicated with  patient and family     Genevieve was recently admitted to the hospital from 6/25/21 - 6/26/21 with lightheadedness, gait instability, suspect orthostatic hypotension. On the morning of admission, she woke up to use the restroom and felt lightheaded when she stood up. The lightheadedness continued so she called her daughter. When her daughter arrived at the patient's home, she noted it was 80 degrees inside. In the ED, labs and EKG were essentially normal.     MRI brain: 1. No acute intracranial abnormality.  2. Brain atrophy and presumed chronic small vessel ischemic disease, as described.  3. 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 No midline shift/herniation.  4. Moderate polypoid mucosal thickening in the right maxillary sinus has increased compared to the prior exam.    She was hydrated overnight with complete resolve of symptoms. Did have positive orthostatic BP in ED, improved with IV fluids. Neurology was consulted and no further neuro evaluation indicated. Did have PT evaluation. Patient safe for discharge. Encouraged adequate oral fluid hydration and keeping apartment temperature a bit cooler     Following discharge, she does feel improved. The temperature in her apartment is lower. She has been drinking plenty of fluids, eating normally. She did have some lightheadedness this morning when she got up from bed and felt like her legs were a little weak. This has resolved. No headache, chest pain, shortness of breath, leg swelling.    Review of Systems   Constitutional, HEENT, cardiovascular, pulmonary, gi and gu systems are negative, except as otherwise noted.      Objective    Temp 98.4  F (36.9  C) (Oral)   Resp 16   Wt 81.2 kg (179 lb)   LMP  (LMP Unknown)   SpO2 94%   BMI 25.68 kg/m    Body mass index is 25.68 kg/m .  Physical Exam   GENERAL: healthy, alert and no distress  EYES: Eyes grossly normal to inspection, PERRL and conjunctivae  and sclerae normal  NECK: no adenopathy, no asymmetry, masses, or scars and thyroid normal to palpation  RESP: lungs clear to auscultation - no rales, rhonchi or wheezes  CV: regular rate and rhythm, normal S1 S2, no S3 or S4, no murmur, click or rub, no peripheral edema and peripheral pulses strong  ABDOMEN: soft, nontender, no hepatosplenomegaly, no masses and bowel sounds normal  MS: no gross musculoskeletal defects noted, no edema  NEURO: Normal strength and tone, mentation intact and speech normal  PSYCH: mentation appears normal, affect normal/bright    Admission on 06/25/2021, Discharged on 06/26/2021   Component Date Value Ref Range Status     WBC 06/25/2021 4.9  4.0 - 11.0 10e9/L Final     RBC Count 06/25/2021 4.70  3.8 - 5.2 10e12/L Final     Hemoglobin 06/25/2021 13.3  11.7 - 15.7 g/dL Final     Hematocrit 06/25/2021 42.6  35.0 - 47.0 % Final     MCV 06/25/2021 91  78 - 100 fl Final     MCH 06/25/2021 28.3  26.5 - 33.0 pg Final     MCHC 06/25/2021 31.2* 31.5 - 36.5 g/dL Final     RDW 06/25/2021 12.8  10.0 - 15.0 % Final     Platelet Count 06/25/2021 218  150 - 450 10e9/L Final     Diff Method 06/25/2021 Automated Method   Final     % Neutrophils 06/25/2021 56.7  % Final     % Lymphocytes 06/25/2021 27.5  % Final     % Monocytes 06/25/2021 7.6  % Final     % Eosinophils 06/25/2021 6.4  % Final     % Basophils 06/25/2021 1.4  % Final     % Immature Granulocytes 06/25/2021 0.4  % Final     Nucleated RBCs 06/25/2021 0  0 /100 Final     Absolute Neutrophil 06/25/2021 2.8  1.6 - 8.3 10e9/L Final     Absolute Lymphocytes 06/25/2021 1.3  0.8 - 5.3 10e9/L Final     Absolute Monocytes 06/25/2021 0.4  0.0 - 1.3 10e9/L Final     Absolute Eosinophils 06/25/2021 0.3  0.0 - 0.7 10e9/L Final     Absolute Basophils 06/25/2021 0.1  0.0 - 0.2 10e9/L Final     Abs Immature Granulocytes 06/25/2021 0.0  0 - 0.4 10e9/L Final     Absolute Nucleated RBC 06/25/2021 0.0   Final     Sodium 06/25/2021 140  133 - 144 mmol/L Final      Potassium 06/25/2021 4.4  3.4 - 5.3 mmol/L Final     Chloride 06/25/2021 108  94 - 109 mmol/L Final     Carbon Dioxide 06/25/2021 32  20 - 32 mmol/L Final     Anion Gap 06/25/2021 <1* 3 - 14 mmol/L Final     Glucose 06/25/2021 96  70 - 99 mg/dL Final     Urea Nitrogen 06/25/2021 20  7 - 30 mg/dL Final     Creatinine 06/25/2021 0.95  0.52 - 1.04 mg/dL Final     GFR Estimate 06/25/2021 56* >60 mL/min/[1.73_m2] Final    Comment: Non  GFR Calc  Starting 12/18/2018, serum creatinine based estimated GFR (eGFR) will be   calculated using the Chronic Kidney Disease Epidemiology Collaboration   (CKD-EPI) equation.       GFR Estimate If Black 06/25/2021 65  >60 mL/min/[1.73_m2] Final    Comment:  GFR Calc  Starting 12/18/2018, serum creatinine based estimated GFR (eGFR) will be   calculated using the Chronic Kidney Disease Epidemiology Collaboration   (CKD-EPI) equation.       Calcium 06/25/2021 9.1  8.5 - 10.1 mg/dL Final     Bilirubin Total 06/25/2021 0.5  0.2 - 1.3 mg/dL Final     Albumin 06/25/2021 3.5  3.4 - 5.0 g/dL Final     Protein Total 06/25/2021 7.1  6.8 - 8.8 g/dL Final     Alkaline Phosphatase 06/25/2021 60  40 - 150 U/L Final     ALT 06/25/2021 19  0 - 50 U/L Final     AST 06/25/2021 11  0 - 45 U/L Final     Troponin I ES 06/25/2021 <0.015  0.000 - 0.045 ug/L Final    Comment: The 99th percentile for upper reference range is 0.045 ug/L.  Troponin values   in the range of 0.045 - 0.120 ug/L may be associated with risks of adverse   clinical events.       Color Urine 06/25/2021 Straw   Final     Appearance Urine 06/25/2021 Clear   Final     Glucose Urine 06/25/2021 Negative  NEG^Negative mg/dL Final     Bilirubin Urine 06/25/2021 Negative  NEG^Negative Final     Ketones Urine 06/25/2021 Negative  NEG^Negative mg/dL Final     Specific Gravity Urine 06/25/2021 1.008  1.003 - 1.035 Final     Blood Urine 06/25/2021 Negative  NEG^Negative Final     pH Urine 06/25/2021 6.5  5.0 - 7.0 pH  Final     Protein Albumin Urine 06/25/2021 Negative  NEG^Negative mg/dL Final     Urobilinogen mg/dL 06/25/2021 0.0  0.0 - 2.0 mg/dL Final     Nitrite Urine 06/25/2021 Negative  NEG^Negative Final     Leukocyte Esterase Urine 06/25/2021 Negative  NEG^Negative Final     Source 06/25/2021 Midstream Urine   Final     WBC Urine 06/25/2021 1  0 - 5 /HPF Final     RBC Urine 06/25/2021 1  0 - 2 /HPF Final     Squamous Epithelial /HPF Urine 06/25/2021 <1  0 - 1 /HPF Final     Interpretation ECG 06/25/2021 Click View Image link to view waveform and result   Final     SARS-CoV-2 Virus Specimen Source 06/25/2021 Nasopharyngeal   Final     SARS-CoV-2 PCR Result 06/25/2021 NEGATIVE   Final    SARS-CoV2 (COVID-19) RNA not detected, presumed negative.     SARS-CoV-2 PCR Comment 06/25/2021 (Note)   Final    Comment: Testing was performed using the adrienne SARS-CoV-2 & Influenza A/B Assay on the   adrienne Kirti System.  This test should be ordered for the detection of SARS-COV-2 in individuals who   meet SARS-CoV-2 clinical and/or epidemiological criteria. Test performance is   unknown in asymptomatic patients.  This test is for in vitro diagnostic use under the FDA EUA for laboratories   certified under CLIA to perform moderate and/or high complexity testing. This   test has not been FDA cleared or approved.  A negative test does not rule out the presence of PCR inhibitors in the   specimen or target RNA in concentration below the limit of detection for the   assay. The possibility of a false negative should be considered if the   patient's recent exposure or clinical presentation suggests COVID-19.  [facility] are certified under the Clinical Laboratory   Improvement Amendments of 1988 (CLIA-88) as qualified to perform moderate   and/or high complexity laboratory testing.       WBC 06/26/2021 6.2  4.0 - 11.0 10e9/L Final     RBC Count 06/26/2021 4.31  3.8 - 5.2 10e12/L Final     Hemoglobin 06/26/2021 12.4  11.7 -  15.7 g/dL Final     Hematocrit 06/26/2021 38.7  35.0 - 47.0 % Final     MCV 06/26/2021 90  78 - 100 fl Final     MCH 06/26/2021 28.8  26.5 - 33.0 pg Final     MCHC 06/26/2021 32.0  31.5 - 36.5 g/dL Final     RDW 06/26/2021 12.8  10.0 - 15.0 % Final     Platelet Count 06/26/2021 195  150 - 450 10e9/L Final     Diff Method 06/26/2021 Automated Method   Final     % Neutrophils 06/26/2021 55.8  % Final     % Lymphocytes 06/26/2021 28.3  % Final     % Monocytes 06/26/2021 9.3  % Final     % Eosinophils 06/26/2021 5.5  % Final     % Basophils 06/26/2021 0.8  % Final     % Immature Granulocytes 06/26/2021 0.3  % Final     Nucleated RBCs 06/26/2021 0  0 /100 Final     Absolute Neutrophil 06/26/2021 3.4  1.6 - 8.3 10e9/L Final     Absolute Lymphocytes 06/26/2021 1.7  0.8 - 5.3 10e9/L Final     Absolute Monocytes 06/26/2021 0.6  0.0 - 1.3 10e9/L Final     Absolute Eosinophils 06/26/2021 0.3  0.0 - 0.7 10e9/L Final     Absolute Basophils 06/26/2021 0.1  0.0 - 0.2 10e9/L Final     Abs Immature Granulocytes 06/26/2021 0.0  0 - 0.4 10e9/L Final     Absolute Nucleated RBC 06/26/2021 0.0   Final     Sodium 06/26/2021 138  133 - 144 mmol/L Final     Potassium 06/26/2021 3.9  3.4 - 5.3 mmol/L Final     Chloride 06/26/2021 108  94 - 109 mmol/L Final     Carbon Dioxide 06/26/2021 28  20 - 32 mmol/L Final     Anion Gap 06/26/2021 2* 3 - 14 mmol/L Final     Glucose 06/26/2021 85  70 - 99 mg/dL Final     Urea Nitrogen 06/26/2021 14  7 - 30 mg/dL Final     Creatinine 06/26/2021 0.96  0.52 - 1.04 mg/dL Final     GFR Estimate 06/26/2021 55* >60 mL/min/[1.73_m2] Final    Comment: Non  GFR Calc  Starting 12/18/2018, serum creatinine based estimated GFR (eGFR) will be   calculated using the Chronic Kidney Disease Epidemiology Collaboration   (CKD-EPI) equation.       GFR Estimate If Black 06/26/2021 64  >60 mL/min/[1.73_m2] Final    Comment:  GFR Calc  Starting 12/18/2018, serum creatinine based estimated GFR  (eGFR) will be   calculated using the Chronic Kidney Disease Epidemiology Collaboration   (CKD-EPI) equation.       Calcium 06/26/2021 8.5  8.5 - 10.1 mg/dL Final     Mr Brain W/o Contrast    Result Date: 6/25/2021  MRI BRAIN WITHOUT CONTRAST June 25, 2021 10:49 AM HISTORY: Transient ischemic attack (TIA). TECHNIQUE: Multiplanar, multisequence MRI of the brain without gadolinium IV contrast material.  COMPARISON: MRI head 3/20/2018. FINDINGS: No abnormal intracranial restricted diffusion to suggest acute infarct. The ventricles are normal in size and configuration. There is moderate generalized brain parenchymal volume loss. Mild to moderate scattered nonspecific patchy T2 FLAIR hyperintense signal in the cerebral white matter, as before, most likely representing chronic small vessel ischemic disease. As before, there is prominence of the extra-axial space overlying the lateral aspect of both frontal lobes with mild associated mass effect, likely representing small subdural hygromas. These appear to measure up to approximate 14 mm in thickness on the left and 11 mm in thickness on the right, unchanged. No evidence for acute intracranial hemorrhage or new/progressive mass effect. There is no herniation. The major arterial flow voids of the skull base are grossly maintained. There is moderate polypoid mucosal thickening in the right maxillary sinus with redemonstrated retention cyst in the right maxillary sinus. Mild scattered paranasal sinus mucosal thickening elsewhere. Bilateral lens replacements. The calvarium, skull base, and midface otherwise appear grossly unremarkable.     IMPRESSION: 1. No acute intracranial abnormality. 2. Brain atrophy and presumed chronic small vessel ischemic disease, as described. 3. 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 No midline shift/herniation. 4. Moderate polypoid mucosal thickening in the right maxillary sinus  has increased compared to the prior exam. GEORGE TAPIA MD

## 2021-08-03 ENCOUNTER — OFFICE VISIT (OUTPATIENT)
Dept: FAMILY MEDICINE | Facility: CLINIC | Age: 81
End: 2021-08-03
Payer: MEDICARE

## 2021-08-03 VITALS
OXYGEN SATURATION: 95 % | DIASTOLIC BLOOD PRESSURE: 72 MMHG | HEART RATE: 68 BPM | RESPIRATION RATE: 17 BRPM | BODY MASS INDEX: 25.86 KG/M2 | SYSTOLIC BLOOD PRESSURE: 122 MMHG | WEIGHT: 180.2 LBS | TEMPERATURE: 98.3 F

## 2021-08-03 DIAGNOSIS — G47.9 SLEEP DISTURBANCE: ICD-10-CM

## 2021-08-03 DIAGNOSIS — R41.3 MEMORY IMPAIRMENT OF GRADUAL ONSET: Primary | ICD-10-CM

## 2021-08-03 DIAGNOSIS — Z23 ENCOUNTER FOR IMMUNIZATION: ICD-10-CM

## 2021-08-03 DIAGNOSIS — F41.9 ANXIETY: ICD-10-CM

## 2021-08-03 DIAGNOSIS — R53.83 OTHER FATIGUE: ICD-10-CM

## 2021-08-03 DIAGNOSIS — E78.5 HYPERLIPIDEMIA LDL GOAL <100: ICD-10-CM

## 2021-08-03 DIAGNOSIS — R68.89 SENSATION OF FEELING COLD: ICD-10-CM

## 2021-08-03 LAB — HGB BLD-MCNC: 13.2 G/DL (ref 11.7–15.7)

## 2021-08-03 PROCEDURE — 36415 COLL VENOUS BLD VENIPUNCTURE: CPT | Performed by: INTERNAL MEDICINE

## 2021-08-03 PROCEDURE — G0009 ADMIN PNEUMOCOCCAL VACCINE: HCPCS | Performed by: INTERNAL MEDICINE

## 2021-08-03 PROCEDURE — 80053 COMPREHEN METABOLIC PANEL: CPT | Performed by: INTERNAL MEDICINE

## 2021-08-03 PROCEDURE — 90732 PPSV23 VACC 2 YRS+ SUBQ/IM: CPT | Performed by: INTERNAL MEDICINE

## 2021-08-03 PROCEDURE — 99214 OFFICE O/P EST MOD 30 MIN: CPT | Mod: 25 | Performed by: INTERNAL MEDICINE

## 2021-08-03 PROCEDURE — 84443 ASSAY THYROID STIM HORMONE: CPT | Performed by: INTERNAL MEDICINE

## 2021-08-03 PROCEDURE — 80061 LIPID PANEL: CPT | Performed by: INTERNAL MEDICINE

## 2021-08-03 PROCEDURE — 85018 HEMOGLOBIN: CPT | Performed by: INTERNAL MEDICINE

## 2021-08-03 PROCEDURE — 96127 BRIEF EMOTIONAL/BEHAV ASSMT: CPT | Performed by: INTERNAL MEDICINE

## 2021-08-03 RX ORDER — CITALOPRAM HYDROBROMIDE 10 MG/1
TABLET ORAL
Qty: 90 TABLET | Refills: 1 | Status: SHIPPED | OUTPATIENT
Start: 2021-08-03 | End: 2022-02-25

## 2021-08-03 RX ORDER — DONEPEZIL HYDROCHLORIDE 10 MG/1
10 TABLET, FILM COATED ORAL AT BEDTIME
Qty: 90 TABLET | Refills: 3 | Status: SHIPPED | OUTPATIENT
Start: 2021-08-03 | End: 2022-07-25

## 2021-08-03 RX ORDER — ATORVASTATIN CALCIUM 10 MG/1
10 TABLET, FILM COATED ORAL AT BEDTIME
Qty: 90 TABLET | Refills: 3 | Status: SHIPPED | OUTPATIENT
Start: 2021-08-03 | End: 2022-07-25

## 2021-08-03 RX ORDER — TRAZODONE HYDROCHLORIDE 100 MG/1
TABLET ORAL
Qty: 90 TABLET | Refills: 3 | Status: SHIPPED | OUTPATIENT
Start: 2021-08-03 | End: 2022-06-09

## 2021-08-03 ASSESSMENT — ANXIETY QUESTIONNAIRES
7. FEELING AFRAID AS IF SOMETHING AWFUL MIGHT HAPPEN: NOT AT ALL
3. WORRYING TOO MUCH ABOUT DIFFERENT THINGS: NOT AT ALL
5. BEING SO RESTLESS THAT IT IS HARD TO SIT STILL: NOT AT ALL
1. FEELING NERVOUS, ANXIOUS, OR ON EDGE: NOT AT ALL
GAD7 TOTAL SCORE: 0
2. NOT BEING ABLE TO STOP OR CONTROL WORRYING: NOT AT ALL
6. BECOMING EASILY ANNOYED OR IRRITABLE: NOT AT ALL
IF YOU CHECKED OFF ANY PROBLEMS ON THIS QUESTIONNAIRE, HOW DIFFICULT HAVE THESE PROBLEMS MADE IT FOR YOU TO DO YOUR WORK, TAKE CARE OF THINGS AT HOME, OR GET ALONG WITH OTHER PEOPLE: NOT DIFFICULT AT ALL

## 2021-08-03 ASSESSMENT — PATIENT HEALTH QUESTIONNAIRE - PHQ9
SUM OF ALL RESPONSES TO PHQ QUESTIONS 1-9: 4
5. POOR APPETITE OR OVEREATING: NOT AT ALL

## 2021-08-03 NOTE — PROGRESS NOTES
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   Plan: atorvastatin (LIPITOR) 10 MG tablet,         Comprehensive metabolic panel, Lipid panel         reflex to direct LDL Non-fasting          (F41.9) Anxiety  Comment: Citalopram for years; felt to be effective.   Plan: citalopram (CELEXA) 10 MG tablet, Comprehensive        metabolic panel          (G47.9) Sleep disturbance  Comment: sleep hygiene reviewed; Trazodone use reviewed  Plan: traZODone (DESYREL) 100 MG tablet,         Comprehensive metabolic panel          (R68.89) Sensation of feeling cold  Comment: cold sensitivity; assess thyroid and anemia;   Plan: TSH with free T4 reflex, Hemoglobin        If labs abnormal, treat accordingly    (R53.83) Other fatigue  Comment: multifactorial; sleep hygiene  Plan: TSH with free T4 reflex, Hemoglobin        Trazodone use reviewed.     (Z23) Encounter for immunization  Comment: reviewed immunization  Plan: PPSV23, IM/SUBQ (2+ YRS) - [id number],         SCREENING QUESTIONS FOR ADULT IMMUNIZATIONS            Review of the result(s) of each unique test - reviewed meds. labs and plan of care  Ordering of each unique test  Prescription drug management  33 minutes spent on the date of the encounter doing chart review, history and exam, documentation and further activities per the note       MEDICATIONS:   Orders Placed This Encounter   Medications     atorvastatin (LIPITOR) 10 MG tablet     Sig: Take 1 tablet (10 mg) by mouth At Bedtime TAKE 1 TABLET(10 MG) BY MOUTH AT BEDTIME     Dispense:  90 tablet     Refill:  3     citalopram (CELEXA) 10 MG tablet     Sig: TAKE 1 TABLET(10 MG) BY MOUTH EVERY MORNING      Dispense:  90 tablet     Refill:  1     donepezil (ARICEPT) 10 MG tablet     Sig: Take 1 tablet (10 mg) by mouth At Bedtime     Dispense:  90 tablet     Refill:  3     traZODone (DESYREL) 100 MG tablet     Sig: TAKE 1 TABLETS(100 MG) BY MOUTH AT BEDTIME     Dispense:  90 tablet     Refill:  3          - Continue other medications without change  Regular exercise      Return in about 1 month (around 2021) for Wellness visit.    Cammie Daugherty MD  Internal Medicine   [facility] IRMAMOLATASHA Vallejo is a 81 year old who presents for the following health issues  accompanied by her daughter:    HPI     Consult feels like she is freezing all the time Air conditioner set at 74.  wears slippers around the house and frequently covers with a blanket     Hyperlipidemia Follow-Up      Are you regularly taking any medication or supplement to lower your cholesterol?   Yes- atorvastatin    Are you having muscle aches or other side effects that you think could be caused by your cholesterol lowering medication?  No   Recent Labs   Lab Test 20  1024 18  0836   CHOL 126 159   HDL 43* 44*   LDL 65 78   TRIG 89 185*           Depression Followup    How are you doing with your depression since your last visit? Stable but has days that she is more depressed; meds reviewed    Are you having other symptoms that might be associated with depression? No    Have you had a significant life event?  No     Are you feeling anxious or having panic attacks?   No    Do you have any concerns with your use of alcohol or other drugs? No    Social History     Tobacco Use     Smoking status: Former Smoker     Packs/day: 0.00     Quit date: 3/10/1972     Years since quittin.4     Smokeless tobacco: Never Used   Substance Use Topics     Alcohol use: Yes     Comment: 2 week      Drug use: No     PHQ 2019   PHQ-9 Total Score 0 4   Q9: Thoughts of better off dead/self-harm past  2 weeks Not at all Not at all     STEPHENIE-7 SCORE 5/29/2019 4/13/2020   Total Score 0 0     Last PHQ-9 4/13/2020   1.  Little interest or pleasure in doing things 0   2.  Feeling down, depressed, or hopeless 0   3.  Trouble falling or staying asleep, or sleeping too much 1   4.  Feeling tired or having little energy 0   5.  Poor appetite or overeating 3   6.  Feeling bad about yourself 0   7.  Trouble concentrating 0   8.  Moving slowly or restless 0   Q9: Thoughts of better off dead/self-harm past 2 weeks 0   PHQ-9 Total Score 4   Difficulty at work, home, or with people Not difficult at all     STEPHENIE-7  4/13/2020   1. Feeling nervous, anxious, or on edge 0   2. Not being able to stop or control worrying 0   3. Worrying too much about different things 0   4. Trouble relaxing 0   5. Being so restless that it is hard to sit still 0   6. Becoming easily annoyed or irritable 0   7. Feeling afraid, as if something awful might happen 0   STEPHENIE-7 Total Score 0   If you checked any problems, how difficult have they made it for you to do your work, take care of things at home, or get along with other people? Not difficult at all           How many servings of fruits and vegetables do you eat daily?  2-3    On average, how many sweetened beverages do you drink each day (Examples: soda, juice, sweet tea, etc.  Do NOT count diet or artificially sweetened beverages)?   0    How many days per week do you exercise enough to make your heart beat faster? 7    How many minutes a day do you exercise enough to make your heart beat faster? 30 - 60    How many days per week do you miss taking your medication? 0    Medication Followup of Aricept     Taking Medication as prescribed: yes    Side Effects:  None    Medication Helping Symptoms:  yes         Review of Systems   CONSTITUTIONAL: NEGATIVE for fever, chills, change in weight  ENT/MOUTH: NEGATIVE for ear, mouth and throat problems  RESP: NEGATIVE for significant cough or SOB  CV: NEGATIVE  for chest pain, palpitations or peripheral edema  GI: reviewed bowel habits; fiber intake  MUSCULOSKELETAL: NEGATIVE for significant arthralgias or myalgia  NEURO: NEGATIVE for weakness, dizziness or paresthesias  ENDOCRINE: reviewed lipids   HEME/ALLERGY/IMMUNE: fatigue and cold intolerance  PSYCHIATRIC: memory, and mood reviewed; medications reviewed.      Objective    /72   Pulse 68   Temp 98.3  F (36.8  C) (Oral)   Resp 17   Wt 81.7 kg (180 lb 3.2 oz)   LMP  (LMP Unknown)   SpO2 95%   BMI 25.86 kg/m    Body mass index is 25.86 kg/m .  Physical Exam   GENERAL: healthy, alert and no distress  NECK: no adenopathy, no asymmetry, masses, or scars and thyroid normal to palpation  RESP: lungs clear to auscultation - no rales, rhonchi or wheezes  CV: regular rates and rhythm, normal S1 S2, no S3 or S4, peripheral pulses strong and no peripheral edema  ABDOMEN: soft, nontender, no hepatosplenomegaly, no masses and bowel sounds normal  MS: no gross musculoskeletal defects noted, no edema  NEURO: Normal strength and tone, mentation intact and speech normal  PSYCH: mentation appears normal and affect normal/bright

## 2021-08-04 LAB
ALBUMIN SERPL-MCNC: 4 G/DL (ref 3.4–5)
ALP SERPL-CCNC: 68 U/L (ref 40–150)
ALT SERPL W P-5'-P-CCNC: 24 U/L (ref 0–50)
ANION GAP SERPL CALCULATED.3IONS-SCNC: 6 MMOL/L (ref 3–14)
AST SERPL W P-5'-P-CCNC: 18 U/L (ref 0–45)
BILIRUB SERPL-MCNC: 0.4 MG/DL (ref 0.2–1.3)
BUN SERPL-MCNC: 15 MG/DL (ref 7–30)
CALCIUM SERPL-MCNC: 9 MG/DL (ref 8.5–10.1)
CHLORIDE BLD-SCNC: 108 MMOL/L (ref 94–109)
CHOLEST SERPL-MCNC: 166 MG/DL
CO2 SERPL-SCNC: 25 MMOL/L (ref 20–32)
CREAT SERPL-MCNC: 1.01 MG/DL (ref 0.52–1.04)
FASTING STATUS PATIENT QL REPORTED: NO
GFR SERPL CREATININE-BSD FRML MDRD: 52 ML/MIN/1.73M2
GLUCOSE BLD-MCNC: 94 MG/DL (ref 70–99)
HDLC SERPL-MCNC: 60 MG/DL
LDLC SERPL CALC-MCNC: 81 MG/DL
NONHDLC SERPL-MCNC: 106 MG/DL
POTASSIUM BLD-SCNC: 4.1 MMOL/L (ref 3.4–5.3)
PROT SERPL-MCNC: 7.1 G/DL (ref 6.8–8.8)
SODIUM SERPL-SCNC: 139 MMOL/L (ref 133–144)
TRIGL SERPL-MCNC: 127 MG/DL
TSH SERPL DL<=0.005 MIU/L-ACNC: 2.06 MU/L (ref 0.4–4)

## 2021-08-04 ASSESSMENT — ANXIETY QUESTIONNAIRES: GAD7 TOTAL SCORE: 0

## 2021-10-24 ENCOUNTER — HEALTH MAINTENANCE LETTER (OUTPATIENT)
Age: 81
End: 2021-10-24

## 2021-10-26 ENCOUNTER — TELEPHONE (OUTPATIENT)
Dept: FAMILY MEDICINE | Facility: CLINIC | Age: 81
End: 2021-10-26

## 2021-10-26 DIAGNOSIS — G47.9 SLEEP DISTURBANCE: ICD-10-CM

## 2021-10-26 NOTE — TELEPHONE ENCOUNTER
"Patient calls states \"a couple months ago\" I was told by provider I could start taking two Trazodone at night    She has been taking two at night and now can't get a refill and paid by insurance until 3 months she states.    Would provider please change script to reflect change   (see last ov notes)    Please call patient and let her know if this will or will not be able to be done    [phone]    thanks  "

## 2021-12-03 ENCOUNTER — TRANSFERRED RECORDS (OUTPATIENT)
Dept: HEALTH INFORMATION MANAGEMENT | Facility: CLINIC | Age: 81
End: 2021-12-03
Payer: MEDICARE

## 2022-02-25 DIAGNOSIS — F41.9 ANXIETY: ICD-10-CM

## 2022-02-25 RX ORDER — CITALOPRAM HYDROBROMIDE 10 MG/1
TABLET ORAL
Qty: 90 TABLET | Refills: 1 | Status: SHIPPED | OUTPATIENT
Start: 2022-02-25 | End: 2022-07-25

## 2022-02-25 NOTE — TELEPHONE ENCOUNTER
Prescription approved per [facility] Refill Protocol.  Ashley Guerin RN

## 2022-04-05 ENCOUNTER — ANCILLARY PROCEDURE (OUTPATIENT)
Dept: GENERAL RADIOLOGY | Facility: CLINIC | Age: 82
End: 2022-04-05
Attending: INTERNAL MEDICINE
Payer: MEDICARE

## 2022-04-05 ENCOUNTER — OFFICE VISIT (OUTPATIENT)
Dept: FAMILY MEDICINE | Facility: CLINIC | Age: 82
End: 2022-04-05
Payer: MEDICARE

## 2022-04-05 VITALS
TEMPERATURE: 98.3 F | DIASTOLIC BLOOD PRESSURE: 74 MMHG | WEIGHT: 195.1 LBS | HEIGHT: 70 IN | SYSTOLIC BLOOD PRESSURE: 118 MMHG | BODY MASS INDEX: 27.93 KG/M2 | OXYGEN SATURATION: 96 % | RESPIRATION RATE: 17 BRPM | HEART RATE: 75 BPM

## 2022-04-05 DIAGNOSIS — M54.6 ACUTE LEFT-SIDED THORACIC BACK PAIN: ICD-10-CM

## 2022-04-05 DIAGNOSIS — Z02.9 ADMINISTRATIVE ENCOUNTER: ICD-10-CM

## 2022-04-05 DIAGNOSIS — N18.30 STAGE 3 CHRONIC KIDNEY DISEASE, UNSPECIFIED WHETHER STAGE 3A OR 3B CKD (H): ICD-10-CM

## 2022-04-05 DIAGNOSIS — M41.9 SCOLIOSIS OF THORACOLUMBAR SPINE, UNSPECIFIED SCOLIOSIS TYPE: ICD-10-CM

## 2022-04-05 DIAGNOSIS — M54.6 ACUTE LEFT-SIDED THORACIC BACK PAIN: Primary | ICD-10-CM

## 2022-04-05 PROCEDURE — 72100 X-RAY EXAM L-S SPINE 2/3 VWS: CPT | Mod: FY | Performed by: RADIOLOGY

## 2022-04-05 PROCEDURE — 99215 OFFICE O/P EST HI 40 MIN: CPT | Performed by: INTERNAL MEDICINE

## 2022-04-05 PROCEDURE — 72170 X-RAY EXAM OF PELVIS: CPT | Mod: FY | Performed by: RADIOLOGY

## 2022-04-05 PROCEDURE — 72080 X-RAY EXAM THORACOLMB 2/> VW: CPT | Mod: 59 | Performed by: RADIOLOGY

## 2022-04-05 RX ORDER — TIZANIDINE 2 MG/1
2 TABLET ORAL 2 TIMES DAILY PRN
Qty: 20 TABLET | Refills: 1 | Status: SHIPPED | OUTPATIENT
Start: 2022-04-05 | End: 2023-05-31

## 2022-04-05 ASSESSMENT — PAIN SCALES - GENERAL: PAINLEVEL: MODERATE PAIN (4)

## 2022-04-05 NOTE — PROGRESS NOTES
Assessment & Plan     (M54.6) Acute left-sided thoracic back pain  (primary encounter diagnosis)  Comment: when standing, pelvis is shiftefted to the left; L Shoulder and L Hip NOT ALIGNED; increased tone; XRay-assess spine alignment; consdier PT and/or chiropractic  Plan: Albumin Random Urine Quantitative with Creat         Ratio, XR Lumbar Spine 2/3 Views, XR Thoracic         Lumbar Standing 2 Views, XR Pelvis 1/2 Views          (N18.30) Stage 3 chronic kidney disease, unspecified whether stage 3a or 3b CKD (H)  Comment: When the GFR ( glomerular filtration rate is <60, there is a risk over time to kidney function) . This is best treated with good hydration and minimizing excessive use of antiinflammatory medications.  It is listed on your chart to alert to possible early kidney changes.  There may be medications that will be avoided in this case or dose adjusted to help the health of the kidney. No other changes need to be made at this time. Periodic lab monitoring will continue.    Plan: limit NSAIDS, Acetaminophen OK    (Z02.9) Handicap Parking 4/2023  Comment: past back surgery; painful when ambulating; DDD without radicular symptoms.  Scoliosis and risk for falls.  Plan: renew    (M41.9) Scoliosis of thoracolumbar spine, unspecified scoliosis type  Comment: note back pain and findings on exam;  Increased tone of paraspinal muscles; she may consider an abdominal binder- careful that it is not too late or that it hinders respiratory excursion; discussed with pt and her daughter that there could be increased risk for respiratory issues.  Keep as active as able  Plan: tiZANidine (ZANAFLEX) 2 MG tablet, Physical         Therapy Referral              Review of the result(s) of each unique test - review of chart, XRays, labs and goals of treatment.  Ordering of each unique test  Prescription drug management  45 minutes spent on the date of the encounter doing chart review, history and exam, documentation and  "further activities per the note         BMI:   Estimated body mass index is 28.4 kg/m  as calculated from the following:    Height as of this encounter: 1.765 m (5' 9.5\").    Weight as of this encounter: 88.5 kg (195 lb 1.6 oz).       MEDICATIONS:   Orders Placed This Encounter   Medications     tiZANidine (ZANAFLEX) 2 MG tablet     Sig: Take 1 tablet (2 mg) by mouth 2 times daily as needed for muscle spasms     Dispense:  20 tablet     Refill:  1          - Continue other medications without change  CONSULTATION/REFERRAL to Physical Therapy  Keep active;  Could consider Wheeled walker- pt wishes to hold off for now.     Return in 2 months (on 6/5/2022) for Wellness visit- can be done anytime; last doen 9/2020.    Cammie Daugherty MD  Internal Medicine   [facility] JARON Vallejo is a 82 year old who presents for the following health issues  accompanied by her daughter.    History of Present Illness       Back Pain:  She presents for follow up of back pain. Patient's back pain is a new problem.    Original cause of back pain: not sure  First noticed back pain: 1-4 weeks ago  Patient feels back pain: constantlyLocation of back pain:  Left upper back  Description of back pain: other  Back pain spreads: nowhere    Since patient first noticed back pain, pain is: always present, but gets better and worse  Does back pain interfere with her job:  No  On a scale of 1-10 (10 being the worst), patient describes pain as:  6  What makes back pain worse: standing  Acupuncture: not tried  Acetaminophen: not helpful  Activity or exercise: not helpful  Chiropractor:  Not tried  Cold: not tried  Heat: not tried  Massage: not tried  Muscle relaxants: not tried  NSAIDS: not helpful  Opioids: not tried  Physical Therapy: not tried  Rest: helpful  Steroid Injection: not tried  Stretching: not helpful  Surgery: not tried  TENS unit: not tried  Topical pain relievers: not tried  Other " "healthcare providers patient is seeing for back pain: None    She eats 2-3 servings of fruits and vegetables daily.She consumes 0 sweetened beverage(s) daily.She exercises with enough effort to increase her heart rate 30 to 60 minutes per day.  She exercises with enough effort to increase her heart rate 7 days per week.   She is taking medications regularly.     Feels better when lie flat        Review of Systems   CONSTITUTIONAL: NEGATIVE for fever, chills, change in weight  ENT/MOUTH: NEGATIVE for ear, mouth and throat problems  RESP: NEGATIVE for significant cough or SOB  CV: NEGATIVE for chest pain, palpitations or peripheral edema  GI: NEGATIVE for nausea, abdominal pain, heartburn, or change in bowel habits  : no urinary symptoms   MUSCULOSKELETAL: see above.  NEURO: no neuropathic symptoms.   PSYCHIATRIC: NEGATIVE for changes in mood or affect      Objective    /74   Pulse 75   Temp 98.3  F (36.8  C) (Oral)   Resp 17   Ht 1.765 m (5' 9.5\")   Wt 88.5 kg (195 lb 1.6 oz)   LMP  (LMP Unknown)   SpO2 96%   BMI 28.40 kg/m    Body mass index is 28.4 kg/m .  Physical Exam   GENERAL: healthy, alert, no distress and accompanied by her daughterRegi  RESP: lungs clear to auscultation - no rales, rhonchi or wheezes  CV: regular rates and rhythm, normal S1 S2, peripheral pulses strong and no peripheral edema  ABDOMEN: soft, nontender and bowel sounds normal  MS: thoracolumbar region with increased tone and curvature noted; lateral shift to her left and uneven iliac crests.  NEURO: Normal strength and tone, sensory exam grossly normal and mentation intact  PSYCH: mentation appears normal, affect normal/bright    Xray - Reviewed with pt and her daughter, independently reviewed at appt   Scoliosis noted; significant curvature, pelvis uneven            "

## 2022-04-06 ENCOUNTER — TELEPHONE (OUTPATIENT)
Dept: CARDIOLOGY | Facility: CLINIC | Age: 82
End: 2022-04-06
Payer: MEDICARE

## 2022-04-06 NOTE — TELEPHONE ENCOUNTER
Health Call Center    Phone Message    May a detailed message be left on voicemail: yes     Reason for Call: Order(s): Other:   Reason for requested: Echo and Monitor   Date needed: asap  Provider name: Dr. Stevenson    - Per patient daughter Regi patient was due for an annual follow up in Feb with Echo and Monitor prior. Orders has  2022 please extend the orders out to the end May for more reji slots.      Action Taken: Message routed to:  [facility] ([facility] clinical pool    Travel Screening: Not Applicable

## 2022-04-06 NOTE — TELEPHONE ENCOUNTER
Orders extended. Messaged scheduling to call daughter to arrange.     Parul Vanegas RN, BSN  04/06/22 at 2:39 PM

## 2022-04-07 ENCOUNTER — THERAPY VISIT (OUTPATIENT)
Dept: PHYSICAL THERAPY | Facility: CLINIC | Age: 82
End: 2022-04-07
Attending: INTERNAL MEDICINE
Payer: MEDICARE

## 2022-04-07 DIAGNOSIS — M54.50 ACUTE LEFT-SIDED LOW BACK PAIN WITHOUT SCIATICA: ICD-10-CM

## 2022-04-07 DIAGNOSIS — M54.6 THORACIC SPINE PAIN: ICD-10-CM

## 2022-04-07 DIAGNOSIS — M41.9 SCOLIOSIS OF THORACOLUMBAR SPINE, UNSPECIFIED SCOLIOSIS TYPE: ICD-10-CM

## 2022-04-07 PROCEDURE — 97161 PT EVAL LOW COMPLEX 20 MIN: CPT | Mod: GP | Performed by: PHYSICAL THERAPIST

## 2022-04-07 PROCEDURE — 97110 THERAPEUTIC EXERCISES: CPT | Mod: GP | Performed by: PHYSICAL THERAPIST

## 2022-04-07 NOTE — PROGRESS NOTES
[facility]    OUTPATIENT Physical Therapy ORTHOPEDIC EVALUATION  PLAN OF TREATMENT FOR OUTPATIENT REHABILITATION  (COMPLETE FOR INITIAL CLAIMS ONLY)  Patient's Last Name, First Name, M.I.  YOB: 1940  Kathie Wetzel    Provider s Name:  [facility]   Medical Record No.  [id number]   Start of Care Date:  04/07/22   Onset Date:   04/05/22 (MD order date)   Type:     _X__PT   ___OT Medical Diagnosis:    Encounter Diagnoses   Name Primary?     Scoliosis of thoracolumbar spine, unspecified scoliosis type      Thoracic spine pain         Treatment Diagnosis:  Thoracic back pain        Goals:     04/07/22 0500   Body Part   Goals listed below are for Thoracic back pain   Goal #1   Goal #1 ambulation   Previous Functional Level No restrictions   Current Functional Level Minutes patient can walk   Performance Level 5 min with pain 6/10   STG Target Performance Minutes patient will be able to walk   Performance Level 10 min with pain 3/10   Rationale for safe household ambulation;for safe outdoor household ambulation;for safe community ambulation   Due Date 04/28/22    LTG Target Performance Minutes patient will be able to  walk   Performance Level 20 min with pain    Rationale for safe household ambulation;for safe outdoor household ambulation;for safe community ambulation   Due Date 05/19/22       Therapy Frequency:     Predicted Duration of Therapy Intervention:       Donnie Rojo, PT                 I CERTIFY THE NEED FOR THESE SERVICES FURNISHED UNDER        THIS PLAN OF TREATMENT AND WHILE UNDER MY CARE     (Physician attestation of this document indicates review and certification of the therapy plan).                       Certification Date From:  04/07/22   Certification Date To:  05/19/22    Referring Provider:  Cammie Daugherty    Initial Assessment        See  Epic Evaluation SOC Date: 04/07/22

## 2022-04-07 NOTE — PROGRESS NOTES
Physical Therapy Initial Evaluation  Subjective:  The history is provided by the patient. No  was used.   Patient Health History  Kathie Wetzel being seen for L LBP.     Date of Onset: 2 weeks.   Problem occurred: unknown   Pain is reported as 6/10 on pain scale.  General health as reported by patient is good.  Pertinent medical history includes: none.   Red flags:  None as reported by patient.  Medical allergies: See EPIC.   Surgeries include:  Orthopedic surgery. Other surgery history details: Back surgery, unknown type.    Current medications:  Anti-depressants, muscle relaxants and sleep medication.    Current occupation is retired.                     Therapist Generated HPI Evaluation  Problem details: Pt. complains of left LBP that has been present for 2 weeks.  No known trauma.  PT order dated 4/5/22.      .         Type of problem:  Lumbar.    This is a new condition.  Condition occurred with:  Insidious onset.  Where condition occurred: for unknown reasons.  Patient reports pain:  Lumbar spine left.  Pain is described as aching and is intermittent.  Pain radiates to:  No radiation. Pain is worse during the day.  Since onset symptoms are unchanged.  Associated symptoms:  Loss of motion/stiffness, loss of strength and loss of motion. Symptoms are exacerbated by standing and walking  and relieved by rest.      Barriers include:  None as reported by patient.                        Objective:  Standing Alignment:        Lumbar:  Lordosis decr                Flexibility/Screens:   Positive screens:  LumbarNegative screens: Thoracic or Hip                    Lumbar/SI Evaluation  ROM:    AROM Lumbar:   Flexion:          100%  Ext:                    50% with ERP   Side Bend:        Left:  75% with ERP    Right:  75% with ERP  Rotation:           Left:     Right:   Side Glide:        Left:     Right:                     Lumbar Palpation:    Tenderness present at Left:    Quadratus Lumborum and  Erector Spinae  Tenderness not present at Left:    Vertebral    Tenderness not present at Right:  Quadratus Lumborum; Erector Spinae or Vertebral      Spinal Segmental Conclusions:     Level: Hypo noted at S1, L5, L4, L3, L2 and L1                                                   General     ROS    Assessment/Plan:    Patient is a 82 year old female with thoracic complaints.    Patient has the following significant findings with corresponding treatment plan.                Diagnosis 1:  Thoracic back apin  Pain -  self management, education, directional preference exercise and home program  Decreased ROM/flexibility - manual therapy and therapeutic exercise  Decreased strength - therapeutic exercise and therapeutic activities  Impaired muscle performance - neuro re-education  Decreased function - therapeutic activities    Therapy Evaluation Codes:   1) Clinical presentation characteristics are:   Stable/Uncomplicated.  2) Decision-Making    Low complexity using standardized patient assessment instrument and/or measureable assessment of functional outcome.  Cumulative Therapy Evaluation is: Low complexity.    Previous and current functional limitations:  (See Goal Flow Sheet for this information)    Short term and Long term goals: (See Goal Flow Sheet for this information)     Communication ability:  Patient appears to be able to clearly communicate and understand verbal and written communication and follow directions correctly.  Treatment Explanation - The following has been discussed with the patient:   RX ordered/plan of care  Anticipated outcomes  Possible risks and side effects  This patient would benefit from PT intervention to resume normal activities.   Rehab potential is good.    Frequency:  1 X week, once daily  Duration:  for 6 weeks  Discharge Plan:  Achieve all LTG.  Independent in home treatment program.  Reach maximal therapeutic benefit.    Please refer to the daily flowsheet for treatment today,  total treatment time and time spent performing 1:1 timed codes.

## 2022-04-12 ENCOUNTER — HOSPITAL ENCOUNTER (OUTPATIENT)
Dept: CARDIOLOGY | Facility: CLINIC | Age: 82
Discharge: HOME | End: 2022-04-12
Attending: INTERNAL MEDICINE | Admitting: INTERNAL MEDICINE
Payer: MEDICARE

## 2022-04-12 DIAGNOSIS — I44.1 AV BLOCK, MOBITZ 1: ICD-10-CM

## 2022-04-12 PROCEDURE — 93244 EXT ECG>48HR<7D REV&INTERPJ: CPT | Performed by: INTERNAL MEDICINE

## 2022-04-12 PROCEDURE — 93242 EXT ECG>48HR<7D RECORDING: CPT

## 2022-04-14 ENCOUNTER — THERAPY VISIT (OUTPATIENT)
Dept: PHYSICAL THERAPY | Facility: CLINIC | Age: 82
End: 2022-04-14
Payer: MEDICARE

## 2022-04-14 DIAGNOSIS — M54.50 ACUTE LEFT-SIDED LOW BACK PAIN WITHOUT SCIATICA: ICD-10-CM

## 2022-04-14 DIAGNOSIS — M54.6 THORACIC SPINE PAIN: Primary | ICD-10-CM

## 2022-04-14 PROCEDURE — 97110 THERAPEUTIC EXERCISES: CPT | Mod: GP | Performed by: PHYSICAL THERAPIST

## 2022-04-14 PROCEDURE — 97035 APP MDLTY 1+ULTRASOUND EA 15: CPT | Mod: GP | Performed by: PHYSICAL THERAPIST

## 2022-04-14 NOTE — PROGRESS NOTES
DISCHARGE REPORT    Progress reporting period is from eval to 4/14/22.       SUBJECTIVE  Subjective changes noted by patient:  .  Subjective: She reports feeling much better.  Localized minor sx's on left LB.  Functioning at 70-80% of where she wants to be.    Current pain level is  Current Pain level: 2/10.     Previous pain level was   Initial Pain level: 6/10.   Changes in function: See Goals flowsheet   Adverse reaction to treatment or activity: None    OBJECTIVE  Changes noted in objective findings:  Yes,   Objective: LROM WNL.     ASSESSMENT/PLAN  Updated problem list and treatment plan: Diagnosis 1:  LBP  Pain -  self management, education, directional preference exercise and home program  Decreased strength - therapeutic exercise and therapeutic activities  STG/LTGs have been met or progress has been made towards goals:  Yes (See Goal flow sheet completed today.)  Assessment of Progress: The patient's condition is improving.  The patient's condition has potential to improve.  Self Management Plans:  Patient has been instructed in a home treatment program.  Patient is independent in a home treatment program.  Patient  has been instructed in self management of symptoms.  I have re-evaluated this patient and find that the nature, scope, duration and intensity of the therapy is appropriate for the medical condition of the patient.      Recommendations:  This patient is ready to be discharged from therapy and continue their home treatment program.    Please refer to the daily flowsheet for treatment today, total treatment time and time spent performing 1:1 timed codes.

## 2022-04-20 ENCOUNTER — HOSPITAL ENCOUNTER (OUTPATIENT)
Dept: CARDIOLOGY | Facility: CLINIC | Age: 82
Discharge: HOME | End: 2022-04-20
Attending: INTERNAL MEDICINE | Admitting: INTERNAL MEDICINE
Payer: MEDICARE

## 2022-04-20 DIAGNOSIS — I77.810 ASCENDING AORTA DILATION (H): ICD-10-CM

## 2022-04-20 LAB — LVEF ECHO: NORMAL

## 2022-04-20 PROCEDURE — 93306 TTE W/DOPPLER COMPLETE: CPT

## 2022-04-20 PROCEDURE — 93306 TTE W/DOPPLER COMPLETE: CPT | Mod: 26 | Performed by: INTERNAL MEDICINE

## 2022-04-22 ENCOUNTER — OFFICE VISIT (OUTPATIENT)
Dept: CARDIOLOGY | Facility: CLINIC | Age: 82
End: 2022-04-22
Attending: INTERNAL MEDICINE
Payer: MEDICARE

## 2022-04-22 VITALS
SYSTOLIC BLOOD PRESSURE: 134 MMHG | DIASTOLIC BLOOD PRESSURE: 76 MMHG | BODY MASS INDEX: 25.77 KG/M2 | WEIGHT: 180 LBS | HEART RATE: 72 BPM | OXYGEN SATURATION: 98 % | HEIGHT: 70 IN

## 2022-04-22 DIAGNOSIS — I44.1 AV BLOCK, MOBITZ 1: ICD-10-CM

## 2022-04-22 DIAGNOSIS — I77.810 ASCENDING AORTA DILATION (H): ICD-10-CM

## 2022-04-22 PROCEDURE — 99214 OFFICE O/P EST MOD 30 MIN: CPT | Performed by: PHYSICIAN ASSISTANT

## 2022-04-22 NOTE — LETTER
2022    Cammie Daugherty MD  [address] [facility] [address]    RE: Kathie Wetzel       Dear Colleague,     I had the pleasure of seeing Kathie Wetzel in the [facility].      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     May 2020 she had pneumonia with a loculated left pleural effusion and underwent thoracotomy.  She has since had some pain around the right side.  She also has some chronic dyspnea.  The pain is not worse with exertion.     7-day ZIO monitor 2021 showed sinus rhythm, average heart rate 72, nighttime heart rate 50s, 2 SVT runs up to 10 beats, single dropped QRS consistent with second-degree AV block type I, no prolonged pauses or significant bradycardia.     Echo 2021 showed EF 60%, normal RV, no valve disease, aorta 4.2 cm.    Interval History:   Last visit 21 with Dr. Stevenson: No changes, follow up in 1 year with Ziopatch and Echo.     -21 Admit for gait instability vs lightheadedness, suspect orthostatic hypotension.     22 3 day Ziopatch: is still in process.  While she was wearing the monitor, she had no sxs of LH, dizziness, near syncope, syncope.     22 Echo:  Normal LV function, no sig valve disease, asc aorta is 3.9 cm.        ROS:  Skin:  Negative     Eyes:  Negative    ENT:  Negative    Respiratory:  Positive for dyspnea on exertion  Cardiovascular:  Negative    Gastroenterology: Positive for reflux;heartburn  Genitourinary:  Negative    Musculoskeletal:  Positive for arthritis  Neurologic:  Negative    Psychiatric:  Negative    Heme/Lymph/Imm:  Negative    Endocrine:  Negative      PAST MEDICAL HISTORY:  Past Medical History:   Diagnosis Date     High cholesterol      HTN (hypertension)      Parathyroid adenoma      PONV  (postoperative nausea and vomiting)      Thyroid disorder        PAST SURGICAL HISTORY:  Past Surgical History:   Procedure Laterality Date     APPENDECTOMY OPEN       CHOLECYSTECTOMY       DISCECTOMY LUMBAR POSTERIOR MICROSCOPIC ONE LEVEL Right 11/10/2017    Procedure: DISCECTOMY LUMBAR POSTERIOR MICROSCOPIC ONE LEVEL;  Right L3-4 hemilaminectomy, medial facetectomy, foraminotomy with microdiscectomy and use of X-ray and intraoperative microscope ;  Surgeon: Al Dailey MD;  Location: [facility] OR     PARATHYROIDECTOMY  3/14/2014    Procedure: PARATHYROIDECTOMY;  Neck Exploration, Excision Right  Parathyroid Adenoma, Pre Operative Sestimibi Injection, Rapid PTH Assay ;  Surgeon: Natividad Fischer MD;  Location: [facility] OR     THORACOSCOPIC DECORTICATION LUNG Right 2020    Procedure: RIGHT VIDEO ASSISTED THORACOSCOPY, RIGHT THORACOTOMY, RIGHT DECORTICATION, RIGHT PARIETAL PLEURECTOMY;  Surgeon: Gary Shah MD;  Location: [facility] OR     THORACOTOMY Right 2020    Procedure: THORACOTOMY ;  Surgeon: Gary Shah MD;  Location: [facility] OR       SOCIAL HISTORY:  Social History     Socioeconomic History     Marital status: Single   Tobacco Use     Smoking status: Former Smoker     Packs/day: 0.00     Quit date: 3/10/1972     Years since quittin.1     Smokeless tobacco: Never Used   Substance and Sexual Activity     Alcohol use: Yes     Comment: 2 week      Drug use: No     Sexual activity: Not Currently   Other Topics Concern     Parent/sibling w/ CABG, MI or angioplasty before 65F 55M? No       FAMILY HISTORY:  Family History   Problem Relation Age of Onset     Thyroid Disease Maternal Aunt      Thyroid Disease Other         cousin        MEDS: atorvastatin (LIPITOR) 10 MG tablet, Take 1 tablet (10 mg) by mouth At Bedtime TAKE 1 TABLET(10 MG) BY MOUTH AT BEDTIME  cholecalciferol 25 MCG (1000 UT) TABS, Take 1 tablet by mouth daily   citalopram (CELEXA) 10 MG tablet, TAKE 1 TABLET(10 MG)  "BY MOUTH EVERY MORNING  donepezil (ARICEPT) 10 MG tablet, Take 1 tablet (10 mg) by mouth At Bedtime  medical cannabis (Patient's own supply), Take 1 Dose by mouth See Admin Instructions (The purpose of this order is to document that the patient reports taking medical cannabis.  This is not a prescription, and is not used to certify that the patient has a qualifying medical condition.)   4.3 mg THC and .7mg CBD.   ( at bedtime)  Methylcobalamin (B-12) 5000 MCG TBDP, Take 5,000 mcg by mouth daily  Multiple Vitamins-Minerals (PRESERVISION AREDS) CAPS, Take 1 tablet by mouth every morning   multivitamin, therapeutic (THERA-VIT) TABS tablet, Take 1 tablet by mouth daily  tiZANidine (ZANAFLEX) 2 MG tablet, Take 1 tablet (2 mg) by mouth 2 times daily as needed for muscle spasms  traZODone (DESYREL) 100 MG tablet, TAKE 1 TABLETS(100 MG) BY MOUTH AT BEDTIME    No current facility-administered medications on file prior to visit.      ALLERGIES:   Allergies   Allergen Reactions     Simvastatin      Buttock pain     Penicillins Rash       PHYSICAL EXAM:  Vitals: /76 (BP Location: Right arm, Patient Position: Sitting, Cuff Size: Adult Regular)   Pulse 72   Ht 1.765 m (5' 9.5\")   Wt 81.6 kg (180 lb)   LMP  (LMP Unknown)   SpO2 98%   BMI 26.20 kg/m    Constitutional:  cooperative, alert and oriented, well developed, well nourished, in no acute distress appears younger than stated age      Skin:  warm and dry to the touch        Head:  normocephalic        Eyes:  pupils equal and round;conjunctivae and lids unremarkable;sclera white        ENT:  no pallor or cyanosis        Neck:  JVP normal;no carotid bruit        Respiratory:  clear to auscultation        Cardiac: regular rhythm;normal S1 and S2     no presence of murmur            GI:  abdomen soft;BS normoactive        Vascular:                                        Extremities and Musculoskeletal:  no edema        Neurological:  no gross motor deficits;affect " appropriate              LABS/DATA:  I reviewed the following:  Echo 4/20/22:  Interpretation Summary  1. Normal biventricular size and function. Left ventricular ejection fraction  of 60-65%. No segmental wall motion abnormalities noted.  2. Normal sized atria.  3. No hemodynamically significant valvular disease.  4. Normal pulmonary artery pressure.  Compared to the previous echocardiogram on 1/15/2021, there are no significant  changes.  Technically adequate study.      4/12/22 Zio is in process        ASSESSMENT:  82-year-old female being seen for follow up of bradycardia and ascending aorta dilatation.    Last year she wore a Zio and had overall heart rate averages in the 70 range.  She had no sustained bradycardia on her ZIO monitor.  She had a single dropped QRS, but no sustained AV block.    She just wore a repeat Zio, but results are still in process. Notably, she had no sxs while wearing the monitor.   And she is not on any rate controlling medication.       Her echo was normal.  She had mild dilation of the ascending aorta, actually measure a little smaller than last year at 3.9 cm (was 4.2 cm last echo).        RECOMMENDATIONS:  1.  Brief episode second-degree AV block in 2021  - Repeat 3-day ZIO monitor is in prcess.  Pending results, either will repeat a monitor next year to monitor for progression or send to EP.      2.  Mild dilation of ascending aorta  - Echo 2021: 4.2 cm  - Echo 4/20/22: 3.9 cm  - Echocardiogram in 2 years       Follow up:  1 year with Dr. Stevenson (possibly repeat a monitor pending results)  Echo in 2024      Darline Hooper PA-C    Thank you for allowing me to participate in the care of your patient.      Sincerely,     Darline Hooper PA-C     [facility]  cc:   Melchor Stevenson MD  [facility]  [address] [place] [address]

## 2022-04-22 NOTE — PATIENT INSTRUCTIONS
The echo shows the aorta is stable.  We will repeat an echo in 2 years.     The monitor is still in process.  Depending on what it shows, we may repeat the monitor in a year or send you to our EP colleagues.

## 2022-04-22 NOTE — PROGRESS NOTES
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     May 2020 she had pneumonia with a loculated left pleural effusion and underwent thoracotomy.  She has since had some pain around the right side.  She also has some chronic dyspnea.  The pain is not worse with exertion.     7-day ZIO monitor 2021 showed sinus rhythm, average heart rate 72, nighttime heart rate 50s, 2 SVT runs up to 10 beats, single dropped QRS consistent with second-degree AV block type I, no prolonged pauses or significant bradycardia.     Echo 2021 showed EF 60%, normal RV, no valve disease, aorta 4.2 cm.    Interval History:   Last visit 21 with Dr. Stevenson: No changes, follow up in 1 year with Ziopatch and Echo.     -21 Admit for gait instability vs lightheadedness, suspect orthostatic hypotension.     22 3 day Ziopatch: is still in process.  While she was wearing the monitor, she had no sxs of LH, dizziness, near syncope, syncope.     22 Echo:  Normal LV function, no sig valve disease, asc aorta is 3.9 cm.        ROS:  Skin:  Negative     Eyes:  Negative    ENT:  Negative    Respiratory:  Positive for dyspnea on exertion  Cardiovascular:  Negative    Gastroenterology: Positive for reflux;heartburn  Genitourinary:  Negative    Musculoskeletal:  Positive for arthritis  Neurologic:  Negative    Psychiatric:  Negative    Heme/Lymph/Imm:  Negative    Endocrine:  Negative      PAST MEDICAL HISTORY:  Past Medical History:   Diagnosis Date     High cholesterol      HTN (hypertension)      Parathyroid adenoma      PONV (postoperative nausea and vomiting)      Thyroid disorder        PAST SURGICAL HISTORY:  Past Surgical History:   Procedure Laterality Date     APPENDECTOMY OPEN       CHOLECYSTECTOMY  2007     DISCECTOMY LUMBAR  POSTERIOR MICROSCOPIC ONE LEVEL Right 11/10/2017    Procedure: DISCECTOMY LUMBAR POSTERIOR MICROSCOPIC ONE LEVEL;  Right L3-4 hemilaminectomy, medial facetectomy, foraminotomy with microdiscectomy and use of X-ray and intraoperative microscope ;  Surgeon: Al Dailey MD;  Location: [facility] OR     PARATHYROIDECTOMY  3/14/2014    Procedure: PARATHYROIDECTOMY;  Neck Exploration, Excision Right  Parathyroid Adenoma, Pre Operative Sestimibi Injection, Rapid PTH Assay ;  Surgeon: Natividad Fischer MD;  Location: [facility] OR     THORACOSCOPIC DECORTICATION LUNG Right 2020    Procedure: RIGHT VIDEO ASSISTED THORACOSCOPY, RIGHT THORACOTOMY, RIGHT DECORTICATION, RIGHT PARIETAL PLEURECTOMY;  Surgeon: Gary Shah MD;  Location: [facility] OR     THORACOTOMY Right 2020    Procedure: THORACOTOMY ;  Surgeon: Gary Shah MD;  Location: [facility] OR       SOCIAL HISTORY:  Social History     Socioeconomic History     Marital status: Single   Tobacco Use     Smoking status: Former Smoker     Packs/day: 0.00     Quit date: 3/10/1972     Years since quittin.1     Smokeless tobacco: Never Used   Substance and Sexual Activity     Alcohol use: Yes     Comment: 2 week      Drug use: No     Sexual activity: Not Currently   Other Topics Concern     Parent/sibling w/ CABG, MI or angioplasty before 65F 55M? No       FAMILY HISTORY:  Family History   Problem Relation Age of Onset     Thyroid Disease Maternal Aunt      Thyroid Disease Other         cousin        MEDS: atorvastatin (LIPITOR) 10 MG tablet, Take 1 tablet (10 mg) by mouth At Bedtime TAKE 1 TABLET(10 MG) BY MOUTH AT BEDTIME  cholecalciferol 25 MCG (1000 UT) TABS, Take 1 tablet by mouth daily   citalopram (CELEXA) 10 MG tablet, TAKE 1 TABLET(10 MG) BY MOUTH EVERY MORNING  donepezil (ARICEPT) 10 MG tablet, Take 1 tablet (10 mg) by mouth At Bedtime  medical cannabis (Patient's own supply), Take 1 Dose by mouth See Admin Instructions (The purpose of this order  "is to document that the patient reports taking medical cannabis.  This is not a prescription, and is not used to certify that the patient has a qualifying medical condition.)   4.3 mg THC and .7mg CBD.   ( at bedtime)  Methylcobalamin (B-12) 5000 MCG TBDP, Take 5,000 mcg by mouth daily  Multiple Vitamins-Minerals (PRESERVISION AREDS) CAPS, Take 1 tablet by mouth every morning   multivitamin, therapeutic (THERA-VIT) TABS tablet, Take 1 tablet by mouth daily  tiZANidine (ZANAFLEX) 2 MG tablet, Take 1 tablet (2 mg) by mouth 2 times daily as needed for muscle spasms  traZODone (DESYREL) 100 MG tablet, TAKE 1 TABLETS(100 MG) BY MOUTH AT BEDTIME    No current facility-administered medications on file prior to visit.      ALLERGIES:   Allergies   Allergen Reactions     Simvastatin      Buttock pain     Penicillins Rash       PHYSICAL EXAM:  Vitals: /76 (BP Location: Right arm, Patient Position: Sitting, Cuff Size: Adult Regular)   Pulse 72   Ht 1.765 m (5' 9.5\")   Wt 81.6 kg (180 lb)   LMP  (LMP Unknown)   SpO2 98%   BMI 26.20 kg/m    Constitutional:  cooperative, alert and oriented, well developed, well nourished, in no acute distress appears younger than stated age      Skin:  warm and dry to the touch        Head:  normocephalic        Eyes:  pupils equal and round;conjunctivae and lids unremarkable;sclera white        ENT:  no pallor or cyanosis        Neck:  JVP normal;no carotid bruit        Respiratory:  clear to auscultation        Cardiac: regular rhythm;normal S1 and S2     no presence of murmur            GI:  abdomen soft;BS normoactive        Vascular:                                        Extremities and Musculoskeletal:  no edema        Neurological:  no gross motor deficits;affect appropriate              LABS/DATA:  I reviewed the following:  Echo 4/20/22:  Interpretation Summary  1. Normal biventricular size and function. Left ventricular ejection fraction  of 60-65%. No segmental wall motion " abnormalities noted.  2. Normal sized atria.  3. No hemodynamically significant valvular disease.  4. Normal pulmonary artery pressure.  Compared to the previous echocardiogram on 1/15/2021, there are no significant  changes.  Technically adequate study.      4/12/22 Zio is in process        ASSESSMENT:  82-year-old female being seen for follow up of bradycardia and ascending aorta dilatation.    Last year she wore a Zio and had overall heart rate averages in the 70 range.  She had no sustained bradycardia on her ZIO monitor.  She had a single dropped QRS, but no sustained AV block.    She just wore a repeat Zio, but results are still in process. Notably, she had no sxs while wearing the monitor.   And she is not on any rate controlling medication.       Her echo was normal.  She had mild dilation of the ascending aorta, actually measure a little smaller than last year at 3.9 cm (was 4.2 cm last echo).        RECOMMENDATIONS:  1.  Brief episode second-degree AV block in 2021  - Repeat 3-day ZIO monitor is in prcess.  Pending results, either will repeat a monitor next year to monitor for progression or send to EP.      2.  Mild dilation of ascending aorta  - Echo 2021: 4.2 cm  - Echo 4/20/22: 3.9 cm  - Echocardiogram in 2 years       Follow up:  1 year with Dr. Stevenson (possibly repeat a monitor pending results)  Echo in 2024      Darline Hooper PA-C

## 2022-04-24 PROBLEM — Z02.9 ADMINISTRATIVE ENCOUNTER: Status: ACTIVE | Noted: 2022-04-24

## 2022-04-24 PROBLEM — M41.9 SCOLIOSIS OF THORACOLUMBAR SPINE, UNSPECIFIED SCOLIOSIS TYPE: Status: ACTIVE | Noted: 2022-04-24

## 2022-04-28 DIAGNOSIS — I49.8 OTHER CARDIAC ARRHYTHMIA: ICD-10-CM

## 2022-04-28 DIAGNOSIS — I44.1 AV BLOCK, MOBITZ 1: Primary | ICD-10-CM

## 2022-05-26 DIAGNOSIS — G47.9 SLEEP DISTURBANCE: ICD-10-CM

## 2022-05-27 RX ORDER — TRAZODONE HYDROCHLORIDE 100 MG/1
TABLET ORAL
Qty: 90 TABLET | Refills: 3 | OUTPATIENT
Start: 2022-05-27

## 2022-05-31 ENCOUNTER — TELEPHONE (OUTPATIENT)
Dept: FAMILY MEDICINE | Facility: CLINIC | Age: 82
End: 2022-05-31

## 2022-05-31 NOTE — TELEPHONE ENCOUNTER
Reason for Call:  Other letter    Detailed comments: patient needs a letter to advise that she is sensative to heat. Her AC went out in her complex and it took them several days to come to fix it, but they said if she was to get a letter they would come out the same day.    Did advise that PCP was out of clinic until 06/06/22.        Phone Number Patient can be reached at: Home number on file [phone] ([email or web address])    Best Time: please call when letter is done, ok to mail out.     Can we leave a detailed message on this number? YES    Call taken on 5/31/2022 at 2:14 PM by Shiresa H. Ormond

## 2022-06-02 DIAGNOSIS — G47.9 SLEEP DISTURBANCE: ICD-10-CM

## 2022-06-02 DIAGNOSIS — F41.9 ANXIETY: ICD-10-CM

## 2022-06-02 DIAGNOSIS — R41.3 MEMORY IMPAIRMENT OF GRADUAL ONSET: ICD-10-CM

## 2022-06-02 DIAGNOSIS — E78.5 HYPERLIPIDEMIA LDL GOAL <100: ICD-10-CM

## 2022-06-03 RX ORDER — DONEPEZIL HYDROCHLORIDE 10 MG/1
TABLET, FILM COATED ORAL
Qty: 90 TABLET | Refills: 3 | OUTPATIENT
Start: 2022-06-03

## 2022-06-03 RX ORDER — CITALOPRAM HYDROBROMIDE 10 MG/1
TABLET ORAL
Qty: 90 TABLET | Refills: 1 | OUTPATIENT
Start: 2022-06-03

## 2022-06-03 RX ORDER — ATORVASTATIN CALCIUM 10 MG/1
TABLET, FILM COATED ORAL
Qty: 90 TABLET | Refills: 3 | OUTPATIENT
Start: 2022-06-03

## 2022-06-03 RX ORDER — TRAZODONE HYDROCHLORIDE 50 MG/1
TABLET, FILM COATED ORAL
Qty: 180 TABLET | Refills: 1 | OUTPATIENT
Start: 2022-06-03

## 2022-06-03 NOTE — TELEPHONE ENCOUNTER
Patient should have refills on file at pharmacy. Requested too soon. Has appointment before will run out of meds.     Gerald PINEDA RN

## 2022-06-09 DIAGNOSIS — G47.9 SLEEP DISTURBANCE: ICD-10-CM

## 2022-06-09 RX ORDER — TRAZODONE HYDROCHLORIDE 100 MG/1
TABLET ORAL
Qty: 90 TABLET | Refills: 3 | Status: CANCELLED | OUTPATIENT
Start: 2022-06-09

## 2022-06-09 RX ORDER — TRAZODONE HYDROCHLORIDE 100 MG/1
TABLET ORAL
Qty: 90 TABLET | Refills: 0 | Status: SHIPPED | OUTPATIENT
Start: 2022-06-09 | End: 2022-07-25

## 2022-06-09 NOTE — TELEPHONE ENCOUNTER
Prescription approved per [facility] Refill Protocol.  Ashley Guerin RN

## 2022-07-07 ENCOUNTER — TRANSFERRED RECORDS (OUTPATIENT)
Dept: FAMILY MEDICINE | Facility: CLINIC | Age: 82
End: 2022-07-07

## 2022-07-25 ENCOUNTER — OFFICE VISIT (OUTPATIENT)
Dept: FAMILY MEDICINE | Facility: CLINIC | Age: 82
End: 2022-07-25
Payer: MEDICARE

## 2022-07-25 VITALS
DIASTOLIC BLOOD PRESSURE: 76 MMHG | SYSTOLIC BLOOD PRESSURE: 134 MMHG | HEIGHT: 70 IN | WEIGHT: 205 LBS | RESPIRATION RATE: 17 BRPM | TEMPERATURE: 98.1 F | OXYGEN SATURATION: 95 % | BODY MASS INDEX: 29.35 KG/M2 | HEART RATE: 71 BPM

## 2022-07-25 DIAGNOSIS — Z13.820 SCREENING FOR OSTEOPOROSIS: ICD-10-CM

## 2022-07-25 DIAGNOSIS — M54.6 ACUTE LEFT-SIDED THORACIC BACK PAIN: ICD-10-CM

## 2022-07-25 DIAGNOSIS — F41.9 ANXIETY: ICD-10-CM

## 2022-07-25 DIAGNOSIS — Z00.00 ENCOUNTER FOR MEDICARE ANNUAL WELLNESS EXAM: Primary | ICD-10-CM

## 2022-07-25 DIAGNOSIS — G47.9 SLEEP DISTURBANCE: ICD-10-CM

## 2022-07-25 DIAGNOSIS — E78.5 HYPERLIPIDEMIA LDL GOAL <100: ICD-10-CM

## 2022-07-25 DIAGNOSIS — Z78.0 MENOPAUSE PRESENT: ICD-10-CM

## 2022-07-25 DIAGNOSIS — R41.3 MEMORY IMPAIRMENT OF GRADUAL ONSET: ICD-10-CM

## 2022-07-25 DIAGNOSIS — N18.31 STAGE 3A CHRONIC KIDNEY DISEASE (H): ICD-10-CM

## 2022-07-25 LAB
ALBUMIN SERPL-MCNC: 3.9 G/DL (ref 3.4–5)
ALP SERPL-CCNC: 76 U/L (ref 40–150)
ALT SERPL W P-5'-P-CCNC: 24 U/L (ref 0–50)
ANION GAP SERPL CALCULATED.3IONS-SCNC: 5 MMOL/L (ref 3–14)
AST SERPL W P-5'-P-CCNC: 12 U/L (ref 0–45)
BILIRUB SERPL-MCNC: 0.6 MG/DL (ref 0.2–1.3)
BUN SERPL-MCNC: 22 MG/DL (ref 7–30)
CALCIUM SERPL-MCNC: 9.2 MG/DL (ref 8.5–10.1)
CHLORIDE BLD-SCNC: 107 MMOL/L (ref 94–109)
CHOLEST SERPL-MCNC: 177 MG/DL
CO2 SERPL-SCNC: 27 MMOL/L (ref 20–32)
CREAT SERPL-MCNC: 1.01 MG/DL (ref 0.52–1.04)
CREAT UR-MCNC: 147 MG/DL
FASTING STATUS PATIENT QL REPORTED: YES
GFR SERPL CREATININE-BSD FRML MDRD: 55 ML/MIN/1.73M2
GLUCOSE BLD-MCNC: 92 MG/DL (ref 70–99)
HDLC SERPL-MCNC: 73 MG/DL
HGB BLD-MCNC: 14.4 G/DL (ref 11.7–15.7)
LDLC SERPL CALC-MCNC: 83 MG/DL
MICROALBUMIN UR-MCNC: 33 MG/L
MICROALBUMIN/CREAT UR: 22.45 MG/G CR (ref 0–25)
NONHDLC SERPL-MCNC: 104 MG/DL
POTASSIUM BLD-SCNC: 4.3 MMOL/L (ref 3.4–5.3)
PROT SERPL-MCNC: 7.7 G/DL (ref 6.8–8.8)
SODIUM SERPL-SCNC: 139 MMOL/L (ref 133–144)
TRIGL SERPL-MCNC: 107 MG/DL

## 2022-07-25 PROCEDURE — 80061 LIPID PANEL: CPT | Performed by: INTERNAL MEDICINE

## 2022-07-25 PROCEDURE — G0439 PPPS, SUBSEQ VISIT: HCPCS | Performed by: INTERNAL MEDICINE

## 2022-07-25 PROCEDURE — 99214 OFFICE O/P EST MOD 30 MIN: CPT | Mod: 25 | Performed by: INTERNAL MEDICINE

## 2022-07-25 PROCEDURE — 82043 UR ALBUMIN QUANTITATIVE: CPT | Performed by: INTERNAL MEDICINE

## 2022-07-25 PROCEDURE — 36415 COLL VENOUS BLD VENIPUNCTURE: CPT | Performed by: INTERNAL MEDICINE

## 2022-07-25 PROCEDURE — 80053 COMPREHEN METABOLIC PANEL: CPT | Performed by: INTERNAL MEDICINE

## 2022-07-25 PROCEDURE — 85018 HEMOGLOBIN: CPT | Performed by: INTERNAL MEDICINE

## 2022-07-25 RX ORDER — CITALOPRAM HYDROBROMIDE 10 MG/1
TABLET ORAL
Qty: 90 TABLET | Refills: 3 | Status: SHIPPED | OUTPATIENT
Start: 2022-07-25 | End: 2023-05-19

## 2022-07-25 RX ORDER — TRAZODONE HYDROCHLORIDE 100 MG/1
100 TABLET ORAL AT BEDTIME
Qty: 90 TABLET | Refills: 3 | Status: SHIPPED | OUTPATIENT
Start: 2022-07-25 | End: 2023-05-31

## 2022-07-25 RX ORDER — ATORVASTATIN CALCIUM 10 MG/1
10 TABLET, FILM COATED ORAL AT BEDTIME
Qty: 90 TABLET | Refills: 3 | Status: SHIPPED | OUTPATIENT
Start: 2022-07-25 | End: 2023-05-19

## 2022-07-25 RX ORDER — DONEPEZIL HYDROCHLORIDE 10 MG/1
10 TABLET, FILM COATED ORAL AT BEDTIME
Qty: 90 TABLET | Refills: 3 | Status: SHIPPED | OUTPATIENT
Start: 2022-07-25 | End: 2023-05-31

## 2022-07-25 ASSESSMENT — ANXIETY QUESTIONNAIRES
2. NOT BEING ABLE TO STOP OR CONTROL WORRYING: NOT AT ALL
7. FEELING AFRAID AS IF SOMETHING AWFUL MIGHT HAPPEN: NOT AT ALL
GAD7 TOTAL SCORE: 0
1. FEELING NERVOUS, ANXIOUS, OR ON EDGE: NOT AT ALL
3. WORRYING TOO MUCH ABOUT DIFFERENT THINGS: NOT AT ALL
6. BECOMING EASILY ANNOYED OR IRRITABLE: NOT AT ALL
5. BEING SO RESTLESS THAT IT IS HARD TO SIT STILL: NOT AT ALL
GAD7 TOTAL SCORE: 0

## 2022-07-25 ASSESSMENT — ENCOUNTER SYMPTOMS
DIARRHEA: 1
NERVOUS/ANXIOUS: 1
MYALGIAS: 0
DYSURIA: 0
CHILLS: 0
ABDOMINAL PAIN: 0
CHILLS: 1
HEARTBURN: 0
PALPITATIONS: 0
DIZZINESS: 0
PARESTHESIAS: 0
FREQUENCY: 1
NAUSEA: 0
HEMATURIA: 0
HEMATOCHEZIA: 0
NERVOUS/ANXIOUS: 0
JOINT SWELLING: 1
HEADACHES: 0
SHORTNESS OF BREATH: 1
SLEEP DISTURBANCE: 1
FEVER: 0
CONSTIPATION: 0
WEAKNESS: 0
SORE THROAT: 0
ARTHRALGIAS: 1
COUGH: 0
BREAST MASS: 0
EYE PAIN: 0

## 2022-07-25 ASSESSMENT — PAIN SCALES - GENERAL: PAINLEVEL: NO PAIN (0)

## 2022-07-25 ASSESSMENT — PATIENT HEALTH QUESTIONNAIRE - PHQ9
SUM OF ALL RESPONSES TO PHQ QUESTIONS 1-9: 0
5. POOR APPETITE OR OVEREATING: NOT AT ALL

## 2022-07-25 ASSESSMENT — ACTIVITIES OF DAILY LIVING (ADL)
CURRENT_FUNCTION: TRANSPORTATION REQUIRES ASSISTANCE
CURRENT_FUNCTION: SHOPPING REQUIRES ASSISTANCE

## 2022-07-25 NOTE — PATIENT INSTRUCTIONS
Patient Education   Personalized Prevention Plan  You are due for the preventive services outlined below.  Your care team is available to assist you in scheduling these services.  If you have already completed any of these items, please share that information with your care team to update in your medical record.  Health Maintenance Due   Topic Date Due     Osteoporosis Screening  Never done     Diptheria Tetanus Pertussis (DTAP/TDAP/TD) Vaccine (1 - Tdap) Never done     Zoster (Shingles) Vaccine (1 of 2) Never done     Kidney Microalbumin Urine Test  04/13/2021     Annual Wellness Visit  09/03/2021     COVID-19 Vaccine (4 - Booster for Pfizer series) 03/20/2022     Basic Metabolic Panel  08/03/2022     Cholesterol Lab  08/03/2022     Hemoglobin  08/03/2022     Pneumococcal Vaccine (2 - PCV) 08/03/2022

## 2022-07-25 NOTE — PROGRESS NOTES
"Chief Complaint   Patient presents with     Wellness Visit     Lipids     Anxiety     Sleep Problem     Pt states would like to increase her dose to 2 daily at HS         SUBJECTIVE:   Kathie Wetzel is a 82 year old female who presents for Preventive Visit.      Patient has been advised of split billing requirements and indicates understanding: Yes  Are you in the first 12 months of your Medicare coverage?  No    Anxiety  Associated symptoms include arthralgias (saw [facility] last week and had bilateral  knee injections. ), chills, joint swelling (saw [facility] last week and had bilateral  knee injections.) and a rash. Pertinent negatives include no abdominal pain, chest pain, congestion, coughing, fever, headaches, myalgias, nausea, sore throat or weakness.   Healthy Habits:     In general, how would you rate your overall health?  Fair    Frequency of exercise:  None    Do you usually eat at least 4 servings of fruit and vegetables a day, include whole grains    & fiber and avoid regularly eating high fat or \"junk\" foods?  Yes    Taking medications regularly:  Yes    Medication side effects:  None    Ability to successfully perform activities of daily living:  Transportation requires assistance and shopping requires assistance    Home Safety:  No safety concerns identified    Hearing Impairment:  Difficulty following a conversation in a noisy restaurant or crowded room, feel that people are mumbling or not speaking clearly, need to ask people to speak up or repeat themselves and difficulty understanding soft or whispered speech    In the past 6 months, have you been bothered by leaking of urine? Yes    In general, how would you rate your overall mental or emotional health?  Good      PHQ-2 Total Score: 0    Additional concerns today:  No    Do you feel safe in your environment? Yes    Have you ever done Advance Care Planning? (For example, a Health Directive, POLST, or a discussion with a medical provider or your loved ones " about your wishes): Yes, advance care planning is on file.       Fall risk  Fallen 2 or more times in the past year?: No  Any fall with injury in the past year?: No    Cognitive Screening Not appropriate due to known dementia    Do you have sleep apnea, excessive snoring or daytime drowsiness?: no    Reviewed and updated as needed this visit by clinical staff   Tobacco  Allergies  Meds   Med Hx  Surg Hx  Fam Hx  Soc Hx          Reviewed and updated as needed this visit by Provider                   Social History     Tobacco Use     Smoking status: Former Smoker     Packs/day: 0.00     Quit date: 3/10/1972     Years since quittin.4     Smokeless tobacco: Never Used   Substance Use Topics     Alcohol use: Yes     Comment: 2 week          Alcohol Use 2022   Prescreen: >3 drinks/day or >7 drinks/week? No           Hyperlipidemia Follow-Up      Are you regularly taking any medication or supplement to lower your cholesterol?   Yes- atorvastatin    Are you having muscle aches or other side effects that you think could be caused by your cholesterol lowering medication?  No    Anxiety Follow-Up    How are you doing with your anxiety since your last visit? Very stable     Are you having other symptoms that might be associated with anxiety? No    Have you had a significant life event? No     Are you feeling depressed? No    Do you have any concerns with your use of alcohol or other drugs? No    Social History     Tobacco Use     Smoking status: Former Smoker     Packs/day: 0.00     Quit date: 3/10/1972     Years since quittin.4     Smokeless tobacco: Never Used   Substance Use Topics     Alcohol use: Yes     Comment: 2 week      Drug use: No     STEPHENIE-7 SCORE 2020 8/3/2021 2022   Total Score 0 0 0     PHQ 2020 8/3/2021 2022   PHQ-9 Total Score 4 4 0   Q9: Thoughts of better off dead/self-harm past 2 weeks Not at all Not at all Not at all     Last PHQ-9 2022   1.  Little interest or  pleasure in doing things 0   2.  Feeling down, depressed, or hopeless 0   3.  Trouble falling or staying asleep, or sleeping too much 0   4.  Feeling tired or having little energy 0   5.  Poor appetite or overeating 0   6.  Feeling bad about yourself 0   7.  Trouble concentrating 0   8.  Moving slowly or restless 0   Q9: Thoughts of better off dead/self-harm past 2 weeks 0   PHQ-9 Total Score 0   Difficulty at work, home, or with people -     STEPHENIE-7  7/25/2022   1. Feeling nervous, anxious, or on edge 0   2. Not being able to stop or control worrying 0   3. Worrying too much about different things 0   4. Trouble relaxing 0   5. Being so restless that it is hard to sit still 0   6. Becoming easily annoyed or irritable 0   7. Feeling afraid, as if something awful might happen 0   STEPHENIE-7 Total Score 0   If you checked any problems, how difficult have they made it for you to do your work, take care of things at home, or get along with other people? -       Sleep problems:  is wondering if she can increase her sleep medication to 2 tablets at night.  States one does not always work for her.     Current providers sharing in care for this patient include:   Patient Care Team:  Cammie Daugherty MD as PCP - General (Internal Medicine)  Cammie Daugherty MD as Assigned PCP  Darline Hooper PA-C as Physician Assistant (Cardiovascular Disease)  Darline Hooper PA-C as Assigned Heart and Vascular Provider    The following health maintenance items are reviewed in Epic and correct as of today:  Health Maintenance Due   Topic Date Due     DEXA  Never done     DTAP/TDAP/TD IMMUNIZATION (1 - Tdap) Never done     ZOSTER IMMUNIZATION (1 of 2) Never done     MICROALBUMIN  04/13/2021     MEDICARE ANNUAL WELLNESS VISIT  09/03/2021     COVID-19 Vaccine (4 - Booster for Pfizer series) 03/20/2022     BMP  08/03/2022     LIPID  08/03/2022     HEMOGLOBIN  08/03/2022     Pneumococcal Vaccine: 65+ Years (2 - PCV) 08/03/2022      Labs reviewed in EPIC  BP Readings from Last 3 Encounters:   07/25/22 134/76   04/22/22 134/76   04/05/22 118/74    Wt Readings from Last 3 Encounters:   07/25/22 93 kg (205 lb)   04/22/22 81.6 kg (180 lb)   04/05/22 88.5 kg (195 lb 1.6 oz)                  Patient Active Problem List   Diagnosis     Acute left-sided low back pain without sciatica     Hyperlipidemia LDL goal <100     Benign essential hypertension     Anxiety     Retinal tear, left     Memory impairment of gradual onset     Lumbar radicular pain- right side.     S/P lumbar microdiscectomy     Sleep disturbance     CKD (chronic kidney disease) stage 3, GFR 30-59 ml/min (H)     Cellulitis of right upper extremity     Pleural effusion on right     Bradycardia     Pleuritic chest pain     Dizziness     Abnormal gait     Unspecified severe protein-calorie malnutrition (H)     Thoracic spine pain     Scoliosis of thoracolumbar spine, unspecified scoliosis type     Handicap Parking 4/2023     Past Surgical History:   Procedure Laterality Date     APPENDECTOMY OPEN       CHOLECYSTECTOMY  2007     DISCECTOMY LUMBAR POSTERIOR MICROSCOPIC ONE LEVEL Right 11/10/2017    Procedure: DISCECTOMY LUMBAR POSTERIOR MICROSCOPIC ONE LEVEL;  Right L3-4 hemilaminectomy, medial facetectomy, foraminotomy with microdiscectomy and use of X-ray and intraoperative microscope ;  Surgeon: Al Dailey MD;  Location: [facility] OR     PARATHYROIDECTOMY  3/14/2014    Procedure: PARATHYROIDECTOMY;  Neck Exploration, Excision Right  Parathyroid Adenoma, Pre Operative Sestimibi Injection, Rapid PTH Assay ;  Surgeon: Natividad Fischer MD;  Location: [facility] OR     THORACOSCOPIC DECORTICATION LUNG Right 5/5/2020    Procedure: RIGHT VIDEO ASSISTED THORACOSCOPY, RIGHT THORACOTOMY, RIGHT DECORTICATION, RIGHT PARIETAL PLEURECTOMY;  Surgeon: Gary Shah MD;  Location: [facility] OR     THORACOTOMY Right 5/5/2020    Procedure: THORACOTOMY ;  Surgeon: Gary Shah MD;   Location:  OR       Social History     Tobacco Use     Smoking status: Former Smoker     Packs/day: 0.00     Quit date: 3/10/1972     Years since quittin.4     Smokeless tobacco: Never Used   Substance Use Topics     Alcohol use: Yes     Comment: 2 week      Family History   Problem Relation Age of Onset     Thyroid Disease Maternal Aunt      Thyroid Disease Other         cousin          Current Outpatient Medications   Medication Sig Dispense Refill     atorvastatin (LIPITOR) 10 MG tablet Take 1 tablet (10 mg) by mouth At Bedtime TAKE 1 TABLET(10 MG) BY MOUTH AT BEDTIME 90 tablet 3     cholecalciferol 25 MCG (1000 UT) TABS Take 1 tablet by mouth daily        citalopram (CELEXA) 10 MG tablet Take 1 tablet by mouth daily 90 tablet 3     donepezil (ARICEPT) 10 MG tablet Take 1 tablet (10 mg) by mouth At Bedtime 90 tablet 3     medical cannabis (Patient's own supply) Take 1 Dose by mouth See Admin Instructions (The purpose of this order is to document that the patient reports taking medical cannabis.  This is not a prescription, and is not used to certify that the patient has a qualifying medical condition.)   4.3 mg THC and .7mg CBD.   ( at bedtime)       Methylcobalamin (B-12) 5000 MCG TBDP Take 5,000 mcg by mouth daily 100 tablet 3     Multiple Vitamins-Minerals (PRESERVISION AREDS) CAPS Take 1 tablet by mouth every morning        multivitamin, therapeutic (THERA-VIT) TABS tablet Take 1 tablet by mouth daily       tiZANidine (ZANAFLEX) 2 MG tablet Take 1 tablet (2 mg) by mouth 2 times daily as needed for muscle spasms 20 tablet 1     traZODone (DESYREL) 100 MG tablet Take 1 tablet (100 mg) by mouth At Bedtime TAKE 1 TABLET(100 MG) BY MOUTH AT BEDTIME 90 tablet 3     Allergies   Allergen Reactions     Simvastatin      Buttock pain     Penicillins Rash     Recent Labs   Lab Test 21  1217 21  1213 21  0759 20  1504 20  0000 20  1024 18  0836   A1C  --   --   --   --   --   5.6  --    LDL 81  --   --   --   --  65 78   HDL 60  --   --   --   --  43* 44*   TRIG 127  --   --   --   --  89 185*   ALT 24  --  19 17   < > 17 28   CR 1.01 0.96 0.95 1.20*   < > 0.94 0.98   GFRESTIMATED 52* 55* 56* 42*   < > 57* 55*   GFRESTBLACK  --  64 65 48*   < > 66 66   POTASSIUM 4.1 3.9 4.4 4.6   < > 3.9 4.2   TSH 2.06  --   --   --   --  2.43  --     < > = values in this interval not displayed.        Pertinent mammograms are reviewed under the imaging tab.    Review of Systems   Constitutional: Positive for chills. Negative for fever.   HENT: Positive for hearing loss (using hearing aids past 2-3 years). Negative for congestion, ear pain and sore throat.    Eyes: Negative for pain and visual disturbance.   Respiratory: Positive for shortness of breath (intermittently; does not limit activity; using walker). Negative for cough.    Cardiovascular: Positive for peripheral edema (later in the day; better since she saw [facility] and had injections. in both knees; ). Negative for chest pain and palpitations.        Dilated aorta slightly; she has seen cardiology; no change in meds; reassess ECHO in 2 years.    Gastrointestinal: Positive for diarrhea (intermittenty; review fiber intake. ). Negative for abdominal pain, constipation, heartburn, hematochezia and nausea.   Breasts:  Negative for tenderness, breast mass and discharge.   Genitourinary: Positive for frequency and urgency. Negative for dysuria, genital sores, hematuria, pelvic pain and vaginal bleeding.   Musculoskeletal: Positive for arthralgias (saw [facility] last week and had bilateral  knee injections. ) and joint swelling (saw [facility] last week and had bilateral  knee injections.). Negative for myalgias.   Skin: Positive for rash.   Neurological: Negative for dizziness, weakness, headaches and paresthesias.   Psychiatric/Behavioral: Positive for sleep disturbance (reviewd use of Trazodone; she prefers the higher dose; 100 mg.  could change cannibis from AM  "to PM; lrecommend change Cannibis to PM (Personal/family decision to take)). Negative for mood changes. The patient is nervous/anxious (Citalopram use reviewed; improvement noted. ).          OBJECTIVE:   /76   Pulse 71   Temp 98.1  F (36.7  C) (Oral)   Resp 17   Ht 1.765 m (5' 9.5\")   Wt 93 kg (205 lb)   LMP  (LMP Unknown)   SpO2 95%   BMI 29.84 kg/m   Estimated body mass index is 29.84 kg/m  as calculated from the following:    Height as of this encounter: 1.765 m (5' 9.5\").    Weight as of this encounter: 93 kg (205 lb).  Physical Exam  GENERAL: healthy, alert and no distress  EYES: Eyes grossly normal to inspection  HENT: normal cephalic/atraumatic, ear canals and TM's normal, nose and mouth without ulcers or lesions, oropharynx clear and oral mucous membranes moist  NECK: no adenopathy, no asymmetry, masses, or scars and thyroid normal to palpation  RESP: lungs clear to auscultation - no rales, rhonchi or wheezes  CV: regular rates and rhythm, normal S1 S2, no S3 or S4 and no peripheral edema  ABDOMEN: soft, nontender, no hepatosplenomegaly, no masses and bowel sounds normal  MS: when standing, pelvis is shifted to the left; L Shoulder and L Hip NOT ALIGNED; increased tone; scoliosis noted.  NEURO: Normal strength and tone, sensory exam grossly normal, mentation intact, gait normal including heel/toe/tandem walking and Romberg normal  PSYCH: affect normal/bright              ASSESSMENT / PLAN:   (Z00.00) Encounter for Medicare annual wellness exam  (primary encounter diagnosis)  Comment: HEALTH CARE MAINTENANCE reviewed; immunizations reviewed  Plan:     (G47.9) Sleep disturbance  Comment: sleep hygiene reviewed; reviewed dose of Trazodone; also discussed switching Cannabis to evening which may also aid in improved sleep along with Trazodone 50 mg or could then change ot full dose.   Plan: traZODone (DESYREL) 100 MG tablet        As reviewed.    (R41.3) Memory impairment of gradual onset  Comment: " "past cognitive evaluation; on aricept; well tolerated; medication well tolerated.  Plan: donepezil (ARICEPT) 10 MG tablet         (F41.9) Anxiety  Comment: Citalopram for years; felt to be effective.   Plan: citalopram (CELEXA) 10 MG tablet        Medications well tolerated.    (E78.5) Hyperlipidemia LDL goal <100  Comment: LDL has been to goal  Plan: atorvastatin (LIPITOR) 10 MG tablet, Lipid         panel reflex to direct LDL Fasting,         Comprehensive metabolic panel          (Z78.0) Menopause present  Comment:  recommend screening for osteoporosis in menopausal female.  Plan: DX Hip/Pelvis/Spine        Continue dietary calcium , vitamin D3 supplement    (Z13.820) Screening for osteoporosis  Comment: recommend screening for osteoporosis in menopausal female.  Plan: DX Hip/Pelvis/Spine        Continue dietary calcium , vitamin D3 supplement    (N18.31) Stage 3a chronic kidney disease (H)  Comment: monitor renal function; adjust meds as warranted.  Plan: Comprehensive metabolic panel, Albumin Random         Urine Quantitative with Creat Ratio, Hemoglobin          (M54.6) Acute left-sided thoracic back pain  Comment: when standing, pelvis is shifted to the left; L Shoulder and L Hip NOT ALIGNED; increased tone; XRay-assess spine alignment; consdier PT and/or chiropractic  Plan: she prefers to hold off on PT/chiro assessment at this time.     Patient has been advised of split billing requirements and indicates understanding: Yes    COUNSELING:  Reviewed preventive health counseling, as reflected in patient instructions       Regular exercise       Healthy diet/nutrition    Estimated body mass index is 29.84 kg/m  as calculated from the following:    Height as of this encounter: 1.765 m (5' 9.5\").    Weight as of this encounter: 93 kg (205 lb).        She reports that she quit smoking about 50 years ago. She smoked 0.00 packs per day. She has never used smokeless tobacco.      Appropriate preventive services were " discussed with this patient, including applicable screening as appropriate for cardiovascular disease, diabetes, osteopenia/osteoporosis, and glaucoma.  As appropriate for age/gender, discussed screening for colorectal cancer, prostate cancer, breast cancer, and cervical cancer. Checklist reviewing preventive services available has been given to the patient.    Reviewed patients plan of care and provided an AVS. The Complex Care Plan (for patients with higher acuity and needing more deliberate coordination of services) for Kathie meets the Care Plan requirement. This Care Plan has been established and reviewed with the Patient and daughter.    Counseling Resources:  ATP IV Guidelines  Pooled Cohorts Equation Calculator  Breast Cancer Risk Calculator  Breast Cancer: Medication to Reduce Risk  FRAX Risk Assessment  ICSI Preventive Guidelines  Dietary Guidelines for Americans, 2010  [employer] MyPlate  ASA Prophylaxis  Lung CA Screening    Cammie Daugherty MD  Internal Medicine   [facility]  30  minutes in addition to HEALTH CARE MAINTENANCE are spent with patient, over 50% of that time spent providing counselling, discussing and reviewing medical conditions/concerns, meds and potential side effects.     Identified Health Risks:

## 2022-08-09 ENCOUNTER — TELEPHONE (OUTPATIENT)
Dept: FAMILY MEDICINE | Facility: CLINIC | Age: 82
End: 2022-08-09

## 2022-08-09 ENCOUNTER — ALLIED HEALTH/NURSE VISIT (OUTPATIENT)
Dept: FAMILY MEDICINE | Facility: CLINIC | Age: 82
End: 2022-08-09
Payer: MEDICARE

## 2022-08-09 VITALS
WEIGHT: 205.8 LBS | TEMPERATURE: 98 F | HEART RATE: 72 BPM | RESPIRATION RATE: 20 BRPM | DIASTOLIC BLOOD PRESSURE: 86 MMHG | OXYGEN SATURATION: 96 % | SYSTOLIC BLOOD PRESSURE: 128 MMHG | BODY MASS INDEX: 29.96 KG/M2

## 2022-08-09 DIAGNOSIS — Z01.30 BP CHECK: Primary | ICD-10-CM

## 2022-08-09 PROCEDURE — 99207 PR NO CHARGE NURSE ONLY: CPT

## 2022-08-09 NOTE — PROGRESS NOTES
Patient presents to clinic with her daughter for a nurse only vital check. Results are as follows:    BP: 128/86  P: 72  O2: 96%  R: 20  T: 98.0    Huddled with RN who originally contacted patient and was okay'd she could leave.     Instructed patient to contact us if feeling symptomatic or any changes occur.     Yannick Ramos CMA ([facility])

## 2022-08-09 NOTE — TELEPHONE ENCOUNTER
Daughter calling to report symptoms of yesterday- pt went into [facility] in [place] and reports BP was elevated- unsure of number. Reports feeling off that her legs her were weak.   HA yesterday   Stayed w/ daughter yesterday and went home today.   Dizzy     Pt is feeling much better today, but BP elevated.   Daughter took bp and it was 144/88 then checked again 30 mins later 192/90. Unsure of pulse.     Drinking/eating normally.     Pt on bp meds but lost a bunch of weight and has not been back on. Pt has gained weight back. Daughter is wondering if she needs a medication.     RN scheduled for nurse only bp/vitals.     Caridad FERRIS RN

## 2022-09-06 DIAGNOSIS — G47.9 SLEEP DISTURBANCE: ICD-10-CM

## 2022-09-08 RX ORDER — TRAZODONE HYDROCHLORIDE 100 MG/1
TABLET ORAL
Qty: 90 TABLET | Refills: 3 | OUTPATIENT
Start: 2022-09-08

## 2022-10-15 ENCOUNTER — HEALTH MAINTENANCE LETTER (OUTPATIENT)
Age: 82
End: 2022-10-15

## 2023-02-16 ENCOUNTER — TELEPHONE (OUTPATIENT)
Dept: FAMILY MEDICINE | Facility: CLINIC | Age: 83
End: 2023-02-16
Payer: MEDICARE

## 2023-02-16 NOTE — TELEPHONE ENCOUNTER
Pt is scheduled w/ Dr. Daugherty on 5/31/23 but current parking status expires in 04/23. Per Dr. Daugherty she will complete this time without an appt but future needs appt.     Mail to address on file upon completion.    India Melgar

## 2023-04-18 NOTE — TELEPHONE ENCOUNTER
LVM for pt advising of finished form. REquested return call to verify if pt will p/u. Form is in [facility]. Two more attempts to reach pt will be made.    India Melgar

## 2023-04-18 NOTE — TELEPHONE ENCOUNTER
FYI - Status Update    Who is Calling: family member, daughter    Update:  Patient's daughter returned call and got message from below. She will be in on Friday to  form.    Does caller want a call/response back: No

## 2023-05-18 DIAGNOSIS — E78.5 HYPERLIPIDEMIA LDL GOAL <100: ICD-10-CM

## 2023-05-18 DIAGNOSIS — F41.9 ANXIETY: ICD-10-CM

## 2023-05-19 ENCOUNTER — TELEPHONE (OUTPATIENT)
Dept: FAMILY MEDICINE | Facility: CLINIC | Age: 83
End: 2023-05-19
Payer: MEDICARE

## 2023-05-19 DIAGNOSIS — E78.5 HYPERLIPIDEMIA LDL GOAL <100: ICD-10-CM

## 2023-05-19 DIAGNOSIS — F41.9 ANXIETY: ICD-10-CM

## 2023-05-19 RX ORDER — CITALOPRAM HYDROBROMIDE 10 MG/1
TABLET ORAL
Qty: 90 TABLET | Refills: 0 | OUTPATIENT
Start: 2023-05-19

## 2023-05-19 RX ORDER — ATORVASTATIN CALCIUM 10 MG/1
TABLET, FILM COATED ORAL
Qty: 90 TABLET | Refills: 0 | OUTPATIENT
Start: 2023-05-19

## 2023-05-19 RX ORDER — ATORVASTATIN CALCIUM 10 MG/1
TABLET, FILM COATED ORAL
Qty: 90 TABLET | Refills: 3 | OUTPATIENT
Start: 2023-05-19

## 2023-05-19 RX ORDER — ATORVASTATIN CALCIUM 10 MG/1
10 TABLET, FILM COATED ORAL AT BEDTIME
Qty: 90 TABLET | Refills: 0 | Status: SHIPPED | OUTPATIENT
Start: 2023-05-19 | End: 2023-05-31

## 2023-05-19 RX ORDER — CITALOPRAM HYDROBROMIDE 10 MG/1
TABLET ORAL
Qty: 90 TABLET | Refills: 3 | OUTPATIENT
Start: 2023-05-19

## 2023-05-19 RX ORDER — CITALOPRAM HYDROBROMIDE 10 MG/1
TABLET ORAL
Qty: 90 TABLET | Refills: 0 | Status: SHIPPED | OUTPATIENT
Start: 2023-05-19 | End: 2023-05-31

## 2023-05-19 NOTE — TELEPHONE ENCOUNTER
Regi, pt's daughter calls ([facility] on file) and reports pt's 2 prescriptions was not approved by the clinic and had no more refills available per [facility]: Atorvastatin and Citalopram.    Nurse called [facility] pharmacy and spoke to Kendra, Technician. Kendra stated due to medication shortages, citalopram was dispensed on 11/4/2022 but only 14 tablets was dispensed. Pt picked up 90 tabs dispensed again in 11/2022 and prior script of 76 tabs was closed so no refills left to fill.    citalopram (CELEXA) 10 MG tablet 90 tablet 3 7/25/2022  No       Dispensed on 11/22/2022 90 tabs; and 2/21/2023 90 tabs  Kendra confirmed pt should have one more script available with atorvastatin but unsure why the script is closed:  atorvastatin (LIPITOR) 10 MG tablet 90 tablet 3 7/25/2022  No     Nurse resent 90 tabs for both scripts above to [facility] in [place] after verifying with Regi.    Pt has follow up scheduled with PCP.  Next 5 appointments (look out 90 days)    May 31, 2023 10:00 AM  (Arrive by 9:40 AM)  Provider Visit with Cammie Daugherty MD  [facility] ([facility] [place] ) [address] [facility] [address] [phone]        Kendall Jama RN on 5/19/2023 at 11:20 AM

## 2023-05-31 ENCOUNTER — OFFICE VISIT (OUTPATIENT)
Dept: FAMILY MEDICINE | Facility: CLINIC | Age: 83
End: 2023-05-31
Payer: MEDICARE

## 2023-05-31 VITALS
BODY MASS INDEX: 30.43 KG/M2 | DIASTOLIC BLOOD PRESSURE: 78 MMHG | RESPIRATION RATE: 16 BRPM | OXYGEN SATURATION: 92 % | HEART RATE: 78 BPM | HEIGHT: 70 IN | TEMPERATURE: 97.5 F | SYSTOLIC BLOOD PRESSURE: 138 MMHG | WEIGHT: 212.6 LBS

## 2023-05-31 DIAGNOSIS — N18.31 STAGE 3A CHRONIC KIDNEY DISEASE (H): ICD-10-CM

## 2023-05-31 DIAGNOSIS — E78.5 HYPERLIPIDEMIA LDL GOAL <100: Primary | ICD-10-CM

## 2023-05-31 DIAGNOSIS — R41.3 MEMORY IMPAIRMENT OF GRADUAL ONSET: ICD-10-CM

## 2023-05-31 DIAGNOSIS — F41.9 ANXIETY: ICD-10-CM

## 2023-05-31 DIAGNOSIS — G47.9 SLEEP DISTURBANCE: ICD-10-CM

## 2023-05-31 DIAGNOSIS — I77.810 ASCENDING AORTA DILATION (H): ICD-10-CM

## 2023-05-31 PROBLEM — E43 UNSPECIFIED SEVERE PROTEIN-CALORIE MALNUTRITION (H): Status: RESOLVED | Noted: 2021-06-29 | Resolved: 2023-05-31

## 2023-05-31 LAB
ALBUMIN SERPL BCG-MCNC: 4.3 G/DL (ref 3.5–5.2)
ALP SERPL-CCNC: 74 U/L (ref 35–104)
ALT SERPL W P-5'-P-CCNC: 16 U/L (ref 10–35)
ANION GAP SERPL CALCULATED.3IONS-SCNC: 15 MMOL/L (ref 7–15)
AST SERPL W P-5'-P-CCNC: 19 U/L (ref 10–35)
BILIRUB SERPL-MCNC: 0.5 MG/DL
BUN SERPL-MCNC: 26 MG/DL (ref 8–23)
CALCIUM SERPL-MCNC: 9.3 MG/DL (ref 8.8–10.2)
CHLORIDE SERPL-SCNC: 102 MMOL/L (ref 98–107)
CHOLEST SERPL-MCNC: 154 MG/DL
CREAT SERPL-MCNC: 1.16 MG/DL (ref 0.51–0.95)
CREAT UR-MCNC: 191 MG/DL
DEPRECATED HCO3 PLAS-SCNC: 22 MMOL/L (ref 22–29)
GFR SERPL CREATININE-BSD FRML MDRD: 47 ML/MIN/1.73M2
GLUCOSE SERPL-MCNC: 93 MG/DL (ref 70–99)
HDLC SERPL-MCNC: 64 MG/DL
HGB BLD-MCNC: 14.3 G/DL (ref 11.7–15.7)
LDLC SERPL CALC-MCNC: 74 MG/DL
MICROALBUMIN UR-MCNC: 41.9 MG/L
MICROALBUMIN/CREAT UR: 21.94 MG/G CR (ref 0–25)
NONHDLC SERPL-MCNC: 90 MG/DL
POTASSIUM SERPL-SCNC: 4.3 MMOL/L (ref 3.4–5.3)
PROT SERPL-MCNC: 7.1 G/DL (ref 6.4–8.3)
SODIUM SERPL-SCNC: 139 MMOL/L (ref 136–145)
TRIGL SERPL-MCNC: 82 MG/DL

## 2023-05-31 PROCEDURE — 80061 LIPID PANEL: CPT | Performed by: INTERNAL MEDICINE

## 2023-05-31 PROCEDURE — 82570 ASSAY OF URINE CREATININE: CPT | Performed by: INTERNAL MEDICINE

## 2023-05-31 PROCEDURE — 85018 HEMOGLOBIN: CPT | Performed by: INTERNAL MEDICINE

## 2023-05-31 PROCEDURE — 80053 COMPREHEN METABOLIC PANEL: CPT | Performed by: INTERNAL MEDICINE

## 2023-05-31 PROCEDURE — 99214 OFFICE O/P EST MOD 30 MIN: CPT | Performed by: INTERNAL MEDICINE

## 2023-05-31 PROCEDURE — 36415 COLL VENOUS BLD VENIPUNCTURE: CPT | Performed by: INTERNAL MEDICINE

## 2023-05-31 PROCEDURE — 82043 UR ALBUMIN QUANTITATIVE: CPT | Performed by: INTERNAL MEDICINE

## 2023-05-31 RX ORDER — CITALOPRAM HYDROBROMIDE 10 MG/1
TABLET ORAL
Qty: 90 TABLET | Refills: 3 | Status: SHIPPED | OUTPATIENT
Start: 2023-05-31 | End: 2024-04-18

## 2023-05-31 RX ORDER — DONEPEZIL HYDROCHLORIDE 10 MG/1
10 TABLET, FILM COATED ORAL AT BEDTIME
Qty: 90 TABLET | Refills: 3 | Status: SHIPPED | OUTPATIENT
Start: 2023-05-31 | End: 2024-04-18

## 2023-05-31 RX ORDER — TRAZODONE HYDROCHLORIDE 100 MG/1
100 TABLET ORAL AT BEDTIME
Qty: 90 TABLET | Refills: 3 | Status: SHIPPED | OUTPATIENT
Start: 2023-05-31 | End: 2024-04-18

## 2023-05-31 RX ORDER — ATORVASTATIN CALCIUM 10 MG/1
10 TABLET, FILM COATED ORAL AT BEDTIME
Qty: 90 TABLET | Refills: 3 | Status: SHIPPED | OUTPATIENT
Start: 2023-05-31 | End: 2024-04-18

## 2023-05-31 ASSESSMENT — PAIN SCALES - GENERAL: PAINLEVEL: NO PAIN (0)

## 2023-05-31 NOTE — PROGRESS NOTES
"  Assessment & Plan     (E78.5) Hyperlipidemia LDL goal <100  (primary encounter diagnosis)  Comment: due for labs; OK to have nonfasting labs today; Atorvastatin well tolerated; goals of therapy reviewed.  Plan: atorvastatin (LIPITOR) 10 MG tablet,         Comprehensive metabolic panel, Lipid panel         reflex to direct LDL Fasting          (N18.31) Stage 3a chronic kidney disease (H)  Comment: medications reviewed; renal function reviewed; goals of therapy discussed. Medications well tolerated.  Plan: Hemoglobin, Albumin Random Urine Quantitative         with Creat Ratio, Comprehensive metabolic panel          (F41.9) Anxiety  Comment: Citalopram for years; felt to be effective. Citalopram well tolerated, effective;       4/13/2020     9:36 AM 8/3/2021    12:20 PM 7/25/2022     8:09 AM   STEPHENIE-7 SCORE   Total Score 0 0 0     Plan: citalopram (CELEXA) 10 MG tablet        Due for updated STEPHENIE-7    (R41.3) Memory impairment of gradual onset  Comment: past cognitive evaluation; Aricept well tolerated; no GI concerns noted; She desires to continue current medications.  Plan: donepezil (ARICEPT) 10 MG tablet          (G47.9) Sleep disturbance  Comment: Trazodone helpful with sleep  Plan: traZODone (DESYREL) 100 MG tablet          (I77.810) Ascending aorta dilation (H)  Comment: follows with Cardiology; ECHO done last Spring. No new symptoms reported.  Plan: continue same medications; she has cardiology follow up appt scheduled.    Review of the result(s) of each unique test - chart reviewed including meds, labs and plan of care  Ordering of each unique test  Prescription drug management  30 minutes spent by me on the date of the encounter doing chart review, history and exam, documentation and further activities per the note       BMI:   Estimated body mass index is 30.5 kg/m  as calculated from the following:    Height as of this encounter: 1.778 m (5' 10\").    Weight as of this encounter: 96.4 kg (212 lb 9.6 oz). "   Weight management plan: Discussed healthy diet and exercise guidelines    MEDICATIONS:   Orders Placed This Encounter   Medications     citalopram (CELEXA) 10 MG tablet     Sig: Take 1 tablet by mouth daily     Dispense:  90 tablet     Refill:  3     atorvastatin (LIPITOR) 10 MG tablet     Sig: Take 1 tablet (10 mg) by mouth At Bedtime TAKE 1 TABLET(10 MG) BY MOUTH AT BEDTIME     Dispense:  90 tablet     Refill:  3     donepezil (ARICEPT) 10 MG tablet     Sig: Take 1 tablet (10 mg) by mouth At Bedtime     Dispense:  90 tablet     Refill:  3     traZODone (DESYREL) 100 MG tablet     Sig: Take 1 tablet (100 mg) by mouth At Bedtime TAKE 1 TABLET(100 MG) BY MOUTH AT BEDTIME     Dispense:  90 tablet     Refill:  3          - Continue other medications without change  Labs today-   FUTURE APPOINTMENTS:       - Follow-up for annual visit or as needed    Cammie Daugherty MD  Internal Medicine   [facility] JARON Vallejo is a 83 year old, presenting for the following health issues:  Recheck Medication        5/31/2023     9:42 AM   Additional Questions   Roomed by Elisa MADERA CMA   Accompanied by Daughter (Regi)         5/31/2023     9:42 AM   Patient Reported Additional Medications   Patient reports taking the following new medications None     History of Present Illness       Hyperlipidemia:  She presents for follow up of hyperlipidemia.  She is taking medication to lower cholesterol. She is not having myalgia or other side effects to statin medications.    She eats 2-3 servings of fruits and vegetables daily.She consumes 0 sweetened beverage(s) daily.She exercises with enough effort to increase her heart rate 20 to 29 minutes per day.  She exercises with enough effort to increase her heart rate 7 days per week.   She is taking medications regularly.       She follows with [facility] for bilateral knee OA; steroid injections every 3 months.   Using walker.      Hyperlipidemia  Follow-Up      Are you regularly taking any medication or supplement to lower your cholesterol?   Yes- Atorvastatin    Are you having muscle aches or other side effects that you think could be caused by your cholesterol lowering medication?  No    Anxiety Follow-Up    How are you doing with your anxiety since your last visit? Overall, doing well;    Are you having other symptoms that might be associated with anxiety? No    Have you had a significant life event? OTHER: granddaughter passed away earlier this year; age 35.     Are you feeling depressed? No    Do you have any concerns with your use of alcohol or other drugs? No    Social History     Tobacco Use     Smoking status: Former     Packs/day: 0.00     Types: Cigarettes     Quit date: 3/10/1972     Years since quittin.2     Smokeless tobacco: Never   Vaping Use     Vaping status: Never Used   Substance Use Topics     Alcohol use: Yes     Comment: 2 week      Drug use: No         2020     9:36 AM 8/3/2021    12:20 PM 2022     8:09 AM   STEPHENIE-7 SCORE   Total Score 0 0 0         2020     9:36 AM 8/3/2021    12:20 PM 2022     8:09 AM   PHQ   PHQ-9 Total Score 4 4 0   Q9: Thoughts of better off dead/self-harm past 2 weeks Not at all Not at all Not at all         2022     8:09 AM   Last PHQ-9   1.  Little interest or pleasure in doing things 0   2.  Feeling down, depressed, or hopeless 0   3.  Trouble falling or staying asleep, or sleeping too much 0   4.  Feeling tired or having little energy 0   5.  Poor appetite or overeating 0   6.  Feeling bad about yourself 0   7.  Trouble concentrating 0   8.  Moving slowly or restless 0   Q9: Thoughts of better off dead/self-harm past 2 weeks 0   PHQ-9 Total Score 0         2022     8:09 AM   STEPHENIE-7    1. Feeling nervous, anxious, or on edge 0   2. Not being able to stop or control worrying 0   3. Worrying too much about different things 0   4. Trouble relaxing 0   5. Being so restless that it  "is hard to sit still 0   6. Becoming easily annoyed or irritable 0   7. Feeling afraid, as if something awful might happen 0   STEPHENIE-7 Total Score 0       Chronic Kidney Disease Follow-up    Do you take any over the counter pain medicine?: Yes  What over the counter medicine are you taking for your pain?:  Diclofenac 2 times per week.       How often do you take this medicine?:  A few times a week    Memory concerns.  Doing well with Aricept; she feels she is able to live on her own ; Daughter agrees.     Review of Systems   CONSTITUTIONAL: NEGATIVE for fever, chills, change in weight  ENT/MOUTH: NEGATIVE for ear, mouth and throat problems  RESP: NEGATIVE for significant cough or SOB  CV: NEGATIVE for chest pain, palpitations or peripheral edema  GI: no GI side effects or symptoms noted.   MUSCULOSKELETAL: using walker for stability; including use at night when up to void.  NEURO: memory concerns; Aricept well tolerated;   ENDOCRINE: lipids reviewed. Due for labs.   PSYCHIATRIC: anxiety and memory changes; overall doing well on current medications.       Objective    /78 (BP Location: Right arm, Patient Position: Sitting, Cuff Size: Adult Large)   Pulse 78   Temp 97.5  F (36.4  C) (Oral)   Resp 16   Ht 1.778 m (5' 10\")   Wt 96.4 kg (212 lb 9.6 oz)   LMP  (LMP Unknown)   SpO2 92%   BMI 30.50 kg/m    Body mass index is 30.5 kg/m .  Physical Exam   GENERAL: healthy, alert and no distress  NECK: no adenopathy, no asymmetry, masses, or scars and thyroid normal to palpation  RESP: lungs clear to auscultation - no rales, rhonchi or wheezes  CV: regular rates and rhythm, normal S1 S2, no S3 or S4 and trace peripheral edema  ABDOMEN: soft, nontender and bowel sounds normal  MS: no gross musculoskeletal defects noted, no edema  NEURO: Normal strength and tone, sensory exam grossly normal and mentation intact  PSYCH: mentation appears normal, affect normal/bright                "

## 2023-06-06 NOTE — PROGRESS NOTES
CARDIOLOGY VISIT    REASON FOR VISIT: Bradycardia    SUBJECTIVE:  83-year-old female seen for bradycardia.  She has hypertension, dyslipidemia, CKD.     May 2020 she had pneumonia with a loculated left pleural effusion and underwent thoracotomy.  She has since had some pain around the right side.  She also has some chronic dyspnea.  The pain is not worse with exertion.     7-day ZIO monitor January 2021 showed sinus rhythm, average heart rate 72, nighttime heart rate 50s, 2 SVT runs up to 10 beats, single dropped QRS consistent with second-degree AV block type I, no prolonged pauses or significant bradycardia.     Echo January 2021 showed EF 60%, normal RV, no valve disease, aorta 4.2 cm.    Zio monitor April 2022 showed sinus rhythm, average rate of 77, no bradycardia or AV block.  Echo showed EF 65%, no valve disease.    Overall she has been feeling about the same.  She will go walking for 15 to 20 minutes 3 times per day with a walker in her hallways.  With longer distances she will have some dyspnea with exertion.  She also has some intermittent lower extremity edema, this improves after she gets bilateral cortisone knee injections.  She denies any lightheadedness, dizziness, presyncope, or syncope.    MEDICATIONS:  Current Outpatient Medications   Medication     atorvastatin (LIPITOR) 10 MG tablet     cholecalciferol 25 MCG (1000 UT) TABS     citalopram (CELEXA) 10 MG tablet     donepezil (ARICEPT) 10 MG tablet     medical cannabis (Patient's own supply)     Methylcobalamin (B-12) 5000 MCG TBDP     Multiple Vitamins-Minerals (PRESERVISION AREDS) CAPS     multivitamin, therapeutic (THERA-VIT) TABS tablet     traZODone (DESYREL) 100 MG tablet     No current facility-administered medications for this visit.       ALLERGIES:  Allergies   Allergen Reactions     Simvastatin      Buttock pain     Penicillins Rash       REVIEW OF SYSTEMS:  Constitutional:  No weight loss, fever, chills  HEENT:  Eyes:  No visual loss,  "blurred vision, double vision or yellow sclerae. No hearing loss, sneezing, congestion, runny nose or sore throat.  Skin:  No rash or itching.  Cardiovascular: per HPI  Respiratory: per HPI  GI:  No anorexia, nausea, vomiting or diarrhea. No abdominal pain or blood.  :  No dysurea, hematuria  Neurologic:  No headache, paralysis, ataxia, numbness or tingling in the extremities. No change in bowel or bladder control.  Musculoskeletal:  No muscle pain  Hematologic:  No bleeding or bruising.  Lymphatics:  No enlarged nodes. No history of splenectomy.  Endocrine:  No reports of sweating, cold or heat intolerance. No polyuria or polydipsia.  Allergies:  No history of asthma, hives, eczema or rhinitis.    PHYSICAL EXAM:  /87   Pulse 73   Ht 1.778 m (5' 10\")   Wt 95.7 kg (211 lb)   LMP  (LMP Unknown)   BMI 30.28 kg/m      Constitutional: awake, alert, no distress  Eyes: PERRL, sclera nonicteric  ENT: trachea midline  Respiratory: Lungs clear  Cardiovascular: Regular rate and rhythm, no murmurs  GI: nondistended, nontender, bowel sounds present  Lymph/Hematologic: no lymphadenopathy  Skin: dry, no rash  Musculoskeletal: good muscle tone, strength 5/5 in upper and lower extremities  Neurologic: no focal deficits  Neuropsychiatric: appropriate affact    DATA:  Lab: May 2023: Potassium 4.3, creatinine 1.2  Recent Labs   Lab Test 05/31/23  1051 07/25/22  0915   CHOL 154 177   HDL 64 73   LDL 74 83   TRIG 82 107     ASSESSMENT:  83-year-old female seen for bradycardia.  She has no symptoms of bradycardia.  Previously she only had some nocturnal brief type I second-degree heart block.  She was encouraged to continue checking heart rate and blood pressure at home and call the clinic if heart rate is too low or blood pressure is too high.  Suspect her edema is noncardiac.  Echo last year was normal.  Interestingly her edema improves after cortisone injections.    RECOMMENDATIONS:  1.  History of nocturnal " bradycardia  -No further evaluation unless symptoms in the future    Follow-up with cardiology as needed.    Melchor Stevenson MD  Cardiology - [facility]  Pager:  [phone]  Text Page  June 8, 2023

## 2023-06-08 ENCOUNTER — OFFICE VISIT (OUTPATIENT)
Dept: CARDIOLOGY | Facility: CLINIC | Age: 83
End: 2023-06-08
Payer: MEDICARE

## 2023-06-08 VITALS
SYSTOLIC BLOOD PRESSURE: 129 MMHG | HEIGHT: 70 IN | HEART RATE: 73 BPM | DIASTOLIC BLOOD PRESSURE: 87 MMHG | BODY MASS INDEX: 30.21 KG/M2 | WEIGHT: 211 LBS

## 2023-06-08 DIAGNOSIS — I44.1 AV BLOCK, MOBITZ 1: Primary | ICD-10-CM

## 2023-06-08 PROCEDURE — 99213 OFFICE O/P EST LOW 20 MIN: CPT | Performed by: INTERNAL MEDICINE

## 2023-06-08 NOTE — LETTER
6/8/2023    Cammie Daugherty [place]  [id number] [address] [facility] [address]    RE: Kathie Wetzel       Dear Colleague,     I had the pleasure of seeing Kathie Wetzel in the [facility].  CARDIOLOGY VISIT    REASON FOR VISIT: Bradycardia    SUBJECTIVE:  83-year-old female seen for bradycardia.  She has hypertension, dyslipidemia, CKD.     May 2020 she had pneumonia with a loculated left pleural effusion and underwent thoracotomy.  She has since had some pain around the right side.  She also has some chronic dyspnea.  The pain is not worse with exertion.     7-day ZIO monitor January 2021 showed sinus rhythm, average heart rate 72, nighttime heart rate 50s, 2 SVT runs up to 10 beats, single dropped QRS consistent with second-degree AV block type I, no prolonged pauses or significant bradycardia.     Echo January 2021 showed EF 60%, normal RV, no valve disease, aorta 4.2 cm.    Zio monitor April 2022 showed sinus rhythm, average rate of 77, no bradycardia or AV block.  Echo showed EF 65%, no valve disease.    Overall she has been feeling about the same.  She will go walking for 15 to 20 minutes 3 times per day with a walker in her hallways.  With longer distances she will have some dyspnea with exertion.  She also has some intermittent lower extremity edema, this improves after she gets bilateral cortisone knee injections.  She denies any lightheadedness, dizziness, presyncope, or syncope.    MEDICATIONS:  Current Outpatient Medications   Medication    atorvastatin (LIPITOR) 10 MG tablet    cholecalciferol 25 MCG (1000 UT) TABS    citalopram (CELEXA) 10 MG tablet    donepezil (ARICEPT) 10 MG tablet    medical cannabis (Patient's own supply)    Methylcobalamin (B-12) 5000 MCG TBDP    Multiple Vitamins-Minerals (PRESERVISION AREDS) CAPS    multivitamin, therapeutic (THERA-VIT) TABS tablet    traZODone (DESYREL) 100 MG tablet     No current facility-administered medications for this visit.  "      ALLERGIES:  Allergies   Allergen Reactions    Simvastatin      Buttock pain    Penicillins Rash       REVIEW OF SYSTEMS:  Constitutional:  No weight loss, fever, chills  HEENT:  Eyes:  No visual loss, blurred vision, double vision or yellow sclerae. No hearing loss, sneezing, congestion, runny nose or sore throat.  Skin:  No rash or itching.  Cardiovascular: per HPI  Respiratory: per HPI  GI:  No anorexia, nausea, vomiting or diarrhea. No abdominal pain or blood.  :  No dysurea, hematuria  Neurologic:  No headache, paralysis, ataxia, numbness or tingling in the extremities. No change in bowel or bladder control.  Musculoskeletal:  No muscle pain  Hematologic:  No bleeding or bruising.  Lymphatics:  No enlarged nodes. No history of splenectomy.  Endocrine:  No reports of sweating, cold or heat intolerance. No polyuria or polydipsia.  Allergies:  No history of asthma, hives, eczema or rhinitis.    PHYSICAL EXAM:  /87   Pulse 73   Ht 1.778 m (5' 10\")   Wt 95.7 kg (211 lb)   LMP  (LMP Unknown)   BMI 30.28 kg/m      Constitutional: awake, alert, no distress  Eyes: PERRL, sclera nonicteric  ENT: trachea midline  Respiratory: Lungs clear  Cardiovascular: Regular rate and rhythm, no murmurs  GI: nondistended, nontender, bowel sounds present  Lymph/Hematologic: no lymphadenopathy  Skin: dry, no rash  Musculoskeletal: good muscle tone, strength 5/5 in upper and lower extremities  Neurologic: no focal deficits  Neuropsychiatric: appropriate affact    DATA:  Lab: May 2023: Potassium 4.3, creatinine 1.2  Recent Labs   Lab Test 05/31/23  1051 07/25/22  0915   CHOL 154 177   HDL 64 73   LDL 74 83   TRIG 82 107     ASSESSMENT:  83-year-old female seen for bradycardia.  She has no symptoms of bradycardia.  Previously she only had some nocturnal brief type I second-degree heart block.  She was encouraged to continue checking heart rate and blood pressure at home and call the clinic if heart rate is too low or blood " pressure is too high.  Suspect her edema is noncardiac.  Echo last year was normal.  Interestingly her edema improves after cortisone injections.    RECOMMENDATIONS:  1.  History of nocturnal bradycardia  -No further evaluation unless symptoms in the future    Follow-up with cardiology as needed.    Melchor Stevenson MD  Cardiology - [facility]  Pager:  [phone]  Text Page  June 8, 2023          Thank you for allowing me to participate in the care of your patient.      Sincerely,     Melchor Stevenson MD     [facility]  cc:   No referring provider defined for this encounter.

## 2023-06-08 NOTE — PATIENT INSTRUCTIONS
Everything with the heart is good.    If your leg swelling gets worse, call us or your primary doctor.  The swelling is likely not heart related.    Goal blood pressure is 130/80 or less.

## 2023-06-26 ENCOUNTER — PATIENT OUTREACH (OUTPATIENT)
Dept: CARE COORDINATION | Facility: CLINIC | Age: 83
End: 2023-06-26
Payer: MEDICARE

## 2023-07-10 ENCOUNTER — PATIENT OUTREACH (OUTPATIENT)
Dept: CARE COORDINATION | Facility: CLINIC | Age: 83
End: 2023-07-10
Payer: MEDICARE

## 2023-08-31 ENCOUNTER — TRANSFERRED RECORDS (OUTPATIENT)
Dept: HEALTH INFORMATION MANAGEMENT | Facility: CLINIC | Age: 83
End: 2023-08-31
Payer: MEDICARE

## 2023-09-13 NOTE — PROGRESS NOTES
06/26/21 1331   Quick Adds   Type of Visit Initial PT Evaluation   Living Environment   People in home alone   Current Living Arrangements apartment   Home Accessibility no concerns   Transportation Anticipated family or friend will provide   Living Environment Comments baseline IND, uses walker at nighttime to go to the bathroom. (4WW)   Self-Care   Usual Activity Tolerance excellent   Current Activity Tolerance good   Regular Exercise Yes   Activity/Exercise Type walking;other (see comments)  (HEP from prior PT)   Exercise Amount/Frequency daily;30 mins  (3x/day)   Equipment Currently Used at Home walker, rolling  (4WW only at night)   Disability/Function   Hearing Difficulty or Deaf yes   Patient's preferred means of communication English speaker with hearing loss, no speech problems.   Describe hearing loss bilateral hearing loss   Use of hearing assistive devices bilateral hearing aids   Wear Glasses or Blind yes   Vision Management glasses   Concentrating, Remembering or Making Decisions Difficulty no   Difficulty Communicating no   Difficulty Eating/Swallowing no   Walking or Climbing Stairs Difficulty yes   Walking or Climbing Stairs ambulation difficulty, requires equipment  (only overnight)   Mobility Management 4WW   Dressing/Bathing Difficulty no   Toileting issues no   Doing Errands Independently Difficulty (such as shopping) yes   Errands Management needs a ride to store from friend/family   Fall history within last six months no   General Information   Onset of Illness/Injury or Date of Surgery 06/25/21   Referring Physician Valdo Ross, DO   Patient/Family Therapy Goals Statement (PT) go home   Pertinent History of Current Problem (include personal factors and/or comorbidities that impact the POC) 81 year old female who presents with lightheadedness and dizziness and a fainting feeling.   Existing Precautions/Restrictions fall   Cognition   Orientation Status (Cognition) oriented x 4    Affect/Mental Status (Cognition) WNL   Follows Commands (Cognition) WNL   Pain Assessment   Patient Currently in Pain No   Integumentary/Edema   Integumentary/Edema no deficits were identifed   Posture    Posture Not impaired   Range of Motion (ROM)   ROM Quick Adds ROM WNL   Strength   Manual Muscle Testing Quick Adds Strength WNL;No deficits observed during functional mobility   Bed Mobility   Comment (Bed Mobility) modIND for supine<>sit with elevated HOB (pt bed at home can elevate)   Transfers   Transfer Safety Comments IND sit<>stand   Gait/Stairs (Locomotion)   [place] Level (Gait) contact guard;independent  (progressed with treatment)   Assistive Device (Gait) other (see comments)  (walker, progress to no AD with treatment)   Distance in Feet (Required for LE Total Joints) >100ft   Pattern (Gait) step-through   Deviations/Abnormal Patterns (Gait) stride length decreased   Comment (Gait/Stairs) no stairs at home, no LOB throughout evaluation   Balance   Balance Comments standing balance WFL   Sensory Examination   Sensory Perception patient reports no sensory changes   Clinical Impression   Criteria for Skilled Therapeutic Intervention yes, treatment indicated   PT Diagnosis (PT) impaired gait/mobility   Influenced by the following impairments mild balance deficits, recent dizziness   Functional limitations due to impairments decreased IND with mobility   Clinical Presentation Stable/Uncomplicated   Clinical Decision Making (Complexity) low complexity   Therapy Frequency (PT) One time eval and treatment only   Predicted Duration of Therapy Intervention (days/wks) 1day   Planned Therapy Interventions (PT) gait training;transfer training   Risk & Benefits of therapy have been explained evaluation/treatment results reviewed;risks/benefits reviewed;participants voiced agreement with care plan;participants included;patient;daughter;son   PT Discharge Planning    PT Discharge Recommendation (DC Rec)  home;home with assist   PT Rationale for DC Rec Pt mobilizing well and progressed to IND gait, no stairs at home.  Pt does live alone and is slightly below baseline so would benefit from possible increase in walker use or return to family's home for distant supervision for a day or so to ensure maximum safety.  Family willing to provide any support needed.   Total Evaluation Time   Total Evaluation Time (Minutes) 15      right normal/left normal

## 2023-10-29 ENCOUNTER — HEALTH MAINTENANCE LETTER (OUTPATIENT)
Age: 83
End: 2023-10-29

## 2024-04-18 ENCOUNTER — OFFICE VISIT (OUTPATIENT)
Dept: FAMILY MEDICINE | Facility: CLINIC | Age: 84
End: 2024-04-18
Payer: MEDICARE

## 2024-04-18 VITALS
RESPIRATION RATE: 15 BRPM | WEIGHT: 206 LBS | SYSTOLIC BLOOD PRESSURE: 124 MMHG | HEIGHT: 70 IN | TEMPERATURE: 97.9 F | DIASTOLIC BLOOD PRESSURE: 77 MMHG | BODY MASS INDEX: 29.49 KG/M2 | HEART RATE: 82 BPM | OXYGEN SATURATION: 98 %

## 2024-04-18 DIAGNOSIS — E78.5 HYPERLIPIDEMIA LDL GOAL <100: ICD-10-CM

## 2024-04-18 DIAGNOSIS — N18.31 STAGE 3A CHRONIC KIDNEY DISEASE (H): ICD-10-CM

## 2024-04-18 DIAGNOSIS — F41.9 ANXIETY: ICD-10-CM

## 2024-04-18 DIAGNOSIS — G47.9 SLEEP DISTURBANCE: ICD-10-CM

## 2024-04-18 DIAGNOSIS — Z00.00 ENCOUNTER FOR MEDICARE ANNUAL WELLNESS EXAM: Primary | ICD-10-CM

## 2024-04-18 DIAGNOSIS — I77.810 ASCENDING AORTA DILATION (H): ICD-10-CM

## 2024-04-18 DIAGNOSIS — E55.9 VITAMIN D DEFICIENCY: ICD-10-CM

## 2024-04-18 DIAGNOSIS — R41.3 MEMORY IMPAIRMENT OF GRADUAL ONSET: ICD-10-CM

## 2024-04-18 PROCEDURE — G0439 PPPS, SUBSEQ VISIT: HCPCS | Performed by: GENERAL PRACTICE

## 2024-04-18 PROCEDURE — 99214 OFFICE O/P EST MOD 30 MIN: CPT | Mod: 25 | Performed by: GENERAL PRACTICE

## 2024-04-18 RX ORDER — ATORVASTATIN CALCIUM 10 MG/1
10 TABLET, FILM COATED ORAL AT BEDTIME
Qty: 90 TABLET | Refills: 4 | Status: SHIPPED | OUTPATIENT
Start: 2024-04-18

## 2024-04-18 RX ORDER — CITALOPRAM HYDROBROMIDE 10 MG/1
TABLET ORAL
Qty: 90 TABLET | Refills: 4 | Status: SHIPPED | OUTPATIENT
Start: 2024-04-18

## 2024-04-18 RX ORDER — TRAZODONE HYDROCHLORIDE 100 MG/1
100 TABLET ORAL AT BEDTIME
Qty: 90 TABLET | Refills: 4 | Status: SHIPPED | OUTPATIENT
Start: 2024-04-18

## 2024-04-18 RX ORDER — DONEPEZIL HYDROCHLORIDE 10 MG/1
10 TABLET, FILM COATED ORAL AT BEDTIME
Qty: 90 TABLET | Refills: 4 | Status: SHIPPED | OUTPATIENT
Start: 2024-04-18

## 2024-04-18 SDOH — HEALTH STABILITY: PHYSICAL HEALTH: ON AVERAGE, HOW MANY DAYS PER WEEK DO YOU ENGAGE IN MODERATE TO STRENUOUS EXERCISE (LIKE A BRISK WALK)?: 5 DAYS

## 2024-04-18 ASSESSMENT — SOCIAL DETERMINANTS OF HEALTH (SDOH): HOW OFTEN DO YOU GET TOGETHER WITH FRIENDS OR RELATIVES?: MORE THAN THREE TIMES A WEEK

## 2024-04-18 ASSESSMENT — PAIN SCALES - GENERAL: PAINLEVEL: MODERATE PAIN (4)

## 2024-04-18 NOTE — PROGRESS NOTES
"Preventive Care Visit  [facility] JARON Joseph MD, Internal Medicine  Apr 18, 2024      Assessment & Plan     Encounter for Medicare annual wellness exam  Accompanied by daughter  Denies falls  Does not want to continue DEXA, Mammogram  - Comprehensive metabolic panel (BMP + Alb, Alk Phos, ALT, AST, Total. Bili, TP); Future  - Lipid panel reflex to direct LDL Fasting; Future  - Hemoglobin; Future  - Vitamin B12; Future    CKD (chronic kidney disease) stage 3, GFR 30-59 ml/min (H)  Stable     Vitamin D deficiency    - Vitamin D Deficiency; Future    Hyperlipidemia LDL goal <100    - atorvastatin (LIPITOR) 10 MG tablet; Take 1 tablet (10 mg) by mouth at bedtime TAKE 1 TABLET(10 MG) BY MOUTH AT BEDTIME    Anxiety  Stable  - citalopram (CELEXA) 10 MG tablet; Take 1 tablet by mouth daily    Memory impairment of gradual onset  Stable  - donepezil (ARICEPT) 10 MG tablet; Take 1 tablet (10 mg) by mouth at bedtime    Sleep disturbance  Discussed risks of falls and would avoid benzo  Trial of melatonin for 1 month  - traZODone (DESYREL) 100 MG tablet; Take 1 tablet (100 mg) by mouth at bedtime TAKE 1 TABLET(100 MG) BY MOUTH AT BEDTIME  - melatonin 1 MG TABS tablet; Take 1 tablet (1 mg) by mouth at bedtime    Ascending aorta dilation (H24)  Likely no further monitoring since patient does not want surgery give her age.              BMI  Estimated body mass index is 29.98 kg/m  as calculated from the following:    Height as of this encounter: 1.765 m (5' 9.5\").    Weight as of this encounter: 93.4 kg (206 lb).       Counseling  Appropriate preventive services were discussed with this patient, including applicable screening as appropriate for fall prevention, nutrition, physical activity, Tobacco-use cessation, weight loss and cognition.  Checklist reviewing preventive services available has been given to the patient.  Reviewed patient's diet, addressing concerns and/or questions.   The patient was " instructed to see the dentist every 6 months.   She is at risk for psychosocial distress and has been provided with information to reduce risk.   Patient reported safety concerns were addressed today.The patient was provided with written information regarding signs of hearing loss.           Opal Vallejo is a 84 year old, presenting for the following:  Wellness Visit        2024    10:48 AM   Additional Questions   Roomed by Gillian KINSEY LPN   Accompanied by Regi   Daughter         Health Care Directive  Patient has a Health Care Directive on file  Advance care planning document is on file and is current.      Annual Wellness    Unable to return to bed after getting up in the middle of the night for the past 2-3 months  Not a moshe  7-8 hours per night    Does not want mammogram or DEXA          Hyperlipidemia Follow-Up    Are you regularly taking any medication or supplement to lower your cholesterol?   Yes- Atorvastatin   Are you having muscle aches or other side effects that you think could be caused by your cholesterol lowering medication?  No    Depression   How are you doing with your depression since your last visit? Stable   Are you having other symptoms that might be associated with depression? No  Have you had a significant life event?  No   Are you feeling anxious or having panic attacks?   No  Do you have any concerns with your use of alcohol or other drugs? No    Social History     Tobacco Use    Smoking status: Former     Current packs/day: 0.00     Types: Cigarettes     Quit date: 3/10/1972     Years since quittin.1    Smokeless tobacco: Never   Vaping Use    Vaping status: Never Used   Substance Use Topics    Alcohol use: Yes     Comment: 2 week     Drug use: No         2020     9:36 AM 8/3/2021    12:20 PM 2022     8:09 AM   PHQ   PHQ-9 Total Score 4 4 0   Q9: Thoughts of better off dead/self-harm past 2 weeks Not at all Not at all Not at all         2020     9:36 AM  8/3/2021    12:20 PM 7/25/2022     8:09 AM   STEPHENIE-7 SCORE   Total Score 0 0 0         Suicide Assessment Five-step Evaluation and Treatment (SAFE-T)          4/18/2024   General Health   How would you rate your overall physical health? Good   Feel stress (tense, anxious, or unable to sleep) Only a little   (!) STRESS CONCERN      4/18/2024   Nutrition   Diet: Regular (no restrictions)         4/18/2024   Exercise   Days per week of moderate/strenous exercise 5 days         4/18/2024   Social Factors   Frequency of gathering with friends or relatives More than three times a week   Worry food won't last until get money to buy more No   Food not last or not have enough money for food? No   Do you have housing?  Yes   Are you worried about losing your housing? No   Lack of transportation? No   Unable to get utilities (heat,electricity)? No         4/18/2024   Fall Risk   Fallen 2 or more times in the past year? No   Trouble with walking or balance? Yes           4/18/2024   Activities of Daily Living- Home Safety   Needs help with the following daily activites None of the above   Safety concerns in the home Throw rugs in the hallway         4/18/2024   Dental   Dentist two times every year? (!) NO         4/18/2024   Hearing Screening   Hearing concerns? (!) I NEED TO ASK PEOPLE TO SPEAK UP OR REPEAT THEMSELVES.    (!) IT'S HARD TO FOLLOW A CONVERSATION IN A NOISY RESTAURANT OR CROWDED ROOM.         4/18/2024   Driving Risk Screening   Patient/family members have concerns about driving No         4/18/2024   General Alertness/Fatigue Screening   Have you been more tired than usual lately? No         4/18/2024   Urinary Incontinence Screening   Bothered by leaking urine in past 6 months No         4/18/2024   TB Screening   Were you born outside of the [place]? No         Today's PHQ-2 Score:       4/18/2024    10:42 AM   PHQ-2 ( 1999 [employer])   Q1: Little interest or pleasure in doing things 0   Q2: Feeling down, depressed or  hopeless 0   PHQ-2 Score 0   Q1: Little interest or pleasure in doing things Not at all   Q2: Feeling down, depressed or hopeless Not at all   PHQ-2 Score 0           2024   Substance Use   Alcohol more than 3/day or more than 7/wk No   Do you have a current opioid prescription? No   How severe/bad is pain from 1 to 10? 1/10   Do you use any other substances recreationally? No     Social History     Tobacco Use    Smoking status: Former     Current packs/day: 0.00     Types: Cigarettes     Quit date: 3/10/1972     Years since quittin.1    Smokeless tobacco: Never   Vaping Use    Vaping status: Never Used   Substance Use Topics    Alcohol use: Yes     Comment: 2 week     Drug use: No                        Reviewed and updated as needed this visit by Provider                      Current providers sharing in care for this patient include:  Patient Care Team:  Cammie Daugherty MD as Assigned PCP  Darline Hooper PA-C as Physician Assistant (Cardiovascular Disease)  Melchor Stevenson MD as MD (Cardiovascular Disease)  Melchor Stevenson MD as Assigned Heart and Vascular Provider    The following health maintenance items are reviewed in Epic and correct as of today:  Health Maintenance   Topic Date Due    DEXA  Never done    RSV VACCINE (Pregnancy & 60+) (1 - 1-dose 60+ series) Never done    Pneumococcal Vaccine: 65+ Years (2 of 2 - PCV) 2022    MEDICARE ANNUAL WELLNESS VISIT  2023    COVID-19 Vaccine ( season) 2023    HEMOGLOBIN  2024    BMP  2024    LIPID  2024    MICROALBUMIN  2024    ANNUAL REVIEW OF HM ORDERS  2024    FALL RISK ASSESSMENT  2025    ADVANCE CARE PLANNING  2027    PHQ-2 (once per calendar year)  Completed    INFLUENZA VACCINE  Completed    URINALYSIS  Completed    IPV IMMUNIZATION  Aged Out    HPV IMMUNIZATION  Aged Out    MENINGITIS IMMUNIZATION  Aged Out    RSV MONOCLONAL ANTIBODY  Aged Out     "MAMMO SCREENING  Discontinued    ZOSTER IMMUNIZATION  Discontinued    DTAP/TDAP/TD IMMUNIZATION  Discontinued         Review of Systems  Constitutional, HEENT, cardiovascular, pulmonary, GI, , musculoskeletal, neuro, skin, endocrine and psych systems are negative, except as otherwise noted.     Objective    Exam  /77   Pulse 82   Temp 97.9  F (36.6  C) (Oral)   Resp 15   Ht 1.765 m (5' 9.5\")   Wt 93.4 kg (206 lb)   LMP  (LMP Unknown)   SpO2 98%   BMI 29.98 kg/m     Estimated body mass index is 29.98 kg/m  as calculated from the following:    Height as of this encounter: 1.765 m (5' 9.5\").    Weight as of this encounter: 93.4 kg (206 lb).    Physical Exam  Constitutional:       Appearance: Normal appearance.   HENT:      Head: Normocephalic and atraumatic.      Right Ear: Tympanic membrane, ear canal and external ear normal.      Left Ear: Tympanic membrane, ear canal and external ear normal.      Nose: Nose normal.      Mouth/Throat:      Mouth: Mucous membranes are moist.      Pharynx: Oropharynx is clear.   Eyes:      Extraocular Movements: Extraocular movements intact.      Conjunctiva/sclera: Conjunctivae normal.      Pupils: Pupils are equal, round, and reactive to light.   Cardiovascular:      Rate and Rhythm: Normal rate and regular rhythm.      Pulses: Normal pulses.      Heart sounds: Normal heart sounds.   Pulmonary:      Effort: Pulmonary effort is normal.      Breath sounds: Normal breath sounds.   Abdominal:      General: Abdomen is flat. Bowel sounds are normal.      Palpations: Abdomen is soft.   Musculoskeletal:         General: Normal range of motion.      Cervical back: Normal range of motion and neck supple.   Skin:     General: Skin is warm.      Capillary Refill: Capillary refill takes less than 2 seconds.   Neurological:      General: No focal deficit present.      Mental Status: She is alert and oriented to person, place, and time. Mental status is at baseline.   Psychiatric:   "       Mood and Affect: Mood and affect normal.         Speech: Speech normal.         Behavior: Behavior normal. Behavior is cooperative.         Thought Content: Thought content normal.         Cognition and Memory: Cognition normal.         Judgment: Judgment normal.               4/18/2024   Mini Cog   Clock Draw Score 0 Abnormal   3 Item Recall 2 objects recalled   Mini Cog Total Score 2              Signed Electronically by: Candace Joseph MD

## 2024-04-18 NOTE — PATIENT INSTRUCTIONS
Preventive Care Advice   This is general advice given by our system to help you stay healthy. However, your care team may have specific advice just for you. Please talk to your care team about your preventive care needs.  Nutrition  Eat 5 or more servings of fruits and vegetables each day.  Try wheat bread, brown rice and whole grain pasta (instead of white bread, rice, and pasta).  Get enough calcium and vitamin D. Check the label on foods and aim for 100% of the RDA (recommended daily allowance).  Lifestyle  Exercise at least 150 minutes each week   (30 minutes a day, 5 days a week).  Do muscle strengthening activities 2 days a week. These help control your weight and prevent disease.  No smoking.  Wear sunscreen to prevent skin cancer.  Have a dental exam and cleaning every 6 months.  Yearly exams  See your health care team every year to talk about:  Any changes in your health.  Any medicines your care team has prescribed.  Preventive care, family planning, and ways to prevent chronic diseases.  Shots (vaccines)   HPV shots (up to age 26), if you've never had them before.  Hepatitis B shots (up to age 59), if you've never had them before.  COVID-19 shot: Get this shot when it's due.  Flu shot: Get a flu shot every year.  Tetanus shot: Get a tetanus shot every 10 years.  Pneumococcal, hepatitis A, and RSV shots: Ask your care team if you need these based on your risk.  Shingles shot (for age 50 and up).  General health tests  Diabetes screening:  Starting at age 35, Get screened for diabetes at least every 3 years.  If you are younger than age 35, ask your care team if you should be screened for diabetes.  Cholesterol test: At age 39, start having a cholesterol test every 5 years, or more often if advised.  Bone density scan (DEXA): At age 50, ask your care team if you should have this scan for osteoporosis (brittle bones).  Hepatitis C: Get tested at least once in your life.  STIs (sexually transmitted  infections)  Before age 24: Ask your care team if you should be screened for STIs.  After age 24: Get screened for STIs if you're at risk. You are at risk for STIs (including HIV) if:  You are sexually active with more than one person.  You don't use condoms every time.  You or a partner was diagnosed with a sexually transmitted infection.  If you are at risk for HIV, ask about PrEP medicine to prevent HIV.  Get tested for HIV at least once in your life, whether you are at risk for HIV or not.  Cancer screening tests  Cervical cancer screening: If you have a cervix, begin getting regular cervical cancer screening tests at age 21. Most people who have regular screenings with normal results can stop after age 65. Talk about this with your provider.  Breast cancer scan (mammogram): If you've ever had breasts, begin having regular mammograms starting at age 40. This is a scan to check for breast cancer.  Colon cancer screening: It is important to start screening for colon cancer at age 45.  Have a colonoscopy test every 10 years (or more often if you're at risk) Or, ask your provider about stool tests like a FIT test every year or Cologuard test every 3 years.  To learn more about your testing options, visit: https://[email or web address]/[email or web address].  For help making a decision, visit: https://bit.ly/fg51848.  Prostate cancer screening test: If you have a prostate and are age 55 to 69, ask your provider if you would benefit from a yearly prostate cancer screening test.  Lung cancer screening: If you are a current or former smoker age 50 to 80, ask your care team if ongoing lung cancer screenings are right for you.  For informational purposes only. Not to replace the advice of your health care provider. Copyright   2023 [employer]. All rights reserved. Clinically reviewed by the [facility]. [facility] 695600 - REV 01/24.    Learning About Activities of Daily Living  What are activities  of daily living?     Activities of daily living (ADLs) are the basic self-care tasks you do every day. These include eating, bathing, dressing, and moving around.  As you age, and if you have health problems, you may find that it's harder to do some of these tasks. If so, your doctor can suggest ideas that may help.  To measure what kind of help you may need, your doctor will ask how well you are able to do ADLs. Let your doctor know if there are any tasks that you are having trouble doing. This is an important first step to getting help. And when you have the help you need, you can stay as independent as possible.  How will a doctor assess your ADLs?  Asking about ADLs is part of a routine health checkup your doctor will likely do as you age. Your health check might be done in a doctor's office, in your home, or at a hospital. The goal is to find out if you are having any problems that could make it hard to care for yourself or that make it unsafe for you to be on your own.  To measure your ADLs, your doctor will ask how hard it is for you to do routine tasks. Your doctor may also want to know if you have changed the way you do a task because of a health problem. Your doctor may watch how you:  Walk back and forth.  Keep your balance while you stand or walk.  Move from sitting to standing or from a bed to a chair.  Button or unbutton a shirt or sweater.  Remove and put on your shoes.  It's common to feel a little worried or anxious if you find you can't do all the things you used to be able to do. Talking with your doctor about ADLs is a way to make sure you're as safe as possible and able to care for yourself as well as you can. You may want to bring a caregiver, friend, or family member to your checkup. They can help you talk to your doctor.  Follow-up care is a key part of your treatment and safety. Be sure to make and go to all appointments, and call your doctor if you are having problems. It's also a good idea  to know your test results and keep a list of the medicines you take.  Current as of: October 24, 2023               Content Version: 14.0    8691-6130 [employer].   Care instructions adapted under license by your healthcare professional. If you have questions about a medical condition or this instruction, always ask your healthcare professional. [employer] disclaims any warranty or liability for your use of this information.      Preventing Falls: Care Instructions  Injuries and health problems such as trouble walking or poor eyesight can increase your risk of falling. So can some medicines. But there are things you can do to help prevent falls. You can exercise to get stronger. You can also arrange your home to make it safer.    Talk to your doctor about the medicines you take. Ask if any of them increase the risk of falls and whether they can be changed or stopped.   Try to exercise regularly. It can help improve your strength and balance. This can help lower your risk of falling.     Practice fall safety and prevention.    Wear low-heeled shoes that fit well and give your feet good support. Talk to your doctor if you have foot problems that make this hard.  Carry a cellphone or wear a medical alert device that you can use to call for help.  Use stepladders instead of chairs to reach high objects. Don't climb if you're at risk for falls. Ask for help, if needed.  Wear the correct eyeglasses, if you need them.    Make your home safer.    Remove rugs, cords, clutter, and furniture from walkways.  Keep your house well lit. Use night-lights in hallways and bathrooms.  Install and use sturdy handrails on stairways.  Wear nonskid footwear, even inside. Don't walk barefoot or in socks without shoes.    Be safe outside.    Use handrails, curb cuts, and ramps whenever possible.  Keep your hands free by using a shoulder bag or backpack.  Try to walk in well-lit areas. Watch out for uneven ground,  "changes in pavement, and debris.  Be careful in the winter. Walk on the grass or gravel when sidewalks are slippery. Use de-icer on steps and walkways. Add non-slip devices to shoes.    Put grab bars and nonskid mats in your shower or tub and near the toilet. Try to use a shower chair or bath bench when bathing.   Get into a tub or shower by putting in your weaker leg first. Get out with your strong side first. Have a phone or medical alert device in the bathroom with you.   Where can you learn more?  Go to https://www.[employer].net/patiented  Enter G117 in the search box to learn more about \"Preventing Falls: Care Instructions.\"  Current as of: July 17, 2023               Content Version: 14.0    3299-8750 [employer].   Care instructions adapted under license by your healthcare professional. If you have questions about a medical condition or this instruction, always ask your healthcare professional. [employer] disclaims any warranty or liability for your use of this information.      Hearing Loss: Care Instructions  Overview     Hearing loss is a sudden or slow decrease in how well you hear. It can range from slight to profound. Permanent hearing loss can occur with aging. It also can happen when you are exposed long-term to loud noise. Examples include listening to loud music, riding motorcycles, or being around other loud machines.  Hearing loss can affect your work and home life. It can make you feel lonely or depressed. You may feel that you have lost your independence. But hearing aids and other devices can help you hear better and feel connected to others.  Follow-up care is a key part of your treatment and safety. Be sure to make and go to all appointments, and call your doctor if you are having problems. It's also a good idea to know your test results and keep a list of the medicines you take.  How can you care for yourself at home?  Avoid loud noises whenever possible. This " helps keep your hearing from getting worse.  Always wear hearing protection around loud noises.  Wear a hearing aid as directed.  A professional can help you pick a hearing aid that will work best for you.  You can also get hearing aids over the counter for mild to moderate hearing loss.  Have hearing tests as your doctor suggests. They can show whether your hearing has changed. Your hearing aid may need to be adjusted.  Use other devices as needed. These may include:  Telephone amplifiers and hearing aids that can connect to a television, stereo, radio, or microphone.  Devices that use lights or vibrations. These alert you to the doorbell, a ringing telephone, or a baby monitor.  Television closed-captioning. This shows the words at the bottom of the screen. Most new TVs can do this.  TTY (text telephone). This lets you type messages back and forth on the telephone instead of talking or listening. These devices are also called TDD. When messages are typed on the keyboard, they are sent over the phone line to a receiving TTY. The message is shown on a monitor.  Use text messaging, social media, and email if it is hard for you to communicate by telephone.  Try to learn a listening technique called speechreading. It is not lipreading. You pay attention to people's gestures, expressions, posture, and tone of voice. These clues can help you understand what a person is saying. Face the person you are talking to, and have them face you. Make sure the lighting is good. You need to see the other person's face clearly.  Think about counseling if you need help to adjust to your hearing loss.  When should you call for help?  Watch closely for changes in your health, and be sure to contact your doctor if:    You think your hearing is getting worse.     You have new symptoms, such as dizziness or nausea.   Where can you learn more?  Go to https://www.healthwise.net/patiented  Enter R798 in the search box to learn more about  "\"Hearing Loss: Care Instructions.\"  Current as of: September 27, 2023               Content Version: 14.0    9808-2059 [employer].   Care instructions adapted under license by your healthcare professional. If you have questions about a medical condition or this instruction, always ask your healthcare professional. [employer] disclaims any warranty or liability for your use of this information.      Learning About Stress  What is stress?     Stress is your body's response to a hard situation. Your body can have a physical, emotional, or mental response. Stress is a fact of life for most people, and it affects everyone differently. What causes stress for you may not be stressful for someone else.  A lot of things can cause stress. You may feel stress when you go on a job interview, take a test, or run a race. This kind of short-term stress is normal and even useful. It can help you if you need to work hard or react quickly. For example, stress can help you finish an important job on time.  Long-term stress is caused by ongoing stressful situations or events. Examples of long-term stress include long-term health problems, ongoing problems at work, or conflicts in your family. Long-term stress can harm your health.  How does stress affect your health?  When you are stressed, your body responds as though you are in danger. It makes hormones that speed up your heart, make you breathe faster, and give you a burst of energy. This is called the fight-or-flight stress response. If the stress is over quickly, your body goes back to normal and no harm is done.  But if stress happens too often or lasts too long, it can have bad effects. Long-term stress can make you more likely to get sick, and it can make symptoms of some diseases worse. If you tense up when you are stressed, you may develop neck, shoulder, or low back pain. Stress is linked to high blood pressure and heart disease.  Stress also " harms your emotional health. It can make you kumar, tense, or depressed. Your relationships may suffer, and you may not do well at work or school.  What can you do to manage stress?  You can try these things to help manage stress:   Do something active. Exercise or activity can help reduce stress. Walking is a great way to get started. Even everyday activities such as housecleaning or yard work can help.  Try yoga or riri chi. These techniques combine exercise and meditation. You may need some training at first to learn them.  Do something you enjoy. For example, listen to music or go to a movie. Practice your hobby or do volunteer work.  Meditate. This can help you relax, because you are not worrying about what happened before or what may happen in the future.  Do guided imagery. Imagine yourself in any setting that helps you feel calm. You can use online videos, books, or a teacher to guide you.  Do breathing exercises. For example:  From a standing position, bend forward from the waist with your knees slightly bent. Let your arms dangle close to the floor.  Breathe in slowly and deeply as you return to a standing position. Roll up slowly and lift your head last.  Hold your breath for just a few seconds in the standing position.  Breathe out slowly and bend forward from the waist.  Let your feelings out. Talk, laugh, cry, and express anger when you need to. Talking with supportive friends or family, a counselor, or a lisa leader about your feelings is a healthy way to relieve stress. Avoid discussing your feelings with people who make you feel worse.  Write. It may help to write about things that are bothering you. This helps you find out how much stress you feel and what is causing it. When you know this, you can find better ways to cope.  What can you do to prevent stress?  You might try some of these things to help prevent stress:  Manage your time. This helps you find time to do the things you want and need to  "do.  Get enough sleep. Your body recovers from the stresses of the day while you are sleeping.  Get support. Your family, friends, and community can make a difference in how you experience stress.  Limit your news feed. Avoid or limit time on social media or news that may make you feel stressed.  Do something active. Exercise or activity can help reduce stress. Walking is a great way to get started.  Where can you learn more?  Go to https://www.[employer].net/patiented  Enter N032 in the search box to learn more about \"Learning About Stress.\"  Current as of: October 24, 2023               Content Version: 14.0    9341-7062 [employer].   Care instructions adapted under license by your healthcare professional. If you have questions about a medical condition or this instruction, always ask your healthcare professional. [employer] disclaims any warranty or liability for your use of this information.      "

## 2024-05-01 ENCOUNTER — LAB (OUTPATIENT)
Dept: LAB | Facility: CLINIC | Age: 84
End: 2024-05-01
Payer: MEDICARE

## 2024-05-01 DIAGNOSIS — E55.9 VITAMIN D DEFICIENCY: ICD-10-CM

## 2024-05-01 DIAGNOSIS — Z00.00 ENCOUNTER FOR MEDICARE ANNUAL WELLNESS EXAM: ICD-10-CM

## 2024-05-01 LAB
ALBUMIN SERPL BCG-MCNC: 4.3 G/DL (ref 3.5–5.2)
ALP SERPL-CCNC: 86 U/L (ref 40–150)
ALT SERPL W P-5'-P-CCNC: 20 U/L (ref 0–50)
ANION GAP SERPL CALCULATED.3IONS-SCNC: 7 MMOL/L (ref 7–15)
AST SERPL W P-5'-P-CCNC: 17 U/L (ref 0–45)
BILIRUB SERPL-MCNC: 0.6 MG/DL
BUN SERPL-MCNC: 17.8 MG/DL (ref 8–23)
CALCIUM SERPL-MCNC: 9.3 MG/DL (ref 8.8–10.2)
CHLORIDE SERPL-SCNC: 103 MMOL/L (ref 98–107)
CHOLEST SERPL-MCNC: 187 MG/DL
CREAT SERPL-MCNC: 1.03 MG/DL (ref 0.51–0.95)
DEPRECATED HCO3 PLAS-SCNC: 31 MMOL/L (ref 22–29)
EGFRCR SERPLBLD CKD-EPI 2021: 53 ML/MIN/1.73M2
FASTING STATUS PATIENT QL REPORTED: YES
GLUCOSE SERPL-MCNC: 98 MG/DL (ref 70–99)
HDLC SERPL-MCNC: 60 MG/DL
HGB BLD-MCNC: 14.4 G/DL (ref 11.7–15.7)
LDLC SERPL CALC-MCNC: 103 MG/DL
NONHDLC SERPL-MCNC: 127 MG/DL
POTASSIUM SERPL-SCNC: 3.9 MMOL/L (ref 3.4–5.3)
PROT SERPL-MCNC: 7.1 G/DL (ref 6.4–8.3)
SODIUM SERPL-SCNC: 141 MMOL/L (ref 135–145)
TRIGL SERPL-MCNC: 122 MG/DL
VIT B12 SERPL-MCNC: 349 PG/ML (ref 232–1245)
VIT D+METAB SERPL-MCNC: 24 NG/ML (ref 20–50)

## 2024-05-01 PROCEDURE — 82306 VITAMIN D 25 HYDROXY: CPT

## 2024-05-01 PROCEDURE — 82607 VITAMIN B-12: CPT

## 2024-05-01 PROCEDURE — 85018 HEMOGLOBIN: CPT

## 2024-05-01 PROCEDURE — 36415 COLL VENOUS BLD VENIPUNCTURE: CPT

## 2024-05-01 PROCEDURE — 80053 COMPREHEN METABOLIC PANEL: CPT

## 2024-05-01 PROCEDURE — 80061 LIPID PANEL: CPT

## 2024-07-18 ENCOUNTER — TRANSFERRED RECORDS (OUTPATIENT)
Dept: HEALTH INFORMATION MANAGEMENT | Facility: CLINIC | Age: 84
End: 2024-07-18
Payer: COMMERCIAL

## 2024-12-23 ENCOUNTER — OFFICE VISIT (OUTPATIENT)
Dept: FAMILY MEDICINE | Facility: CLINIC | Age: 84
End: 2024-12-23
Payer: MEDICARE

## 2024-12-23 ENCOUNTER — ANCILLARY PROCEDURE (OUTPATIENT)
Dept: GENERAL RADIOLOGY | Facility: CLINIC | Age: 84
End: 2024-12-23
Payer: MEDICARE

## 2024-12-23 VITALS
RESPIRATION RATE: 16 BRPM | SYSTOLIC BLOOD PRESSURE: 138 MMHG | WEIGHT: 200 LBS | BODY MASS INDEX: 28.63 KG/M2 | HEART RATE: 75 BPM | HEIGHT: 70 IN | OXYGEN SATURATION: 97 % | TEMPERATURE: 97.9 F | DIASTOLIC BLOOD PRESSURE: 78 MMHG

## 2024-12-23 DIAGNOSIS — M54.42 ACUTE LEFT-SIDED LOW BACK PAIN WITH LEFT-SIDED SCIATICA: Primary | ICD-10-CM

## 2024-12-23 DIAGNOSIS — M25.552 HIP PAIN, LEFT: ICD-10-CM

## 2024-12-23 PROCEDURE — 99203 OFFICE O/P NEW LOW 30 MIN: CPT

## 2024-12-23 PROCEDURE — 73502 X-RAY EXAM HIP UNI 2-3 VIEWS: CPT | Mod: TC | Performed by: RADIOLOGY

## 2024-12-23 RX ORDER — PREDNISONE 20 MG/1
20 TABLET ORAL DAILY
Qty: 5 TABLET | Refills: 0 | Status: SHIPPED | OUTPATIENT
Start: 2024-12-23

## 2024-12-23 ASSESSMENT — ENCOUNTER SYMPTOMS: BACK PAIN: 1

## 2024-12-23 ASSESSMENT — PAIN SCALES - GENERAL: PAINLEVEL_OUTOF10: SEVERE PAIN (6)

## 2024-12-23 NOTE — PROGRESS NOTES
"  Assessment & Plan     Acute left-sided low back pain with left-sided sciatica  Left lower lumbar paraspinal tenderness.  Positive left straight leg test.  Positive left MILY test.  CMS intact distally.  Slight antalgic gait, uses walker for ambulation.  X-ray of left hip obtained, awaiting official radiology read.  No bowel or bladder incontinence.  Discussed conservative treatments such as Tylenol administration, ice and or heat application for 20 minutes, physical therapy exercises.  Avoid lifting greater than 10-15 pounds.  Avoid excessive bending and twisting activities.  Prescribed prednisone 20 mg for 5-day course.  Placed [facility] in [place].  Discussed red flag symptoms and to seek medical attention.  - XR Hip Left 2-3 Views; Future  - Orthopedic  Referral; Future  - predniSONE (DELTASONE) 20 MG tablet; Take 1 tablet (20 mg) by mouth daily.    BMI  Estimated body mass index is 29.11 kg/m  as calculated from the following:    Height as of this encounter: 1.765 m (5' 9.5\").    Weight as of this encounter: 90.7 kg (200 lb).     Subjective   Genevieve is a 84 year old, presenting for the following health issues:  Back Pain (Patient states that she has had lower left back pain for the past couple of weeks. )      12/23/2024     3:22 PM   Additional Questions   Roomed by Alta GUERRA CMA     Back Pain     History of Present Illness       Reason for visit:  Back pain   She is taking medications regularly.     Patient a 84-year-old patient presenting with daughter Paige for concerns of lower back pain.  Medical history includes hypertension, CKD, hyperlipidemia, ascending aorta dilation.  Reports intermittent pain to left groin and left lower back for 3-4 weeks.  In November was cleaning tub- kneeling on knees and bending over.  Patient initially developed pain in bilateral knees but not in lower back.  She later on developed left lower back pain that sometimes radiates to her left groin " and left buttock.  Walking makes the pain worse.  Has been taking tylenol with little improvement.  When she lays down she needs to bend her left knee as she experiences pain in her left lower back when left leg is outstretched.  No loss of bowel or bladder.  No numbness or tingling down legs.  No skin abnormalities or changes.  History of back surgery 2017.  Receives steroid injections in bilateral knee for osteoarthritis at [facility] orthoped[facility].      Review of Systems  Review of systems negative otherwise known HPI.    Past Medical History:   Diagnosis Date    High cholesterol     HTN (hypertension)     Parathyroid adenoma     PONV (postoperative nausea and vomiting)     Thyroid disorder        Past Surgical History:   Procedure Laterality Date    APPENDECTOMY OPEN      CHOLECYSTECTOMY  2007    DISCECTOMY LUMBAR POSTERIOR MICROSCOPIC ONE LEVEL Right 11/10/2017    Procedure: DISCECTOMY LUMBAR POSTERIOR MICROSCOPIC ONE LEVEL;  Right L3-4 hemilaminectomy, medial facetectomy, foraminotomy with microdiscectomy and use of X-ray and intraoperative microscope ;  Surgeon: Al Dailey MD;  Location: [facility] OR    PARATHYROIDECTOMY  3/14/2014    Procedure: PARATHYROIDECTOMY;  Neck Exploration, Excision Right  Parathyroid Adenoma, Pre Operative Sestimibi Injection, Rapid PTH Assay ;  Surgeon: Natividad Fischer MD;  Location: [facility] OR    THORACOSCOPIC DECORTICATION LUNG Right 5/5/2020    Procedure: RIGHT VIDEO ASSISTED THORACOSCOPY, RIGHT THORACOTOMY, RIGHT DECORTICATION, RIGHT PARIETAL PLEURECTOMY;  Surgeon: Gary Shah MD;  Location:  OR    THORACOTOMY Right 5/5/2020    Procedure: THORACOTOMY ;  Surgeon: Gary Shah MD;  Location:  OR       Family History   Problem Relation Age of Onset    Thyroid Disease Maternal Aunt     Thyroid Disease Other         cousin        Social History     Tobacco Use    Smoking status: Former     Current packs/day: 0.00     Types: Cigarettes     Quit date:  "3/10/1972     Years since quittin.8    Smokeless tobacco: Never   Substance Use Topics    Alcohol use: Yes     Comment: 2 week      Current Outpatient Medications   Medication Sig Dispense Refill    ACE/ARB/ARNI NOT PRESCRIBED (INTENTIONAL) Please choose reason not prescribed from choices below.      atorvastatin (LIPITOR) 10 MG tablet Take 1 tablet (10 mg) by mouth at bedtime TAKE 1 TABLET(10 MG) BY MOUTH AT BEDTIME 90 tablet 4    cholecalciferol 25 MCG (1000 UT) TABS Take 1 tablet by mouth daily       citalopram (CELEXA) 10 MG tablet Take 1 tablet by mouth daily 90 tablet 4    donepezil (ARICEPT) 10 MG tablet Take 1 tablet (10 mg) by mouth at bedtime 90 tablet 4    medical cannabis (Patient's own supply) Take 1 Dose by mouth See Admin Instructions (The purpose of this order is to document that the patient reports taking medical cannabis.  This is not a prescription, and is not used to certify that the patient has a qualifying medical condition.)   4.3 mg THC and .7mg CBD.   ( at bedtime)      melatonin 1 MG TABS tablet Take 1 tablet (1 mg) by mouth at bedtime 30 tablet 3    Methylcobalamin (B-12) 5000 MCG TBDP Take 5,000 mcg by mouth daily 100 tablet 3    Multiple Vitamins-Minerals (PRESERVISION AREDS) CAPS Take 1 tablet by mouth every morning       multivitamin, therapeutic (THERA-VIT) TABS tablet Take 1 tablet by mouth daily      traZODone (DESYREL) 100 MG tablet Take 1 tablet (100 mg) by mouth at bedtime TAKE 1 TABLET(100 MG) BY MOUTH AT BEDTIME 90 tablet 4     No current facility-administered medications for this visit.       Objective    /78 (BP Location: Left arm, Patient Position: Sitting, Cuff Size: Adult Regular)   Pulse 75   Temp 97.9  F (36.6  C) (Temporal)   Resp 16   Ht 1.765 m (5' 9.5\")   Wt 90.7 kg (200 lb)   LMP  (LMP Unknown)   SpO2 97%   BMI 29.11 kg/m    Body mass index is 29.11 kg/m .  Physical Exam   General: Alert, oriented, no acute distress.    Respiratory: Easy work of " breathing.   Cardiovascular: Appears warm and well-perfused.    Skin: No abnormalities noted on visualized skin.   Neurologic: Mentation intact and speech normal.     Psychiatric: Appropriate affect.   Musculoskeletal: No deformity of spine. No tenderness over spinous processes. Positive left straight leg test. Positive left MILY test. CMS intact distally. Slight antalgic gait, uses walker for ambulation    Signed Electronically by: WILD Hou CNP

## 2024-12-26 ENCOUNTER — PATIENT OUTREACH (OUTPATIENT)
Dept: CARE COORDINATION | Facility: CLINIC | Age: 84
End: 2024-12-26
Payer: MEDICARE

## 2024-12-30 ENCOUNTER — PATIENT OUTREACH (OUTPATIENT)
Dept: CARE COORDINATION | Facility: CLINIC | Age: 84
End: 2024-12-30
Payer: MEDICARE

## 2025-01-10 ENCOUNTER — NURSE TRIAGE (OUTPATIENT)
Dept: FAMILY MEDICINE | Facility: CLINIC | Age: 85
End: 2025-01-10

## 2025-01-10 PROBLEM — M54.50 ACUTE LEFT-SIDED LOW BACK PAIN: Status: ACTIVE | Noted: 2025-01-10

## 2025-01-10 PROBLEM — M25.552 HIP PAIN, LEFT: Status: ACTIVE | Noted: 2025-01-10

## 2025-01-10 NOTE — TELEPHONE ENCOUNTER
Nurse Triage SBAR    Is this a 2nd Level Triage? YES, LICENSED PRACTITIONER REVIEW IS REQUIRED    Situation: Hip pain chronic, but currently a 9/10    Background:   Past Medical History:   Diagnosis Date    High cholesterol     HTN (hypertension)     Parathyroid adenoma     PONV (postoperative nausea and vomiting)     Thyroid disorder         Assessment:  incoming call from patient's daughter (on consent to communicate ) patient has chronic hip pain.  She had PT today and her currently her pain is a 9/10.  She is still able to move around her house but she is intentionally moving around less due to her pain.  Patient was given prednisone prescription on 12/23 which really helped her pain.  Daughter states the patient refuses to take Tylenol or ibuprofen.  Patient is going on a family trip on 1/21 and is hoping to have better pain management before this trip.    Protocol Recommended Disposition:   See in Office Within 2 Weeks    Recommendation:  disposition is to be seen in office within 2 weeks scheduled patient for office visit with PCP next week.  Advised daughter to take patient to urgent care if pain becomes more severe.    **No need to call daughter back if PCP agrees with disposition**    Routed to provider    Does the patient meet one of the following criteria for ADS visit consideration? 16+ years old, with an [facility] PCP     TIP  Providers, please consider if this condition is appropriate for management at one of our Acute and Diagnostic Services sites.     If patient is a good candidate, please use dotphrase <dot>triageresponse and select Refer to ADS to document.    Reason for Disposition   Hip pain is a chronic symptom (recurrent or ongoing AND present > 4 weeks)    Protocols used: Hip Pain-A-[place]

## 2025-01-10 NOTE — TELEPHONE ENCOUNTER
Provider Response to 2nd Level Triage Request    I have reviewed the RN documentation. My recommendation is:  Agree with disposition.

## 2025-01-14 ENCOUNTER — OFFICE VISIT (OUTPATIENT)
Dept: FAMILY MEDICINE | Facility: CLINIC | Age: 85
End: 2025-01-14
Payer: MEDICARE

## 2025-01-14 VITALS
OXYGEN SATURATION: 95 % | RESPIRATION RATE: 14 BRPM | DIASTOLIC BLOOD PRESSURE: 74 MMHG | TEMPERATURE: 96.9 F | HEART RATE: 91 BPM | SYSTOLIC BLOOD PRESSURE: 130 MMHG

## 2025-01-14 DIAGNOSIS — M25.552 CHRONIC LEFT HIP PAIN: Primary | ICD-10-CM

## 2025-01-14 DIAGNOSIS — G89.29 CHRONIC LEFT HIP PAIN: Primary | ICD-10-CM

## 2025-01-14 PROCEDURE — 99213 OFFICE O/P EST LOW 20 MIN: CPT

## 2025-01-14 PROCEDURE — G2211 COMPLEX E/M VISIT ADD ON: HCPCS

## 2025-01-14 RX ORDER — PREDNISONE 20 MG/1
20 TABLET ORAL DAILY
Qty: 5 TABLET | Refills: 0 | Status: SHIPPED | OUTPATIENT
Start: 2025-01-14

## 2025-01-14 ASSESSMENT — PAIN SCALES - GENERAL: PAINLEVEL_OUTOF10: SEVERE PAIN (6)

## 2025-01-14 NOTE — PROGRESS NOTES
"  Assessment & Plan     Chronic left hip pain  Acute on chronic left hip pain.  12/23/2024 left hip pain x-ray showed moderate left hip joint degenerative changes, osteopenia.  Attended physical therapy 1/10/2025 and was provided some hip and leg exercises which patient has been doing at home.  Advised patient to follow-up with either [facility] or [facility] (reprinted off previous Ortho referral and provided scheduling number) to discuss if joint injection would be beneficial.  Has previously received bilateral knee joint injections by [facility].  Will provide 5-day course of prednisone 20 mg.  Discussed medication administration, precautions and side effects.  Avoid excessive walking.  May try heat therapy.  Patient has diclofenac prescription from [facility] provider.  - predniSONE (DELTASONE) 20 MG tablet; Take 1 tablet (20 mg) by mouth daily.    The longitudinal plan of care for the diagnosis(es)/condition(s) as documented were addressed during this visit. Due to the added complexity in care, I will continue to support Genevieve in the subsequent management and with ongoing continuity of care.    BMI  Estimated body mass index is 29.11 kg/m  as calculated from the following:    Height as of 12/23/24: 1.765 m (5' 9.5\").    Weight as of 12/23/24: 90.7 kg (200 lb).     Subjective   Genevieve is a 84 year old, presenting for the following health issues:  Hip Problem (Chronic pain radiating into left leg. Sleeping postures and activity worsen the pain. )      1/14/2025    10:54 AM   Additional Questions   Roomed by ROSS Man     History of Present Illness       Back Pain:  She presents for follow up of back pain. Patient's back pain is a recurring problem.  Location of back pain:  Left buttock and left hip  Description of back pain: sharp and shooting  Back pain spreads: left thigh    Since patient first noticed back pain, pain is: always present, but gets better and worse  Does back pain interfere with her job:  Not applicable "       She eats 2-3 servings of fruits and vegetables daily.She consumes 0 sweetened beverage(s) daily.She exercises with enough effort to increase her heart rate 20 to 29 minutes per day.  She exercises with enough effort to increase her heart rate 6 days per week.   She is taking medications regularly.     Patient a 84-year-old patient presenting with daughter Paige for concerns of ongoing left hip pain.  Medical history includes hypertension, CKD, hyperlipidemia, ascending aorta dilation, osteoarthritis of bilateral knees.    Saw patient 12/23/2024 for left sided lower back pain/left hip.knee.  Placed referral to PT, orthopedics, and prescribed prednisone 5 day course.  Patient states 5-day course of prednisone significantly reduced left hip and lower leg pain.  She was able to fully extend left knee which she was unable to do prior.  She attended first physical therapy visit 1/10/2025.  They were able to show her some exercises that she can do at home.  Has not followed-up with orthopedics.  Left hip pain returned with left knee stiffness, unable to fully extend knee.  Pain has interrupted sleep.  No loss of bowel or bladder.  No numbness or tingling down legs.  No skin abnormalities or changes. Will be traveling to [place] by plane in approximately a week with family and would like pain reduced prior to travel.  Has previously received joint injections to left knee by [facility] which has greatly reduced pain.  Last bilateral knee injection 12/12/2024.    Review of Systems  Review of systems negative otherwise known HPI.    Past Medical History:   Diagnosis Date    High cholesterol     HTN (hypertension)     Parathyroid adenoma     PONV (postoperative nausea and vomiting)     Thyroid disorder        Past Surgical History:   Procedure Laterality Date    APPENDECTOMY OPEN      CHOLECYSTECTOMY  2007    DISCECTOMY LUMBAR POSTERIOR MICROSCOPIC ONE LEVEL Right 11/10/2017    Procedure: DISCECTOMY LUMBAR POSTERIOR MICROSCOPIC  ONE LEVEL;  Right L3-4 hemilaminectomy, medial facetectomy, foraminotomy with microdiscectomy and use of X-ray and intraoperative microscope ;  Surgeon: Al Dailey MD;  Location: [facility] OR    PARATHYROIDECTOMY  3/14/2014    Procedure: PARATHYROIDECTOMY;  Neck Exploration, Excision Right  Parathyroid Adenoma, Pre Operative Sestimibi Injection, Rapid PTH Assay ;  Surgeon: Natividad Fischer MD;  Location: [facility] OR    THORACOSCOPIC DECORTICATION LUNG Right 2020    Procedure: RIGHT VIDEO ASSISTED THORACOSCOPY, RIGHT THORACOTOMY, RIGHT DECORTICATION, RIGHT PARIETAL PLEURECTOMY;  Surgeon: Gary Shah MD;  Location: [facility] OR    THORACOTOMY Right 2020    Procedure: THORACOTOMY ;  Surgeon: Gary Shah MD;  Location: [facility] OR       Family History   Problem Relation Age of Onset    Thyroid Disease Maternal Aunt     Thyroid Disease Other         cousin        Social History     Tobacco Use    Smoking status: Former     Current packs/day: 0.00     Types: Cigarettes     Quit date: 3/10/1972     Years since quittin.8    Smokeless tobacco: Never   Substance Use Topics    Alcohol use: Yes     Comment: 2 week      Current Outpatient Medications   Medication Sig Dispense Refill    ACE/ARB/ARNI NOT PRESCRIBED (INTENTIONAL) Please choose reason not prescribed from choices below.      atorvastatin (LIPITOR) 10 MG tablet Take 1 tablet (10 mg) by mouth at bedtime TAKE 1 TABLET(10 MG) BY MOUTH AT BEDTIME 90 tablet 4    cholecalciferol 25 MCG (1000 UT) TABS Take 1 tablet by mouth daily       citalopram (CELEXA) 10 MG tablet Take 1 tablet by mouth daily 90 tablet 4    donepezil (ARICEPT) 10 MG tablet Take 1 tablet (10 mg) by mouth at bedtime 90 tablet 4    medical cannabis (Patient's own supply) Take 1 Dose by mouth See Admin Instructions (The purpose of this order is to document that the patient reports taking medical cannabis.  This is not a prescription, and is not used to certify that the patient  has a qualifying medical condition.)   4.3 mg THC and .7mg CBD.   ( at bedtime)      melatonin 1 MG TABS tablet Take 1 tablet (1 mg) by mouth at bedtime 30 tablet 3    Methylcobalamin (B-12) 5000 MCG TBDP Take 5,000 mcg by mouth daily 100 tablet 3    Multiple Vitamins-Minerals (PRESERVISION AREDS) CAPS Take 1 tablet by mouth every morning       multivitamin, therapeutic (THERA-VIT) TABS tablet Take 1 tablet by mouth daily      predniSONE (DELTASONE) 20 MG tablet Take 1 tablet (20 mg) by mouth daily. 5 tablet 0    traZODone (DESYREL) 100 MG tablet Take 1 tablet (100 mg) by mouth at bedtime TAKE 1 TABLET(100 MG) BY MOUTH AT BEDTIME 90 tablet 4     No current facility-administered medications for this visit.       Objective    /74   Pulse 91   Temp 96.9  F (36.1  C) (Temporal)   Resp 14   LMP  (LMP Unknown)   SpO2 95%   There is no height or weight on file to calculate BMI.  Physical Exam    General: Alert, oriented, no acute distress.    Respiratory: Easy work of breathing.   Cardiovascular: Appears warm and well-perfused.    Skin: No abnormalities noted on visualized skin.   Neurologic: Mentation intact and speech normal.     Psychiatric: Appropriate affect.   Musculoskeletal: Tenderness to left hip.    Signed Electronically by: WILD Hou CNP

## 2025-01-16 ENCOUNTER — THERAPY VISIT (OUTPATIENT)
Dept: PHYSICAL THERAPY | Facility: CLINIC | Age: 85
End: 2025-01-16
Payer: MEDICARE

## 2025-01-16 DIAGNOSIS — M25.552 HIP PAIN, LEFT: ICD-10-CM

## 2025-01-16 DIAGNOSIS — M54.42 ACUTE LEFT-SIDED LOW BACK PAIN WITH LEFT-SIDED SCIATICA: Primary | ICD-10-CM

## 2025-01-16 DIAGNOSIS — M54.50 ACUTE LEFT-SIDED LOW BACK PAIN: ICD-10-CM

## 2025-01-20 ENCOUNTER — PATIENT OUTREACH (OUTPATIENT)
Dept: CARE COORDINATION | Facility: CLINIC | Age: 85
End: 2025-01-20
Payer: MEDICARE

## 2025-01-22 ENCOUNTER — PATIENT OUTREACH (OUTPATIENT)
Dept: CARE COORDINATION | Facility: CLINIC | Age: 85
End: 2025-01-22
Payer: MEDICARE

## 2025-02-11 NOTE — PROGRESS NOTES
ASSESSMENT & PLAN    Genevieve was seen today for pain.    Diagnoses and all orders for this visit:    Muscle strain of left gluteal region, initial encounter  -     Large Joint Injection/Arthocentesis: L hip joint    Primary osteoarthritis of left hip  -     Orthopedic  Referral  -     Large Joint Injection/Arthocentesis: L hip joint      This issue is chronic and Unchanged. Genevieve presents our clinic today to discuss her chronic, posterior hip pain.  We reviewed her previous radiographs that showed overall moderate osteoarthritis of the left hip joint.  She reports pain in the gluteal muscle belly that mostly occurs at night, but can intermittently bother her during the day as well.  She was previously seen for this at physical therapy, and according to the PT notes she had good relief in her symptoms from PT.  However, today she endorses continued episodes of pain especially at night.  On examination, she does have pain in the anterior hip/groin area with FADIR testing, however the patient states this is not reproductive of her normal pain.  Her pain is reproduced with palpation of the muscle, more medial towards the coccyx, and she has no pain over the greater trochanter or the left SI joint.  Her pain is more muscular in nature, and there is not a good location in the posterior hip that I think would be beneficial to injection, but given that we do not directly inject muscles with corticosteroids.  We discussed that we could trial a diagnostic/therapeutic injection to the left hip joint to see if her arthritis is contributing to her hip pain, however I do not think that  Her symptoms are coming from her hip joint and I am pessimistic that this will provide relief.  We discussed the risks of an injection including infection, bleeding, and femoral head collapse after corticosteroid injection.  after this discussion the patient verbalized understanding of the risks and wished to proceed with a steroid  injection to the hip joint.  We determined the following plan:  - CSI to the left hip joint performed today under ultrasound guidance, see procedure note below for details  - She will continue with her home exercises  - She can otherwise utilize over-the-counter pain medicines, ice, heat as needed  - She can follow-up with me as needed      Irving Allen DO  [facility]    -----  Chief Complaint   Patient presents with    Left Hip - Pain       SUBJECTIVE  Kathie Wetzel is a/an 85 year old female who is seen in consultation at the request of  Janeen Romero  PCP for evaluation of left hip.     The patient is seen grand daughter.      Onset: 5-6 month(s) ago. Patient describes injury as cleaning tub and felt pain left buttocks  Location of Pain: left buttocks  Worsened by: laying on back at night  Better with: sitting up  Treatments tried: rest/activity avoidance, ice, Tylenol, other medications: Prednisone (Medrol), home exercises, physical therapy (3 visits), and previous imaging (xray 12/23/2024)  Associated symptoms: weakness of Hip and feeling of instability    Orthopedic/Surgical history: NO  Social History/Occupation: retired      REVIEW OF SYSTEMS:  Review of systems negative unless mentioned in HPI     OBJECTIVE:  LMP  (LMP Unknown)    General: healthy, alert and in no distress  Skin: no suspicious lesions or rash.  CV: distal perfusion intact   Resp: normal respiratory effort without conversational dyspnea   Psych: normal mood and affect  Neuro: Normal light sensory exam of LL extremity     Hip Exam:    Musculoskeletal Exam   Gait Normal     Left Right   Inspection Grossly Normal  Grossly Normal    Palpation     Tenderness over  Glute muscle belly/posterior hip. No ttp over greater troch, SI joint on L  None   Range of Motion     Flexion - Supine  Full to about 90  Full to about 90   ER at 90 of flexion 55 55   IR at 90 of flexion 40 40   Strength 5/5 Flexion  5/5  Abduction in Neutral  5/5 Abduction in Extension    Grossly Nml otherwise  5/5 Flexion  5/5 Abduction in Neutral  5/5 Abduction in Extension    Grossly Nml otherwise    Pain Provocation Tests     FADIR Painful, not reproductive of sx NEG   MILY NEG  NEG    Instability/log roll NEG  NEG    Trochanteric Pain Sign NEG NEG    Straight leg raise (passive) NEG  NEG    Posterior/Ischiofemoral Impingement Provocation NEG  NEG    Neurologic Intact sensation          RADIOLOGY:  Final results and radiologist's interpretation, available in the [facility] health record.  Images were reviewed with the patient in the office today.  My personal interpretation of the performed imaging: Radiographs from 12/23/2024 reviewed and show moderate joint space narrowing of the left hip joint.  No acute fractures or bony abnormalities.    Large Joint Injection/Arthocentesis: L hip joint    Date/Time: 2/18/2025 1:23 PM    Performed by: Irving Allen DO  Authorized by: Irving Allen DO    Indications:  Pain and osteoarthritis  Needle Size:  22 G  Guidance: ultrasound    Approach:  Anterior  Location:  Hip      Site:  L hip joint  Medications:  6 mg betamethasone acet & sod phos 6 (3-3) MG/ML; 5 mL lidocaine 1 %; 2 mL ROPivacaine 5 MG/ML  Medications comment:  1ml of 8.4% Sodium Bicarbonate solution was used to buffer the local numbing agent for today's injection    Outcome:  Tolerated well, no immediate complications  Procedure discussed: discussed risks, benefits, and alternatives    Consent Given by:  Patient  Timeout: timeout called immediately prior to procedure    Prep: patient was prepped and draped in usual sterile fashion     Ultrasound was used to ensure safe and accurate needle placement and injection. Ultrasound images of the procedure were permanently stored.

## 2025-02-13 ENCOUNTER — THERAPY VISIT (OUTPATIENT)
Dept: PHYSICAL THERAPY | Facility: CLINIC | Age: 85
End: 2025-02-13
Payer: MEDICARE

## 2025-02-13 DIAGNOSIS — M54.42 ACUTE LEFT-SIDED LOW BACK PAIN WITH LEFT-SIDED SCIATICA: Primary | ICD-10-CM

## 2025-02-13 DIAGNOSIS — M25.552 HIP PAIN, LEFT: ICD-10-CM

## 2025-02-13 NOTE — PROGRESS NOTES
DISCHARGE  Reason for Discharge: Patient has met all goals.    Equipment Issued: none    Discharge Plan: Patient to continue home program.    Referring Provider:  Janeen Romero       02/13/25 0500   Appointment Info   Signing clinician's name / credentials Arline Brooks, PT, DPT   Total/Authorized Visits 8 (E&T)   Visits Used 3   Medical Diagnosis Acute left-sided low back pain with left-sided sciatica   PT Tx Diagnosis Low back pain and left hip pain   Precautions/Limitations hx of low back surgery and scoliosis   Progress Note/Certification   Start of Care Date 01/10/25   Onset of illness/injury or Date of Surgery 12/26/24  (MD order)   Therapy Frequency 1 x a week   Predicted Duration 8 weeks   Certification date from 01/10/25   Certification date to 03/07/25   Progress Note Completed Date 01/10/25   GOALS   PT Goals 2   PT Goal 1   Goal Identifier Ambulation   Goal Description Patient will be able to walk 5 minutes with walker and pain 3/10 at worst   Rationale to maximize safety and independence with self cares;to maximize safety and independence with transportation;to maximize safety and independence within the community;to maximize safety and independence within the home;to maximize safety and independence with performance of ADLs and functional tasks   Target Date 03/07/25   Date Met 02/13/25   PT Goal 2   Goal Identifier Sitting   Goal Description Patient will be able to sit for 30 minutes with 0/10 pain   Rationale to maximize safety and independence with performance of ADLs and functional tasks;to maximize safety and independence within the home;to maximize safety and independence within the community;to maximize safety and independence with self cares;to maximize safety and independence with transportation   Target Date 03/07/25   Date Met 01/16/25   Subjective Report   Subjective Report Pt was just doing HEP while she was gone in [place] for two weeks, felling really good. Generally not having any  "pain although still planning to get hip injection next week. Ready to wrap up PT   Treatment Interventions (PT)   Interventions Therapeutic Procedure/Exercise   Therapeutic Procedure/Exercise   Therapeutic Procedures: strength, endurance, ROM, flexibility minutes (44276) 25   Ther Proc 1 Abdominal Brace Transverse Abdominis   Ther Proc 1 - Details 10 x 5\" sitting in chair   PTRx Ther Proc 1 Roll Outs   PTRx Ther Proc 1 - Details HEP    PTRx Ther Proc 2 Short Arc Knee Extension   PTRx Ther Proc 2 - Details x 15 reps    PTRx Ther Proc 3 Sitting Hip Adduction Squeeze   PTRx Ther Proc 3 - Details x 10 reps 5 second holds    PTRx Ther Proc 4 Sit to Stand   PTRx Ther Proc 4 - Details x  5 reps needing hands for movement    Skilled Intervention MC and VC   Patient Response/Progress tolerated exercises   PTRx Ther Proc 5 Toe Raises   PTRx Ther Proc 5 - Details with UE support on counter x 10 reps no pain    PTRx Ther Proc 6 Standing Hip Abduction   PTRx Ther Proc 6 - Details with UE support on counter x 10 reps no pain    PTRx Ther Proc 7 Standing Hip Flexion   PTRx Ther Proc 7 - Details with UE support on counter x 10 reps no pain    PTRx Ther Proc 8 Knee Bends Supported   PTRx Ther Proc 8 - Details with UE support on counter x 10 reps no pain    Neuromuscular Re-education   PTRx Neuro Re-ed 1 Abdominal Brace Transverse Abdominis   PTRx Neuro Re-ed 1 - Details x 10 reps 5 second holds    Education   Education Comments print outs   Plan   Plan for next session trial standing exercises, NuStep   Total Session Time   Timed Code Treatment Minutes 25   Total Treatment Time (sum of timed and untimed services) 25       "

## 2025-02-18 ENCOUNTER — OFFICE VISIT (OUTPATIENT)
Dept: ORTHOPEDICS | Facility: CLINIC | Age: 85
End: 2025-02-18
Payer: MEDICARE

## 2025-02-18 DIAGNOSIS — S76.012A MUSCLE STRAIN OF LEFT GLUTEAL REGION, INITIAL ENCOUNTER: Primary | ICD-10-CM

## 2025-02-18 DIAGNOSIS — M16.12 PRIMARY OSTEOARTHRITIS OF LEFT HIP: ICD-10-CM

## 2025-02-18 PROCEDURE — 20611 DRAIN/INJ JOINT/BURSA W/US: CPT | Mod: LT | Performed by: STUDENT IN AN ORGANIZED HEALTH CARE EDUCATION/TRAINING PROGRAM

## 2025-02-18 PROCEDURE — 99203 OFFICE O/P NEW LOW 30 MIN: CPT | Mod: 25 | Performed by: STUDENT IN AN ORGANIZED HEALTH CARE EDUCATION/TRAINING PROGRAM

## 2025-02-18 RX ORDER — BETAMETHASONE SODIUM PHOSPHATE AND BETAMETHASONE ACETATE 3; 3 MG/ML; MG/ML
6 INJECTION, SUSPENSION INTRA-ARTICULAR; INTRALESIONAL; INTRAMUSCULAR; SOFT TISSUE
Status: COMPLETED | OUTPATIENT
Start: 2025-02-18 | End: 2025-02-18

## 2025-02-18 RX ORDER — LIDOCAINE HYDROCHLORIDE 10 MG/ML
5 INJECTION, SOLUTION INFILTRATION; PERINEURAL
Status: COMPLETED | OUTPATIENT
Start: 2025-02-18 | End: 2025-02-18

## 2025-02-18 RX ORDER — ROPIVACAINE HYDROCHLORIDE 5 MG/ML
2 INJECTION, SOLUTION EPIDURAL; INFILTRATION; PERINEURAL
Status: COMPLETED | OUTPATIENT
Start: 2025-02-18 | End: 2025-02-18

## 2025-02-18 RX ADMIN — LIDOCAINE HYDROCHLORIDE 5 ML: 10 INJECTION, SOLUTION INFILTRATION; PERINEURAL at 13:23

## 2025-02-18 RX ADMIN — ROPIVACAINE HYDROCHLORIDE 2 ML: 5 INJECTION, SOLUTION EPIDURAL; INFILTRATION; PERINEURAL at 13:23

## 2025-02-18 RX ADMIN — BETAMETHASONE SODIUM PHOSPHATE AND BETAMETHASONE ACETATE 6 MG: 3; 3 INJECTION, SUSPENSION INTRA-ARTICULAR; INTRALESIONAL; INTRAMUSCULAR; SOFT TISSUE at 13:23

## 2025-02-18 NOTE — LETTER
2/18/2025      Kathie Wetzel  [address]  [place] [address]      Dear Colleague,    Thank you for referring your patient, Kathie Wetzel, to the [facility] [place]. Please see a copy of my visit note below.    ASSESSMENT & PLAN    Genevieve was seen today for pain.    Diagnoses and all orders for this visit:    Muscle strain of left gluteal region, initial encounter  -     Large Joint Injection/Arthocentesis: L hip joint    Primary osteoarthritis of left hip  -     Orthopedic  Referral  -     Large Joint Injection/Arthocentesis: L hip joint      This issue is chronic and Unchanged. Genevieve presents our clinic today to discuss her chronic, posterior hip pain.  We reviewed her previous radiographs that showed overall moderate osteoarthritis of the left hip joint.  She reports pain in the gluteal muscle belly that mostly occurs at night, but can intermittently bother her during the day as well.  She was previously seen for this at physical therapy, and according to the PT notes she had good relief in her symptoms from PT.  However, today she endorses continued episodes of pain especially at night.  On examination, she does have pain in the anterior hip/groin area with FADIR testing, however the patient states this is not reproductive of her normal pain.  Her pain is reproduced with palpation of the muscle, more medial towards the coccyx, and she has no pain over the greater trochanter or the left SI joint.  Her pain is more muscular in nature, and there is not a good location in the posterior hip that I think would be beneficial to injection, but given that we do not directly inject muscles with corticosteroids.  We discussed that we could trial a diagnostic/therapeutic injection to the left hip joint to see if her arthritis is contributing to her hip pain, however I do not think that  Her symptoms are coming from her hip joint and I am pessimistic that this  will provide relief.  We discussed the risks of an injection including infection, bleeding, and femoral head collapse after corticosteroid injection.  after this discussion the patient verbalized understanding of the risks and wished to proceed with a steroid injection to the hip joint.  We determined the following plan:  - CSI to the left hip joint performed today under ultrasound guidance, see procedure note below for details  - She will continue with her home exercises  - She can otherwise utilize over-the-counter pain medicines, ice, heat as needed  - She can follow-up with me as needed      Irving Allen [facility] [place]    -----  Chief Complaint   Patient presents with     Left Hip - Pain       SUBJECTIVE  Kathie Wetzel is a/an 85 year old female who is seen in consultation at the request of  Janeen Romero  PCP for evaluation of left hip.     The patient is seen grand daughter.      Onset: 5-6 month(s) ago. Patient describes injury as cleaning tub and felt pain left buttocks  Location of Pain: left buttocks  Worsened by: laying on back at night  Better with: sitting up  Treatments tried: rest/activity avoidance, ice, Tylenol, other medications: Prednisone (Medrol), home exercises, physical therapy (3 visits), and previous imaging (xray 12/23/2024)  Associated symptoms: weakness of Hip and feeling of instability    Orthopedic/Surgical history: NO  Social History/Occupation: retired      REVIEW OF SYSTEMS:  Review of systems negative unless mentioned in HPI     OBJECTIVE:  LMP  (LMP Unknown)    General: healthy, alert and in no distress  Skin: no suspicious lesions or rash.  CV: distal perfusion intact   Resp: normal respiratory effort without conversational dyspnea   Psych: normal mood and affect  Neuro: Normal light sensory exam of LL extremity     Hip Exam:    Musculoskeletal Exam   Gait Normal     Left Right   Inspection Grossly Normal  Grossly Normal    Palpation      Tenderness over  Glute muscle belly/posterior hip. No ttp over greater troch, SI joint on L  None   Range of Motion     Flexion - Supine  Full to about 90  Full to about 90   ER at 90 of flexion 55 55   IR at 90 of flexion 40 40   Strength 5/5 Flexion  5/5 Abduction in Neutral  5/5 Abduction in Extension    Grossly Nml otherwise  5/5 Flexion  5/5 Abduction in Neutral  5/5 Abduction in Extension    Grossly Nml otherwise    Pain Provocation Tests     FADIR Painful, not reproductive of sx NEG   MILY NEG  NEG    Instability/log roll NEG  NEG    Trochanteric Pain Sign NEG NEG    Straight leg raise (passive) NEG  NEG    Posterior/Ischiofemoral Impingement Provocation NEG  NEG    Neurologic Intact sensation          RADIOLOGY:  Final results and radiologist's interpretation, available in the [facility] health record.  Images were reviewed with the patient in the office today.  My personal interpretation of the performed imaging: Radiographs from 12/23/2024 reviewed and show moderate joint space narrowing of the left hip joint.  No acute fractures or bony abnormalities.    Large Joint Injection/Arthocentesis: L hip joint    Date/Time: 2/18/2025 1:23 PM    Performed by: Irving Allen DO  Authorized by: Irving Allen DO    Indications:  Pain and osteoarthritis  Needle Size:  22 G  Guidance: ultrasound    Approach:  Anterior  Location:  Hip      Site:  L hip joint  Medications:  6 mg betamethasone acet & sod phos 6 (3-3) MG/ML; 5 mL lidocaine 1 %; 2 mL ROPivacaine 5 MG/ML  Medications comment:  1ml of 8.4% Sodium Bicarbonate solution was used to buffer the local numbing agent for today's injection    Outcome:  Tolerated well, no immediate complications  Procedure discussed: discussed risks, benefits, and alternatives    Consent Given by:  Patient  Timeout: timeout called immediately prior to procedure    Prep: patient was prepped and draped in usual sterile fashion     Ultrasound was used to ensure safe and accurate needle  placement and injection. Ultrasound images of the procedure were permanently stored.          Again, thank you for allowing me to participate in the care of your patient.        Sincerely,        Irving Allen, DO    Electronically signed

## 2025-03-19 ENCOUNTER — PATIENT OUTREACH (OUTPATIENT)
Dept: CARE COORDINATION | Facility: CLINIC | Age: 85
End: 2025-03-19
Payer: MEDICARE

## 2025-04-02 ENCOUNTER — PATIENT OUTREACH (OUTPATIENT)
Dept: CARE COORDINATION | Facility: CLINIC | Age: 85
End: 2025-04-02
Payer: MEDICARE

## 2025-04-08 NOTE — PROGRESS NOTES
ASSESSMENT & PLAN  Patient Instructions     1. Cervical radiculopathy    2. Strain of neck muscle, initial encounter    3. Cervical spondylosis      -Patient has acute left sided neck pain due to cervical muscle strain and cervical arthritis  -Xrays taken in office today shows multilevel mild intervertebral degenerative changes and multilevel facet arthritis.  No acute fracture, or scoliosis.  -Patient has significant spasms of the left trapezius and rhomboids on exam today with significant exacerbation of her pain with movements of her head  -Patient will start formal physical therapy and home exercise program  -Patient will discontinue oxycodone.  Patient will start oral prednisone 40 mg daily for the next 5 days to decrease inflammation and pain.  Patient may also start tizanidine 2 mg at bedtime for muscle spasms and strain close over neck pain.  Patient should not take her tizanidine if she is drinking alcohol.  Patient needs to get up multiple times at nighttime to go to the bathroom so she should be more careful with extra medications.  Patient may try oral Tylenol, instead Muscle relaxer if the patient experiences significant dizziness or lightheadedness.  -Patient will continue to follow-up with me for further treatment and recommendations.  Patient was requesting cortisone injection today which unfortunately not helped her significantly since she has a large area of muscle pain and spasms.  If the oral medications and physical therapy are not helpful enough, she may return if she has specific muscle knots and areas of tension to try some trigger point injections.  -Call direct clinic number [[phone]] at any time with questions or concerns.    Albert Yeo MD [facility]  [facility]        -----    SUBJECTIVE  Kathie Wetzel is a/an 85 year old  left handed  female who is seen as a self referral for evaluation of left shoulder pain. The patient is  seen with their daughter. Patient states she writes with left hand, but does all other tasks with her right hand.    Onset: 1.5 week(s) ago. Reports insidious onset without acute precipitating event.  Location of Pain: left shoulder over trapezius  Rating of Pain at worst: 10/10  Rating of Pain Currently: 4/10  Worsened by: sleeping on back (back sleeper) - has been sleeping sitting up on couch, pain wakes her at night, pain at rest, lifting  Better with: heat, motrin  Treatments tried: rest/activity avoidance, ice, heat, tylenol, motrin, advil, other medications: Oxycodone/Acetaminophen (Percocet), and previous imaging (xray 3/31/25 at [facility]), home exercises  Associated symptoms: no distal numbness or tingling; denies swelling or warmth  Orthopedic history: YES - patient reports intermittent left sided neck pain but notes shoulder pain is more concerning  Relevant surgical history: NO  Social history: social history: retired    Past Medical History:   Diagnosis Date    High cholesterol     HTN (hypertension)     Parathyroid adenoma     PONV (postoperative nausea and vomiting)     Thyroid disorder      Social History     Socioeconomic History    Marital status: Single   Tobacco Use    Smoking status: Former     Current packs/day: 0.00     Types: Cigarettes     Quit date: 3/10/1972     Years since quittin.1    Smokeless tobacco: Never   Vaping Use    Vaping status: Never Used   Substance and Sexual Activity    Alcohol use: Yes     Comment: 2 week     Drug use: No    Sexual activity: Not Currently   Other Topics Concern    Parent/sibling w/ CABG, MI or angioplasty before 65F 55M? No     Social Drivers of Health     Financial Resource Strain: Low Risk  (2024)    Financial Resource Strain     Within the past 12 months, have you or your family members you live with been unable to get utilities (heat, electricity) when it was really needed?: No   Food Insecurity: Low Risk  (2024)    Food Insecurity      Within the past 12 months, did you worry that your food would run out before you got money to buy more?: No     Within the past 12 months, did the food you bought just not last and you didn t have money to get more?: No   Transportation Needs: Low Risk  (4/18/2024)    Transportation Needs     Within the past 12 months, has lack of transportation kept you from medical appointments, getting your medicines, non-medical meetings or appointments, work, or from getting things that you need?: No   Physical Activity: Unknown (4/18/2024)    Exercise Vital Sign     Days of Exercise per Week: 5 days   Stress: No Stress Concern Present (4/18/2024)    [place] [employer] - Occupational Stress Questionnaire     Feeling of Stress : Only a little   Social Connections: Unknown (4/18/2024)    Social Connection and Isolation Panel [NHANES]     Frequency of Social Gatherings with Friends and Family: More than three times a week   Interpersonal Safety: Low Risk  (4/18/2024)    Interpersonal Safety     Do you feel physically and emotionally safe where you currently live?: Yes     Within the past 12 months, have you been hit, slapped, kicked or otherwise physically hurt by someone?: No     Within the past 12 months, have you been humiliated or emotionally abused in other ways by your partner or ex-partner?: No   Housing Stability: Low Risk  (4/18/2024)    Housing Stability     Do you have housing? : Yes     Are you worried about losing your housing?: No         Patient's past medical, surgical, social, and family histories were reviewed today and no changes are noted.    REVIEW OF SYSTEMS:  10 point ROS is negative other than symptoms noted above in HPI, Past Medical History or as stated below  Constitutional: NEGATIVE for fever, chills, change in weight  Skin: NEGATIVE for worrisome rashes, moles or lesions  GI/: NEGATIVE for bowel or bladder changes  Neuro: NEGATIVE for weakness, dizziness or  "paresthesias    OBJECTIVE:  Ht 1.765 m (5' 9.5\")   Wt 91.2 kg (201 lb)   LMP  (LMP Unknown)   BMI 29.26 kg/m     General: healthy, alert and in no distress  HEENT: no scleral icterus or conjunctival erythema  Skin: no suspicious lesions or rash. No jaundice.  CV: regular rhythm by palpation  Resp: normal respiratory effort without conversational dyspnea   Psych: normal mood and affect  Gait: normal steady gait with appropriate coordination and balance  Neuro: normal light touch sensory exam of the bilateral upper extremities.    MSK:  LEFT SHOULDER  Inspection:    no swelling, bruising, discoloration, or obvious deformity or asymmetry  Palpation:    Tender about the trapezius. Remainder of bony and tendinous landmarks are nontender.  Active Range of Motion:     Abduction normal0, FF 1800, ER normal0, IR normal.    Strength:    Scapular plane abduction painful,  ER grossly intact, IR grossly intact, biceps grossly intact, triceps grossly intact  Special Tests:    Positive: none    Negative: Neer's, Lin', supraspinatus (empty can), drop arm/painful arc, crossed arm adduction, Culebra's, Speed's, and Yergason's    CERVICAL SPINE  Inspection:    normal cervical lordosis present, rounded shoulders, forward head posture  Palpation:    Tender about the paracervical musculature (left), levator scapula (left), upper trapezius (left), lower trapezius (left), and rhomboids (left). Otherwise remainder of the landmarks and nontender.  Range of Motion:     Flexion limited by pain    Extension limited by pain    Right side bend limited by pain    Left side bend limited by pain    Right rotation limited by pain    Left rotation limited by pain  Strength:    Full strength throughout all neck muscles  Special Tests:    Positive: None    Negative: Spurling's (bilateral), Quispe's (bilateral)    Independent visualization of the below image:    Xrays taken in office today shows multilevel mild intervertebral degenerative changes " and multilevel facet arthritis.  Mild anterolisthesis of C2 on C3 and C3 on C4.  No acute fracture, or scoliosis.    Xray Left Shoulder 3 Views at [facility] 3/31/25  Indication:   Shoulder pain.     Technique:   Left shoulder 3 views.     Comparison:   None.     Findings:   Bones: Alignment is normal. No fractures or bone lesions.      Joint spaces: Moderate to severe acromioclavicular joint arthrosis.     Soft tissues: No acute findings     Impression:   No sign of acute injury.         Albert Yeo MD [facility]

## 2025-04-09 ENCOUNTER — ANCILLARY PROCEDURE (OUTPATIENT)
Dept: GENERAL RADIOLOGY | Facility: CLINIC | Age: 85
End: 2025-04-09
Attending: FAMILY MEDICINE
Payer: MEDICARE

## 2025-04-09 ENCOUNTER — OFFICE VISIT (OUTPATIENT)
Dept: ORTHOPEDICS | Facility: CLINIC | Age: 85
End: 2025-04-09
Payer: MEDICARE

## 2025-04-09 VITALS — WEIGHT: 201 LBS | HEIGHT: 70 IN | BODY MASS INDEX: 28.77 KG/M2

## 2025-04-09 DIAGNOSIS — M54.12 CERVICAL RADICULOPATHY: ICD-10-CM

## 2025-04-09 DIAGNOSIS — S16.1XXA STRAIN OF NECK MUSCLE, INITIAL ENCOUNTER: ICD-10-CM

## 2025-04-09 DIAGNOSIS — M47.812 CERVICAL SPONDYLOSIS: ICD-10-CM

## 2025-04-09 DIAGNOSIS — M54.12 CERVICAL RADICULOPATHY: Primary | ICD-10-CM

## 2025-04-09 PROCEDURE — 72040 X-RAY EXAM NECK SPINE 2-3 VW: CPT | Mod: TC | Performed by: RADIOLOGY

## 2025-04-09 PROCEDURE — G2211 COMPLEX E/M VISIT ADD ON: HCPCS | Performed by: FAMILY MEDICINE

## 2025-04-09 PROCEDURE — 99214 OFFICE O/P EST MOD 30 MIN: CPT | Performed by: FAMILY MEDICINE

## 2025-04-09 RX ORDER — PREDNISONE 20 MG/1
20 TABLET ORAL DAILY
Qty: 5 TABLET | Refills: 0 | Status: SHIPPED | OUTPATIENT
Start: 2025-04-09

## 2025-04-09 RX ORDER — TIZANIDINE 2 MG/1
2 TABLET ORAL
Qty: 10 TABLET | Refills: 0 | Status: SHIPPED | OUTPATIENT
Start: 2025-04-09

## 2025-04-09 RX ORDER — PREDNISONE 20 MG/1
20 TABLET ORAL DAILY
Qty: 5 TABLET | Refills: 0 | Status: SHIPPED | OUTPATIENT
Start: 2025-04-09 | End: 2025-04-09

## 2025-04-09 RX ORDER — TIZANIDINE 2 MG/1
2 TABLET ORAL
Qty: 10 TABLET | Refills: 0 | Status: SHIPPED | OUTPATIENT
Start: 2025-04-09 | End: 2025-04-09

## 2025-04-09 NOTE — PATIENT INSTRUCTIONS
1. Cervical radiculopathy    2. Strain of neck muscle, initial encounter    3. Cervical spondylosis      -Patient has acute left sided neck pain due to cervical muscle strain and cervical arthritis  -Xrays taken in office today shows multilevel mild intervertebral degenerative changes and multilevel facet arthritis.  No acute fracture, or scoliosis.  -Patient has significant spasms of the left trapezius and rhomboids on exam today with significant exacerbation of her pain with movements of her head  -Patient will start formal physical therapy and home exercise program  -Patient will discontinue oxycodone.  Patient will start oral prednisone 40 mg daily for the next 5 days to decrease inflammation and pain.  Patient may also start tizanidine 2 mg at bedtime for muscle spasms and strain close over neck pain.  Patient should not take her tizanidine if she is drinking alcohol.  Patient needs to get up multiple times at nighttime to go to the bathroom so she should be more careful with extra medications.  Patient may try oral Tylenol, instead Muscle relaxer if the patient experiences significant dizziness or lightheadedness.  -Patient will continue to follow-up with me for further treatment and recommendations.  Patient was requesting cortisone injection today which unfortunately not helped her significantly since she has a large area of muscle pain and spasms.  If the oral medications and physical therapy are not helpful enough, she may return if she has specific muscle knots and areas of tension to try some trigger point injections.  -Call direct clinic number [[phone]] at any time with questions or concerns.    Albert Yeo MD [facility]  [facility]

## 2025-04-09 NOTE — LETTER
4/9/2025      Kathie Wetzel  [address]  [facility] [address]      Dear Colleague,    Thank you for referring your patient, Kathie Wetzel, to the [facility] [place]. Please see a copy of my visit note below.    ASSESSMENT & PLAN  Patient Instructions     1. Cervical radiculopathy    2. Strain of neck muscle, initial encounter    3. Cervical spondylosis      -Patient has acute left sided neck pain due to cervical muscle strain and cervical arthritis  -Xrays taken in office today shows multilevel mild intervertebral degenerative changes and multilevel facet arthritis.  No acute fracture, or scoliosis.  -Patient has significant spasms of the left trapezius and rhomboids on exam today with significant exacerbation of her pain with movements of her head  -Patient will start formal physical therapy and home exercise program  -Patient will discontinue oxycodone.  Patient will start oral prednisone 40 mg daily for the next 5 days to decrease inflammation and pain.  Patient may also start tizanidine 2 mg at bedtime for muscle spasms and strain close over neck pain.  Patient should not take her tizanidine if she is drinking alcohol.  Patient needs to get up multiple times at nighttime to go to the bathroom so she should be more careful with extra medications.  Patient may try oral Tylenol, instead Muscle relaxer if the patient experiences significant dizziness or lightheadedness.  -Patient will continue to follow-up with me for further treatment and recommendations.  Patient was requesting cortisone injection today which unfortunately not helped her significantly since she has a large area of muscle pain and spasms.  If the oral medications and physical therapy are not helpful enough, she may return if she has specific muscle knots and areas of tension to try some trigger point injections.  -Call direct clinic number [[phone]] at any time with questions or  concerns.    Albert Yeo MD CAQSM  [facility]  [facility]        -----    SUBJECTIVE  Kathie Wetzel is a/an 85 year old  left handed  female who is seen as a self referral for evaluation of left shoulder pain. The patient is seen with their daughter. Patient states she writes with left hand, but does all other tasks with her right hand.    Onset: 1.5 week(s) ago. Reports insidious onset without acute precipitating event.  Location of Pain: left shoulder over trapezius  Rating of Pain at worst: 10/10  Rating of Pain Currently: 4/10  Worsened by: sleeping on back (back sleeper) - has been sleeping sitting up on couch, pain wakes her at night, pain at rest, lifting  Better with: heat, motrin  Treatments tried: rest/activity avoidance, ice, heat, tylenol, motrin, advil, other medications: Oxycodone/Acetaminophen (Percocet), and previous imaging (xray 3/31/25 at [facility]), home exercises  Associated symptoms: no distal numbness or tingling; denies swelling or warmth  Orthopedic history: YES - patient reports intermittent left sided neck pain but notes shoulder pain is more concerning  Relevant surgical history: NO  Social history: social history: retired    Past Medical History:   Diagnosis Date     High cholesterol      HTN (hypertension)      Parathyroid adenoma      PONV (postoperative nausea and vomiting)      Thyroid disorder      Social History     Socioeconomic History     Marital status: Single   Tobacco Use     Smoking status: Former     Current packs/day: 0.00     Types: Cigarettes     Quit date: 3/10/1972     Years since quittin.1     Smokeless tobacco: Never   Vaping Use     Vaping status: Never Used   Substance and Sexual Activity     Alcohol use: Yes     Comment: 2 week      Drug use: No     Sexual activity: Not Currently   Other Topics Concern     Parent/sibling w/ CABG, MI or angioplasty before 65F 55M? No     Social Drivers of Health     Financial  Resource Strain: Low Risk  (4/18/2024)    Financial Resource Strain      Within the past 12 months, have you or your family members you live with been unable to get utilities (heat, electricity) when it was really needed?: No   Food Insecurity: Low Risk  (4/18/2024)    Food Insecurity      Within the past 12 months, did you worry that your food would run out before you got money to buy more?: No      Within the past 12 months, did the food you bought just not last and you didn t have money to get more?: No   Transportation Needs: Low Risk  (4/18/2024)    Transportation Needs      Within the past 12 months, has lack of transportation kept you from medical appointments, getting your medicines, non-medical meetings or appointments, work, or from getting things that you need?: No   Physical Activity: Unknown (4/18/2024)    Exercise Vital Sign      Days of Exercise per Week: 5 days   Stress: No Stress Concern Present (4/18/2024)    [employer] - Occupational Stress Questionnaire      Feeling of Stress : Only a little   Social Connections: Unknown (4/18/2024)    Social Connection and Isolation Panel [NHANES]      Frequency of Social Gatherings with Friends and Family: More than three times a week   Interpersonal Safety: Low Risk  (4/18/2024)    Interpersonal Safety      Do you feel physically and emotionally safe where you currently live?: Yes      Within the past 12 months, have you been hit, slapped, kicked or otherwise physically hurt by someone?: No      Within the past 12 months, have you been humiliated or emotionally abused in other ways by your partner or ex-partner?: No   Housing Stability: Low Risk  (4/18/2024)    Housing Stability      Do you have housing? : Yes      Are you worried about losing your housing?: No         Patient's past medical, surgical, social, and family histories were reviewed today and no changes are noted.    REVIEW OF SYSTEMS:  10 point ROS is negative other  "than symptoms noted above in HPI, Past Medical History or as stated below  Constitutional: NEGATIVE for fever, chills, change in weight  Skin: NEGATIVE for worrisome rashes, moles or lesions  GI/: NEGATIVE for bowel or bladder changes  Neuro: NEGATIVE for weakness, dizziness or paresthesias    OBJECTIVE:  Ht 1.765 m (5' 9.5\")   Wt 91.2 kg (201 lb)   LMP  (LMP Unknown)   BMI 29.26 kg/m     General: healthy, alert and in no distress  HEENT: no scleral icterus or conjunctival erythema  Skin: no suspicious lesions or rash. No jaundice.  CV: regular rhythm by palpation  Resp: normal respiratory effort without conversational dyspnea   Psych: normal mood and affect  Gait: normal steady gait with appropriate coordination and balance  Neuro: normal light touch sensory exam of the bilateral upper extremities.    MSK:  LEFT SHOULDER  Inspection:    no swelling, bruising, discoloration, or obvious deformity or asymmetry  Palpation:    Tender about the trapezius. Remainder of bony and tendinous landmarks are nontender.  Active Range of Motion:     Abduction normal0, FF 1800, ER normal0, IR normal.    Strength:    Scapular plane abduction painful,  ER grossly intact, IR grossly intact, biceps grossly intact, triceps grossly intact  Special Tests:    Positive: none    Negative: Neer's, Lin', supraspinatus (empty can), drop arm/painful arc, crossed arm adduction, Comal's, Speed's, and Yergason's    CERVICAL SPINE  Inspection:    normal cervical lordosis present, rounded shoulders, forward head posture  Palpation:    Tender about the paracervical musculature (left), levator scapula (left), upper trapezius (left), lower trapezius (left), and rhomboids (left). Otherwise remainder of the landmarks and nontender.  Range of Motion:     Flexion limited by pain    Extension limited by pain    Right side bend limited by pain    Left side bend limited by pain    Right rotation limited by pain    Left rotation limited by " pain  Strength:    Full strength throughout all neck muscles  Special Tests:    Positive: None    Negative: Spurling's (bilateral), Quispe's (bilateral)    Independent visualization of the below image:    Xrays taken in office today shows multilevel mild intervertebral degenerative changes and multilevel facet arthritis.  Mild anterolisthesis of C2 on C3 and C3 on C4.  No acute fracture, or scoliosis.    Xray Left Shoulder 3 Views at [facility] 3/31/25  Indication:   Shoulder pain.     Technique:   Left shoulder 3 views.     Comparison:   None.     Findings:   Bones: Alignment is normal. No fractures or bone lesions.      Joint spaces: Moderate to severe acromioclavicular joint arthrosis.     Soft tissues: No acute findings     Impression:   No sign of acute injury.         Albert Yeo MD [facility]      Again, thank you for allowing me to participate in the care of your patient.        Sincerely,        Albert Yeo, MD    Electronically signed

## 2025-04-10 ENCOUNTER — THERAPY VISIT (OUTPATIENT)
Dept: PHYSICAL THERAPY | Facility: CLINIC | Age: 85
End: 2025-04-10
Attending: FAMILY MEDICINE
Payer: MEDICARE

## 2025-04-10 DIAGNOSIS — M54.12 CERVICAL RADICULOPATHY: ICD-10-CM

## 2025-04-10 DIAGNOSIS — M47.812 CERVICAL SPONDYLOSIS: ICD-10-CM

## 2025-04-10 DIAGNOSIS — S16.1XXA STRAIN OF NECK MUSCLE, INITIAL ENCOUNTER: ICD-10-CM

## 2025-04-10 NOTE — PROGRESS NOTES
PHYSICAL THERAPY EVALUATION  Type of Visit: Evaluation       Fall Risk Screen:  Fall screen completed by: PT  Have you fallen 2 or more times in the past year?: No  Have you fallen and had an injury in the past year?: No  Is patient a fall risk?: No    Subjective         Presenting condition or subjective complaint: Neck pain  Date of onset: 25    Relevant medical history: Arthritis; Hearing problems   Dates & types of surgery:      Prior diagnostic imaging/testing results: X-ray     Prior therapy history for the same diagnosis, illness or injury: No      Prior Level of Function  Transfers: Independent  Ambulation: Independent  ADL: Independent      Living Environment  Social support: Alone   Type of home: Apartment/condo; 1 level   Stairs to enter the home: No       Ramp: No   Stairs inside the home: No       Help at home: None  Equipment owned: Walker with wheels     Employment: Not Applicable    Hobbies/Interests: Reading    Patient goals for therapy: Sleep and use left arm more    Pain assessment: Pain present  Location: L UT/Ratin/10  Present symptoms: patient reports pain located in the left upper trap .    Radiation:none  Type of pain is described as sharp and aching.  Associated symptoms include: none.  Present since: 2025 with symptoms worsening .  This condition is new .  Symptoms commenced as a result of: no apparent reason, woke up with pain   Condition occurred in the following environment: home   Symptoms at onset: L shoulder  Constant symptoms: L shoulder  Intermittent symptoms:   Previous history/treatment: n/a       Dizziness/tinnitus/nausea/swallowing:neg    (with consideration for bending, sitting/rising, turning, lying/rising, am, as the day progresses, pm, when still, on the move, other )  Symptoms are made worse with the following: turning head, sometime putting weight through L hand to use walker, sleeping flat on back , lying on L side  Symptoms are made better with the  following: lying on R side of neck, muscle relaxants, Prednisone (also took some Motrin)    Sleep disturbance: yes, unable to sleep on back  Sleeping postures: back  Sleeping surface: medium  Number of pillows:none       Objective   Posture:   Sitting: fair; Standing: fair; Protruded head: yes; Relevant wry neck: nil  Posture Correction: decreases     Neurological:  Motor Deficit:  Myotomes L R   C4 (shoulder elevation)     C5 (shoulder abduction)     C6 (elbow flexion)     C7 (elbow extension)     C8 (thumb extension)     T1 (finger add/abd)      Strength (lb)       Sensory Deficit, Reflexes, Dural Signs: n/a    AROM: (Major, Moderate, Minimal or Nil loss)  Movement Loss Yandel Mod Min Nil Pain   Protrusion    x Incr L shoulder   Flexion    x pdm   Retraction  x x  erp   Extension  x x  pdm   Lateral flexion R   x     Lateral flexion L x x   pdm   Rotation R   x     Rotation L  x   pdm     Repeated movement testing:   (During: produces, abolishes, increases, decreases, no effect, centralizing, peripheralizing; After: better, worse, no better, no worse, no effect, centralized, peripheralized)    Pre-test Symptoms Sittin/10   Symptoms During Symptoms After ROM increased ROM decreased No Effect   PRO        Rep PRO        RET incr NW      Rep RET incr W   x   RET EXT        Rep RET EXT        LF - R        Rep LF - R        LF - L        Rep LF - L        ROT - R        Rep ROT - R        ROT - L        Rep ROT - L        FLEX        Rep FLEX          Pre-test Symptoms Lying (if needed):    Symptoms During Symptoms After ROM increased ROM decreased No Effect   RET incr NW      Rep RET incr NW   x   RET EXT        Rep RET EXT          Static Tests:   Other Tests: supine mid cervical mobilization + traction: D/B/incr ROM    Provisional Classification: derangement, asymmetrical above elbow  Principle of Management (education/equipment/mechanical therapy/specific principle): rep RET in semi-reclined position x 10/2  hrs       Assessment & Plan   CLINICAL IMPRESSIONS  Medical Diagnosis:      Treatment Diagnosis: neck pain with mobility and posture deficits   Impression/Assessment: Patient is a 85 year old female with cervical complaints.  The following significant findings have been identified: Pain, Decreased ROM/flexibility, Decreased joint mobility, Decreased activity tolerance, and Impaired posture. These impairments interfere with their ability to perform self care tasks and household chores as compared to previous level of function.     Clinical Decision Making (Complexity):  Clinical Presentation: Stable/Uncomplicated  Clinical Presentation Rationale: based on medical and personal factors listed in PT evaluation  Clinical Decision Making (Complexity): Low complexity    PLAN OF CARE  Treatment Interventions:  Interventions: Manual Therapy, Neuromuscular Re-education, Therapeutic Activity, Therapeutic Exercise    Long Term Goals     PT Goal 1  Goal Identifier: reading, sitting at computer  Goal Description: patient will be able to read or sit at computer in good posture painfree for 2+ hours  Rationale: to maximize safety and independence with transportation;to maximize safety and independence within the home;to maximize safety and independence with performance of ADLs and functional tasks  Target Date: 06/05/25  PT Goal 2  Goal Identifier: sleeping  Goal Description: sleeping; patient will sleep through the night, able to sleep on side without pain to restore normal sleep pattern.  Rationale: to maximize safety and independence with performance of ADLs and functional tasks;to maximize safety and independence with self cares  Target Date: 06/05/25      Frequency of Treatment: 1x week  Duration of Treatment: 8 weeks    Recommended Referrals to Other Professionals:   Education Assessment:   Learner/Method: Patient;Family;No Barriers to Learning;Pictures/Video;Demonstration    Risks and benefits of evaluation/treatment have  been explained.   Patient/Family/caregiver agrees with Plan of Care.     Evaluation Time:     PT Eval, Low Complexity Minutes (28006): 20       Signing Clinician: ELOISA Salas [facility]                                                                                   OUTPATIENT PHYSICAL THERAPY      PLAN OF TREATMENT FOR OUTPATIENT REHABILITATION   Patient's Last Name, First Name, Kathie Addison YOB: 1940   Provider's Name   [facility]   Medical Record No.  [id number]     Onset Date: 03/30/25  Start of Care Date: 04/10/25     Medical Diagnosis:         PT Treatment Diagnosis:  neck pain with mobility and posture deficits Plan of Treatment  Frequency/Duration: 1x week/ 8 weeks    Certification date from 04/10/25 to 06/05/25         See note for plan of treatment details and functional goals     Maria Juarez, PT                         I CERTIFY THE NEED FOR THESE SERVICES FURNISHED UNDER        THIS PLAN OF TREATMENT AND WHILE UNDER MY CARE     (Physician attestation of this document indicates review and certification of the therapy plan).              Referring Provider:  Albert Yeo    Initial Assessment  See Epic Evaluation- Start of Care Date: 04/10/25

## 2025-04-15 ENCOUNTER — TELEPHONE (OUTPATIENT)
Dept: ORTHOPEDICS | Facility: CLINIC | Age: 85
End: 2025-04-15
Payer: MEDICARE

## 2025-04-15 NOTE — TELEPHONE ENCOUNTER
Other: Requesting c/b- patient is still in pain and they were told that should could have a neck injection-daughter wasn't sure if that was w/Dr Yeo or who they should be scheduling with

## 2025-04-15 NOTE — PROGRESS NOTES
ASSESSMENT & PLAN  Patient Instructions     1. Cervical spondylosis    2. Cervical radiculopathy    3. Strain of neck muscle, initial encounter      - Patient is following up for acute left sided neck pain rating to the upper back and shoulder due to multilevel arthritis, degenerative disc disease causing cervical nerve root impingement and muscle spasms  -Patient requested and tolerated left trapezius muscle trigger point injection which she tolerated well without complications  -However, the root of her problem is likely cervical nerve root impingement from the spine.  -Patient will get an MRI of the cervical spine for further evaluation  - Your MRI has been ordered. You may call [phone] to schedule over the phone.  Please call my office to schedule a video visit 2 days after your MRI is complete to discuss results and next step treatment options.  -Call direct clinic number [[phone]] at any time with questions or concerns.    Albert Yeo MD [facility]  [facility]        -----    SUBJECTIVE:  Kathie Wetzel is a 85 year old female who is seen in follow-up for left shoulder.They were last seen 04/09/2025.     Since their last visit reports 0% - (About the same as last time). They indicate that their current pain level is 7/10. They have tried rest/activity avoidance, elevation, heat, Tylenol, Ibuprofen, other medications: prednisone, tizanidine, physical therapy (1 visits), and previous imaging (xray 03/31/25).      The patient is seen with their daughter.    Patient's past medical, surgical, social, and family histories were reviewed today and no changes are noted.    REVIEW OF SYSTEMS:  Constitutional: NEGATIVE for fever, chills, change in weight  Skin: NEGATIVE for worrisome rashes, moles or lesions  GI/: NEGATIVE for bowel or bladder changes  Neuro: NEGATIVE for weakness, dizziness or paresthesias    OBJECTIVE:  LMP  (LMP Unknown)    General:  healthy, alert and in no distress  HEENT: no scleral icterus or conjunctival erythema  Skin: no suspicious lesions or rash. No jaundice.  CV: regular rhythm by palpation, no pedal edema  Resp: normal respiratory effort without conversational dyspnea   Psych: normal mood and affect  Gait: normal steady gait with appropriate coordination and balance  Neuro: normal light touch sensory exam of the extremities.    MSK:  CERVICAL SPINE  Inspection:    normal cervical lordosis present, rounded shoulders, forward head posture  Palpation:    Tender about the paracervical musculature (left), levator scapula (left), upper trapezius (left), lower trapezius (left), and rhomboids (left). Otherwise remainder of the landmarks and nontender.  Range of Motion:     Flexion limited by pain    Extension limited by pain    Right side bend limited by pain    Left side bend limited by pain    Right rotation limited by pain    Left rotation limited by pain  Strength:    Full strength throughout all neck muscles  Special Tests:    Positive: None    Negative: Spurling's (bilateral), Quispe's (bilateral)    Independent visualization of the below image:    Right Trapezius Trigger Point Cortisone Injection    Date/Time: 4/16/2025 4:11 PM    Performed by: Yeo, Albert, MD  Authorized by: Yeo, Albert, MD    Indications:  Pain  Needle Size:  25 G  Guidance: landmark guided    Approach:  Posterior  Location:  Shoulder   Location comment:  Right Trapezius      Medications:  40 mg methylPREDNISolone 40 MG/ML; 4 mL lidocaine 1 %  Outcome:  Tolerated well, no immediate complications  Procedure discussed: discussed risks, benefits, and alternatives    Consent Given by:  Patient  Timeout: timeout called immediately prior to procedure    Prep: patient was prepped and draped in usual sterile fashion            Albert Yeo MD, [facility]

## 2025-04-15 NOTE — TELEPHONE ENCOUNTER
Called and spoke to daughter, I got her scheduled for trigger point injections with Dr. Yeo as recommended at [facility].     Saturnino Massey, ATC

## 2025-04-16 ENCOUNTER — OFFICE VISIT (OUTPATIENT)
Dept: ORTHOPEDICS | Facility: CLINIC | Age: 85
End: 2025-04-16
Payer: MEDICARE

## 2025-04-16 DIAGNOSIS — M54.12 CERVICAL RADICULOPATHY: ICD-10-CM

## 2025-04-16 DIAGNOSIS — M47.812 CERVICAL SPONDYLOSIS: Primary | ICD-10-CM

## 2025-04-16 DIAGNOSIS — S16.1XXA STRAIN OF NECK MUSCLE, INITIAL ENCOUNTER: ICD-10-CM

## 2025-04-16 PROCEDURE — 99214 OFFICE O/P EST MOD 30 MIN: CPT | Mod: 25 | Performed by: FAMILY MEDICINE

## 2025-04-16 PROCEDURE — 20552 NJX 1/MLT TRIGGER POINT 1/2: CPT | Performed by: FAMILY MEDICINE

## 2025-04-16 RX ORDER — LIDOCAINE HYDROCHLORIDE 10 MG/ML
4 INJECTION, SOLUTION INFILTRATION; PERINEURAL
Status: COMPLETED | OUTPATIENT
Start: 2025-04-16 | End: 2025-04-16

## 2025-04-16 RX ORDER — METHYLPREDNISOLONE ACETATE 40 MG/ML
40 INJECTION, SUSPENSION INTRA-ARTICULAR; INTRALESIONAL; INTRAMUSCULAR; SOFT TISSUE
Status: COMPLETED | OUTPATIENT
Start: 2025-04-16 | End: 2025-04-16

## 2025-04-16 RX ADMIN — LIDOCAINE HYDROCHLORIDE 4 ML: 10 INJECTION, SOLUTION INFILTRATION; PERINEURAL at 16:11

## 2025-04-16 RX ADMIN — METHYLPREDNISOLONE ACETATE 40 MG: 40 INJECTION, SUSPENSION INTRA-ARTICULAR; INTRALESIONAL; INTRAMUSCULAR; SOFT TISSUE at 16:11

## 2025-04-16 NOTE — PATIENT INSTRUCTIONS
1. Cervical spondylosis    2. Cervical radiculopathy    3. Strain of neck muscle, initial encounter      - Patient is following up for acute left sided neck pain rating to the upper back and shoulder due to multilevel arthritis, degenerative disc disease causing cervical nerve root impingement and muscle spasms  -Patient requested and tolerated left trapezius muscle trigger point injection which she tolerated well without complications  -However, the root of her problem is likely cervical nerve root impingement from the spine.  -Patient will get an MRI of the cervical spine for further evaluation  - Your MRI has been ordered. You may call [phone] to schedule over the phone.  Please call my office to schedule a video visit 2 days after your MRI is complete to discuss results and next step treatment options.  -Call direct clinic number [[phone]] at any time with questions or concerns.    Albert Yeo MD [facility]  [facility] [place]

## 2025-04-16 NOTE — LETTER
4/16/2025      Kathie Wetzel  [address]  [facility] [address]      Dear Colleague,    Thank you for referring your patient, Kathie Wetezl, to the [facility] [place]. Please see a copy of my visit note below.    ASSESSMENT & PLAN  Patient Instructions     1. Cervical spondylosis    2. Cervical radiculopathy    3. Strain of neck muscle, initial encounter      - Patient is following up for acute left sided neck pain rating to the upper back and shoulder due to multilevel arthritis, degenerative disc disease causing cervical nerve root impingement and muscle spasms  -Patient requested and tolerated left trapezius muscle trigger point injection which she tolerated well without complications  -However, the root of her problem is likely cervical nerve root impingement from the spine.  -Patient will get an MRI of the cervical spine for further evaluation  - Your MRI has been ordered. You may call [phone] to schedule over the phone.  Please call my office to schedule a video visit 2 days after your MRI is complete to discuss results and next step treatment options.  -Call direct clinic number [[phone]] at any time with questions or concerns.    Albert Yeo MD [facility]  [facility]        -----    SUBJECTIVE:  Kathie Wetzel is a 85 year old female who is seen in follow-up for left shoulder.They were last seen 04/09/2025.     Since their last visit reports 0% - (About the same as last time). They indicate that their current pain level is 7/10. They have tried rest/activity avoidance, elevation, heat, Tylenol, Ibuprofen, other medications: prednisone, tizanidine, physical therapy (1 visits), and previous imaging (xray 03/31/25).      The patient is seen with their daughter.    Patient's past medical, surgical, social, and family histories were reviewed today and no changes are noted.    REVIEW OF  SYSTEMS:  Constitutional: NEGATIVE for fever, chills, change in weight  Skin: NEGATIVE for worrisome rashes, moles or lesions  GI/: NEGATIVE for bowel or bladder changes  Neuro: NEGATIVE for weakness, dizziness or paresthesias    OBJECTIVE:  LMP  (LMP Unknown)    General: healthy, alert and in no distress  HEENT: no scleral icterus or conjunctival erythema  Skin: no suspicious lesions or rash. No jaundice.  CV: regular rhythm by palpation, no pedal edema  Resp: normal respiratory effort without conversational dyspnea   Psych: normal mood and affect  Gait: normal steady gait with appropriate coordination and balance  Neuro: normal light touch sensory exam of the extremities.    MSK:  CERVICAL SPINE  Inspection:    normal cervical lordosis present, rounded shoulders, forward head posture  Palpation:    Tender about the paracervical musculature (left), levator scapula (left), upper trapezius (left), lower trapezius (left), and rhomboids (left). Otherwise remainder of the landmarks and nontender.  Range of Motion:     Flexion limited by pain    Extension limited by pain    Right side bend limited by pain    Left side bend limited by pain    Right rotation limited by pain    Left rotation limited by pain  Strength:    Full strength throughout all neck muscles  Special Tests:    Positive: None    Negative: Spurling's (bilateral), Quispe's (bilateral)    Independent visualization of the below image:    Right Trapezius Trigger Point Cortisone Injection    Date/Time: 4/16/2025 4:11 PM    Performed by: Yeo, Albert, MD  Authorized by: Yeo, Albert, MD    Indications:  Pain  Needle Size:  25 G  Guidance: landmark guided    Approach:  Posterior  Location:  Shoulder   Location comment:  Right Trapezius      Medications:  40 mg methylPREDNISolone 40 MG/ML; 4 mL lidocaine 1 %  Outcome:  Tolerated well, no immediate complications  Procedure discussed: discussed risks, benefits, and alternatives    Consent Given by:   Patient  Timeout: timeout called immediately prior to procedure    Prep: patient was prepped and draped in usual sterile fashion            Albert Yeo MD, [facility]      Again, thank you for allowing me to participate in the care of your patient.        Sincerely,        Albert Yeo, MD    Electronically signed

## 2025-04-18 ENCOUNTER — THERAPY VISIT (OUTPATIENT)
Dept: PHYSICAL THERAPY | Facility: CLINIC | Age: 85
End: 2025-04-18
Payer: MEDICARE

## 2025-04-18 DIAGNOSIS — M54.12 CERVICAL RADICULOPATHY: Primary | ICD-10-CM

## 2025-04-18 PROCEDURE — 97110 THERAPEUTIC EXERCISES: CPT | Mod: GP | Performed by: PHYSICAL THERAPIST

## 2025-04-18 PROCEDURE — 97140 MANUAL THERAPY 1/> REGIONS: CPT | Mod: GP | Performed by: PHYSICAL THERAPIST

## 2025-04-24 ASSESSMENT — ANXIETY QUESTIONNAIRES
GAD7 TOTAL SCORE: 0
GAD7 TOTAL SCORE: 0
7. FEELING AFRAID AS IF SOMETHING AWFUL MIGHT HAPPEN: NOT AT ALL
3. WORRYING TOO MUCH ABOUT DIFFERENT THINGS: NOT AT ALL
7. FEELING AFRAID AS IF SOMETHING AWFUL MIGHT HAPPEN: NOT AT ALL
2. NOT BEING ABLE TO STOP OR CONTROL WORRYING: NOT AT ALL
8. IF YOU CHECKED OFF ANY PROBLEMS, HOW DIFFICULT HAVE THESE MADE IT FOR YOU TO DO YOUR WORK, TAKE CARE OF THINGS AT HOME, OR GET ALONG WITH OTHER PEOPLE?: NOT DIFFICULT AT ALL
GAD7 TOTAL SCORE: 0
1. FEELING NERVOUS, ANXIOUS, OR ON EDGE: NOT AT ALL
6. BECOMING EASILY ANNOYED OR IRRITABLE: NOT AT ALL
5. BEING SO RESTLESS THAT IT IS HARD TO SIT STILL: NOT AT ALL
IF YOU CHECKED OFF ANY PROBLEMS ON THIS QUESTIONNAIRE, HOW DIFFICULT HAVE THESE PROBLEMS MADE IT FOR YOU TO DO YOUR WORK, TAKE CARE OF THINGS AT HOME, OR GET ALONG WITH OTHER PEOPLE: NOT DIFFICULT AT ALL
4. TROUBLE RELAXING: NOT AT ALL

## 2025-04-24 ASSESSMENT — PATIENT HEALTH QUESTIONNAIRE - PHQ9
SUM OF ALL RESPONSES TO PHQ QUESTIONS 1-9: 1
SUM OF ALL RESPONSES TO PHQ QUESTIONS 1-9: 1
10. IF YOU CHECKED OFF ANY PROBLEMS, HOW DIFFICULT HAVE THESE PROBLEMS MADE IT FOR YOU TO DO YOUR WORK, TAKE CARE OF THINGS AT HOME, OR GET ALONG WITH OTHER PEOPLE: NOT DIFFICULT AT ALL

## 2025-04-25 ENCOUNTER — HOSPITAL ENCOUNTER (OUTPATIENT)
Dept: MRI IMAGING | Facility: CLINIC | Age: 85
Discharge: HOME | End: 2025-04-25
Attending: FAMILY MEDICINE | Admitting: FAMILY MEDICINE
Payer: MEDICARE

## 2025-04-25 DIAGNOSIS — M47.812 CERVICAL SPONDYLOSIS: ICD-10-CM

## 2025-04-25 DIAGNOSIS — M54.12 CERVICAL RADICULOPATHY: ICD-10-CM

## 2025-04-25 PROCEDURE — 72141 MRI NECK SPINE W/O DYE: CPT

## 2025-04-28 ENCOUNTER — OFFICE VISIT (OUTPATIENT)
Dept: ORTHOPEDICS | Facility: CLINIC | Age: 85
End: 2025-04-28
Payer: MEDICARE

## 2025-04-28 DIAGNOSIS — M47.812 CERVICAL SPONDYLOSIS: Primary | ICD-10-CM

## 2025-04-28 DIAGNOSIS — M54.12 CERVICAL RADICULOPATHY: ICD-10-CM

## 2025-04-28 DIAGNOSIS — M19.012 PRIMARY OSTEOARTHRITIS OF LEFT SHOULDER: ICD-10-CM

## 2025-04-28 DIAGNOSIS — M48.02 CERVICAL STENOSIS OF SPINAL CANAL: ICD-10-CM

## 2025-04-28 PROCEDURE — 20606 DRAIN/INJ JOINT/BURSA W/US: CPT | Mod: 59 | Performed by: FAMILY MEDICINE

## 2025-04-28 PROCEDURE — 99214 OFFICE O/P EST MOD 30 MIN: CPT | Mod: 25 | Performed by: FAMILY MEDICINE

## 2025-04-28 PROCEDURE — 20611 DRAIN/INJ JOINT/BURSA W/US: CPT | Mod: LT | Performed by: FAMILY MEDICINE

## 2025-04-28 RX ORDER — METHYLPREDNISOLONE ACETATE 40 MG/ML
40 INJECTION, SUSPENSION INTRA-ARTICULAR; INTRALESIONAL; INTRAMUSCULAR; SOFT TISSUE
Status: COMPLETED | OUTPATIENT
Start: 2025-04-28 | End: 2025-04-28

## 2025-04-28 RX ORDER — LIDOCAINE HYDROCHLORIDE 10 MG/ML
8 INJECTION, SOLUTION INFILTRATION; PERINEURAL
Status: COMPLETED | OUTPATIENT
Start: 2025-04-28 | End: 2025-04-28

## 2025-04-28 RX ORDER — ROPIVACAINE HYDROCHLORIDE 5 MG/ML
4 INJECTION, SOLUTION EPIDURAL; INFILTRATION; PERINEURAL
Status: COMPLETED | OUTPATIENT
Start: 2025-04-28 | End: 2025-04-28

## 2025-04-28 RX ORDER — ROPIVACAINE HYDROCHLORIDE 5 MG/ML
1 INJECTION, SOLUTION EPIDURAL; INFILTRATION; PERINEURAL
Status: COMPLETED | OUTPATIENT
Start: 2025-04-28 | End: 2025-04-28

## 2025-04-28 RX ORDER — LIDOCAINE HYDROCHLORIDE 10 MG/ML
3 INJECTION, SOLUTION INFILTRATION; PERINEURAL
Status: COMPLETED | OUTPATIENT
Start: 2025-04-28 | End: 2025-04-28

## 2025-04-28 RX ADMIN — LIDOCAINE HYDROCHLORIDE 3 ML: 10 INJECTION, SOLUTION INFILTRATION; PERINEURAL at 10:55

## 2025-04-28 RX ADMIN — LIDOCAINE HYDROCHLORIDE 8 ML: 10 INJECTION, SOLUTION INFILTRATION; PERINEURAL at 10:55

## 2025-04-28 RX ADMIN — METHYLPREDNISOLONE ACETATE 40 MG: 40 INJECTION, SUSPENSION INTRA-ARTICULAR; INTRALESIONAL; INTRAMUSCULAR; SOFT TISSUE at 10:55

## 2025-04-28 RX ADMIN — ROPIVACAINE HYDROCHLORIDE 1 ML: 5 INJECTION, SOLUTION EPIDURAL; INFILTRATION; PERINEURAL at 10:55

## 2025-04-28 RX ADMIN — ROPIVACAINE HYDROCHLORIDE 4 ML: 5 INJECTION, SOLUTION EPIDURAL; INFILTRATION; PERINEURAL at 10:55

## 2025-04-28 NOTE — LETTER
4/28/2025      Kathie Wetzel  [address]  [facility] [address]      Dear Colleague,    Thank you for referring your patient, Kathie Wetzel, to the [facility] [place]. Please see a copy of my visit note below.    ASSESSMENT & PLAN  Patient Instructions     1. Cervical spondylosis    2. Cervical stenosis of spinal canal    3. Cervical radiculopathy    4. Primary osteoarthritis of left shoulder      -Patient is following up for acute left-sided upper back, neck and posterior shoulder pain due to multilevel degenerative changes of cervical spine causing mild to moderate foraminal stenosis and left shoulder pain and due to osteoarthritis  -MRI results of the cervical spine were reviewed today which showed multilevel mild degenerative changes causing mild to moderate for middle narrowing, left greater than right.  No significant cervical nerve root impingement seen  -Patient will be referred to Dr. Conklin for further evaluation and potential cervical epidural steroid  -Patient states that she had only 2 days of relief from the left trapezius trigger point injection  -We also discussed that she may be having an atypical presentation left upper back pain rating from the glenohumeral and acromioclavicular joints  -Patient had recent x-rays of the left shoulder which showed significant degenerative changes of the acromioclavicular joint and mild degenerative changes of the glenohumeral joint  -We discussed potentially trying diagnostic and therapeutic left shoulder cortisone injections  -The patient agreed and tolerated the left acromioclavicular and glenohumeral intra-articular cortisone injections today without complications.  Patient was given postprocedure instruction  -Call direct clinic number [[phone]] at any time with questions or concerns.    Albert Yeo MD [facility]  [facility]        -----    SUBJECTIVE:  Kathie Wetzel is a 85 year old female who is seen in follow-up for neck. They were last seen 4/16/2025.     Since their last visit reports worsening pain. They indicate that their current pain level is 8/10. They have tried rest/activity avoidance, heat, Tylenol, right trapezius trigger point cortisone injection (last one: 04/16/2025) that provided 2 days of relief, Ibuprofen, and other medications: Prednisone (Medrol) and tizanidine, physical therapy (1 visit), and previous imaging (Xray 3/31/25).  She is here to review her cervical spine MRI results.     The patient is seen with their daughter.    Patient's past medical, surgical, social, and family histories were reviewed today and no changes are noted.    REVIEW OF SYSTEMS:  Constitutional: NEGATIVE for fever, chills, change in weight  Skin: NEGATIVE for worrisome rashes, moles or lesions  GI/: NEGATIVE for bowel or bladder changes  Neuro: NEGATIVE for weakness, dizziness or paresthesias    OBJECTIVE:  LMP  (LMP Unknown)    General: healthy, alert and in no distress  HEENT: no scleral icterus or conjunctival erythema  Skin: no suspicious lesions or rash. No jaundice.  CV: regular rhythm by palpation, no pedal edema  Resp: normal respiratory effort without conversational dyspnea   Psych: normal mood and affect  Gait: normal steady gait with appropriate coordination and balance  Neuro: normal light touch sensory exam of the extremities.    MSK:  LEFT SHOULDER  Inspection:    no swelling, bruising, discoloration, or obvious deformity or asymmetry  Palpation:    Tender about the trapezius. Remainder of bony and tendinous landmarks are nontender.  Active Range of Motion:     Abduction normal0, FF 1800, ER normal0, IR normal.    Strength:    Scapular plane abduction painful,  ER grossly intact, IR grossly intact, biceps grossly intact, triceps grossly intact  Special Tests:    Positive: none    Negative: Neer's, Lin',  supraspinatus (empty can), drop arm/painful arc, crossed arm adduction, Webb's, Speed's, and Yergason's     CERVICAL SPINE  Inspection:    normal cervical lordosis present, rounded shoulders, forward head posture  Palpation:    Tender about the paracervical musculature (left), levator scapula (left), upper trapezius (left), lower trapezius (left), and rhomboids (left). Otherwise remainder of the landmarks and nontender.  Range of Motion:     Flexion limited by pain    Extension limited by pain    Right side bend limited by pain    Left side bend limited by pain    Right rotation limited by pain    Left rotation limited by pain  Strength:    Full strength throughout all neck muscles  Special Tests:    Positive: None    Negative: Spurling's (bilateral), Quispe's (bilateral)    Independent visualization of the below image:  Results for orders placed or performed during the hospital encounter of 04/25/25   MR Cervical Spine w/o Contrast    Narrative    EXAM: MR CERVICAL SPINE W/O CONTRAST  LOCATION: [facility]  DATE: 4/25/2025    INDICATION: Acute left sided neck pain radiating to the left shoulder and upper back.  No improvement with PT, prednisone, muscle relaxers.  COMPARISON: None.  TECHNIQUE: MRI Cervical Spine without IV contrast.    FINDINGS:       Vertebral body heights are maintained. Degenerative changes at the craniocervical junction is unremarkable. No significant STIR edema within the vertebral column. No cord signal abnormality.    C2-3: No cord compression. Mild right foraminal narrowing.    C3-4: No cord compression. Mild right foraminal narrowing is exacerbated by right facet disease.    C4-5: Broad-based disc osteophyte complex without cord compression. Mild bilateral foraminal narrowing, left greater than right.    C5-6: Broad-based disc osteophyte complex without cord mild-moderate bilateral foraminal narrowing.    C6-7: Broad-based disc osteophyte complex without cord  compression. Moderate left and mild right foraminal narrowing.    C7-T1: No cord compression or foraminal narrowing.      Impression    IMPRESSION:  1.  Multilevel degenerative changes without high-grade cord compression. No cord signal abnormality. Please see above discussion for level specific findings.       Large Joint Injection/Arthocentesis: L glenohumeral joint    Date/Time: 4/28/2025 10:55 AM    Performed by: Yeo, Albert, MD  Authorized by: Yeo, Albert, MD    Indications:  Pain and osteoarthritis  Needle Size:  22 G  Guidance: ultrasound    Approach:  Posterior  Location:  Shoulder      Site:  L glenohumeral joint  Medications:  40 mg methylPREDNISolone 40 MG/ML; 8 mL lidocaine 1 %; 4 mL ROPivacaine 5 MG/ML  Outcome:  Tolerated well, no immediate complications  Procedure discussed: discussed risks, benefits, and alternatives    Consent Given by:  Patient  Timeout: timeout called immediately prior to procedure    Prep: patient was prepped and draped in usual sterile fashion     Ultrasound was used to ensure safe and accurate needle placement and injection. Ultrasound images of the procedure were permanently stored.    Medium Joint Injection/Arthrocentesis: L acromioclavicular    Date/Time: 4/28/2025 10:55 AM    Performed by: Yeo, Albert, MD  Authorized by: Yeo, Albert, MD    Indications:  Pain  Needle Size:  25 G  Guidance: ultrasound    Approach:  Anterolateral  Location:  Shoulder  Site:  L acromioclavicular  Medications:  40 mg methylPREDNISolone 40 MG/ML; 1 mL ROPivacaine 5 MG/ML; 3 mL lidocaine 1 %  Outcome:  Tolerated well, no immediate complications  Procedure discussed: discussed risks, benefits, and alternatives    Consent Given by:  Patient  Timeout: timeout called immediately prior to procedure    Prep: patient was prepped and draped in usual sterile fashion     Ultrasound was used to ensure safe and accurate needle placement and injection. Ultrasound images of the procedure were permanently  stored.      Preprocedure pain 8/10, postprocedure pain 0/10    Albert Yeo MD, [facility]        Again, thank you for allowing me to participate in the care of your patient.        Sincerely,        Albert Yeo, MD    Electronically signed

## 2025-04-28 NOTE — PATIENT INSTRUCTIONS
1. Cervical spondylosis    2. Cervical stenosis of spinal canal    3. Cervical radiculopathy    4. Primary osteoarthritis of left shoulder      -Patient is following up for acute left-sided upper back, neck and posterior shoulder pain due to multilevel degenerative changes of cervical spine causing mild to moderate foraminal stenosis and left shoulder pain and due to osteoarthritis  -MRI results of the cervical spine were reviewed today which showed multilevel mild degenerative changes causing mild to moderate for middle narrowing, left greater than right.  No significant cervical nerve root impingement seen  -Patient will be referred to Dr. Conklin for further evaluation and potential cervical epidural steroid  -Patient states that she had only 2 days of relief from the left trapezius trigger point injection  -We also discussed that she may be having an atypical presentation left upper back pain rating from the glenohumeral and acromioclavicular joints  -Personal review of recent x-rays of the left shoulder which showed significant degenerative changes of the acromioclavicular joint and mild degenerative changes of the glenohumeral joint  -We discussed potentially trying diagnostic and therapeutic left shoulder cortisone injections  -The patient agreed and tolerated the left acromioclavicular and glenohumeral intra-articular cortisone injections today without complications.  Patient was given postprocedure instruction  -Call direct clinic number [[phone]] at any time with questions or concerns.    Albert Yeo MD [facility]  [facility] [place]

## 2025-04-28 NOTE — PROGRESS NOTES
ASSESSMENT & PLAN  Patient Instructions     1. Cervical spondylosis    2. Cervical stenosis of spinal canal    3. Cervical radiculopathy    4. Primary osteoarthritis of left shoulder      -Patient is following up for acute left-sided upper back, neck and posterior shoulder pain due to multilevel degenerative changes of cervical spine causing mild to moderate foraminal stenosis and left shoulder pain and due to osteoarthritis  -MRI results of the cervical spine were reviewed today which showed multilevel mild degenerative changes causing mild to moderate for middle narrowing, left greater than right.  No significant cervical nerve root impingement seen  -Patient will be referred to Dr. Conklin for further evaluation and potential cervical epidural steroid  -Patient states that she had only 2 days of relief from the left trapezius trigger point injection  -We also discussed that she may be having an atypical presentation left upper back pain rating from the glenohumeral and acromioclavicular joints  -Patient had recent x-rays of the left shoulder which showed significant degenerative changes of the acromioclavicular joint and mild degenerative changes of the glenohumeral joint  -We discussed potentially trying diagnostic and therapeutic left shoulder cortisone injections  -The patient agreed and tolerated the left acromioclavicular and glenohumeral intra-articular cortisone injections today without complications.  Patient was given postprocedure instruction  -Call direct clinic number [[phone]] at any time with questions or concerns.    Albert Yeo MD [facility] Sports Medicine  [facility]        -----    SUBJECTIVE:  Kathie Wetzel is a 85 year old female who is seen in follow-up for neck. They were last seen 4/16/2025.     Since their last visit reports worsening pain. They indicate that their current pain level is 8/10. They have tried rest/activity avoidance, heat, Tylenol,  right trapezius trigger point cortisone injection (last one: 04/16/2025) that provided 2 days of relief, Ibuprofen, and other medications: Prednisone (Medrol) and tizanidine, physical therapy (1 visit), and previous imaging (Xray 3/31/25).  She is here to review her cervical spine MRI results.     The patient is seen with their daughter.    Patient's past medical, surgical, social, and family histories were reviewed today and no changes are noted.    REVIEW OF SYSTEMS:  Constitutional: NEGATIVE for fever, chills, change in weight  Skin: NEGATIVE for worrisome rashes, moles or lesions  GI/: NEGATIVE for bowel or bladder changes  Neuro: NEGATIVE for weakness, dizziness or paresthesias    OBJECTIVE:  LMP  (LMP Unknown)    General: healthy, alert and in no distress  HEENT: no scleral icterus or conjunctival erythema  Skin: no suspicious lesions or rash. No jaundice.  CV: regular rhythm by palpation, no pedal edema  Resp: normal respiratory effort without conversational dyspnea   Psych: normal mood and affect  Gait: normal steady gait with appropriate coordination and balance  Neuro: normal light touch sensory exam of the extremities.    MSK:  LEFT SHOULDER  Inspection:    no swelling, bruising, discoloration, or obvious deformity or asymmetry  Palpation:    Tender about the trapezius. Remainder of bony and tendinous landmarks are nontender.  Active Range of Motion:     Abduction normal0, FF 1800, ER normal0, IR normal.    Strength:    Scapular plane abduction painful,  ER grossly intact, IR grossly intact, biceps grossly intact, triceps grossly intact  Special Tests:    Positive: none    Negative: Neer's, Lin', supraspinatus (empty can), drop arm/painful arc, crossed arm adduction, [place]'s, Speed's, and Yergason's     CERVICAL SPINE  Inspection:    normal cervical lordosis present, rounded shoulders, forward head posture  Palpation:    Tender about the paracervical musculature (left), levator scapula (left),  upper trapezius (left), lower trapezius (left), and rhomboids (left). Otherwise remainder of the landmarks and nontender.  Range of Motion:     Flexion limited by pain    Extension limited by pain    Right side bend limited by pain    Left side bend limited by pain    Right rotation limited by pain    Left rotation limited by pain  Strength:    Full strength throughout all neck muscles  Special Tests:    Positive: None    Negative: Spurling's (bilateral), Quispe's (bilateral)    Independent visualization of the below image:  Results for orders placed or performed during the hospital encounter of 04/25/25   MR Cervical Spine w/o Contrast    Narrative    EXAM: MR CERVICAL SPINE W/O CONTRAST  LOCATION: [facility]  DATE: 4/25/2025    INDICATION: Acute left sided neck pain radiating to the left shoulder and upper back.  No improvement with PT, prednisone, muscle relaxers.  COMPARISON: None.  TECHNIQUE: MRI Cervical Spine without IV contrast.    FINDINGS:       Vertebral body heights are maintained. Degenerative changes at the craniocervical junction is unremarkable. No significant STIR edema within the vertebral column. No cord signal abnormality.    C2-3: No cord compression. Mild right foraminal narrowing.    C3-4: No cord compression. Mild right foraminal narrowing is exacerbated by right facet disease.    C4-5: Broad-based disc osteophyte complex without cord compression. Mild bilateral foraminal narrowing, left greater than right.    C5-6: Broad-based disc osteophyte complex without cord mild-moderate bilateral foraminal narrowing.    C6-7: Broad-based disc osteophyte complex without cord compression. Moderate left and mild right foraminal narrowing.    C7-T1: No cord compression or foraminal narrowing.      Impression    IMPRESSION:  1.  Multilevel degenerative changes without high-grade cord compression. No cord signal abnormality. Please see above discussion for level specific findings.        Large Joint Injection/Arthocentesis: L glenohumeral joint    Date/Time: 4/28/2025 10:55 AM    Performed by: Yeo, Albert, MD  Authorized by: Yeo, Albert, MD    Indications:  Pain and osteoarthritis  Needle Size:  22 G  Guidance: ultrasound    Approach:  Posterior  Location:  Shoulder      Site:  L glenohumeral joint  Medications:  40 mg methylPREDNISolone 40 MG/ML; 8 mL lidocaine 1 %; 4 mL ROPivacaine 5 MG/ML  Outcome:  Tolerated well, no immediate complications  Procedure discussed: discussed risks, benefits, and alternatives    Consent Given by:  Patient  Timeout: timeout called immediately prior to procedure    Prep: patient was prepped and draped in usual sterile fashion     Ultrasound was used to ensure safe and accurate needle placement and injection. Ultrasound images of the procedure were permanently stored.    Medium Joint Injection/Arthrocentesis: L acromioclavicular    Date/Time: 4/28/2025 10:55 AM    Performed by: Yeo, Albert, MD  Authorized by: Yeo, Albert, MD    Indications:  Pain  Needle Size:  25 G  Guidance: ultrasound    Approach:  Anterolateral  Location:  Shoulder  Site:  L acromioclavicular  Medications:  40 mg methylPREDNISolone 40 MG/ML; 1 mL ROPivacaine 5 MG/ML; 3 mL lidocaine 1 %  Outcome:  Tolerated well, no immediate complications  Procedure discussed: discussed risks, benefits, and alternatives    Consent Given by:  Patient  Timeout: timeout called immediately prior to procedure    Prep: patient was prepped and draped in usual sterile fashion     Ultrasound was used to ensure safe and accurate needle placement and injection. Ultrasound images of the procedure were permanently stored.      Preprocedure pain 8/10, postprocedure pain 0/10    Albert Yeo MD, [facility]

## 2025-04-29 ENCOUNTER — OFFICE VISIT (OUTPATIENT)
Dept: FAMILY MEDICINE | Facility: CLINIC | Age: 85
End: 2025-04-29
Payer: MEDICARE

## 2025-04-29 VITALS
BODY MASS INDEX: 28.66 KG/M2 | WEIGHT: 200.2 LBS | HEART RATE: 80 BPM | RESPIRATION RATE: 18 BRPM | DIASTOLIC BLOOD PRESSURE: 86 MMHG | OXYGEN SATURATION: 97 % | SYSTOLIC BLOOD PRESSURE: 134 MMHG | HEIGHT: 70 IN | TEMPERATURE: 98.8 F

## 2025-04-29 DIAGNOSIS — E78.2 MIXED HYPERLIPIDEMIA: ICD-10-CM

## 2025-04-29 DIAGNOSIS — Z76.0 ENCOUNTER FOR MEDICATION REFILL: Primary | ICD-10-CM

## 2025-04-29 DIAGNOSIS — R41.3 MEMORY IMPAIRMENT OF GRADUAL ONSET: ICD-10-CM

## 2025-04-29 DIAGNOSIS — F41.9 ANXIETY: ICD-10-CM

## 2025-04-29 DIAGNOSIS — G89.29 OTHER CHRONIC PAIN: ICD-10-CM

## 2025-04-29 DIAGNOSIS — G47.9 SLEEP DISTURBANCE: ICD-10-CM

## 2025-04-29 PROCEDURE — 3075F SYST BP GE 130 - 139MM HG: CPT

## 2025-04-29 PROCEDURE — 3079F DIAST BP 80-89 MM HG: CPT

## 2025-04-29 PROCEDURE — 1126F AMNT PAIN NOTED NONE PRSNT: CPT

## 2025-04-29 PROCEDURE — G2211 COMPLEX E/M VISIT ADD ON: HCPCS

## 2025-04-29 PROCEDURE — 99214 OFFICE O/P EST MOD 30 MIN: CPT

## 2025-04-29 RX ORDER — CITALOPRAM HYDROBROMIDE 10 MG/1
TABLET ORAL
Qty: 90 TABLET | Refills: 3 | Status: SHIPPED | OUTPATIENT
Start: 2025-04-29

## 2025-04-29 RX ORDER — TRAZODONE HYDROCHLORIDE 100 MG/1
100 TABLET ORAL AT BEDTIME
Qty: 90 TABLET | Refills: 3 | Status: SHIPPED | OUTPATIENT
Start: 2025-04-29

## 2025-04-29 RX ORDER — DONEPEZIL HYDROCHLORIDE 10 MG/1
10 TABLET, FILM COATED ORAL AT BEDTIME
Qty: 90 TABLET | Refills: 3 | Status: SHIPPED | OUTPATIENT
Start: 2025-04-29

## 2025-04-29 RX ORDER — ATORVASTATIN CALCIUM 10 MG/1
10 TABLET, FILM COATED ORAL AT BEDTIME
Qty: 90 TABLET | Refills: 3 | Status: SHIPPED | OUTPATIENT
Start: 2025-04-29

## 2025-04-29 ASSESSMENT — PAIN SCALES - GENERAL: PAINLEVEL_OUTOF10: NO PAIN (0)

## 2025-04-29 NOTE — PROGRESS NOTES
"  Assessment & Plan     Encounter for medication refill    Sleep disturbance  Takes trazodone 100 mg at bedtime for insomnia.  - traZODone (DESYREL) 100 MG tablet; Take 1 tablet (100 mg) by mouth at bedtime. TAKE 1 TABLET(100 MG) BY MOUTH AT BEDTIME    Memory impairment of gradual onset  Takes donepezil 10 mg for memory impairment.    - donepezil (ARICEPT) 10 MG tablet; Take 1 tablet (10 mg) by mouth at bedtime.    Anxiety      8/3/2021    12:20 PM 7/25/2022     8:09 AM 4/24/2025     8:32 AM   STEPHENIE-7 SCORE   Total Score   0 (minimal anxiety)   Total Score 0 0 0        Patient-reported   Controlled well with citalopram 10 mg daily  - citalopram (CELEXA) 10 MG tablet; Take 1 tablet by mouth daily    Mixed hyperlipidemia  5/2024 .  Takes atorvastatin 10 mg at bedtime.  - atorvastatin (LIPITOR) 10 MG tablet; Take 1 tablet (10 mg) by mouth at bedtime. TAKE 1 TABLET(10 MG) BY MOUTH AT BEDTIME    Other chronic pain  Chronic pain related to osteoarthritis: left shoulder, left hip, thoracic-lumbar spine.  Has received steroid steroid injections with orthopedics.  Reports relief after joint injections.  Receives medical cannabis for chronic pain from the [facility].  Needs refill on diclofenac which she takes sparingly.    - diclofenac (VOLTAREN) 50 MG EC tablet; Take 1 tablet (50 mg) by mouth 2 times daily.    The longitudinal plan of care for the diagnosis(es)/condition(s) as documented were addressed during this visit. Due to the added complexity in care, I will continue to support Genevieve in the subsequent management and with ongoing continuity of care.    BMI  Estimated body mass index is 29.14 kg/m  as calculated from the following:    Height as of this encounter: 1.765 m (5' 9.5\").    Weight as of this encounter: 90.8 kg (200 lb 3.2 oz).     Opal Vallejo is a 85 year old, presenting for the following health issues:  Recheck Medication      4/29/2025    10:18 AM   Additional Questions   Roomed by Alta GUERRA CMA "     History of Present Illness       Reason for visit:  Refills   She is taking medications regularly.      Patient is a 85-year-old female presenting with daughter for medication refills.  Medical history includes hypertension, CKD, hyperlipidemia, ascending aorta dilation, osteoarthritis of bilateral knees.  Needs medication refills on trazodone, donepezil, citalopram, atorvastatin, diclofenac.  Patient does not have any medical concerns today.      Review of Systems  Review of systems negative otherwise known HPI.    Past Medical History:   Diagnosis Date    High cholesterol     HTN (hypertension)     Parathyroid adenoma     PONV (postoperative nausea and vomiting)     Thyroid disorder        Past Surgical History:   Procedure Laterality Date    APPENDECTOMY OPEN      CHOLECYSTECTOMY  2007    DISCECTOMY LUMBAR POSTERIOR MICROSCOPIC ONE LEVEL Right 11/10/2017    Procedure: DISCECTOMY LUMBAR POSTERIOR MICROSCOPIC ONE LEVEL;  Right L3-4 hemilaminectomy, medial facetectomy, foraminotomy with microdiscectomy and use of X-ray and intraoperative microscope ;  Surgeon: Al Dailey MD;  Location: [facility] OR    PARATHYROIDECTOMY  3/14/2014    Procedure: PARATHYROIDECTOMY;  Neck Exploration, Excision Right  Parathyroid Adenoma, Pre Operative Sestimibi Injection, Rapid PTH Assay ;  Surgeon: Natividad Fischer MD;  Location: [facility] OR    THORACOSCOPIC DECORTICATION LUNG Right 5/5/2020    Procedure: RIGHT VIDEO ASSISTED THORACOSCOPY, RIGHT THORACOTOMY, RIGHT DECORTICATION, RIGHT PARIETAL PLEURECTOMY;  Surgeon: Gary Shah MD;  Location:  OR    THORACOTOMY Right 5/5/2020    Procedure: THORACOTOMY ;  Surgeon: Gary Shah MD;  Location:  OR       Family History   Problem Relation Age of Onset    Thyroid Disease Maternal Aunt     Thyroid Disease Other         cousin        Social History     Tobacco Use    Smoking status: Former     Current packs/day: 0.00     Types: Cigarettes     Quit date:  "3/10/1972     Years since quittin.1    Smokeless tobacco: Never   Substance Use Topics    Alcohol use: Yes     Comment: 2 week      Current Outpatient Medications   Medication Sig Dispense Refill    ACE/ARB/ARNI NOT PRESCRIBED (INTENTIONAL) Please choose reason not prescribed from choices below.      atorvastatin (LIPITOR) 10 MG tablet Take 1 tablet (10 mg) by mouth at bedtime. TAKE 1 TABLET(10 MG) BY MOUTH AT BEDTIME 90 tablet 3    cholecalciferol 25 MCG (1000 UT) TABS Take 1 tablet by mouth daily.      citalopram (CELEXA) 10 MG tablet Take 1 tablet by mouth daily 90 tablet 3    diclofenac (VOLTAREN) 50 MG EC tablet Take 1 tablet (50 mg) by mouth 2 times daily. 30 tablet 2    donepezil (ARICEPT) 10 MG tablet Take 1 tablet (10 mg) by mouth at bedtime. 90 tablet 3    medical cannabis (Patient's own supply) Take 1 Dose by mouth See Admin Instructions. (The purpose of this order is to document that the patient reports taking medical cannabis.  This is not a prescription, and is not used to certify that the patient has a qualifying medical condition.)   4.3 mg THC and .7mg CBD.   ( at bedtime)      multivitamin, therapeutic (THERA-VIT) TABS tablet Take 1 tablet by mouth daily.      traZODone (DESYREL) 100 MG tablet Take 1 tablet (100 mg) by mouth at bedtime. TAKE 1 TABLET(100 MG) BY MOUTH AT BEDTIME 90 tablet 3     No current facility-administered medications for this visit.       Objective    /86 (BP Location: Right arm, Patient Position: Sitting, Cuff Size: Adult Regular)   Pulse 80   Temp 98.8  F (37.1  C) (Temporal)   Resp 18   Ht 1.765 m (5' 9.5\")   Wt 90.8 kg (200 lb 3.2 oz)   LMP  (LMP Unknown)   SpO2 97%   BMI 29.14 kg/m    Body mass index is 29.14 kg/m .  Physical Exam   General: Alert, oriented, no acute distress.    Respiratory: Easy work of breathing.   Cardiovascular: Appears warm and well-perfused.    Skin: No abnormalities noted on visualized skin.   Neurologic: Mentation intact and " speech normal.     Psychiatric: Appropriate affect.     Signed Electronically by: WILD Hou CNP

## 2025-04-30 ENCOUNTER — MYC MEDICAL ADVICE (OUTPATIENT)
Dept: ORTHOPEDICS | Facility: CLINIC | Age: 85
End: 2025-04-30
Payer: MEDICARE

## 2025-04-30 DIAGNOSIS — M54.12 CERVICAL RADICULOPATHY: ICD-10-CM

## 2025-04-30 DIAGNOSIS — M48.02 CERVICAL STENOSIS OF SPINAL CANAL: ICD-10-CM

## 2025-04-30 DIAGNOSIS — M47.812 CERVICAL SPONDYLOSIS: Primary | ICD-10-CM

## 2025-05-24 ENCOUNTER — HEALTH MAINTENANCE LETTER (OUTPATIENT)
Age: 85
End: 2025-05-24

## 2025-05-29 ENCOUNTER — TRANSFERRED RECORDS (OUTPATIENT)
Dept: HEALTH INFORMATION MANAGEMENT | Facility: CLINIC | Age: 85
End: 2025-05-29
Payer: MEDICARE

## 2025-05-30 ENCOUNTER — OFFICE VISIT (OUTPATIENT)
Dept: ORTHOPEDICS | Facility: CLINIC | Age: 85
End: 2025-05-30
Attending: FAMILY MEDICINE
Payer: MEDICARE

## 2025-05-30 VITALS
WEIGHT: 200 LBS | BODY MASS INDEX: 28.63 KG/M2 | HEIGHT: 70 IN | DIASTOLIC BLOOD PRESSURE: 89 MMHG | SYSTOLIC BLOOD PRESSURE: 134 MMHG | OXYGEN SATURATION: 96 % | HEART RATE: 73 BPM

## 2025-05-30 DIAGNOSIS — M48.02 CERVICAL STENOSIS OF SPINAL CANAL: ICD-10-CM

## 2025-05-30 DIAGNOSIS — M41.9 SCOLIOSIS OF THORACOLUMBAR SPINE, UNSPECIFIED SCOLIOSIS TYPE: ICD-10-CM

## 2025-05-30 DIAGNOSIS — M47.812 FACET ARTHROPATHY, CERVICAL: Primary | ICD-10-CM

## 2025-05-30 DIAGNOSIS — M50.30 DDD (DEGENERATIVE DISC DISEASE), CERVICAL: ICD-10-CM

## 2025-05-30 DIAGNOSIS — M54.12 CERVICAL RADICULOPATHY: ICD-10-CM

## 2025-05-30 DIAGNOSIS — M47.812 CERVICAL SPONDYLOSIS: ICD-10-CM

## 2025-05-30 PROCEDURE — 3075F SYST BP GE 130 - 139MM HG: CPT | Performed by: PHYSICAL MEDICINE & REHABILITATION

## 2025-05-30 PROCEDURE — 3079F DIAST BP 80-89 MM HG: CPT | Performed by: PHYSICAL MEDICINE & REHABILITATION

## 2025-05-30 PROCEDURE — 1126F AMNT PAIN NOTED NONE PRSNT: CPT | Performed by: PHYSICAL MEDICINE & REHABILITATION

## 2025-05-30 PROCEDURE — 99204 OFFICE O/P NEW MOD 45 MIN: CPT | Performed by: PHYSICAL MEDICINE & REHABILITATION

## 2025-05-30 ASSESSMENT — PAIN SCALES - GENERAL: PAINLEVEL_OUTOF10: NO PAIN (0)

## 2025-05-30 NOTE — PATIENT INSTRUCTIONS
Please schedule a follow-up appointment in 2-3 months.  You can schedule this at the , or you can call [phone].    Clinic Fax:  [phone].    For nursing questions, please call [phone].    For medical records, please call [phone].    Please schedule fluoroscopic-guided cervical medical branch blocks with Claritza Cee MD.  The [facility] Procedure Scheduling Team will call you to schedule your procedure in the next 0-3 days.  You can also call them directly at [phone] option 2.

## 2025-05-30 NOTE — LETTER
"5/30/2025      Kathie Wetzel  [address]  [facility] [address]      Dear Colleague,    Thank you for referring your patient, Kathie Wetzel, to the [facility]. Please see a copy of my visit note below.    PHYSICAL MEDICINE & REHABILITATION / MEDICAL SPINE        Date:  May 30, 2025    Name:  Kathie Wetzel  YOB: 1940  MRN:  [id number]      \"I am using a new tool to help me save time with documentation.  Basically it is going to listen to our visit today and uses AI to help me draft my note.  Is it okay if I use this tool today?\"        REASON FOR CONSULTATION:  Cervical spondylosis, cervical radiculopathy, cervical stenosis of spinal canal.        REFERRING PROVIDER:  Albert Yeo, MD        CHART REVIEW:  Reviewed 04/28/2025 note from Albert Yeo, MD (family medicine-sports medicine).  Ms. Kathie Wetzel had acute left-sided upper back, neck, posterior shoulder pain.  Left acromioclavicular joint and left glenohumeral joint corticosteroid injections were performed.  Referral to medical spine was placed.        HISTORY OF PRESENT ILLNESS:  Ms. Kathie Wetzel is an 85-year-old, ambidextrous, female.  She has 2 sons and 1 daughter.  Her son, Mr. Mu Wetzel, accompanies her to today's appointment.    Ms. Kathie Wetzel denies any prior or recent injuries of her head, neck, upper back, mid back, chest, shoulders, arms, elbows, forearms, wrist, hands, fingers.    Ms. Kathie Wetzel denies any personal or family history of autoimmune diseases, rheumatologic diseases, gout, pseudogout, neurologic diseases, inflammatory bowel diseases.    Ms. Kathie Wetzel uses a 4 wheeled walker for all of her mobility.    Ms. Kathie Wetzel denies any lightheadedness, dizziness.  She denies any weakness.  Ms. Kathie Wetzel denies dropping objects.  She denies any difficulty with overhead activities.  Ms. Kathie Wetzel denies any catching, locking, popping.  She denies any " "bruising, redness, swelling.  Ms. Ktahie Wetzel denies any tripping, stumbling, falling.  Ms. Kathie Wetzel denies any numbness, tingling, pins-and-needles.  She denies any saddle anesthesia, bowel incontinence.  Ms. Kathie Wetzel does have some urinary incontinence.    Ms. Kathie Wetzel has intermittent left posterolateral neck pain that extends to the left shoulder beginning this year.  There is no radiation into the upper extremities.  Pain is intermittent and varies from 0/10 to 10/10.  Pain is described as \"awful, shooting, lingering.\"  Ms. Kathie Wetzel wakes with the pain.  Lying flat worsens her pain.  She sleeps sitting upright.  Her pain keeps her awake but does not wake her.  Coughing and sneezing does not change her pain.  Riding in a car and hitting a pothole worsens her pain.  She uses cannabis twice daily and that helps with her pain.  She is also using acetaminophen and diclofenac.    History of Present Illness-  - Genevieve Wetzel, 85-year-old female, reports neck pain that has been occurring off and on for a while, specifically within this year, 2025.  - Describes the neck pain as less severe than shoulder pain, which she characterizes as shooting and lingering.  - Shoulder pain is described as worse than childbirth, but currently not present.  - Pain does not radiate down the arms.  - Pain is exacerbated by riding in a car and hitting a pothole.  - Pain does not change with coughing or sneezing.  - Pain does not keep her awake when she sleeps sitting up; however, lying flat causes pain in the neck and shoulder.  - Uses a bed that allows her to sleep sitting up to avoid pain.  - Takes Tylenol for knee pain and cannabis for pain management.  - Reports having had physical therapy for shoulder pain, which was initially helpful but no longer effective after two sessions.  - No history of injuries to the head, neck, upper back, chest, shoulders, arms, elbows, forearms, wrists, hands, or fingers, " except for knee issues.  - No lightheadedness, dizziness, weakness, numbness, tingling, or balance problems reported.  - Uses a walker consistently for mobility.  - No history of autoimmune diseases, rheumatologic diseases, gout, pseudogout, neurologic diseases, or inflammatory bowel diseases mentioned.  - Experiences occasional incontinence if unable to reach the bathroom in time.        REVIEW OF SYSTEMS:  Review of Systems     Constitutional:  Negative for fever, weight loss, weight gain and fatigue.   HENT:  Negative for tinnitus, trouble swallowing and hoarse voice.    Eyes:  Negative for eye pain, eye pain and decreased vision.   Respiratory:   Negative for cough, shortness of breath and wheezing.    Cardiovascular:  Negative for chest pain, palpitations and leg swelling.   Gastrointestinal:  Negative for heartburn, nausea, vomiting, abdominal pain, diarrhea, constipation, blood in stool and bowel incontinence.   Genitourinary:  Negative for bladder incontinence, dysuria, hematuria and difficulty urinating.   Musculoskeletal:  Positive for myalgias and arthralgias.   Skin:  Negative for itching, poor wound healing and poor wound healing.   Neurological:  Negative for dizziness, seizures, loss of consciousness, headaches and difficulty walking.   Endo/Heme:  Negative for anemia, swollen glands and bruises/bleeds easily.   Psychiatric/Behavioral:  Negative for depression.          ALLERGIES:  Allergies   Allergen Reactions     Simvastatin      Buttock pain     Penicillins Rash         MEDICATIONS:  Current Outpatient Medications   Medication Sig Dispense Refill     ACE/ARB/ARNI NOT PRESCRIBED (INTENTIONAL) Please choose reason not prescribed from choices below.       atorvastatin (LIPITOR) 10 MG tablet Take 1 tablet (10 mg) by mouth at bedtime. TAKE 1 TABLET(10 MG) BY MOUTH AT BEDTIME 90 tablet 3     cholecalciferol 25 MCG (1000 UT) TABS Take 1 tablet by mouth daily.       citalopram (CELEXA) 10 MG tablet Take 1  tablet by mouth daily 90 tablet 3     diclofenac (VOLTAREN) 50 MG EC tablet Take 1 tablet (50 mg) by mouth 2 times daily. 30 tablet 2     donepezil (ARICEPT) 10 MG tablet Take 1 tablet (10 mg) by mouth at bedtime. 90 tablet 3     medical cannabis (Patient's own supply) Take 1 Dose by mouth See Admin Instructions. (The purpose of this order is to document that the patient reports taking medical cannabis.  This is not a prescription, and is not used to certify that the patient has a qualifying medical condition.)   4.3 mg THC and .7mg CBD.   ( at bedtime)       multivitamin, therapeutic (THERA-VIT) TABS tablet Take 1 tablet by mouth daily.       traZODone (DESYREL) 100 MG tablet Take 1 tablet (100 mg) by mouth at bedtime. TAKE 1 TABLET(100 MG) BY MOUTH AT BEDTIME 90 tablet 3         PAST MEDICAL HISTORY:  Past Medical History:   Diagnosis Date     High cholesterol      HTN (hypertension)      Parathyroid adenoma      PONV (postoperative nausea and vomiting)      Thyroid disorder          PAST SURGICAL HISTORY:  Past Surgical History:   Procedure Laterality Date     APPENDECTOMY OPEN       CHOLECYSTECTOMY  2007     DISCECTOMY LUMBAR POSTERIOR MICROSCOPIC ONE LEVEL Right 11/10/2017    Procedure: DISCECTOMY LUMBAR POSTERIOR MICROSCOPIC ONE LEVEL;  Right L3-4 hemilaminectomy, medial facetectomy, foraminotomy with microdiscectomy and use of X-ray and intraoperative microscope ;  Surgeon: Al Dailey MD;  Location: [facility] OR     PARATHYROIDECTOMY  3/14/2014    Procedure: PARATHYROIDECTOMY;  Neck Exploration, Excision Right  Parathyroid Adenoma, Pre Operative Sestimibi Injection, Rapid PTH Assay ;  Surgeon: Natividad Fischer MD;  Location: [facility] OR     THORACOSCOPIC DECORTICATION LUNG Right 5/5/2020    Procedure: RIGHT VIDEO ASSISTED THORACOSCOPY, RIGHT THORACOTOMY, RIGHT DECORTICATION, RIGHT PARIETAL PLEURECTOMY;  Surgeon: Gary Shah MD;  Location: [facility] OR     THORACOTOMY Right 5/5/2020    Procedure:  THORACOTOMY ;  Surgeon: Gary Shah MD;  Location:  OR         FAMILY HISTORY:  Family History   Problem Relation Age of Onset     Thyroid Disease Maternal Aunt      Thyroid Disease Other         cousin          SOCIAL HISTORY:  Social History     Socioeconomic History     Marital status: Single     Spouse name: Not on file     Number of children: Not on file     Years of education: Not on file     Highest education level: Not on file   Occupational History     Not on file   Tobacco Use     Smoking status: Former     Current packs/day: 0.00     Types: Cigarettes     Quit date: 3/10/1972     Years since quittin.2     Smokeless tobacco: Never   Vaping Use     Vaping status: Never Used   Substance and Sexual Activity     Alcohol use: Yes     Comment: 2 week      Drug use: No     Sexual activity: Not Currently   Other Topics Concern     Parent/sibling w/ CABG, MI or angioplasty before 65F 55M? No   Social History Narrative     Not on file     Social Drivers of Health     Financial Resource Strain: Low Risk  (2024)    Financial Resource Strain      Within the past 12 months, have you or your family members you live with been unable to get utilities (heat, electricity) when it was really needed?: No   Food Insecurity: Low Risk  (2024)    Food Insecurity      Within the past 12 months, did you worry that your food would run out before you got money to buy more?: No      Within the past 12 months, did the food you bought just not last and you didn t have money to get more?: No   Transportation Needs: Low Risk  (2024)    Transportation Needs      Within the past 12 months, has lack of transportation kept you from medical appointments, getting your medicines, non-medical meetings or appointments, work, or from getting things that you need?: No   Physical Activity: Unknown (2024)    Exercise Vital Sign      Days of Exercise per Week: 5 days      Minutes of Exercise per Session: Not on  "file   Stress: No Stress Concern Present (4/18/2024)    Citizen of [place] of Occupational Health - Occupational Stress Questionnaire      Feeling of Stress : Only a little   Social Connections: Unknown (4/18/2024)    Social Connection and Isolation Panel [NHANES]      Frequency of Communication with Friends and Family: Not on file      Frequency of Social Gatherings with Friends and Family: More than three times a week      Attends Mandaen Services: Not on file      Active Member of Clubs or Organizations: Not on file      Attends Club or Organization Meetings: Not on file      Marital Status: Not on file   Interpersonal Safety: Low Risk  (4/18/2024)    Interpersonal Safety      Do you feel physically and emotionally safe where you currently live?: Yes      Within the past 12 months, have you been hit, slapped, kicked or otherwise physically hurt by someone?: No      Within the past 12 months, have you been humiliated or emotionally abused in other ways by your partner or ex-partner?: No   Housing Stability: Low Risk  (4/18/2024)    Housing Stability      Do you have housing? : Yes      Are you worried about losing your housing?: No         PHYSICAL EXAMINATION:  Vitals:    05/30/25 1417   BP: 134/89   Pulse: 73   SpO2: 96%   Weight: 90.7 kg (200 lb)   Height: 1.778 m (5' 10\")       GENERAL:  No acute distress.  Pleasant and cooperative.   PSYCH:  Normal mood and affect.  HEAD:  Normocephalic.  SPEECH:  No dysarthria.  EYES:  No scleral icterus.  Wearing glasses.  EARS:  Hearing is intact to spoken voice.  NOSE:  Midline, symmetric, no rhinorrhea.  LUNGS:  No respiratory distress.  No increased work of breathing.  VASCULAR/PULSES:  Radial:  Right 2+.  Left 2+.  UPPER EXTREMITIES:  No clubbing, cyanosis, or edema bilaterally.    BALANCE AND GAIT:   The patient stands and ambulates with a slight forward trunk lean, mildly flexed hips, mildly flexed knees.    INSPECTION:  There is no erythema, ecchymosis, deformity, " asymmetry, or abnormality of the neck.    CERVICAL PALPATION:  Inion:  not tender.  Greater Occipital Nerve:  Right not tender.  Left not tender.  Lesser Occipital Nerve:  Right not tender.  Left not tender.  Cervical Spinous Processes:  not tender.  Cervical Paraspinals:  Right not tender.  Left not tender.  Rectus Capitis Posterior:  Right not tender.  Left not tender.  Semispinalis:  Right not tender.  Left not tender.  Splenius Capitis:  Right not tender.  Left not tender.  Splenius Cervicis:  Right not tender.  Left not tender.  Levator Scapulae at the Neck:  Right not tender.  Left not tender.  Cervical Facet Joints:  Right not tender.  Left marked tenderness at the mid and lower facet joints.  Longissimus:  Right not tender.  Left not tender.  Anterior, Middle, Posterior Scalenes:  Right not tender.  Left not tender.  Sternocleidomastoid:  Right not tender.  Left not tender.  Trapezius:  Right not tender.  Left not tender.    THORACIC PALPATION:  Thoracic Spinous Processes:  not tender.  Thoracic Paraspinals:  Right not tender.  Left not tender.  Levator Scapulae at the Scapular Insertion:  Right not tender.  Left not tender.  Rhomboid Minor:  Right not tender.  Left not tender.  Rhomboid Major:  Right not tender.  Left not tender.  Infraspinatus:  Right not tender.  Left not tender.  Teres Minor:  Right not tender.  Left not tender.    CERVICAL RANGE OF MOTION:  Forward Flexion (40 ): Normal range of motion, does not cause pain, does not cause radiating pain.  Extension (40 ): Moderately reduced range of motion, causes slight increase in left neck/upper back pain, does not cause radiating pain.  Rotating Right (45 ):  Moderately reduced range of motion, does not cause pain, does not cause radiating pain.  Rotating Left (45 ):  Moderately reduced range of motion, does not cause pain, does not cause radiating pain.  Lateral Bending Right (40 ):  Moderately reduced range of motion, causes slight increase in  left neck/upper back pain, does not cause radiating pain.  Lateral Bending Left (40 ):  Moderately reduced range of motion, does not cause pain, does not cause radiating pain.    SHOULDER RANGE OF MOTION:  Active Forward Flexion (180 ):  Right 150 .  Left 140 .  Active Extension (60 ):  Right 30 .  Left 30 .  Active Abduction (180 ):  Right 110 .  Left 100 .  Scapular Dyskinesis:  Right -.  Left -.    STRENGTH:  Shoulder abduction:  Right 5/5.  Left 5/5.  Elbow flexion:  Right 5/5.  Left 5/5.  Wrist extension:  Right 5/5.  Left 5/5.  Elbow extension:  Right 5/5.  Left 4+/5.  Wrist flexion:  Right 5/5.  Left 4+/5.  Finger flexion:  Right 5/5.  Left 4+/5.  Finger abduction:  Right 5/5.  Left 5/5.    SENSATION:  Intact to light touch along right C3, C4, C5, C6, C7, C8, T1, T2.  Intact to light touch along left C3, C4, C5, C6, C7, C8, T1, T2.    REFLEXES:  Biceps (C5-C6):  Right 1+.  Left 1+.  Brachioradialis (C5-C6):  Right 1+.  Left 1+.  Triceps (C7-C8):  Right 1+.  Left 1+.  Quispe's:  Right -.  Left -.    CERVICAL SPECIAL TESTS:  Facet Loading:  Right -.  Left +.  Spurling Neck Compression:  Right -.  Left -.    SHOULDER SPECIAL TESTS:  Drop Arm:  Right - . Left -.        IMAGING:  EXAM: MR CERVICAL SPINE W/O CONTRAST  LOCATION: [facility]  DATE: 4/25/2025     INDICATION: Acute left sided neck pain radiating to the left shoulder and upper back.  No improvement with PT, prednisone, muscle relaxers.  COMPARISON: None.  TECHNIQUE: MRI Cervical Spine without IV contrast.     FINDINGS:     Vertebral body heights are maintained. Degenerative changes at the craniocervical junction is unremarkable. No significant STIR edema within the vertebral column. No cord signal abnormality.     C2-3: No cord compression. Mild right foraminal narrowing.     C3-4: No cord compression. Mild right foraminal narrowing is exacerbated by right facet disease.     C4-5: Broad-based disc osteophyte complex without cord  compression. Mild bilateral foraminal narrowing, left greater than right.     C5-6: Broad-based disc osteophyte complex without cord mild-moderate bilateral foraminal narrowing.     C6-7: Broad-based disc osteophyte complex without cord compression. Moderate left and mild right foraminal narrowing.     C7-T1: No cord compression or foraminal narrowing.    IMPRESSION:  1.  Multilevel degenerative changes without high-grade cord compression. No cord signal abnormality. Please see above discussion for level specific findings.         EXAM: XR CERVICAL SPINE 2/3 VIEWS  LOCATION: [facility]  DATE: 4/9/2025     INDICATION:  Cervical radiculopathy  COMPARISON: None.    IMPRESSION: Trace stepwise anterolisthesis C2 on C3 and C3 on C4. Vertebral body heights are maintained. No anterior compression deformity. Patient shoulders obscure the T1 vertebra on the lateral view. Mild disc degeneration. Moderate cervical facet degenerative change, left greater than right. Pulmonary apices are clear           ASSESSMENT/PLAN:  Ms. Kathie Wetzel is an 85-year-old, ambidextrous, female.  Her history and exam are most consistent with symptomatic left cervical facet arthropathy.  She does have thoracolumbar scoliosis and cervical degenerative disc disease.  There is subtle weakness in the left upper extremity that may represent a left cervical radiculopathy.  Discussed diagnoses, pathophysiologies, and treatment options with Ms. Kathie Wetzel and her son, Mr. Mu Sweet.  Discussed the options of doing nothing/living with it, physical therapy, chiropractic care, oral medications such as gabapentin and pregabalin, cervical epidural corticosteroid injection, cervical facet joint medial branch blocks with following radiofrequency ablations, referral to neurosurgery.  Given Ms. Kathie Wetzel's difficulty with balance, would prefer not to start gabapentin or pregabalin.  Ms. Kathie Wetzel will be set up  for medial branch blocks of the left C4-C5 and C5-C6 facet joints.  Ms. Kathie Wetzel is follow-up in this clinic in 2 to 3 months.        Total time on the date of the encounter:  44 minutes were spent on one more or more of the following:  discussion with patient, history, exam, coordinating care, treatment goals, record review, documenting clinical information, and/or data review.    Consent was obtained from the patient to use an AI documentation tool in the creation of this note.      Aba Conklin MD        Again, thank you for allowing me to participate in the care of your patient.        Sincerely,        Aba Conklin MD    Electronically signed

## 2025-05-30 NOTE — PROGRESS NOTES
"PHYSICAL MEDICINE & REHABILITATION / MEDICAL SPINE        Date:  May 30, 2025    Name:  Kathie Wetzel  YOB: 1940  MRN:  [id number]      \"I am using a new tool to help me save time with documentation.  Basically it is going to listen to our visit today and uses AI to help me draft my note.  Is it okay if I use this tool today?\"        REASON FOR CONSULTATION:  Cervical spondylosis, cervical radiculopathy, cervical stenosis of spinal canal.        REFERRING PROVIDER:  Albert Yeo, MD        CHART REVIEW:  Reviewed 04/28/2025 note from Albert Yeo, MD (family medicine-sports medicine).  Ms. Kathie Wetzel had acute left-sided upper back, neck, posterior shoulder pain.  Left acromioclavicular joint and left glenohumeral joint corticosteroid injections were performed.  Referral to medical spine was placed.        HISTORY OF PRESENT ILLNESS:  Ms. Kathie Wetzel is an 85-year-old, ambidextrous, female.  She has 2 sons and 1 daughter.  Her son, Mr. Mu Wetzel, accompanies her to today's appointment.    Ms. Kathie Wetzel denies any prior or recent injuries of her head, neck, upper back, mid back, chest, shoulders, arms, elbows, forearms, wrist, hands, fingers.    Ms. Kathie Wetzel denies any personal or family history of autoimmune diseases, rheumatologic diseases, gout, pseudogout, neurologic diseases, inflammatory bowel diseases.    Ms. Kathie Wetzel uses a 4 wheeled walker for all of her mobility.    Ms. Kathie Wetzel denies any lightheadedness, dizziness.  She denies any weakness.  Ms. Kathie Wetzel denies dropping objects.  She denies any difficulty with overhead activities.  Ms. Kathie Wetzel denies any catching, locking, popping.  She denies any bruising, redness, swelling.  Ms. Kathie Wetzel denies any tripping, stumbling, falling.  Ms. Kathie Wetzel denies any numbness, tingling, pins-and-needles.  She denies any saddle anesthesia, bowel incontinence.  Ms. Kathie Wetzel does have some urinary " "incontinence.    Ms. Kathie Wetzel has intermittent left posterolateral neck pain that extends to the left shoulder beginning this year.  There is no radiation into the upper extremities.  Pain is intermittent and varies from 0/10 to 10/10.  Pain is described as \"awful, shooting, lingering.\"  Ms. Kathie Wetzel wakes with the pain.  Lying flat worsens her pain.  She sleeps sitting upright.  Her pain keeps her awake but does not wake her.  Coughing and sneezing does not change her pain.  Riding in a car and hitting a pothole worsens her pain.  She uses cannabis twice daily and that helps with her pain.  She is also using acetaminophen and diclofenac.    History of Present Illness-  - Genevieve Wetzel, 85-year-old female, reports neck pain that has been occurring off and on for a while, specifically within this year, 2025.  - Describes the neck pain as less severe than shoulder pain, which she characterizes as shooting and lingering.  - Shoulder pain is described as worse than childbirth, but currently not present.  - Pain does not radiate down the arms.  - Pain is exacerbated by riding in a car and hitting a pothole.  - Pain does not change with coughing or sneezing.  - Pain does not keep her awake when she sleeps sitting up; however, lying flat causes pain in the neck and shoulder.  - Uses a bed that allows her to sleep sitting up to avoid pain.  - Takes Tylenol for knee pain and cannabis for pain management.  - Reports having had physical therapy for shoulder pain, which was initially helpful but no longer effective after two sessions.  - No history of injuries to the head, neck, upper back, chest, shoulders, arms, elbows, forearms, wrists, hands, or fingers, except for knee issues.  - No lightheadedness, dizziness, weakness, numbness, tingling, or balance problems reported.  - Uses a walker consistently for mobility.  - No history of autoimmune diseases, rheumatologic diseases, gout, pseudogout, neurologic diseases, " or inflammatory bowel diseases mentioned.  - Experiences occasional incontinence if unable to reach the bathroom in time.        REVIEW OF SYSTEMS:  Review of Systems     Constitutional:  Negative for fever, weight loss, weight gain and fatigue.   HENT:  Negative for tinnitus, trouble swallowing and hoarse voice.    Eyes:  Negative for eye pain, eye pain and decreased vision.   Respiratory:   Negative for cough, shortness of breath and wheezing.    Cardiovascular:  Negative for chest pain, palpitations and leg swelling.   Gastrointestinal:  Negative for heartburn, nausea, vomiting, abdominal pain, diarrhea, constipation, blood in stool and bowel incontinence.   Genitourinary:  Negative for bladder incontinence, dysuria, hematuria and difficulty urinating.   Musculoskeletal:  Positive for myalgias and arthralgias.   Skin:  Negative for itching, poor wound healing and poor wound healing.   Neurological:  Negative for dizziness, seizures, loss of consciousness, headaches and difficulty walking.   Endo/Heme:  Negative for anemia, swollen glands and bruises/bleeds easily.   Psychiatric/Behavioral:  Negative for depression.          ALLERGIES:  Allergies   Allergen Reactions    Simvastatin      Buttock pain    Penicillins Rash         MEDICATIONS:  Current Outpatient Medications   Medication Sig Dispense Refill    ACE/ARB/ARNI NOT PRESCRIBED (INTENTIONAL) Please choose reason not prescribed from choices below.      atorvastatin (LIPITOR) 10 MG tablet Take 1 tablet (10 mg) by mouth at bedtime. TAKE 1 TABLET(10 MG) BY MOUTH AT BEDTIME 90 tablet 3    cholecalciferol 25 MCG (1000 UT) TABS Take 1 tablet by mouth daily.      citalopram (CELEXA) 10 MG tablet Take 1 tablet by mouth daily 90 tablet 3    diclofenac (VOLTAREN) 50 MG EC tablet Take 1 tablet (50 mg) by mouth 2 times daily. 30 tablet 2    donepezil (ARICEPT) 10 MG tablet Take 1 tablet (10 mg) by mouth at bedtime. 90 tablet 3    medical cannabis (Patient's own supply)  Take 1 Dose by mouth See Admin Instructions. (The purpose of this order is to document that the patient reports taking medical cannabis.  This is not a prescription, and is not used to certify that the patient has a qualifying medical condition.)   4.3 mg THC and .7mg CBD.   ( at bedtime)      multivitamin, therapeutic (THERA-VIT) TABS tablet Take 1 tablet by mouth daily.      traZODone (DESYREL) 100 MG tablet Take 1 tablet (100 mg) by mouth at bedtime. TAKE 1 TABLET(100 MG) BY MOUTH AT BEDTIME 90 tablet 3         PAST MEDICAL HISTORY:  Past Medical History:   Diagnosis Date    High cholesterol     HTN (hypertension)     Parathyroid adenoma     PONV (postoperative nausea and vomiting)     Thyroid disorder          PAST SURGICAL HISTORY:  Past Surgical History:   Procedure Laterality Date    APPENDECTOMY OPEN      CHOLECYSTECTOMY  2007    DISCECTOMY LUMBAR POSTERIOR MICROSCOPIC ONE LEVEL Right 11/10/2017    Procedure: DISCECTOMY LUMBAR POSTERIOR MICROSCOPIC ONE LEVEL;  Right L3-4 hemilaminectomy, medial facetectomy, foraminotomy with microdiscectomy and use of X-ray and intraoperative microscope ;  Surgeon: Al Dailey MD;  Location: [facility] OR    PARATHYROIDECTOMY  3/14/2014    Procedure: PARATHYROIDECTOMY;  Neck Exploration, Excision Right  Parathyroid Adenoma, Pre Operative Sestimibi Injection, Rapid PTH Assay ;  Surgeon: Natividad Fischer MD;  Location: [facility] OR    THORACOSCOPIC DECORTICATION LUNG Right 5/5/2020    Procedure: RIGHT VIDEO ASSISTED THORACOSCOPY, RIGHT THORACOTOMY, RIGHT DECORTICATION, RIGHT PARIETAL PLEURECTOMY;  Surgeon: Gary Shah MD;  Location: [facility] OR    THORACOTOMY Right 5/5/2020    Procedure: THORACOTOMY ;  Surgeon: Gary Shah MD;  Location:  OR         FAMILY HISTORY:  Family History   Problem Relation Age of Onset    Thyroid Disease Maternal Aunt     Thyroid Disease Other         cousin          SOCIAL HISTORY:  Social History     Socioeconomic History     Marital status: Single     Spouse name: Not on file    Number of children: Not on file    Years of education: Not on file    Highest education level: Not on file   Occupational History    Not on file   Tobacco Use    Smoking status: Former     Current packs/day: 0.00     Types: Cigarettes     Quit date: 3/10/1972     Years since quittin.2    Smokeless tobacco: Never   Vaping Use    Vaping status: Never Used   Substance and Sexual Activity    Alcohol use: Yes     Comment: 2 week     Drug use: No    Sexual activity: Not Currently   Other Topics Concern    Parent/sibling w/ CABG, MI or angioplasty before 65F 55M? No   Social History Narrative    Not on file     Social Drivers of Health     Financial Resource Strain: Low Risk  (2024)    Financial Resource Strain     Within the past 12 months, have you or your family members you live with been unable to get utilities (heat, electricity) when it was really needed?: No   Food Insecurity: Low Risk  (2024)    Food Insecurity     Within the past 12 months, did you worry that your food would run out before you got money to buy more?: No     Within the past 12 months, did the food you bought just not last and you didn t have money to get more?: No   Transportation Needs: Low Risk  (2024)    Transportation Needs     Within the past 12 months, has lack of transportation kept you from medical appointments, getting your medicines, non-medical meetings or appointments, work, or from getting things that you need?: No   Physical Activity: Unknown (2024)    Exercise Vital Sign     Days of Exercise per Week: 5 days     Minutes of Exercise per Session: Not on file   Stress: No Stress Concern Present (2024)    [employer] - Occupational Stress Questionnaire     Feeling of Stress : Only a little   Social Connections: Unknown (2024)    Social Connection and Isolation Panel [NHANES]     Frequency of Communication with Friends  "and Family: Not on file     Frequency of Social Gatherings with Friends and Family: More than three times a week     Attends Synagogue Services: Not on file     Active Member of Clubs or Organizations: Not on file     Attends Club or Organization Meetings: Not on file     Marital Status: Not on file   Interpersonal Safety: Low Risk  (4/18/2024)    Interpersonal Safety     Do you feel physically and emotionally safe where you currently live?: Yes     Within the past 12 months, have you been hit, slapped, kicked or otherwise physically hurt by someone?: No     Within the past 12 months, have you been humiliated or emotionally abused in other ways by your partner or ex-partner?: No   Housing Stability: Low Risk  (4/18/2024)    Housing Stability     Do you have housing? : Yes     Are you worried about losing your housing?: No         PHYSICAL EXAMINATION:  Vitals:    05/30/25 1417   BP: 134/89   Pulse: 73   SpO2: 96%   Weight: 90.7 kg (200 lb)   Height: 1.778 m (5' 10\")       GENERAL:  No acute distress.  Pleasant and cooperative.   PSYCH:  Normal mood and affect.  HEAD:  Normocephalic.  SPEECH:  No dysarthria.  EYES:  No scleral icterus.  Wearing glasses.  EARS:  Hearing is intact to spoken voice.  NOSE:  Midline, symmetric, no rhinorrhea.  LUNGS:  No respiratory distress.  No increased work of breathing.  VASCULAR/PULSES:  Radial:  Right 2+.  Left 2+.  UPPER EXTREMITIES:  No clubbing, cyanosis, or edema bilaterally.    BALANCE AND GAIT:   The patient stands and ambulates with a slight forward trunk lean, mildly flexed hips, mildly flexed knees.    INSPECTION:  There is no erythema, ecchymosis, deformity, asymmetry, or abnormality of the neck.    CERVICAL PALPATION:  Inion:  not tender.  Greater Occipital Nerve:  Right not tender.  Left not tender.  Lesser Occipital Nerve:  Right not tender.  Left not tender.  Cervical Spinous Processes:  not tender.  Cervical Paraspinals:  Right not tender.  Left not tender.  Rectus " Capitis Posterior:  Right not tender.  Left not tender.  Semispinalis:  Right not tender.  Left not tender.  Splenius Capitis:  Right not tender.  Left not tender.  Splenius Cervicis:  Right not tender.  Left not tender.  Levator Scapulae at the Neck:  Right not tender.  Left not tender.  Cervical Facet Joints:  Right not tender.  Left marked tenderness at the mid and lower facet joints.  Longissimus:  Right not tender.  Left not tender.  Anterior, Middle, Posterior Scalenes:  Right not tender.  Left not tender.  Sternocleidomastoid:  Right not tender.  Left not tender.  Trapezius:  Right not tender.  Left not tender.    THORACIC PALPATION:  Thoracic Spinous Processes:  not tender.  Thoracic Paraspinals:  Right not tender.  Left not tender.  Levator Scapulae at the Scapular Insertion:  Right not tender.  Left not tender.  Rhomboid Minor:  Right not tender.  Left not tender.  Rhomboid Major:  Right not tender.  Left not tender.  Infraspinatus:  Right not tender.  Left not tender.  Teres Minor:  Right not tender.  Left not tender.    CERVICAL RANGE OF MOTION:  Forward Flexion (40 ): Normal range of motion, does not cause pain, does not cause radiating pain.  Extension (40 ): Moderately reduced range of motion, causes slight increase in left neck/upper back pain, does not cause radiating pain.  Rotating Right (45 ):  Moderately reduced range of motion, does not cause pain, does not cause radiating pain.  Rotating Left (45 ):  Moderately reduced range of motion, does not cause pain, does not cause radiating pain.  Lateral Bending Right (40 ):  Moderately reduced range of motion, causes slight increase in left neck/upper back pain, does not cause radiating pain.  Lateral Bending Left (40 ):  Moderately reduced range of motion, does not cause pain, does not cause radiating pain.    SHOULDER RANGE OF MOTION:  Active Forward Flexion (180 ):  Right 150 .  Left 140 .  Active Extension (60 ):  Right 30 .  Left 30 .  Active  Abduction (180 ):  Right 110 .  Left 100 .  Scapular Dyskinesis:  Right -.  Left -.    STRENGTH:  Shoulder abduction:  Right 5/5.  Left 5/5.  Elbow flexion:  Right 5/5.  Left 5/5.  Wrist extension:  Right 5/5.  Left 5/5.  Elbow extension:  Right 5/5.  Left 4+/5.  Wrist flexion:  Right 5/5.  Left 4+/5.  Finger flexion:  Right 5/5.  Left 4+/5.  Finger abduction:  Right 5/5.  Left 5/5.    SENSATION:  Intact to light touch along right C3, C4, C5, C6, C7, C8, T1, T2.  Intact to light touch along left C3, C4, C5, C6, C7, C8, T1, T2.    REFLEXES:  Biceps (C5-C6):  Right 1+.  Left 1+.  Brachioradialis (C5-C6):  Right 1+.  Left 1+.  Triceps (C7-C8):  Right 1+.  Left 1+.  Quispe's:  Right -.  Left -.    CERVICAL SPECIAL TESTS:  Facet Loading:  Right -.  Left +.  Spurling Neck Compression:  Right -.  Left -.    SHOULDER SPECIAL TESTS:  Drop Arm:  Right - . Left -.        IMAGING:  EXAM: MR CERVICAL SPINE W/O CONTRAST  LOCATION: [facility]  DATE: 4/25/2025     INDICATION: Acute left sided neck pain radiating to the left shoulder and upper back.  No improvement with PT, prednisone, muscle relaxers.  COMPARISON: None.  TECHNIQUE: MRI Cervical Spine without IV contrast.     FINDINGS:     Vertebral body heights are maintained. Degenerative changes at the craniocervical junction is unremarkable. No significant STIR edema within the vertebral column. No cord signal abnormality.     C2-3: No cord compression. Mild right foraminal narrowing.     C3-4: No cord compression. Mild right foraminal narrowing is exacerbated by right facet disease.     C4-5: Broad-based disc osteophyte complex without cord compression. Mild bilateral foraminal narrowing, left greater than right.     C5-6: Broad-based disc osteophyte complex without cord mild-moderate bilateral foraminal narrowing.     C6-7: Broad-based disc osteophyte complex without cord compression. Moderate left and mild right foraminal narrowing.     C7-T1: No cord  compression or foraminal narrowing.    IMPRESSION:  1.  Multilevel degenerative changes without high-grade cord compression. No cord signal abnormality. Please see above discussion for level specific findings.         EXAM: XR CERVICAL SPINE 2/3 VIEWS  LOCATION: [facility]  DATE: 4/9/2025     INDICATION:  Cervical radiculopathy  COMPARISON: None.    IMPRESSION: Trace stepwise anterolisthesis C2 on C3 and C3 on C4. Vertebral body heights are maintained. No anterior compression deformity. Patient shoulders obscure the T1 vertebra on the lateral view. Mild disc degeneration. Moderate cervical facet degenerative change, left greater than right. Pulmonary apices are clear           ASSESSMENT/PLAN:  Ms. Kathie Wetzel is an 85-year-old, ambidextrous, female.  Her history and exam are most consistent with symptomatic left cervical facet arthropathy.  She does have thoracolumbar scoliosis and cervical degenerative disc disease.  There is subtle weakness in the left upper extremity that may represent a left cervical radiculopathy.  Discussed diagnoses, pathophysiologies, and treatment options with Ms. Kathie Wetzel and her son, Mr. Mu Sweet.  Discussed the options of doing nothing/living with it, physical therapy, chiropractic care, oral medications such as gabapentin and pregabalin, cervical epidural corticosteroid injection, cervical facet joint medial branch blocks with following radiofrequency ablations, referral to neurosurgery.  Given Ms. Kathie Wetzel's difficulty with balance, would prefer not to start gabapentin or pregabalin.  Ms. Kathie Wetzel will be set up for medial branch blocks of the left C4-C5 and C5-C6 facet joints.  Ms. Kathie Wetzel is follow-up in this clinic in 2 to 3 months.        Total time on the date of the encounter:  44 minutes were spent on one more or more of the following:  discussion with patient, history, exam, coordinating care, treatment goals,  record review, documenting clinical information, and/or data review.    Consent was obtained from the patient to use an AI documentation tool in the creation of this note.      Aba Conklin MD

## 2025-06-01 ASSESSMENT — ENCOUNTER SYMPTOMS
DYSURIA: 0
DIFFICULTY URINATING: 0
BLOOD IN STOOL: 0
NAUSEA: 0
DIARRHEA: 0
COUGH: 0
NERVOUS/ANXIOUS: 0
INSOMNIA: 0
FEVER: 0
HOARSE VOICE: 0
ARTHRALGIAS: 1
WEIGHT GAIN: 0
TROUBLE SWALLOWING: 0
FATIGUE: 0
WHEEZING: 0
HEARTBURN: 0
SHORTNESS OF BREATH: 0
BRUISES/BLEEDS EASILY: 0
DEPRESSION: 0
HEMATURIA: 0
BOWEL INCONTINENCE: 0
POOR WOUND HEALING: 0
SWOLLEN GLANDS: 0
WEIGHT LOSS: 0
PALPITATIONS: 0
EYE PAIN: 0
MYALGIAS: 1
ABDOMINAL PAIN: 0
VOMITING: 0
SEIZURES: 0
CONSTIPATION: 0
HEADACHES: 0
LOSS OF CONSCIOUSNESS: 0
DIZZINESS: 0
LEG SWELLING: 0

## 2025-06-09 ENCOUNTER — OFFICE VISIT (OUTPATIENT)
Dept: URGENT CARE | Facility: URGENT CARE | Age: 85
End: 2025-06-09
Payer: MEDICARE

## 2025-06-09 VITALS
RESPIRATION RATE: 20 BRPM | OXYGEN SATURATION: 96 % | HEART RATE: 71 BPM | DIASTOLIC BLOOD PRESSURE: 84 MMHG | TEMPERATURE: 97.9 F | SYSTOLIC BLOOD PRESSURE: 135 MMHG

## 2025-06-09 DIAGNOSIS — S41.112A SKIN TEAR OF LEFT UPPER EXTREMITY: Primary | ICD-10-CM

## 2025-06-09 PROCEDURE — 3075F SYST BP GE 130 - 139MM HG: CPT | Performed by: PREVENTIVE MEDICINE

## 2025-06-09 PROCEDURE — 3079F DIAST BP 80-89 MM HG: CPT | Performed by: PREVENTIVE MEDICINE

## 2025-06-09 PROCEDURE — 99214 OFFICE O/P EST MOD 30 MIN: CPT | Performed by: PREVENTIVE MEDICINE

## 2025-06-09 NOTE — PROGRESS NOTES
Urgent Care Clinic Visit    Chief Complaint   Patient presents with    Derm Problem     Bumped top of left forearm on Friday, has a skin tear and bruising w/pain on touch               6/9/2025    11:43 AM   Additional Questions   Roomed by Mickie   Accompanied by Daughter         6/9/2025   Declines Weight   Did patient decline having their weight taken? Yes

## 2025-06-12 SDOH — HEALTH STABILITY: PHYSICAL HEALTH
ON AVERAGE, HOW MANY DAYS PER WEEK DO YOU ENGAGE IN MODERATE TO STRENUOUS EXERCISE (LIKE A BRISK WALK)?: PATIENT DECLINED

## 2025-06-12 ASSESSMENT — SOCIAL DETERMINANTS OF HEALTH (SDOH): HOW OFTEN DO YOU GET TOGETHER WITH FRIENDS OR RELATIVES?: TWICE A WEEK

## 2025-06-17 ENCOUNTER — OFFICE VISIT (OUTPATIENT)
Dept: FAMILY MEDICINE | Facility: CLINIC | Age: 85
End: 2025-06-17
Attending: GENERAL PRACTICE
Payer: MEDICARE

## 2025-06-17 ENCOUNTER — RESULTS FOLLOW-UP (OUTPATIENT)
Dept: FAMILY MEDICINE | Facility: CLINIC | Age: 85
End: 2025-06-17

## 2025-06-17 VITALS
SYSTOLIC BLOOD PRESSURE: 130 MMHG | BODY MASS INDEX: 28.63 KG/M2 | HEIGHT: 70 IN | TEMPERATURE: 98 F | WEIGHT: 200 LBS | OXYGEN SATURATION: 96 % | DIASTOLIC BLOOD PRESSURE: 78 MMHG | HEART RATE: 67 BPM | RESPIRATION RATE: 18 BRPM

## 2025-06-17 DIAGNOSIS — Z00.00 MEDICARE ANNUAL WELLNESS VISIT, SUBSEQUENT: Primary | ICD-10-CM

## 2025-06-17 DIAGNOSIS — E53.8 VITAMIN B12 DEFICIENCY (NON ANEMIC): ICD-10-CM

## 2025-06-17 DIAGNOSIS — E78.2 MIXED HYPERLIPIDEMIA: ICD-10-CM

## 2025-06-17 DIAGNOSIS — F41.9 ANXIETY: ICD-10-CM

## 2025-06-17 DIAGNOSIS — F51.01 PRIMARY INSOMNIA: ICD-10-CM

## 2025-06-17 DIAGNOSIS — I77.810 ASCENDING AORTA DILATION: ICD-10-CM

## 2025-06-17 DIAGNOSIS — I10 BENIGN ESSENTIAL HYPERTENSION: ICD-10-CM

## 2025-06-17 DIAGNOSIS — N18.31 STAGE 3A CHRONIC KIDNEY DISEASE (H): ICD-10-CM

## 2025-06-17 DIAGNOSIS — G89.29 OTHER CHRONIC PAIN: ICD-10-CM

## 2025-06-17 DIAGNOSIS — R35.0 URINARY FREQUENCY: ICD-10-CM

## 2025-06-17 DIAGNOSIS — E55.9 VITAMIN D DEFICIENCY: ICD-10-CM

## 2025-06-17 DIAGNOSIS — R41.3 MEMORY IMPAIRMENT OF GRADUAL ONSET: ICD-10-CM

## 2025-06-17 PROBLEM — J90 PLEURAL EFFUSION ON RIGHT: Status: RESOLVED | Noted: 2020-05-04 | Resolved: 2025-06-17

## 2025-06-17 PROBLEM — R07.81 PLEURITIC CHEST PAIN: Status: RESOLVED | Noted: 2020-12-16 | Resolved: 2025-06-17

## 2025-06-17 LAB
ALBUMIN UR-MCNC: 30 MG/DL
APPEARANCE UR: CLEAR
BACTERIA #/AREA URNS HPF: ABNORMAL /HPF
BILIRUB UR QL STRIP: NEGATIVE
CAOX CRY #/AREA URNS HPF: ABNORMAL /HPF
CHOLEST SERPL-MCNC: 147 MG/DL
COLOR UR AUTO: YELLOW
EST. AVERAGE GLUCOSE BLD GHB EST-MCNC: 108 MG/DL
FASTING STATUS PATIENT QL REPORTED: NORMAL
GLUCOSE UR STRIP-MCNC: NEGATIVE MG/DL
HBA1C MFR BLD: 5.4 % (ref 0–5.6)
HDLC SERPL-MCNC: 51 MG/DL
HGB UR QL STRIP: NEGATIVE
HOLD SPECIMEN: NORMAL
KETONES UR STRIP-MCNC: ABNORMAL MG/DL
LDLC SERPL CALC-MCNC: 76 MG/DL
LEUKOCYTE ESTERASE UR QL STRIP: ABNORMAL
NITRATE UR QL: NEGATIVE
NONHDLC SERPL-MCNC: 96 MG/DL
PH UR STRIP: 5.5 [PH] (ref 5–8)
RBC #/AREA URNS AUTO: ABNORMAL /HPF
SP GR UR STRIP: >=1.03 (ref 1–1.03)
SQUAMOUS #/AREA URNS AUTO: ABNORMAL /LPF
TRIGL SERPL-MCNC: 98 MG/DL
UROBILINOGEN UR STRIP-ACNC: 0.2 E.U./DL
VIT B12 SERPL-MCNC: 300 PG/ML (ref 232–1245)
VIT D+METAB SERPL-MCNC: 30 NG/ML (ref 20–50)
WBC #/AREA URNS AUTO: ABNORMAL /HPF

## 2025-06-17 PROCEDURE — 3044F HG A1C LEVEL LT 7.0%: CPT

## 2025-06-17 PROCEDURE — 82306 VITAMIN D 25 HYDROXY: CPT

## 2025-06-17 PROCEDURE — 82043 UR ALBUMIN QUANTITATIVE: CPT

## 2025-06-17 PROCEDURE — 82607 VITAMIN B-12: CPT

## 2025-06-17 PROCEDURE — 99214 OFFICE O/P EST MOD 30 MIN: CPT | Mod: 25

## 2025-06-17 PROCEDURE — 36415 COLL VENOUS BLD VENIPUNCTURE: CPT

## 2025-06-17 PROCEDURE — 82570 ASSAY OF URINE CREATININE: CPT

## 2025-06-17 PROCEDURE — G2211 COMPLEX E/M VISIT ADD ON: HCPCS

## 2025-06-17 PROCEDURE — 3078F DIAST BP <80 MM HG: CPT

## 2025-06-17 PROCEDURE — G0439 PPPS, SUBSEQ VISIT: HCPCS

## 2025-06-17 PROCEDURE — 1126F AMNT PAIN NOTED NONE PRSNT: CPT

## 2025-06-17 PROCEDURE — 83036 HEMOGLOBIN GLYCOSYLATED A1C: CPT

## 2025-06-17 PROCEDURE — 80061 LIPID PANEL: CPT

## 2025-06-17 PROCEDURE — 3075F SYST BP GE 130 - 139MM HG: CPT

## 2025-06-17 PROCEDURE — 81001 URINALYSIS AUTO W/SCOPE: CPT

## 2025-06-17 ASSESSMENT — PAIN SCALES - GENERAL: PAINLEVEL_OUTOF10: NO PAIN (0)

## 2025-06-17 NOTE — PROGRESS NOTES
Preventive Care Visit  [facility]  WILD Hou CNP, Family Medicine  Jun 17, 2025      Assessment & Plan     Medicare annual wellness visit, subsequent  Health maintenance reviewed and addressed.  Screening labs today per maintenance, age, risk assessment, and to promote patient wellbeing.  Aged out of cervical cancer, breast cancer, colon cancer screening.  Will look into vaccines at pharmacy: Pneumococcal, RSV.  - REVIEW OF HEALTH MAINTENANCE PROTOCOL ORDERS  - PRIMARY CARE FOLLOW-UP SCHEDULING; Future  - Hemoglobin A1c    Benign essential hypertension  Stage 3a chronic kidney disease (H)  Blood pressure 130/78.  No current medication management.  3/31/2025 creatinine 0.84, GFR 68.    - Albumin Random Urine Quantitative with Creat Ratio    Urinary frequency  Baseline nocturia 1-2x.  Over the past 3 weeks, has been waking up 4-5x during the night to urinate.  Urine output has decreased.  Denies dysuria, hematuria, fever, chills, abdominal/pelvic pain, flank pain.  Last BM this morning.  Baseline stool output 1-3x daily.  No history of bladder prolapse.  UA small leukocytes but negative nitrites.  Urinary frequency could be related to overactive bladder, placed Uro/Gyn referral.    - UA Macroscopic with reflex to Microscopic and Culture - Lab Collect  - UA Microscopic with Reflex to Culture  - Adult Uro/Gyn  Referral; Future    Mixed hyperlipidemia  5/2024 .  Takes atorvastatin 10 mg at bedtime.  Will update fasting lipid panel.   - Lipid panel reflex to direct LDL Fasting    Vitamin D deficiency  4/2024 vitamin D level 24.  No current vitamin D supplementation.  Will update vitamin D level.  - Vitamin D Deficiency    Vitamin B12 deficiency (non anemic)  4/2024 vitamin B12 level 349.  No current vitamin B12 supplementation.  Will update vitamin B12 level.   - Vitamin B12    Memory impairment of gradual onset  Takes donepezil 10 mg for memory impairment.  Does not need  "refill.     Ascending aorta dilation  2021 ECHO showed aorta 4.2 cm.  Ordered abdominal aorta ultrasound.   - US Abdominal Aorta Imaging; Future    Primary insomnia   Takes trazodone 100 mg at bedtime for insomnia.  Does not need refill.     Anxiety      8/3/2021    12:20 PM 7/25/2022     8:09 AM 4/24/2025     8:32 AM   STEPHENIE-7 SCORE   Total Score   0 (minimal anxiety)   Total Score 0 0 0        Patient-reported   Controlled well with citalopram 10 mg daily.  Does not need refill.     Other chronic pain  Chronic pain related to osteoarthritis: left shoulder, left hip, thoracic-lumbar spine.  Has received intraarticular steroid injections with orthopedics.  Reports relief after joint injections.  Receives medical [facility] for chronic pain [facility] the [facility].  Takes diclofenac PRN.  Does not need refill.  Has follow up with orthopedics for intraarticular steroid injections.      The longitudinal plan of care for the diagnosis(es)/condition(s) as documented were addressed during this visit. Due to the added complexity in care, I will continue to support Genevieve in the subsequent management and with ongoing continuity of care.    BMI  Estimated body mass index is 28.7 kg/m  as calculated from the following:    Height as of this encounter: 1.778 m (5' 10\").    Weight as of this encounter: 90.7 kg (200 lb).     Counseling  Appropriate preventive services were addressed with this patient via screening, questionnaire, or discussion as appropriate for fall prevention, nutrition, physical activity, Tobacco-use cessation, social engagement, weight loss and cognition.  Checklist reviewing preventive services available has been given to the patient.  Reviewed patient's diet, addressing concerns and/or questions.   The patient was instructed to see the dentist every 6 months.   Updated plan of care.  Patient reported difficulty with activities of daily living were addressed today.Patient reported safety concerns were addressed today.The " patient was provided with written information regarding signs of hearing loss.   Information on urinary incontinence and treatment options given to patient.     Opal Vallejo is a 85 year old, presenting for the following:  Wellness Visit (Not fasting) and Urinary urgency (Past three weeks constantly going to the bathroom)        6/9/2025    11:43 AM   Additional Questions   Roomed by Mickie   Accompanied by Daughter      JOSE ANTONIO  Patient is a 85-year-old female presenting with granddaughter for Medicare wellness exam.   Medical history includes hypertension, CKD, hyperlipidemia, ascending aorta dilation, osteoarthritis of bilateral knees.      Advance Care Planning    Document on file is a Health Care Directive or POLST.        6/12/2025   General Health   How would you rate your overall physical health? Good   Feel stress (tense, anxious, or unable to sleep) Not at all         6/12/2025   Nutrition   Diet: Regular (no restrictions)         6/12/2025   Exercise   Days per week of moderate/strenous exercise Patient declined         6/12/2025   Social Factors   Frequency of gathering with friends or relatives Twice a week   Worry food won't last until get money to buy more No   Food not last or not have enough money for food? No   Do you have housing? (Housing is defined as stable permanent housing and does not include staying outside in a car, in a tent, in an abandoned building, in an overnight shelter, or couch-surfing.) Yes   Are you worried about losing your housing? No   Lack of transportation? No   Unable to get utilities (heat,electricity)? No         6/17/2025   Fall Risk   Gait Speed Test (Document in seconds) 4.6   Gait Speed Test Interpretation Less than or equal to 5.00 seconds - PASS          6/12/2025   Activities of Daily Living- Home Safety   Needs help with the following daily activites Transportation   Safety concerns in the home Throw rugs in the hallway         6/12/2025   Dental   Dentist two  times every year? (!) NO         2025   Hearing Screening   Hearing concerns? (!) IT'S HARD TO FOLLOW A CONVERSATION IN A NOISY RESTAURANT OR CROWDED ROOM.    (!) TROUBLE UNDERSTANDING SOFT OR WHISPERED SPEECH.       Multiple values from one day are sorted in reverse-chronological order         2025   Driving Risk Screening   Patient/family members have concerns about driving No         2025   General Alertness/Fatigue Screening   Have you been more tired than usual lately? No         2025   Urinary Incontinence Screening   Bothered by leaking urine in past 6 months Yes         Today's PHQ-2 Score:       2025     2:39 PM   PHQ-2 (  [employer])   Q1: Little interest or pleasure in doing things 0   Q2: Feeling down, depressed or hopeless 0   PHQ-2 Score 0    Q1: Little interest or pleasure in doing things Not at all   Q2: Feeling down, depressed or hopeless Not at all   PHQ-2 Score 0       Patient-reported         2025   Substance Use   Alcohol more than 3/day or more than 7/wk No   Do you have a current opioid prescription? No   How severe/bad is pain from 1 to 10? 4/10   Do you use any other substances recreationally? (!) CANNABIS PRODUCTS     Social History     Tobacco Use    Smoking status: Former     Current packs/day: 0.00     Types: Cigarettes     Quit date: 3/10/1972     Years since quittin.3    Smokeless tobacco: Never   Vaping Use    Vaping status: Never Used   Substance Use Topics    Alcohol use: Yes     Comment: 2 week     Drug use: No          Mammogram Screening - After age 74- determine frequency with patient based on health status, life expectancy and patient goals    Fracture Risk Assessment Tool  Link to Frax Calculator  Use the information below to complete the Frax calculator  : 1940  Sex: female  Weight (kg): 90.7 kg (actual weight)  Height (cm): 177.8 cm  Previous Fragility Fracture:  No  History of parent with fractured hip:  No  Current Smoking:   No  Patient has been on glucocorticoids for more than 3 months (5mg/day or more): No  Rheumatoid Arthritis on Problem List:  No  Secondary Osteoporosis on Problem List:  No  Consumes 3 or more units of alcohol per day: No  Femoral Neck BMD (g/cm2)            Reviewed and updated as needed this visit by Provider    Past Medical History:   Diagnosis Date    High cholesterol     HTN (hypertension)     Parathyroid adenoma     Pleural effusion on right 05/04/2020    Pleuritic chest pain 12/16/2020    PONV (postoperative nausea and vomiting)     Thyroid disorder      Past Surgical History:   Procedure Laterality Date    APPENDECTOMY OPEN      CHOLECYSTECTOMY  2007    DISCECTOMY LUMBAR POSTERIOR MICROSCOPIC ONE LEVEL Right 11/10/2017    Procedure: DISCECTOMY LUMBAR POSTERIOR MICROSCOPIC ONE LEVEL;  Right L3-4 hemilaminectomy, medial facetectomy, foraminotomy with microdiscectomy and use of X-ray and intraoperative microscope ;  Surgeon: lA Dailey MD;  Location: [facility] OR    PARATHYROIDECTOMY  3/14/2014    Procedure: PARATHYROIDECTOMY;  Neck Exploration, Excision Right  Parathyroid Adenoma, Pre Operative Sestimibi Injection, Rapid PTH Assay ;  Surgeon: Natividad Fischer MD;  Location: [facility] OR    THORACOSCOPIC DECORTICATION LUNG Right 5/5/2020    Procedure: RIGHT VIDEO ASSISTED THORACOSCOPY, RIGHT THORACOTOMY, RIGHT DECORTICATION, RIGHT PARIETAL PLEURECTOMY;  Surgeon: Gary Shah MD;  Location: [facility] OR    THORACOTOMY Right 5/5/2020    Procedure: THORACOTOMY ;  Surgeon: Gary Shah MD;  Location: [facility] OR     BP Readings from Last 3 Encounters:   06/17/25 130/78   06/09/25 135/84   05/30/25 134/89    Wt Readings from Last 3 Encounters:   06/17/25 90.7 kg (200 lb)   05/30/25 90.7 kg (200 lb)   04/29/25 90.8 kg (200 lb 3.2 oz)         Current Outpatient Medications   Medication Sig Dispense Refill    ACE/ARB/ARNI NOT PRESCRIBED (INTENTIONAL) Please choose reason not prescribed from choices  below.      atorvastatin (LIPITOR) 10 MG tablet Take 1 tablet (10 mg) by mouth at bedtime. TAKE 1 TABLET(10 MG) BY MOUTH AT BEDTIME 90 tablet 3    citalopram (CELEXA) 10 MG tablet Take 1 tablet by mouth daily 90 tablet 3    diclofenac (VOLTAREN) 50 MG EC tablet Take 1 tablet (50 mg) by mouth 2 times daily. (Patient taking differently: Take 50 mg by mouth 2 times daily as needed.) 30 tablet 2    donepezil (ARICEPT) 10 MG tablet Take 1 tablet (10 mg) by mouth at bedtime. 90 tablet 3    medical cannabis (Patient's own supply) Take 1 Dose by mouth See Admin Instructions. (The purpose of this order is to document that the patient reports taking medical cannabis.  This is not a prescription, and is not used to certify that the patient has a qualifying medical condition.)   4.3 mg THC and .7mg CBD.   ( at bedtime)      traZODone (DESYREL) 100 MG tablet Take 1 tablet (100 mg) by mouth at bedtime. TAKE 1 TABLET(100 MG) BY MOUTH AT BEDTIME 90 tablet 3     Current providers sharing in care for this patient include:  Patient Care Team:  Janeen Romero APRN CNP as PCP - General (Family Medicine)  Darline Hooper PA-C as Physician Assistant (Cardiovascular Disease)  Melchor Stevenson MD as MD (Cardiovascular Disease)  Janeen Romero APRN CNP as Assigned PCP  Yeo, Albert, MD as Assigned Musculoskeletal Provider    The following health maintenance items are reviewed in Epic and correct as of today:  Health Maintenance   Topic Date Due    DEXA  Never done    RSV VACCINE (1 - 1-dose 75+ series) Never done    PNEUMOCOCCAL VACCINE 50+ YEARS (2 of 2 - PCV) 08/03/2022    MICROALBUMIN  05/31/2024    ANNUAL REVIEW OF HM ORDERS  05/31/2024    COVID-19 VACCINE (4 - 2024-25 season) 09/01/2024    MEDICARE ANNUAL WELLNESS VISIT  04/18/2025    BMP  05/01/2025    LIPID  05/01/2025    HEMOGLOBIN  05/01/2025    FALL RISK ASSESSMENT  06/17/2026    ADVANCE CARE PLANNING  04/18/2029    PHQ-2 (once per calendar year)  Completed    INFLUENZA  "VACCINE  Completed    URINALYSIS  Completed    HPV VACCINE  Aged Out    MENINGITIS VACCINE  Aged Out    MAMMO SCREENING  Discontinued    ZOSTER VACCINE  Discontinued    DTAP/TDAP/TD VACCINE  Discontinued     Review of Systems  Review of systems negative otherwise known HPI.     Objective    Exam  /78   Pulse 67   Temp 98  F (36.7  C) (Oral)   Resp 18   Ht 1.778 m (5' 10\")   Wt 90.7 kg (200 lb)   LMP  (LMP Unknown)   SpO2 96%   BMI 28.70 kg/m     Estimated body mass index is 28.7 kg/m  as calculated from the following:    Height as of this encounter: 1.778 m (5' 10\").    Weight as of this encounter: 90.7 kg (200 lb).    Physical Exam  General: Alert, oriented, no acute distress.    Head: Normocephalic and atraumatic.   Eyes: Conjunctiva and sclera clear.   Ears: External ear nontender. TMs intact and clear. Grossly normal hearing.   Oropharynx: Dentition intact. Oral and posterior pharynx pink and moist.     Neck: Supple, no masses or nodes. No adenopathy.    Respiratory: Lungs clear, unlabored. No rales, rhonchi, or wheezes.    Cardiovascular: Regular rate and rhythm, S1 and S2. No murmurs. No peripheral edema.     Gastrointestinal: Normoactive bowel sounds.   Skin: Scabbed over blood blister to left forearm.   Neurologic: Mentation intact and speech normal.     Psychiatric: Appropriate affect.     Gait and balance assessed per Gait Speed Test.  Result as above.        6/17/2025   Mini Cog   Mini-Cog Not Completed (choose reason) Known dementia         Signed Electronically by: WILD Hou CNP    "

## 2025-06-18 ENCOUNTER — PATIENT OUTREACH (OUTPATIENT)
Dept: CARE COORDINATION | Facility: CLINIC | Age: 85
End: 2025-06-18
Payer: MEDICARE

## 2025-06-18 LAB
CREAT UR-MCNC: 263 MG/DL
MICROALBUMIN UR-MCNC: 46.8 MG/L
MICROALBUMIN/CREAT UR: 17.79 MG/G CR (ref 0–25)

## 2025-06-25 NOTE — PROGRESS NOTES
[facility] - Procedure Note    Date of Service: 6/26/2025    Procedure performed: LEFT C4,5,6 medial branch blocks  Diagnosis: Other spondylosis, cervical region M47.892; Cervical facet arthropathy  : Claritza Cee MD     Indications: Kathie Wetzel is a 85 year old female who is seen at the request of Dr. Conklin for cervical medial branch blocks. The patient describes central neck pain without radiation to the arms.  Physical exam shows pain with neck extension & rotation. The patient reports minimal improvement with conservative treatment, including PT and medications.    CERVICAL MRI was done on 4/25/2025 which showed   FINDINGS:    Vertebral body heights are maintained. Degenerative changes at the craniocervical junction is unremarkable. No significant STIR edema within the vertebral column. No cord signal abnormality.     C2-3: No cord compression. Mild right foraminal narrowing.     C3-4: No cord compression. Mild right foraminal narrowing is exacerbated by right facet disease.     C4-5: Broad-based disc osteophyte complex without cord compression. Mild bilateral foraminal narrowing, left greater than right.     C5-6: Broad-based disc osteophyte complex without cord mild-moderate bilateral foraminal narrowing.     C6-7: Broad-based disc osteophyte complex without cord compression. Moderate left and mild right foraminal narrowing.     C7-T1: No cord compression or foraminal narrowing.                                                                    IMPRESSION:  1.  Multilevel degenerative changes without high-grade cord compression. No cord signal abnormality. Please see above discussion for level specific findings.    Allergies:      Allergies   Allergen Reactions    Simvastatin      Buttock pain    Penicillins Rash        Vitals:  /84   Pulse 80   LMP  (LMP Unknown)   SpO2 94%     Review of Systems: The patient denies recent fever, chills, illness, use of  antibiotics or anticoagulants. All other 10-point review of systems negative.     Procedure:   Options/alternatives, benefits and risks were discussed with the patient including bleeding, infection, tissue trauma, exposure to radiation, reaction to medications, spinal cord injury, weakness, numbness and paralysis.  Questions were answered to her satisfaction and she agrees to proceed. Voluntary informed consent was obtained and signed.     After getting informed consent, patient was brought into the procedure suite and was placed in a side-lying position opposite the side of the procedure on the procedure table. A Pause for the Cause was performed. Patient was prepped and draped in sterile fashion.     Under AP fluoroscopic guidance the Left C4,5,6 articular pillars were identified. The C-arm was rotated to afford optimal visualization. Under intermittent fluoroscopy, a 25 gauge 1.5 inch needle was advanced slowly until it had contact on the mid portion of the articular pillar. Then, the two other needles of the same size and gauge were placed under fluorsoscopic guidance using the same technique. The needle positions were verified and optimized from the AP and lateral views.    The anatomic targets for the C4,5,6 medial nerves were the C4,5,6 articular pillars, resulting in blockade of the C4/5 and C5/6 facet joints.    After negative aspiration, bupivacaine 0.5% 0.5 ml was injected at each location.  The needles were removed. Hemostasis was achieved, the area was cleaned, and bandaids were placed when appropriate. The patient tolerated the procedure well, and was taken to the recovery room. Images were saved to PACS.    Assessment/Plan: Kathie Wetzel is a 85 year old female s/p LEFT C4,5,6 medial branch blocks today for cervical spondylosis, facet arthropathy.     Pre procecedure pain score: 7/10   Post procedure pain score: 0/10.     The patient will continue to monitor progress, and they were given a pain diary  to complete at home.  They will either fax or mail this back to us or bring it to their next appointment. We will determine the treatment plan after we review the diary.      1. The patient was advised to contact the Pain Management Center for any of the following:   Fever, chills, or night sweats   New onset of pain, numbness, or weakness   Any questions/concerns regarding the procedure  If unable to contact the Pain Center, the patient was instructed to go to a local Emergency Room for any complications.   2. We will await the pain diary to determent next step of the treatment plan and call patient to schedule.    LILI SIDDIQI MD   Pain Management

## 2025-06-26 ENCOUNTER — RADIOLOGY INJECTION OFFICE VISIT (OUTPATIENT)
Dept: PALLIATIVE MEDICINE | Facility: CLINIC | Age: 85
End: 2025-06-26
Attending: PHYSICAL MEDICINE & REHABILITATION
Payer: MEDICARE

## 2025-06-26 VITALS — OXYGEN SATURATION: 94 % | HEART RATE: 80 BPM | DIASTOLIC BLOOD PRESSURE: 84 MMHG | SYSTOLIC BLOOD PRESSURE: 138 MMHG

## 2025-06-26 DIAGNOSIS — M41.9 SCOLIOSIS OF THORACOLUMBAR SPINE, UNSPECIFIED SCOLIOSIS TYPE: ICD-10-CM

## 2025-06-26 DIAGNOSIS — M47.812 CERVICAL SPONDYLOSIS: ICD-10-CM

## 2025-06-26 DIAGNOSIS — M47.812 FACET ARTHROPATHY, CERVICAL: Primary | ICD-10-CM

## 2025-06-26 ASSESSMENT — PAIN SCALES - GENERAL: PAINLEVEL_OUTOF10: SEVERE PAIN (7)

## 2025-06-26 NOTE — PATIENT INSTRUCTIONS
[facility]   Medial Branch Block Discharge Instructions      Your procedure was performed by:  Dr. Claritza Cee     Medications used:  bupivacaine     You will need to complete the Pain Scale Log form and return it to us as soon as possible.  Once we have received the form, we will review it and call you to determine the next steps.     The form can be faxed to [phone] or mailed to:   [facility]    [address] [id number]    You may resume your regular activity after the injection.  Be cautious since you may have numbness and/or weakness in the area for up to 6-8 hours after the procedure due to the effects of the local anesthetic.  Avoid driving for 6 hours. The local anesthetic could slow your reflexes  You may shower, however no swimming or tub baths or hot tubs for 24 hours following your procedure.  Your pain will return after the numbing medications have worn off.  You may use your current pain medications as needed.  Unless you have been directed to avoid the use of anti-inflammatory medications (NSAIDS), you may use medications such as ibuprofen, Aleve or Tylenol for pain control if needed. Some people find it helpful to alternate ibuprofen and Tylenol every 3 hours for a couple of days.  You may use ice packs 10-15 minutes three to four times a day at the injection site for comfort.   Do not use heat to painful areas for 6 to 8 hours. This will give the local anesthetic time to wear off and prevent you from accidentally burning your skin.   If you experience any of the following, call the Pain Clinic during work hours at [phone] or the Provider Line after hours at [phone]:  -Fever over 100 degree F  -Swelling, bleeding, redness, drainage, warmth at the injection site  -Progressive weakness or numbness on your arms  -If cervical, call if you have any unusual headache that is not  relieved by Tylenol  -Unusual new onset of pain that is not improving

## 2025-06-26 NOTE — NURSING NOTE
Discharge Information    IV Discontiued Time:  NA    Amount of Fluid Infused:  NA    Discharge Criteria = When patient returns to baseline or as per MD order    Consciousness:  Pt is fully awake    Circulation:  BP +/- 20% of pre-procedure level    Respiration:  Patient is able to breathe deeply    O2 Sat:  Patient is able to maintain O2 Sat >92% on room air    Activity:  Moves 4 extremities on command    Ambulation:  Patient is able to stand and walk or stand and pivot into wheelchair    Dressing:  Clean/dry or No Dressing    Notes:   Discharge instructions and AVS given to patient    Patient meets criteria for discharge?  YES    Admitted to PCU?  No    Responsible adult present to accompany patient home?  Yes    Signature/Title:    Gretchen Kinney RN  RN Care Coordinator  [facility] [place]

## 2025-06-26 NOTE — NURSING NOTE
Pre-procedure Intake  If YES to any questions or NO to having a   Please complete laminated checklist and leave on the computer keyboard for Provider, verbally inform provider if able.    For SCS Trial, RFA's or any sedation procedure:  Have you been fasting? NA  If yes, for how long?     Are you taking any any blood thinners such as Coumadin, Warfarin, Jantoven, Pradaxa Xarelto, Eliquis, Edoxaban, Enoxaparin, Lovenox, Heparin, Arixtra, Fondaparinux, or Fragmin? OR Antiplatelet medication such as Plavix, Brilinta, or Effient?   No   If yes, when did you take your last dose?     Do you take aspirin?  No  If cervical procedure, have you held aspirin for 6 days?   NA    Is the Pt taking any GLP-1 Antagonist (hold needed for sedation patients only)  (semaglutide (Ozempic, Wegovy), dulaglutide (Trulicity), exenatide ER (Bydureon), tirzepatide (Mounjaro), Liraglutide (Saxenda, Victoza), semaglutide (Rybelsus)     NA  If yes, when did you take your last dose?     Do you have any allergies to contrast dye, iodine, steroid and/or numbing medications?  NO    Are you currently taking antibiotics or have an active infection?  NO    Have you had a fever/elevated temperature within the past week? NO    Are you currently taking oral steroids? NO    Do you have a ? Yes    Are you pregnant or breastfeeding?  Not Applicable    Have you received any vaccinations in the last week? NO    Vitals: B/P 145/82    Notify provider and RNs if systolic BP >170, diastolic BP >100, P >100 or O2 sats < 90%      Oumou Blackburn MA  [facility] [place]

## 2025-07-01 ENCOUNTER — HOSPITAL ENCOUNTER (OUTPATIENT)
Dept: ULTRASOUND IMAGING | Facility: CLINIC | Age: 85
Discharge: HOME | End: 2025-07-01
Payer: MEDICARE

## 2025-07-01 DIAGNOSIS — I77.810 ASCENDING AORTA DILATION: ICD-10-CM

## 2025-07-01 PROCEDURE — 76775 US EXAM ABDO BACK WALL LIM: CPT

## 2025-07-22 ENCOUNTER — VIRTUAL VISIT (OUTPATIENT)
Dept: UROLOGY | Facility: CLINIC | Age: 85
End: 2025-07-22
Payer: MEDICARE

## 2025-07-22 DIAGNOSIS — R35.1 NOCTURIA: Primary | ICD-10-CM

## 2025-07-22 DIAGNOSIS — R32 URINARY INCONTINENCE, UNSPECIFIED TYPE: ICD-10-CM

## 2025-07-22 DIAGNOSIS — R35.0 URINARY FREQUENCY: ICD-10-CM

## 2025-07-22 PROCEDURE — 1126F AMNT PAIN NOTED NONE PRSNT: CPT | Mod: 95 | Performed by: UROLOGY

## 2025-07-22 PROCEDURE — 98002 SYNCH AUDIO-VIDEO NEW MOD 45: CPT | Performed by: UROLOGY

## 2025-07-22 ASSESSMENT — PAIN SCALES - GENERAL: PAINLEVEL_OUTOF10: NO PAIN (0)

## 2025-07-22 NOTE — LETTER
2025       RE: Kathie Wetzel  [address]  [facility] [address]     Dear Colleague,    Thank you for referring your patient, Kathie Wetzel, to the [facility] at [facility]. Please see a copy of my visit note below.    Virtual Visit Details    Type of service:  Video Visit   Video Start Time: 2:58 PM  Video End Time:3:08 PM    Originating Location (pt. Location): Home    Distant Location (provider location):  On-site  Platform used for Video Visit: Myles    2025    Referring Provider: WILD Hou CNP  [address] [facility] [address] [facility] [address]  [place] [address]    Primary Care Provider: Janeen Romero    Assessment & Plan    Urinary frequency    - Adult Uro/Gyn  Referral    Nocturia    Urinary incontinence, unspecified type    Discussed intake modification    But as symptoms have gotten worse have recommended she come in for PVR and cystoscopy. Hesitant to consider medications because she is currently also having some cognitive evaluations    15 minutes were spent on this day of the encounter in reviewing the EMR including A Heather's note and labs, direct patient care, coordination of care and documentation    Ekaterina Zhong MD MPH  (she/her/hers)   of Urology  [facility]      HPI:  Kathie Wetzel is a 85 year old female who presents for evaluation of her pelvic floor symptoms.  She is accompanied by her daughter     She is referred by A Heather CNP, note from 25 reviewed in Epic.      Patient has noted increased urinary frequency at night, feels maybe less output in the day.  Wakes up sometimes with the depends wet in night.    24oz x3 water a day.  Has a dirty martini maybe 2x per week    Denies gross hematuria, vaginal bleeding, constipation, UTIs, prior pelvic surgery.    3     Past Medical History:   Diagnosis Date     High cholesterol      HTN (hypertension)       Parathyroid adenoma      Pleural effusion on right 2020     Pleuritic chest pain 2020     PONV (postoperative nausea and vomiting)      Thyroid disorder      Past Surgical History:   Procedure Laterality Date     APPENDECTOMY OPEN       CHOLECYSTECTOMY       DISCECTOMY LUMBAR POSTERIOR MICROSCOPIC ONE LEVEL Right 11/10/2017    Procedure: DISCECTOMY LUMBAR POSTERIOR MICROSCOPIC ONE LEVEL;  Right L3-4 hemilaminectomy, medial facetectomy, foraminotomy with microdiscectomy and use of X-ray and intraoperative microscope ;  Surgeon: Al Dailey MD;  Location: [facility] OR     PARATHYROIDECTOMY  3/14/2014    Procedure: PARATHYROIDECTOMY;  Neck Exploration, Excision Right  Parathyroid Adenoma, Pre Operative Sestimibi Injection, Rapid PTH Assay ;  Surgeon: Natividad Fischer MD;  Location: [facility] OR     THORACOSCOPIC DECORTICATION LUNG Right 2020    Procedure: RIGHT VIDEO ASSISTED THORACOSCOPY, RIGHT THORACOTOMY, RIGHT DECORTICATION, RIGHT PARIETAL PLEURECTOMY;  Surgeon: Gary Shah MD;  Location: [facility] OR     THORACOTOMY Right 2020    Procedure: THORACOTOMY ;  Surgeon: Gary Shah MD;  Location:  OR     Social History     Socioeconomic History     Marital status: Single     Spouse name: Not on file     Number of children: Not on file     Years of education: Not on file     Highest education level: Not on file   Occupational History     Not on file   Tobacco Use     Smoking status: Former     Current packs/day: 0.00     Types: Cigarettes     Quit date: 3/10/1972     Years since quittin.4     Smokeless tobacco: Never   Vaping Use     Vaping status: Never Used   Substance and Sexual Activity     Alcohol use: Yes     Comment: 2 week      Drug use: No     Sexual activity: Not Currently   Other Topics Concern     Parent/sibling w/ CABG, MI or angioplasty before 65F 55M? No   Social History Narrative     Not on file     Social Drivers of Health     Financial Resource  Strain: Low Risk  (6/12/2025)    Financial Resource Strain      Within the past 12 months, have you or your family members you live with been unable to get utilities (heat, electricity) when it was really needed?: No   Food Insecurity: Low Risk  (6/12/2025)    Food Insecurity      Within the past 12 months, did you worry that your food would run out before you got money to buy more?: No      Within the past 12 months, did the food you bought just not last and you didn t have money to get more?: No   Transportation Needs: Low Risk  (6/12/2025)    Transportation Needs      Within the past 12 months, has lack of transportation kept you from medical appointments, getting your medicines, non-medical meetings or appointments, work, or from getting things that you need?: No   Physical Activity: Unknown (6/12/2025)    Exercise Vital Sign      Days of Exercise per Week: Patient declined      Minutes of Exercise per Session: Not on file   Stress: No Stress Concern Present (6/12/2025)    [employer] - Occupational Stress Questionnaire      Feeling of Stress : Not at all   Social Connections: Unknown (6/12/2025)    Social Connection and Isolation Panel [NHANES]      Frequency of Communication with Friends and Family: Not on file      Frequency of Social Gatherings with Friends and Family: Twice a week      Attends Denominational Services: Not on file      Active Member of Clubs or Organizations: Not on file      Attends Club or Organization Meetings: Not on file      Marital Status: Not on file   Interpersonal Safety: Low Risk  (4/18/2024)    Interpersonal Safety      Do you feel physically and emotionally safe where you currently live?: Yes      Within the past 12 months, have you been hit, slapped, kicked or otherwise physically hurt by someone?: No      Within the past 12 months, have you been humiliated or emotionally abused in other ways by your partner or ex-partner?: No   Housing Stability: Low  Risk  (6/12/2025)    Housing Stability      Do you have housing? : Yes      Are you worried about losing your housing?: No     Family History   Problem Relation Age of Onset     Thyroid Disease Maternal Aunt      Thyroid Disease Other         cousin      ROS    Allergies   Allergen Reactions     Simvastatin      Buttock pain     Penicillins Rash     Current Outpatient Medications   Medication Sig Dispense Refill     ACE/ARB/ARNI NOT PRESCRIBED (INTENTIONAL) Please choose reason not prescribed from choices below.       atorvastatin (LIPITOR) 10 MG tablet Take 1 tablet (10 mg) by mouth at bedtime. TAKE 1 TABLET(10 MG) BY MOUTH AT BEDTIME 90 tablet 3     citalopram (CELEXA) 10 MG tablet Take 1 tablet by mouth daily 90 tablet 3     diclofenac (VOLTAREN) 50 MG EC tablet Take 1 tablet (50 mg) by mouth 2 times daily. (Patient taking differently: Take 50 mg by mouth 2 times daily as needed.) 30 tablet 2     donepezil (ARICEPT) 10 MG tablet Take 1 tablet (10 mg) by mouth at bedtime. 90 tablet 3     medical cannabis (Patient's own supply) Take 1 Dose by mouth See Admin Instructions. (The purpose of this order is to document that the patient reports taking medical cannabis.  This is not a prescription, and is not used to certify that the patient has a qualifying medical condition.)   4.3 mg THC and .7mg CBD.   ( at bedtime)       traZODone (DESYREL) 100 MG tablet Take 1 tablet (100 mg) by mouth at bedtime. TAKE 1 TABLET(100 MG) BY MOUTH AT BEDTIME 90 tablet 3     No current facility-administered medications for this visit.     LMP  (LMP Unknown)   GENERAL: healthy, alert and no distress  EYES: Eyes grossly normal to inspection, conjunctivae and sclerae normal  HENT: normal cephalic/atraumatic.  External ears, nose and mouth without ulcers or lesions.  RESP: no audible wheeze, cough, or visible cyanosis.  No visible retractions or increased work of breathing.  Able to speak fully in complete sentences.  NEURO: Cranial nerves  grossly intact, mentation intact and speech normal  PSYCH: mentation appears normal, affect normal/bright, judgement and insight intact, normal speech and appearance well-groomed    Labs 6/17/25  HgbA1c 5.4   Urinalysis 0-2 RBC, no WBC    CC  Patient Care Team:  Janeen Humphreys APRN CNP as PCP - General (Family Medicine)  Darline Hooper PA-C as Physician Assistant (Cardiovascular Disease)  Melchor Stevenson MD as MD (Cardiovascular Disease)  Janeen Humphreys APRN CNP as Assigned PCP  Yeo, Albert, MD as Assigned Musculoskeletal Provider  Ekaterina Zhong MD as MD (Urology)  Aba Conklin MD as Assigned Neuroscience Provider  JANEEN HUMPHREYS                Again, thank you for allowing me to participate in the care of your patient.      Sincerely,    Ekaterina Zhong MD

## 2025-07-22 NOTE — NURSING NOTE
Current patient location: [address] [place] [address] W   [facility] [address]    Is the patient currently in the state of [place]? YES    Visit mode: VIDEO    If the visit is dropped, the patient can be reconnected by:VIDEO VISIT: Text to cell phone:   Telephone Information:   Mobile [phone]       Will anyone else be joining the visit? Daughter Paige in same video  (If patient encounters technical issues they should call [phone])    Are changes needed to the allergy or medication list? No    Are refills needed on medications prescribed by this physician? NO    Rooming Documentation:  Questionnaire(s) completed    Reason for visit: Consult    Navin GUERRA

## 2025-07-22 NOTE — PROGRESS NOTES
Virtual Visit Details    Type of service:  Video Visit   Video Start Time: 2:58 PM  Video End Time:3:08 PM    Originating Location (pt. Location): Home    Distant Location (provider location):  On-site  Platform used for Video Visit: Myles    2025    Referring Provider: WILD Hou CNP  [address] [address]    Primary Care Provider: Janeen Romero    Assessment & Plan     Urinary frequency    - Adult Uro/Gyn  Referral    Nocturia    Urinary incontinence, unspecified type    Discussed intake modification    But as symptoms have gotten worse have recommended she come in for PVR and cystoscopy. Hesitant to consider medications because she is currently also having some cognitive evaluations    15 minutes were spent on this day of the encounter in reviewing the EMR including A Heather's note and labs, direct patient care, coordination of care and documentation    Ekaterina Zhong MD MPH  (she/her/hers)   of Urology  [facility]      HPI:  Kathie Wetzel is a 85 year old female who presents for evaluation of her pelvic floor symptoms.  She is accompanied by her daughter     She is referred by A Heather SMALL, note from 25 reviewed in Epic.      Patient has noted increased urinary frequency at night, feels maybe less output in the day.  Wakes up sometimes with the depends wet in night.    24oz x3 water a day.  Has a dirty martini maybe 2x per week    Denies gross hematuria, vaginal bleeding, constipation, UTIs, prior pelvic surgery.    3     Past Medical History:   Diagnosis Date    High cholesterol     HTN (hypertension)     Parathyroid adenoma     Pleural effusion on right 2020    Pleuritic chest pain 2020    PONV (postoperative nausea and vomiting)     Thyroid disorder      Past Surgical History:   Procedure Laterality Date    APPENDECTOMY OPEN      CHOLECYSTECTOMY  2007    DISCECTOMY LUMBAR POSTERIOR MICROSCOPIC ONE LEVEL Right  11/10/2017    Procedure: DISCECTOMY LUMBAR POSTERIOR MICROSCOPIC ONE LEVEL;  Right L3-4 hemilaminectomy, medial facetectomy, foraminotomy with microdiscectomy and use of X-ray and intraoperative microscope ;  Surgeon: Al Dailey MD;  Location: [facility] OR    PARATHYROIDECTOMY  3/14/2014    Procedure: PARATHYROIDECTOMY;  Neck Exploration, Excision Right  Parathyroid Adenoma, Pre Operative Sestimibi Injection, Rapid PTH Assay ;  Surgeon: Natividad Fischer MD;  Location: [facility] OR    THORACOSCOPIC DECORTICATION LUNG Right 2020    Procedure: RIGHT VIDEO ASSISTED THORACOSCOPY, RIGHT THORACOTOMY, RIGHT DECORTICATION, RIGHT PARIETAL PLEURECTOMY;  Surgeon: Gary Shah MD;  Location: [facility] OR    THORACOTOMY Right 2020    Procedure: THORACOTOMY ;  Surgeon: Gary Shah MD;  Location: [facility] OR     Social History     Socioeconomic History    Marital status: Single     Spouse name: Not on file    Number of children: Not on file    Years of education: Not on file    Highest education level: Not on file   Occupational History    Not on file   Tobacco Use    Smoking status: Former     Current packs/day: 0.00     Types: Cigarettes     Quit date: 3/10/1972     Years since quittin.4    Smokeless tobacco: Never   Vaping Use    Vaping status: Never Used   Substance and Sexual Activity    Alcohol use: Yes     Comment: 2 week     Drug use: No    Sexual activity: Not Currently   Other Topics Concern    Parent/sibling w/ CABG, MI or angioplasty before 65F 55M? No   Social History Narrative    Not on file     Social Drivers of Health     Financial Resource Strain: Low Risk  (2025)    Financial Resource Strain     Within the past 12 months, have you or your family members you live with been unable to get utilities (heat, electricity) when it was really needed?: No   Food Insecurity: Low Risk  (2025)    Food Insecurity     Within the past 12 months, did you worry that your food would run out  before you got money to buy more?: No     Within the past 12 months, did the food you bought just not last and you didn t have money to get more?: No   Transportation Needs: Low Risk  (6/12/2025)    Transportation Needs     Within the past 12 months, has lack of transportation kept you from medical appointments, getting your medicines, non-medical meetings or appointments, work, or from getting things that you need?: No   Physical Activity: Unknown (6/12/2025)    Exercise Vital Sign     Days of Exercise per Week: Patient declined     Minutes of Exercise per Session: Not on file   Stress: No Stress Concern Present (6/12/2025)    [employer] - Occupational Stress Questionnaire     Feeling of Stress : Not at all   Social Connections: Unknown (6/12/2025)    Social Connection and Isolation Panel [NHANES]     Frequency of Communication with Friends and Family: Not on file     Frequency of Social Gatherings with Friends and Family: Twice a week     Attends Restoration Services: Not on file     Active Member of Clubs or Organizations: Not on file     Attends Club or Organization Meetings: Not on file     Marital Status: Not on file   Interpersonal Safety: Low Risk  (4/18/2024)    Interpersonal Safety     Do you feel physically and emotionally safe where you currently live?: Yes     Within the past 12 months, have you been hit, slapped, kicked or otherwise physically hurt by someone?: No     Within the past 12 months, have you been humiliated or emotionally abused in other ways by your partner or ex-partner?: No   Housing Stability: Low Risk  (6/12/2025)    Housing Stability     Do you have housing? : Yes     Are you worried about losing your housing?: No     Family History   Problem Relation Age of Onset    Thyroid Disease Maternal Aunt     Thyroid Disease Other         cousin      ROS    Allergies   Allergen Reactions    Simvastatin      Buttock pain    Penicillins Rash     Current Outpatient  Medications   Medication Sig Dispense Refill    ACE/ARB/ARNI NOT PRESCRIBED (INTENTIONAL) Please choose reason not prescribed from choices below.      atorvastatin (LIPITOR) 10 MG tablet Take 1 tablet (10 mg) by mouth at bedtime. TAKE 1 TABLET(10 MG) BY MOUTH AT BEDTIME 90 tablet 3    citalopram (CELEXA) 10 MG tablet Take 1 tablet by mouth daily 90 tablet 3    diclofenac (VOLTAREN) 50 MG EC tablet Take 1 tablet (50 mg) by mouth 2 times daily. (Patient taking differently: Take 50 mg by mouth 2 times daily as needed.) 30 tablet 2    donepezil (ARICEPT) 10 MG tablet Take 1 tablet (10 mg) by mouth at bedtime. 90 tablet 3    medical cannabis (Patient's own supply) Take 1 Dose by mouth See Admin Instructions. (The purpose of this order is to document that the patient reports taking medical cannabis.  This is not a prescription, and is not used to certify that the patient has a qualifying medical condition.)   4.3 mg THC and .7mg CBD.   ( at bedtime)      traZODone (DESYREL) 100 MG tablet Take 1 tablet (100 mg) by mouth at bedtime. TAKE 1 TABLET(100 MG) BY MOUTH AT BEDTIME 90 tablet 3     No current facility-administered medications for this visit.     LMP  (LMP Unknown)   GENERAL: healthy, alert and no distress  EYES: Eyes grossly normal to inspection, conjunctivae and sclerae normal  HENT: normal cephalic/atraumatic.  External ears, nose and mouth without ulcers or lesions.  RESP: no audible wheeze, cough, or visible cyanosis.  No visible retractions or increased work of breathing.  Able to speak fully in complete sentences.  NEURO: Cranial nerves grossly intact, mentation intact and speech normal  PSYCH: mentation appears normal, affect normal/bright, judgement and insight intact, normal speech and appearance well-groomed    Labs 6/17/25  HgbA1c 5.4   Urinalysis 0-2 RBC, no WBC    CC  Patient Care Team:  Janeen Romero APRN CNP as PCP - General (Family Medicine)  Hooper, Darline SHEN PA-C as Physician Assistant  (Cardiovascular Disease)  Melchor Stevenson MD as MD (Cardiovascular Disease)  Janeen Humphreys APRN CNP as Assigned PCP  Yeo, Albert, MD as Assigned Musculoskeletal Provider  Ekaterina Zhong MD as MD (Urology)  Aba Conklin MD as Assigned Neuroscience Provider  JANEEN HUMPHREYS

## 2025-07-22 NOTE — PATIENT INSTRUCTIONS
Try to minimize fluids after dinner    Websites with free information:    American [employer] patient website: www.voicesforpfd.org    Total Control Program: www.totalcontrolprogram.BadAbroad    [facility] [place]  [address]  [place]   [phone]  Park in the [facility] parking ramp    It was a pleasure meeting with you today.  Thank you for allowing me and my team the privilege of caring for you today.  YOU are the reason we are here, and I truly hope we provided you with the excellent service you deserve.  Please let us know if there is anything else we can do for you so that we can be sure you are leaving completely satisfied with your care experience.    Cystoscopy    Cystoscopy is a procedure that lets your doctor look directly inside your urethra and bladder. It can be used to:  Help diagnose a problem with your urethra, bladder, or kidneys.  Take a sample (biopsy) of bladder or urethral tissue.  Treat certain problems (such as removing kidney stones).  Place a stent to bypass an obstruction.  Take special X-rays of the kidneys.  Based on the findings, your doctor may recommend other tests or treatments.  What is a cystoscope?  A cystoscope is a telescope-like instrument that contains lenses and fiberoptics (small glass wires that make bright light). The cystoscope may be straight and rigid, or flexible to bend around curves in the urethra. The doctor may look directly into the cystoscope, or project the image onto a monitor.  Getting ready  Ask your doctor if you should stop taking any medicines before the procedure.  Follow any other instructions your doctor gives you.  Tell your doctor before the exam if you:  Take any medicines, such as aspirin or blood thinners  Have allergies to any medicines  Are pregnant   The procedure  Cystoscopy is done in the doctor s office, surgery center, or hospital. The doctor and a nurse are present during the procedure. It takes only a few  minutes, longer if a biopsy, X-ray, or treatment needs to be done.  During the procedure:  You lie on an exam table on your back, knees bent and legs apart. You are covered with a drape.  Your urethra and the area around it are washed. Anesthetic jelly may be applied to numb the urethra.  The cystoscope is inserted. A sterile fluid is put into the bladder to expand it. You may feel pressure from this fluid.  When the procedure is done, the cystoscope is removed.  After the procedure   Once you re home:  Drink plenty of fluids.  You may have burning or light bleeding when you urinate--this is normal.  Medicines may be prescribed to ease any discomfort or prevent infection. Take these as directed.  Call your doctor if you have heavy bleeding or blood clots, burning that lasts more than a day, a fever over 100 F  (38  C), or trouble urinating.  Date Last Reviewed: 1/1/2017 2000-2017 [employer]. [address] [place] [address], [place] [address]. All rights reserved. This information is not intended as a substitute for professional medical care. Always follow your healthcare professional's instructions.

## 2025-07-24 ENCOUNTER — TELEPHONE (OUTPATIENT)
Dept: UROLOGY | Facility: CLINIC | Age: 85
End: 2025-07-24
Payer: MEDICARE

## 2025-07-24 NOTE — TELEPHONE ENCOUNTER
----- Message from Isis GREENFIELD sent at 7/23/2025  8:29 AM CDT -----  Regarding: Cysto  PVR  and Cystoscopy in [facility]  7/22/25

## 2025-08-20 ENCOUNTER — OFFICE VISIT (OUTPATIENT)
Dept: UROLOGY | Facility: CLINIC | Age: 85
End: 2025-08-20
Payer: MEDICARE

## 2025-08-20 VITALS
SYSTOLIC BLOOD PRESSURE: 129 MMHG | OXYGEN SATURATION: 97 % | DIASTOLIC BLOOD PRESSURE: 84 MMHG | WEIGHT: 200 LBS | BODY MASS INDEX: 28.63 KG/M2 | HEART RATE: 75 BPM | HEIGHT: 70 IN

## 2025-08-20 DIAGNOSIS — N36.8 PROLAPSED URETHRAL MUCOSA: ICD-10-CM

## 2025-08-20 DIAGNOSIS — R35.0 URINARY FREQUENCY: Primary | ICD-10-CM

## 2025-08-20 DIAGNOSIS — N32.9 LESION OF BLADDER: ICD-10-CM

## 2025-08-20 DIAGNOSIS — N28.89 URETEROCELE: ICD-10-CM

## 2025-08-20 DIAGNOSIS — R35.1 NOCTURIA: ICD-10-CM

## 2025-08-20 DIAGNOSIS — R32 URINARY INCONTINENCE, UNSPECIFIED TYPE: ICD-10-CM

## 2025-08-20 LAB
ALBUMIN UR-MCNC: NEGATIVE MG/DL
APPEARANCE UR: CLEAR
BILIRUB UR QL STRIP: NEGATIVE
COLOR UR AUTO: YELLOW
GLUCOSE UR STRIP-MCNC: NEGATIVE MG/DL
HGB UR QL STRIP: NEGATIVE
KETONES UR STRIP-MCNC: NEGATIVE MG/DL
LEUKOCYTE ESTERASE UR QL STRIP: NEGATIVE
NITRATE UR QL: NEGATIVE
PH UR STRIP: 5.5 [PH] (ref 5–7)
SP GR UR STRIP: 1.02 (ref 1–1.03)
UROBILINOGEN UR STRIP-ACNC: 0.2 E.U./DL

## 2025-08-20 PROCEDURE — 81003 URINALYSIS AUTO W/O SCOPE: CPT | Mod: QW | Performed by: UROLOGY

## 2025-08-20 PROCEDURE — 3079F DIAST BP 80-89 MM HG: CPT | Performed by: UROLOGY

## 2025-08-20 PROCEDURE — 52000 CYSTOURETHROSCOPY: CPT | Performed by: UROLOGY

## 2025-08-20 PROCEDURE — 3074F SYST BP LT 130 MM HG: CPT | Performed by: UROLOGY

## 2025-08-20 RX ORDER — LIDOCAINE HYDROCHLORIDE 20 MG/ML
JELLY TOPICAL ONCE
Status: COMPLETED | OUTPATIENT
Start: 2025-08-20 | End: 2025-08-20

## 2025-08-20 RX ADMIN — LIDOCAINE HYDROCHLORIDE 5 ML: 20 JELLY TOPICAL at 09:45

## 2025-08-25 ENCOUNTER — LAB (OUTPATIENT)
Dept: LAB | Facility: CLINIC | Age: 85
End: 2025-08-25
Payer: MEDICARE

## 2025-08-25 DIAGNOSIS — N18.30 CKD (CHRONIC KIDNEY DISEASE) STAGE 3, GFR 30-59 ML/MIN (H): Primary | ICD-10-CM

## 2025-08-25 LAB
ANION GAP SERPL CALCULATED.3IONS-SCNC: 8 MMOL/L (ref 7–15)
BUN SERPL-MCNC: 15.1 MG/DL (ref 8–23)
CALCIUM SERPL-MCNC: 9.5 MG/DL (ref 8.8–10.4)
CHLORIDE SERPL-SCNC: 105 MMOL/L (ref 98–107)
CREAT SERPL-MCNC: 0.99 MG/DL (ref 0.51–0.95)
EGFRCR SERPLBLD CKD-EPI 2021: 56 ML/MIN/1.73M2
GLUCOSE SERPL-MCNC: 95 MG/DL (ref 70–99)
HCO3 SERPL-SCNC: 26 MMOL/L (ref 22–29)
HGB BLD-MCNC: 14.1 G/DL (ref 11.7–15.7)
MCV RBC AUTO: 92.4 FL (ref 78–100)
POTASSIUM SERPL-SCNC: 4.5 MMOL/L (ref 3.4–5.3)
SODIUM SERPL-SCNC: 139 MMOL/L (ref 135–145)

## 2025-08-25 PROCEDURE — 36415 COLL VENOUS BLD VENIPUNCTURE: CPT

## 2025-08-25 PROCEDURE — 80048 BASIC METABOLIC PNL TOTAL CA: CPT

## 2025-08-25 PROCEDURE — 85018 HEMOGLOBIN: CPT

## 2025-09-04 ENCOUNTER — HOSPITAL ENCOUNTER (OUTPATIENT)
Dept: CT IMAGING | Facility: CLINIC | Age: 85
Discharge: HOME | End: 2025-09-04
Attending: UROLOGY
Payer: MEDICARE

## 2025-09-04 ENCOUNTER — HOSPITAL ENCOUNTER (OUTPATIENT)
Dept: NUCLEAR MEDICINE | Facility: CLINIC | Age: 85
Discharge: HOME | End: 2025-09-04
Attending: UROLOGY
Payer: MEDICARE

## 2025-09-04 DIAGNOSIS — R35.0 URINARY FREQUENCY: ICD-10-CM

## 2025-09-04 DIAGNOSIS — N28.89 URETEROCELE: ICD-10-CM

## 2025-09-04 DIAGNOSIS — R35.1 NOCTURIA: ICD-10-CM

## 2025-09-04 DIAGNOSIS — N36.8 PROLAPSED URETHRAL MUCOSA: ICD-10-CM

## 2025-09-04 DIAGNOSIS — R32 URINARY INCONTINENCE, UNSPECIFIED TYPE: ICD-10-CM

## 2025-09-04 DIAGNOSIS — N32.9 LESION OF BLADDER: ICD-10-CM

## 2025-09-04 PROCEDURE — 255N000002 HC RX 255 OP 636: Performed by: UROLOGY

## 2025-09-04 PROCEDURE — A9562 TC99M MERTIATIDE: HCPCS | Performed by: UROLOGY

## 2025-09-04 PROCEDURE — 78708 K FLOW/FUNCT IMAGE W/DRUG: CPT

## 2025-09-04 PROCEDURE — 250N000009 HC RX 250: Performed by: UROLOGY

## 2025-09-04 PROCEDURE — 343N000001 HC RX 343 MED OP 636: Performed by: UROLOGY

## 2025-09-04 PROCEDURE — 74178 CT ABD&PLV WO CNTR FLWD CNTR: CPT

## 2025-09-04 RX ORDER — FUROSEMIDE 10 MG/ML
40 INJECTION INTRAMUSCULAR; INTRAVENOUS ONCE
Status: COMPLETED | OUTPATIENT
Start: 2025-09-04 | End: 2025-09-04

## 2025-09-04 RX ADMIN — FUROSEMIDE 40 MG: 10 INJECTION INTRAMUSCULAR; INTRAVENOUS at 11:00

## 2025-09-04 RX ADMIN — IOHEXOL 104 ML: 350 INJECTION, SOLUTION INTRAVENOUS at 11:31

## 2025-09-04 RX ADMIN — TECHNESCAN TC 99M MERTIATIDE 8.6 MILLICURIE: 1 INJECTION, POWDER, LYOPHILIZED, FOR SOLUTION INTRAVENOUS at 10:50

## 2025-09-04 RX ADMIN — SODIUM CHLORIDE 70 ML: 9 INJECTION, SOLUTION INTRAVENOUS at 11:31

## (undated) DEVICE — CATH ON-Q PAIN SILVER SKR 2.5" PM010-A

## (undated) DEVICE — GLOVE PROTEXIS W/NEU-THERA 8.5  2D73TE85

## (undated) DEVICE — GOWN XXL 9575

## (undated) DEVICE — SU VICRYL 2-0 CT-1 27" J339H

## (undated) DEVICE — PACK SMALL SPINE RIDGES

## (undated) DEVICE — SU SILK 2-0 TIE 24" SA75H

## (undated) DEVICE — SUCTION CANISTER MEDIVAC LINER 3000ML W/LID 65651-530

## (undated) DEVICE — GLOVE PROTEXIS W/NEU-THERA 7.5  2D73TE75

## (undated) DEVICE — PEN MARKING SKIN W/LABELS 31145884

## (undated) DEVICE — SUCTION FRAZIER 12FR W/OBTURATOR 33120

## (undated) DEVICE — ENDO TROCAR THORACIC 10/12MM TT012

## (undated) DEVICE — TUBING SUCTION MEDI-VAC SOFT 3/16"X20' N520A

## (undated) DEVICE — LINEN ORTHO ACL PACK 5447

## (undated) DEVICE — MIDAS REX DISSECTING TOOL  14MH30

## (undated) DEVICE — ESU CLEANER TIP 31142717

## (undated) DEVICE — DRAPE IOBAN INCISE 23X17" 6650EZ

## (undated) DEVICE — GLOVE PROTEXIS BLUE W/NEU-THERA 8.5  2D73EB85

## (undated) DEVICE — GLOVE PROTEXIS W/NEU-THERA 6.5  2D73TE65

## (undated) DEVICE — SU PROLENE 3-0 V-7DA 36" 8976H

## (undated) DEVICE — LINEN TOWEL PACK X5 5464

## (undated) DEVICE — SU VICRYL 1 CTX CR 8X18" J765D

## (undated) DEVICE — LINEN POUCH DBL 5427

## (undated) DEVICE — DRSG KERLIX FLUFFS X5

## (undated) DEVICE — DECANTER BAG 2002S

## (undated) DEVICE — SU VICRYL 0 CT-2 CR 8X18" J727D

## (undated) DEVICE — PREP DURAPREP 26ML APL 8630

## (undated) DEVICE — STPL SKIN SUBCUTICULAR INSORB  2030

## (undated) DEVICE — SU WND CLOSURE VLOC 180 ABS 3-0 6" V-20 VLOCL0604

## (undated) DEVICE — PACK MINOR SBA15MIFSE

## (undated) DEVICE — DRSG KERLIX 4 1/2"X4YDS ROLL 6715

## (undated) DEVICE — ESU PENCIL W/HOLSTER E2350H

## (undated) DEVICE — SUCTION DRY CHEST DRAIN OASIS 3600-100

## (undated) DEVICE — CUSHION INSERT LG PRONE VIEW JACKSON TABLE

## (undated) DEVICE — SOL NACL 0.9% IRRIG 1000ML BOTTLE 2F7124

## (undated) DEVICE — BLADE KNIFE SURG 15 371115

## (undated) DEVICE — DRAIN CHEST TUBE RIGHT ANGLED 28FR 8128

## (undated) DEVICE — SYSTEM CLEARIFY VISUALIZATION 21-345

## (undated) DEVICE — NDL 22GA 1.5"

## (undated) DEVICE — SU PROLENE 4-0 V-7DA 36" 8975H

## (undated) DEVICE — SU VICRYL 2-0 CT-2 CR 8X18" J726D

## (undated) DEVICE — GLOVE PROTEXIS BLUE W/NEU-THERA 6.5  2D73EB65

## (undated) DEVICE — SU VICRYL 1 CT 36" J959H

## (undated) DEVICE — ESU ELEC BLADE 6" COATED E1450-6

## (undated) DEVICE — BLADE KNIFE SURG 11 371111

## (undated) DEVICE — DRSG GAUZE 4X4" 3033

## (undated) DEVICE — ANTIFOG SOLUTION W/FOAM PAD 31142527

## (undated) DEVICE — DRAPE MICROSCOPE OPMI ZEISS 48X118" 306071-0000-000

## (undated) DEVICE — SPONGE COTTONOID 1/2X1/2" 80-1400

## (undated) DEVICE — TAPE DRSG UNIVERSAL CLOTH 3" WHITE LATEX 881-3

## (undated) DEVICE — DRAPE POUCH INSTRUMENT 1018

## (undated) DEVICE — DRAIN CHEST TUBE 28FR STR 8028

## (undated) DEVICE — ESU GROUND PAD UNIVERSAL W/O CORD

## (undated) DEVICE — SU SILK 2 REEL 60" SA8H

## (undated) DEVICE — NDL SPINAL 18GA 3.5" 405184

## (undated) DEVICE — SPONGE SURGIFOAM 100 1974

## (undated) DEVICE — DECANTER VIAL 2006S

## (undated) DEVICE — SOL WATER IRRIG 1000ML BOTTLE 2F7114

## (undated) DEVICE — SU NDL CUT REV MED 3/8 209014

## (undated) DEVICE — GLOVE PROTEXIS MICRO 6.5  2D73PM65

## (undated) DEVICE — SYR BULB IRRIG 50ML LATEX FREE 0035280

## (undated) DEVICE — SPONGE RAY-TEC 4X8" 7318

## (undated) DEVICE — DRAIN PENROSE 0.75"X18" LATEX FREE GR205

## (undated) DEVICE — ESU GROUND PAD ADULT W/CORD E7507

## (undated) DEVICE — RX SURGIFLO HEMOSTATIC MATRIX 8ML 2991

## (undated) DEVICE — PREP CHLORAPREP 26ML TINTED ORANGE  260815

## (undated) DEVICE — DRAPE BREAST/CHEST 29420

## (undated) DEVICE — GOWN IMPERVIOUS ZONED LG

## (undated) DEVICE — SYR 10ML LL W/O NDL

## (undated) RX ORDER — PROPOFOL 10 MG/ML
INJECTION, EMULSION INTRAVENOUS
Status: DISPENSED
Start: 2020-05-05

## (undated) RX ORDER — ONDANSETRON 2 MG/ML
INJECTION INTRAMUSCULAR; INTRAVENOUS
Status: DISPENSED
Start: 2020-05-05

## (undated) RX ORDER — DEXAMETHASONE SODIUM PHOSPHATE 4 MG/ML
INJECTION, SOLUTION INTRA-ARTICULAR; INTRALESIONAL; INTRAMUSCULAR; INTRAVENOUS; SOFT TISSUE
Status: DISPENSED
Start: 2017-11-10

## (undated) RX ORDER — GLYCOPYRROLATE 0.2 MG/ML
INJECTION INTRAMUSCULAR; INTRAVENOUS
Status: DISPENSED
Start: 2017-11-10

## (undated) RX ORDER — FENTANYL CITRATE 50 UG/ML
INJECTION, SOLUTION INTRAMUSCULAR; INTRAVENOUS
Status: DISPENSED
Start: 2020-05-05

## (undated) RX ORDER — DEXAMETHASONE SODIUM PHOSPHATE 4 MG/ML
INJECTION, SOLUTION INTRA-ARTICULAR; INTRALESIONAL; INTRAMUSCULAR; INTRAVENOUS; SOFT TISSUE
Status: DISPENSED
Start: 2020-05-05

## (undated) RX ORDER — ONDANSETRON 4 MG/1
TABLET, ORALLY DISINTEGRATING ORAL
Status: DISPENSED
Start: 2017-11-10

## (undated) RX ORDER — HYDROMORPHONE HYDROCHLORIDE 1 MG/ML
INJECTION, SOLUTION INTRAMUSCULAR; INTRAVENOUS; SUBCUTANEOUS
Status: DISPENSED
Start: 2017-11-10

## (undated) RX ORDER — HYDROMORPHONE HYDROCHLORIDE 1 MG/ML
INJECTION, SOLUTION INTRAMUSCULAR; INTRAVENOUS; SUBCUTANEOUS
Status: DISPENSED
Start: 2020-05-05

## (undated) RX ORDER — FENTANYL CITRATE 50 UG/ML
INJECTION, SOLUTION INTRAMUSCULAR; INTRAVENOUS
Status: DISPENSED
Start: 2017-11-10

## (undated) RX ORDER — PROPOFOL 10 MG/ML
INJECTION, EMULSION INTRAVENOUS
Status: DISPENSED
Start: 2017-11-10

## (undated) RX ORDER — NEOSTIGMINE METHYLSULFATE 1 MG/ML
VIAL (ML) INJECTION
Status: DISPENSED
Start: 2017-11-10

## (undated) RX ORDER — BUPIVACAINE HYDROCHLORIDE 5 MG/ML
INJECTION, SOLUTION EPIDURAL; INTRACAUDAL
Status: DISPENSED
Start: 2020-05-05

## (undated) RX ORDER — CEFAZOLIN SODIUM 2 G/100ML
INJECTION, SOLUTION INTRAVENOUS
Status: DISPENSED
Start: 2020-05-05

## (undated) RX ORDER — LIDOCAINE HYDROCHLORIDE 10 MG/ML
INJECTION, SOLUTION EPIDURAL; INFILTRATION; INTRACAUDAL; PERINEURAL
Status: DISPENSED
Start: 2017-11-10

## (undated) RX ORDER — ALBUMIN, HUMAN INJ 5% 5 %
SOLUTION INTRAVENOUS
Status: DISPENSED
Start: 2020-05-05

## (undated) RX ORDER — CEFAZOLIN SODIUM 2 G/100ML
INJECTION, SOLUTION INTRAVENOUS
Status: DISPENSED
Start: 2017-11-10

## (undated) RX ORDER — GLYCOPYRROLATE 0.2 MG/ML
INJECTION, SOLUTION INTRAMUSCULAR; INTRAVENOUS
Status: DISPENSED
Start: 2020-05-05

## (undated) RX ORDER — NEOSTIGMINE METHYLSULFATE 1 MG/ML
VIAL (ML) INJECTION
Status: DISPENSED
Start: 2020-05-05

## (undated) RX ORDER — ONDANSETRON 2 MG/ML
INJECTION INTRAMUSCULAR; INTRAVENOUS
Status: DISPENSED
Start: 2017-11-10

## (undated) RX ORDER — LIDOCAINE HYDROCHLORIDE 20 MG/ML
INJECTION, SOLUTION EPIDURAL; INFILTRATION; INTRACAUDAL; PERINEURAL
Status: DISPENSED
Start: 2020-05-05

## (undated) RX ORDER — BUPIVACAINE HYDROCHLORIDE AND EPINEPHRINE 5; 5 MG/ML; UG/ML
INJECTION, SOLUTION EPIDURAL; INTRACAUDAL; PERINEURAL
Status: DISPENSED
Start: 2017-11-10

## (undated) RX ORDER — FUROSEMIDE 10 MG/ML
INJECTION INTRAMUSCULAR; INTRAVENOUS
Status: DISPENSED
Start: 2025-09-04